# Patient Record
Sex: MALE | Race: WHITE | NOT HISPANIC OR LATINO | Employment: PART TIME | URBAN - METROPOLITAN AREA
[De-identification: names, ages, dates, MRNs, and addresses within clinical notes are randomized per-mention and may not be internally consistent; named-entity substitution may affect disease eponyms.]

---

## 2021-12-02 ENCOUNTER — APPOINTMENT (OUTPATIENT)
Dept: CARDIOLOGY | Facility: HOSPITAL | Age: 61
End: 2021-12-02
Attending: INTERNAL MEDICINE
Payer: COMMERCIAL

## 2021-12-02 ENCOUNTER — APPOINTMENT (OUTPATIENT)
Dept: CT IMAGING | Facility: HOSPITAL | Age: 61
End: 2021-12-02
Attending: EMERGENCY MEDICINE
Payer: COMMERCIAL

## 2021-12-02 ENCOUNTER — APPOINTMENT (OUTPATIENT)
Dept: RADIOLOGY | Facility: HOSPITAL | Age: 61
End: 2021-12-02
Attending: EMERGENCY MEDICINE
Payer: COMMERCIAL

## 2021-12-02 ENCOUNTER — HOSPITAL ENCOUNTER (INPATIENT)
Facility: HOSPITAL | Age: 61
LOS: 3 days | Discharge: HOME | End: 2021-12-05
Attending: EMERGENCY MEDICINE | Admitting: HOSPITALIST
Payer: COMMERCIAL

## 2021-12-02 ENCOUNTER — APPOINTMENT (OUTPATIENT)
Dept: MRI IMAGING | Facility: HOSPITAL | Age: 61
End: 2021-12-02
Attending: HOSPITALIST
Payer: COMMERCIAL

## 2021-12-02 DIAGNOSIS — I24.9 ACS (ACUTE CORONARY SYNDROME) (H): ICD-10-CM

## 2021-12-02 DIAGNOSIS — E78.5 HYPERLIPIDEMIA, UNSPECIFIED HYPERLIPIDEMIA TYPE: ICD-10-CM

## 2021-12-02 DIAGNOSIS — I10 HYPERTENSION, UNSPECIFIED TYPE: ICD-10-CM

## 2021-12-02 DIAGNOSIS — R00.1 BRADYCARDIA: ICD-10-CM

## 2021-12-02 DIAGNOSIS — I16.1 HYPERTENSIVE EMERGENCY: Primary | ICD-10-CM

## 2021-12-02 DIAGNOSIS — I21.3 ST ELEVATION MI (STEMI) (H): ICD-10-CM

## 2021-12-02 PROBLEM — Z98.890 STATUS POST CORONARY ANGIOGRAM: Status: ACTIVE | Noted: 2021-12-02

## 2021-12-02 LAB
ACT BLD: 297 SECONDS (ref 74–150)
ANION GAP SERPL CALCULATED.3IONS-SCNC: 14 MMOL/L (ref 5–18)
APTT PPP: 27 SECONDS (ref 22–38)
ATRIAL RATE - MUSE: 49 BPM
BASOPHILS # BLD AUTO: 0.1 10E3/UL (ref 0–0.2)
BASOPHILS NFR BLD AUTO: 1 %
BUN SERPL-MCNC: 18 MG/DL (ref 8–22)
CALCIUM SERPL-MCNC: 9.8 MG/DL (ref 8.5–10.5)
CATH EF ESTIMATED: 60 %
CHLORIDE BLD-SCNC: 108 MMOL/L (ref 98–107)
CHOLEST SERPL-MCNC: 187 MG/DL
CO2 SERPL-SCNC: 19 MMOL/L (ref 22–31)
CREAT SERPL-MCNC: 0.91 MG/DL (ref 0.7–1.3)
DIASTOLIC BLOOD PRESSURE - MUSE: NORMAL MMHG
EOSINOPHIL # BLD AUTO: 0.2 10E3/UL (ref 0–0.7)
EOSINOPHIL NFR BLD AUTO: 3 %
ERYTHROCYTE [DISTWIDTH] IN BLOOD BY AUTOMATED COUNT: 12.4 % (ref 10–15)
GFR SERPL CREATININE-BSD FRML MDRD: >90 ML/MIN/1.73M2
GLUCOSE BLD-MCNC: 150 MG/DL (ref 70–125)
HCT VFR BLD AUTO: 46 % (ref 40–53)
HDLC SERPL-MCNC: 51 MG/DL
HGB BLD-MCNC: 15.9 G/DL (ref 13.3–17.7)
HOLD SPECIMEN: NORMAL
IMM GRANULOCYTES # BLD: 0 10E3/UL
IMM GRANULOCYTES NFR BLD: 1 %
INR PPP: 1.05 (ref 0.9–1.15)
INTERPRETATION ECG - MUSE: NORMAL
LDLC SERPL CALC-MCNC: 109 MG/DL
LVEF ECHO: NORMAL
LYMPHOCYTES # BLD AUTO: 1.6 10E3/UL (ref 0.8–5.3)
LYMPHOCYTES NFR BLD AUTO: 25 %
MCH RBC QN AUTO: 31.9 PG (ref 26.5–33)
MCHC RBC AUTO-ENTMCNC: 34.6 G/DL (ref 31.5–36.5)
MCV RBC AUTO: 92 FL (ref 78–100)
MONOCYTES # BLD AUTO: 0.6 10E3/UL (ref 0–1.3)
MONOCYTES NFR BLD AUTO: 9 %
NEUTROPHILS # BLD AUTO: 3.9 10E3/UL (ref 1.6–8.3)
NEUTROPHILS NFR BLD AUTO: 61 %
NRBC # BLD AUTO: 0 10E3/UL
NRBC BLD AUTO-RTO: 0 /100
P AXIS - MUSE: 45 DEGREES
PLATELET # BLD AUTO: 268 10E3/UL (ref 150–450)
POTASSIUM BLD-SCNC: 3.6 MMOL/L (ref 3.5–5)
PR INTERVAL - MUSE: 200 MS
QRS DURATION - MUSE: 162 MS
QT - MUSE: 526 MS
QTC - MUSE: 475 MS
R AXIS - MUSE: -7 DEGREES
RBC # BLD AUTO: 4.99 10E6/UL (ref 4.4–5.9)
SARS-COV-2 RNA RESP QL NAA+PROBE: NEGATIVE
SODIUM SERPL-SCNC: 141 MMOL/L (ref 136–145)
SYSTOLIC BLOOD PRESSURE - MUSE: NORMAL MMHG
T AXIS - MUSE: 118 DEGREES
TRIGL SERPL-MCNC: 134 MG/DL
TROPONIN I SERPL-MCNC: 0.02 NG/ML (ref 0–0.29)
TROPONIN I SERPL-MCNC: 0.03 NG/ML (ref 0–0.29)
TROPONIN I SERPL-MCNC: 0.04 NG/ML (ref 0–0.29)
VENTRICULAR RATE- MUSE: 49 BPM
WBC # BLD AUTO: 6.4 10E3/UL (ref 4–11)

## 2021-12-02 PROCEDURE — 250N000011 HC RX IP 250 OP 636: Performed by: EMERGENCY MEDICINE

## 2021-12-02 PROCEDURE — 80048 BASIC METABOLIC PNL TOTAL CA: CPT | Performed by: EMERGENCY MEDICINE

## 2021-12-02 PROCEDURE — 99152 MOD SED SAME PHYS/QHP 5/>YRS: CPT | Performed by: INTERNAL MEDICINE

## 2021-12-02 PROCEDURE — B2111ZZ FLUOROSCOPY OF MULTIPLE CORONARY ARTERIES USING LOW OSMOLAR CONTRAST: ICD-10-PCS | Performed by: INTERNAL MEDICINE

## 2021-12-02 PROCEDURE — 85730 THROMBOPLASTIN TIME PARTIAL: CPT | Performed by: EMERGENCY MEDICINE

## 2021-12-02 PROCEDURE — 250N000013 HC RX MED GY IP 250 OP 250 PS 637: Performed by: HOSPITALIST

## 2021-12-02 PROCEDURE — 272N000001 HC OR GENERAL SUPPLY STERILE: Performed by: INTERNAL MEDICINE

## 2021-12-02 PROCEDURE — 255N000002 HC RX 255 OP 636: Performed by: INTERNAL MEDICINE

## 2021-12-02 PROCEDURE — 71045 X-RAY EXAM CHEST 1 VIEW: CPT

## 2021-12-02 PROCEDURE — C1894 INTRO/SHEATH, NON-LASER: HCPCS | Performed by: INTERNAL MEDICINE

## 2021-12-02 PROCEDURE — 250N000013 HC RX MED GY IP 250 OP 250 PS 637: Performed by: EMERGENCY MEDICINE

## 2021-12-02 PROCEDURE — 99223 1ST HOSP IP/OBS HIGH 75: CPT | Mod: AI | Performed by: HOSPITALIST

## 2021-12-02 PROCEDURE — 84484 ASSAY OF TROPONIN QUANT: CPT | Performed by: INTERNAL MEDICINE

## 2021-12-02 PROCEDURE — C9803 HOPD COVID-19 SPEC COLLECT: HCPCS

## 2021-12-02 PROCEDURE — 96374 THER/PROPH/DIAG INJ IV PUSH: CPT

## 2021-12-02 PROCEDURE — 250N000009 HC RX 250: Performed by: INTERNAL MEDICINE

## 2021-12-02 PROCEDURE — 85610 PROTHROMBIN TIME: CPT | Performed by: EMERGENCY MEDICINE

## 2021-12-02 PROCEDURE — 93458 L HRT ARTERY/VENTRICLE ANGIO: CPT | Mod: 26 | Performed by: INTERNAL MEDICINE

## 2021-12-02 PROCEDURE — 250N000011 HC RX IP 250 OP 636: Performed by: INTERNAL MEDICINE

## 2021-12-02 PROCEDURE — 99223 1ST HOSP IP/OBS HIGH 75: CPT | Mod: 25 | Performed by: INTERNAL MEDICINE

## 2021-12-02 PROCEDURE — 255N000002 HC RX 255 OP 636: Performed by: HOSPITALIST

## 2021-12-02 PROCEDURE — 85347 COAGULATION TIME ACTIVATED: CPT

## 2021-12-02 PROCEDURE — 93306 TTE W/DOPPLER COMPLETE: CPT | Mod: 26 | Performed by: INTERNAL MEDICINE

## 2021-12-02 PROCEDURE — 70553 MRI BRAIN STEM W/O & W/DYE: CPT

## 2021-12-02 PROCEDURE — 250N000013 HC RX MED GY IP 250 OP 250 PS 637: Performed by: INTERNAL MEDICINE

## 2021-12-02 PROCEDURE — 93005 ELECTROCARDIOGRAM TRACING: CPT

## 2021-12-02 PROCEDURE — 93005 ELECTROCARDIOGRAM TRACING: CPT | Performed by: EMERGENCY MEDICINE

## 2021-12-02 PROCEDURE — 85025 COMPLETE CBC W/AUTO DIFF WBC: CPT | Performed by: EMERGENCY MEDICINE

## 2021-12-02 PROCEDURE — 70450 CT HEAD/BRAIN W/O DYE: CPT

## 2021-12-02 PROCEDURE — 99285 EMERGENCY DEPT VISIT HI MDM: CPT | Mod: 25

## 2021-12-02 PROCEDURE — 80061 LIPID PANEL: CPT | Performed by: INTERNAL MEDICINE

## 2021-12-02 PROCEDURE — A9585 GADOBUTROL INJECTION: HCPCS | Performed by: HOSPITALIST

## 2021-12-02 PROCEDURE — 84484 ASSAY OF TROPONIN QUANT: CPT | Performed by: EMERGENCY MEDICINE

## 2021-12-02 PROCEDURE — 36415 COLL VENOUS BLD VENIPUNCTURE: CPT | Performed by: EMERGENCY MEDICINE

## 2021-12-02 PROCEDURE — 93458 L HRT ARTERY/VENTRICLE ANGIO: CPT | Performed by: INTERNAL MEDICINE

## 2021-12-02 PROCEDURE — C1769 GUIDE WIRE: HCPCS | Performed by: INTERNAL MEDICINE

## 2021-12-02 PROCEDURE — 93010 ELECTROCARDIOGRAM REPORT: CPT | Performed by: INTERNAL MEDICINE

## 2021-12-02 PROCEDURE — 210N000001 HC R&B IMCU HEART CARE

## 2021-12-02 PROCEDURE — B2151ZZ FLUOROSCOPY OF LEFT HEART USING LOW OSMOLAR CONTRAST: ICD-10-PCS | Performed by: INTERNAL MEDICINE

## 2021-12-02 PROCEDURE — C1887 CATHETER, GUIDING: HCPCS | Performed by: INTERNAL MEDICINE

## 2021-12-02 PROCEDURE — 87635 SARS-COV-2 COVID-19 AMP PRB: CPT | Performed by: EMERGENCY MEDICINE

## 2021-12-02 PROCEDURE — 36415 COLL VENOUS BLD VENIPUNCTURE: CPT | Performed by: INTERNAL MEDICINE

## 2021-12-02 PROCEDURE — C1725 CATH, TRANSLUMIN NON-LASER: HCPCS | Performed by: INTERNAL MEDICINE

## 2021-12-02 PROCEDURE — 4A023N7 MEASUREMENT OF CARDIAC SAMPLING AND PRESSURE, LEFT HEART, PERCUTANEOUS APPROACH: ICD-10-PCS | Performed by: INTERNAL MEDICINE

## 2021-12-02 RX ORDER — ATENOLOL 25 MG/1
25 TABLET ORAL DAILY
Status: DISCONTINUED | OUTPATIENT
Start: 2021-12-02 | End: 2021-12-02

## 2021-12-02 RX ORDER — ATROPINE SULFATE 0.1 MG/ML
0.5 INJECTION INTRAVENOUS
Status: ACTIVE | OUTPATIENT
Start: 2021-12-02 | End: 2021-12-02

## 2021-12-02 RX ORDER — HYDRALAZINE HYDROCHLORIDE 20 MG/ML
10 INJECTION INTRAMUSCULAR; INTRAVENOUS
Status: COMPLETED | OUTPATIENT
Start: 2021-12-02 | End: 2021-12-02

## 2021-12-02 RX ORDER — HYDROMORPHONE HCL IN WATER/PF 6 MG/30 ML
.2-.4 PATIENT CONTROLLED ANALGESIA SYRINGE INTRAVENOUS
Status: DISCONTINUED | OUTPATIENT
Start: 2021-12-02 | End: 2021-12-05 | Stop reason: HOSPADM

## 2021-12-02 RX ORDER — POLYETHYLENE GLYCOL 3350 17 G/17G
17 POWDER, FOR SOLUTION ORAL DAILY PRN
Status: DISCONTINUED | OUTPATIENT
Start: 2021-12-02 | End: 2021-12-05 | Stop reason: HOSPADM

## 2021-12-02 RX ORDER — ATORVASTATIN CALCIUM 40 MG/1
80 TABLET, FILM COATED ORAL DAILY
Status: DISCONTINUED | OUTPATIENT
Start: 2021-12-03 | End: 2021-12-05 | Stop reason: HOSPADM

## 2021-12-02 RX ORDER — IODIXANOL 320 MG/ML
INJECTION, SOLUTION INTRAVASCULAR
Status: DISCONTINUED | OUTPATIENT
Start: 2021-12-02 | End: 2021-12-02 | Stop reason: HOSPADM

## 2021-12-02 RX ORDER — FENTANYL CITRATE 50 UG/ML
INJECTION, SOLUTION INTRAMUSCULAR; INTRAVENOUS
Status: DISCONTINUED | OUTPATIENT
Start: 2021-12-02 | End: 2021-12-02 | Stop reason: HOSPADM

## 2021-12-02 RX ORDER — ATENOLOL 25 MG/1
50 TABLET ORAL DAILY
Status: DISCONTINUED | OUTPATIENT
Start: 2021-12-03 | End: 2021-12-04

## 2021-12-02 RX ORDER — ATORVASTATIN CALCIUM 40 MG/1
40 TABLET, FILM COATED ORAL DAILY
Status: ON HOLD | COMMUNITY
End: 2021-12-05

## 2021-12-02 RX ORDER — METHOCARBAMOL 500 MG/1
500 TABLET, FILM COATED ORAL 4 TIMES DAILY
Status: DISCONTINUED | OUTPATIENT
Start: 2021-12-02 | End: 2021-12-03

## 2021-12-02 RX ORDER — LANOLIN ALCOHOL/MO/W.PET/CERES
3 CREAM (GRAM) TOPICAL
Status: DISCONTINUED | OUTPATIENT
Start: 2021-12-02 | End: 2021-12-05 | Stop reason: HOSPADM

## 2021-12-02 RX ORDER — SERTRALINE HYDROCHLORIDE 100 MG/1
100 TABLET, FILM COATED ORAL DAILY
Status: ON HOLD | COMMUNITY
End: 2021-12-05

## 2021-12-02 RX ORDER — OXYCODONE HYDROCHLORIDE 5 MG/1
5 TABLET ORAL EVERY 4 HOURS PRN
Status: DISCONTINUED | OUTPATIENT
Start: 2021-12-02 | End: 2021-12-05 | Stop reason: HOSPADM

## 2021-12-02 RX ORDER — ACETAMINOPHEN 325 MG/1
650 TABLET ORAL EVERY 4 HOURS PRN
Status: DISCONTINUED | OUTPATIENT
Start: 2021-12-02 | End: 2021-12-05 | Stop reason: HOSPADM

## 2021-12-02 RX ORDER — OXYCODONE HYDROCHLORIDE 5 MG/1
10 TABLET ORAL EVERY 4 HOURS PRN
Status: DISCONTINUED | OUTPATIENT
Start: 2021-12-02 | End: 2021-12-05 | Stop reason: HOSPADM

## 2021-12-02 RX ORDER — CARBOXYMETHYLCELLULOSE SODIUM 5 MG/ML
1 SOLUTION/ DROPS OPHTHALMIC 3 TIMES DAILY PRN
Status: DISCONTINUED | OUTPATIENT
Start: 2021-12-02 | End: 2021-12-05 | Stop reason: HOSPADM

## 2021-12-02 RX ORDER — GADOBUTROL 604.72 MG/ML
10 INJECTION INTRAVENOUS ONCE
Status: COMPLETED | OUTPATIENT
Start: 2021-12-02 | End: 2021-12-02

## 2021-12-02 RX ORDER — ATORVASTATIN CALCIUM 40 MG/1
40 TABLET, FILM COATED ORAL DAILY
Status: DISCONTINUED | OUTPATIENT
Start: 2021-12-02 | End: 2021-12-02

## 2021-12-02 RX ORDER — NALOXONE HYDROCHLORIDE 0.4 MG/ML
0.2 INJECTION, SOLUTION INTRAMUSCULAR; INTRAVENOUS; SUBCUTANEOUS
Status: ACTIVE | OUTPATIENT
Start: 2021-12-02 | End: 2021-12-02

## 2021-12-02 RX ORDER — CALCIUM CARBONATE 500 MG/1
500 TABLET, CHEWABLE ORAL 3 TIMES DAILY PRN
Status: DISCONTINUED | OUTPATIENT
Start: 2021-12-02 | End: 2021-12-05 | Stop reason: HOSPADM

## 2021-12-02 RX ORDER — NALOXONE HYDROCHLORIDE 0.4 MG/ML
0.4 INJECTION, SOLUTION INTRAMUSCULAR; INTRAVENOUS; SUBCUTANEOUS
Status: ACTIVE | OUTPATIENT
Start: 2021-12-02 | End: 2021-12-02

## 2021-12-02 RX ORDER — NITROGLYCERIN 0.4 MG/1
TABLET SUBLINGUAL
Qty: 25 TABLET | Refills: 3 | Status: SHIPPED | OUTPATIENT
Start: 2021-12-02 | End: 2021-12-05

## 2021-12-02 RX ORDER — ATENOLOL 50 MG/1
50 TABLET ORAL DAILY
Status: ON HOLD | COMMUNITY
End: 2021-12-05

## 2021-12-02 RX ORDER — ONDANSETRON 2 MG/ML
4 INJECTION INTRAMUSCULAR; INTRAVENOUS EVERY 6 HOURS PRN
Status: DISCONTINUED | OUTPATIENT
Start: 2021-12-02 | End: 2021-12-05 | Stop reason: HOSPADM

## 2021-12-02 RX ORDER — ASPIRIN 81 MG/1
324 TABLET, CHEWABLE ORAL ONCE
Status: COMPLETED | OUTPATIENT
Start: 2021-12-02 | End: 2021-12-02

## 2021-12-02 RX ORDER — FLUMAZENIL 0.1 MG/ML
0.2 INJECTION, SOLUTION INTRAVENOUS
Status: ACTIVE | OUTPATIENT
Start: 2021-12-02 | End: 2021-12-02

## 2021-12-02 RX ORDER — BISACODYL 10 MG
10 SUPPOSITORY, RECTAL RECTAL DAILY PRN
Status: DISCONTINUED | OUTPATIENT
Start: 2021-12-02 | End: 2021-12-05 | Stop reason: HOSPADM

## 2021-12-02 RX ORDER — ASPIRIN 81 MG/1
81 TABLET ORAL DAILY
Status: DISCONTINUED | OUTPATIENT
Start: 2021-12-02 | End: 2021-12-05 | Stop reason: HOSPADM

## 2021-12-02 RX ORDER — ENALAPRILAT 1.25 MG/ML
1.25 INJECTION INTRAVENOUS EVERY 6 HOURS PRN
Status: DISCONTINUED | OUTPATIENT
Start: 2021-12-02 | End: 2021-12-05 | Stop reason: HOSPADM

## 2021-12-02 RX ORDER — FENTANYL CITRATE 50 UG/ML
25 INJECTION, SOLUTION INTRAMUSCULAR; INTRAVENOUS
Status: DISCONTINUED | OUTPATIENT
Start: 2021-12-02 | End: 2021-12-05 | Stop reason: HOSPADM

## 2021-12-02 RX ORDER — SODIUM CHLORIDE 9 MG/ML
75 INJECTION, SOLUTION INTRAVENOUS CONTINUOUS
Status: DISCONTINUED | OUTPATIENT
Start: 2021-12-02 | End: 2021-12-02

## 2021-12-02 RX ORDER — LISINOPRIL 5 MG/1
10 TABLET ORAL DAILY
Status: DISCONTINUED | OUTPATIENT
Start: 2021-12-02 | End: 2021-12-05 | Stop reason: HOSPADM

## 2021-12-02 RX ADMIN — GADOBUTROL 10 ML: 604.72 INJECTION INTRAVENOUS at 20:27

## 2021-12-02 RX ADMIN — PERFLUTREN 3 ML: 6.52 INJECTION, SUSPENSION INTRAVENOUS at 11:03

## 2021-12-02 RX ADMIN — TICAGRELOR 180 MG: 90 TABLET ORAL at 08:41

## 2021-12-02 RX ADMIN — METHOCARBAMOL 500 MG: 500 TABLET, FILM COATED ORAL at 18:22

## 2021-12-02 RX ADMIN — HEPARIN SODIUM 8000 UNITS: 1000 INJECTION INTRAVENOUS; SUBCUTANEOUS at 08:45

## 2021-12-02 RX ADMIN — ASPIRIN 81 MG 324 MG: 81 TABLET ORAL at 08:25

## 2021-12-02 RX ADMIN — HYDRALAZINE HYDROCHLORIDE 10 MG: 20 INJECTION INTRAMUSCULAR; INTRAVENOUS at 17:29

## 2021-12-02 RX ADMIN — LISINOPRIL 10 MG: 5 TABLET ORAL at 11:09

## 2021-12-02 RX ADMIN — METHOCARBAMOL 500 MG: 500 TABLET, FILM COATED ORAL at 23:46

## 2021-12-02 ASSESSMENT — ACTIVITIES OF DAILY LIVING (ADL)
ADLS_ACUITY_SCORE: 5
ADLS_ACUITY_SCORE: 3
ADLS_ACUITY_SCORE: 11
ADLS_ACUITY_SCORE: 3
CONCENTRATING,_REMEMBERING_OR_MAKING_DECISIONS_DIFFICULTY: NO
ADLS_ACUITY_SCORE: 11
NUMBER_OF_TIMES_PATIENT_HAS_FALLEN_WITHIN_LAST_SIX_MONTHS: 0
DOING_ERRANDS_INDEPENDENTLY_DIFFICULTY: NO
ADLS_ACUITY_SCORE: 5
WALKING_OR_CLIMBING_STAIRS_DIFFICULTY: NO
DIFFICULTY_COMMUNICATING: NO
DIFFICULTY_EATING/SWALLOWING: NO
ADLS_ACUITY_SCORE: 5
ADLS_ACUITY_SCORE: 3
ADLS_ACUITY_SCORE: 3
WEAR_GLASSES_OR_BLIND: NO
DRESSING/BATHING_DIFFICULTY: NO
ADLS_ACUITY_SCORE: 5
FALL_HISTORY_WITHIN_LAST_SIX_MONTHS: NO
ADLS_ACUITY_SCORE: 11
TOILETING_ISSUES: NO
HEARING_DIFFICULTY_OR_DEAF: NO
ADLS_ACUITY_SCORE: 11

## 2021-12-02 ASSESSMENT — MIFFLIN-ST. JEOR
SCORE: 1997.87
SCORE: 2005.58

## 2021-12-02 NOTE — LETTER
[facility] HEART CARE  [facility] [facility] [address]  Phone: [phone]  Fax: [phone]    December 5, 2021        Abraham Dong  [address]  [address]  [place] [id number]          To Whom It May Concern:    Abraham Dong was admitted to our facility from 12/2/2021 to 12/5/2021. Please excuse him from work.    He may return to work without restriction on 12/13/2021. If you have questions or concerns about this message please contact Mr. Dong directly for more information.    Sincerely,    Keri Rodas MD

## 2021-12-02 NOTE — Clinical Note
left ventricle Cine(s)  injected and visualized utilizing power injector system.Rate (mL/sec) 10 Total Volume (mL) 40

## 2021-12-02 NOTE — PLAN OF CARE
Problem: Hypertension Comorbidity  Goal: Blood Pressure in Desired Range  Outcome: No Change     Received from [facility] [place], alert/orient, TR band removed prior to transfer to floor, right radial site intact, no bleeding, no hematoma, continues to have vertigo, and wants head flat, and no lights on. BP running 150-160/70s

## 2021-12-02 NOTE — ED TRIAGE NOTES
Patient arrived by EMS due to having dizziness and lightheadedness, feeling like spinning, Patient in route had BP of 210/130. Patient reliving 2L via NC. Patient is not on blood thinners, no COVID exposure, no C/O C/P. Fall aprox 45 min in bathroom, did not hit head. Chest pressure.

## 2021-12-02 NOTE — CONSULTS
"  HEART CARE CONSULTATON NOTE        Assessment/Recommendations   Assessment/Plan:  Chest pressure - likely related to his severe hypertension. There is mild atherosclerosis so I will titrate up his statin medication and continue his aspirin. Continue home does atenolol 50 mg daily and start lisinopril 10mg daily.     Vertigo - I have ordered a CT head. If that is negative we may need to get a brain MRA head/neck to rule out posterior circulation infarct causing his sudden vertigo.     Hypertension - as above    Hyperlipidemia - as above    F/U with Dr. Ferrell in 4 weeks after discharge       History of Present Illness/Subjective    HPI: Abraham Dong is a 61 year old male who is obese, with hypertension, hyperlipemia, depression and a chronically abnormal EKG who presented to our ER via EMS this morning with chest pressure. He started experiencing dizziness with vertigo yesterday around noon and then noted some dyspnea and central chest pressure. His ex-wife called EMS this morning and he was found to be diaphoretic and very hypertensive with /130 mmHg. EMS EKG showed diffuse ST segment depressions and elevation in AVR. He was given aspirin and SL NTG in the EMS and the cath lab was activated from the field. In the ER his chest pain had resolved and his EKG was improving but his vertigo persisted with any head movement. He was brought to the cath lab and found to have mild coronary disease and a severely elevated LV EDP. EF was grossly normal with possible apical hypertrophy vs diverticulum.        Physical Examination  Review of Systems   VITALS: BP (!) 147/92   Pulse 61   Temp (!) 96.6  F (35.9  C) (Rectal)   Resp 10   Ht 1.854 m (6' 1\")   Wt 113.4 kg (250 lb)   SpO2 99%   BMI 32.98 kg/m    BMI: Body mass index is 32.98 kg/m .  Wt Readings from Last 3 Encounters:   12/02/21 113.4 kg (250 lb)     No intake or output data in the 24 hours ending 12/02/21 0847  General Appearance:   no distress, " normal body habitus   ENT/Mouth: membranes moist, no oral lesions or bleeding gums.      EYES:  no scleral icterus, normal conjunctivae   Neck: no carotid bruits or thyromegaly   Chest/Lungs:   lungs are clear to auscultation, no rales or wheezing, no sternal scar, equal chest wall expansion    Cardiovascular:   Regular. Normal first and second heart sounds with no murmurs, rubs, or gallops; the carotid, radial and posterior tibial pulses are intact, Jugular venous pressure flat, no edema bilaterally    Abdomen:  no organomegaly, masses, bruits, or tenderness; bowel sounds are present   Extremities: no cyanosis or clubbing   Skin: no xanthelasma, warm.    Neurologic: normal  bilateral, no tremors     Psychiatric: alert and oriented x3, calm     Review Of Systems  Vertigo, otherwise 10/14 systems are reviewed and are negative except as per the HPI          Lab Results    Chemistry/lipid CBC Cardiac Enzymes/BNP/TSH/INR   No results for input(s): CHOL, HDL, LDL, TRIG, CHOLHDLRATIO in the last 65561 hours.  No results for input(s): LDL in the last 90818 hours.  No results for input(s): NA, POTASSIUM, CHLORIDE, CO2, GLC, BUN, CR, GFRESTIMATED, TEGAN in the last 72849 hours.    Invalid input(s): GRFESTBLACK  No results for input(s): CR in the last 84616 hours.  No results for input(s): A1C in the last 78218 hours.       Recent Labs   Lab Test 12/02/21  0828   WBC 6.4   HGB 15.9   HCT 46.0   MCV 92        Recent Labs   Lab Test 12/02/21  0828   HGB 15.9    No results for input(s): TROPONINI in the last 15615 hours.  No results for input(s): BNP, NTBNPI, NTBNP in the last 05293 hours.  No results for input(s): TSH in the last 65962 hours.  No results for input(s): INR in the last 44812 hours.     Medical History  Surgical History Family History Social History   HTN  Hyperlipidemia  Depression  Obesity   Arthroscopy, vasectomy, hernia No heart history   Social History     Socioeconomic History     Marital status:       Spouse name: Not on file     Number of children: Not on file     Years of education: Not on file     Highest education level: Not on file   Occupational History     Not on file   Tobacco Use     Smoking status: Not on file     Smokeless tobacco: Not on file   Substance and Sexual Activity     Alcohol use: Not on file     Drug use: Not on file     Sexual activity: Not on file   Other Topics Concern     Not on file   Social History Narrative     Not on file     Social Determinants of Health     Financial Resource Strain: Not on file   Food Insecurity: Not on file   Transportation Needs: Not on file   Physical Activity: Not on file   Stress: Not on file   Social Connections: Not on file   Intimate Partner Violence: Not on file   Housing Stability: Not on file    Non smoker, 8 beers per week     Medications  Allergies   No current outpatient medications on file.    Atenolol 50, atorvastatin 40, zoloft 100  NKDA     Radha Ferrell MD

## 2021-12-02 NOTE — ED PROVIDER NOTES
"EMERGENCY DEPARTMENT ENCOUNTER            IMPRESSION:  Acute coronary syndrome-STEMI      MEDICAL DECISION MAKING:  Patient brought in by paramedics for acute coronary syndrome.  He developed chest heaviness shortness of breath and dizziness over the last 12 hours.  Prehospital EKG showed evidence of ischemia.  Level One was initiated.  He was given aspirin and nitroglycerin prehospital which lessened his chest heaviness.    On initial exam he appears mildly ill.  His vital signs are unremarkable.  Cardiopulmonary exam is normal.    He was placed on cardiac monitor    EKG shows some ST elevation in aVR and diffuse ST depressions with some T wave inversions    STEMI orders were initiated.    I spoke with his ex-wife who is at the bedside.    I spoke with on-call cardiology regarding the case.    His history physical and EKG are convincing for acute coronary syndrome.  Patient will be probably transported to the interventional Cath Lab      =================================================================  CHIEF COMPLAINT:  Chief Complaint   Patient presents with     Dizziness         HPI  Ruddy Dong is a 61 year old male with a history of depression, HTN, HLD, and obesity who presents to the ED by EMS for evaluation of dizziness.    Since yesterday at 12:00 PM (20 hours ago), patient reports chest \"heaviness,\" shortness of breath, nausea, emesis, lightheadedness, and dizziness. He describes the dizziness as \"room-spinning.\" He initially attributed his symptoms to the flu. Today, his ex-wife called EMS. Upon arrival, EMS found patient to be diaphoretic with blood pressure 210/130. Three EKGs taken with concern for STEMI. They gave patient 324 mg Asprin and 0.4 mg nitro. Repeat blood pressure reading of 183/10. Patient on 2L O2. No other known complaints at this time. Patient denies history of STEMI or vertigo.      IMary Ellen am serving as a scribe to document services personally performed by  " "David Forbes MD, based on my observation and the provider's statements to me. I, Dr. David Forbes MD attest that Mary Ellen Nelson is acting in a scribe capacity, has observed my performance of the services and has documented them in accordance with my direction.      REVIEW OF SYSTEMS   Constitutional: Denies fever, chills, unintentional weight loss or fatigue   Eyes: Denies visual changes or discharge    HENT: Denies sore throat, ear pain or neck pain  Respiratory: Denies cough Positive for shortness of breath   Cardiovascular: Denies palpitations or leg swelling Positive for chest \"heaviness\"  GI: Denies abdominal pain, or dark, bloody stools. Positive for nausea and emesis   : Denies hematuria, dysuria, or flank pain  Musculoskeletal: Denies any new back pain or new muscle/joint pains  Skin: Denies rash or wound  Neurologic: Denies current headache, new weakness, focal weakness, or sensory changes Positive for lightheadedness and dizziness   Lymphatic: Denies swollen glands    Psychiatric: Denies depression, suicidal ideation or homicidal ideation.    Remainder of systems reviewed, unless noted in HPI all others negative.      PAST MEDICAL HISTORY:  History reviewed. No pertinent past medical history except as otherwise stated.    PAST SURGICAL HISTORY:  History reviewed. No pertinent surgical history.      CURRENT MEDICATIONS:    No current outpatient medications on file.      ALLERGIES:  Not on File    FAMILY HISTORY:  No family history on file.    SOCIAL HISTORY:   Social History     Socioeconomic History     Marital status:      Spouse name: Not on file     Number of children: Not on file     Years of education: Not on file     Highest education level: Not on file   Occupational History     Not on file   Tobacco Use     Smoking status: Not on file     Smokeless tobacco: Not on file   Substance and Sexual Activity     Alcohol use: Not on file     Drug use: Not on file     Sexual activity: Not on file " "  Other Topics Concern     Not on file   Social History Narrative     Not on file     Social Determinants of Health     Financial Resource Strain: Not on file   Food Insecurity: Not on file   Transportation Needs: Not on file   Physical Activity: Not on file   Stress: Not on file   Social Connections: Not on file   Intimate Partner Violence: Not on file   Housing Stability: Not on file       PHYSICAL EXAM:    BP (!) 145/82   Pulse 66   Temp (!) 96.6  F (35.9  C) (Rectal)   Resp 22   Ht 1.854 m (6' 1\")   Wt 113.4 kg (250 lb)   SpO2 99%   BMI 32.98 kg/m      Constitutional: Awake, alert, in no acute distress, he appears ill  Head: Normocephalic, atraumatic.  ENT: Mucous membranes moist. Posterior oropharynx appears normal.  Eyes: Pupils midrange and reactive ,no conjunctival discharge  Neck: No lymphadenopathy, no stridor, supple, soft tissue swelling  Chest: No tenderness   Respiratory: Respirations even, unlabored. Lungs clear to ascultation bilaterally, in no acute respiratory distress.  Cardiovascular: Regular rate and rhythm.+2 radial pulses, equal bilaterally.  No murmurs.   GI: Abdomen soft, non-tender to palpation in all 4 quadrants. No guarding or rebound. Bowel sounds intact on all 4 quadrants.   Back: No CVA tenderness.    Musculoskeletal: Moves all 4 extremities equally, strength symmetrical on bilateral uppers and lowers.  No peripheral edema  Integument: Warm, dry. No rash. No bruising or petechiae.  Lymphatic: No cervical lymphadenopathy  Neurologic: Alert & oriented x 3. Normal speech. Grossly normal motor and sensory function. No focal deficits noted.  NIHSS = 0  Psychiatric: Normal mood and affect. Normal judgement.    ED COURSE:  8:17 AM I met with patient for initial interview and exam. PPE includes surgical mask.    8:24 AM Patient's ex-wife is on the way here to the ED.  8:29 AM I discussed case with   - cardiologist.    LAB:  All pertinent labs reviewed and interpreted.  Results for " orders placed or performed during the hospital encounter of 12/02/21   CBC with platelets and differential   Result Value Ref Range    WBC Count 6.4 4.0 - 11.0 10e3/uL    RBC Count 4.99 4.40 - 5.90 10e6/uL    Hemoglobin 15.9 13.3 - 17.7 g/dL    Hematocrit 46.0 40.0 - 53.0 %    MCV 92 78 - 100 fL    MCH 31.9 26.5 - 33.0 pg    MCHC 34.6 31.5 - 36.5 g/dL    RDW 12.4 10.0 - 15.0 %    Platelet Count 268 150 - 450 10e3/uL    % Neutrophils 61 %    % Lymphocytes 25 %    % Monocytes 9 %    % Eosinophils 3 %    % Basophils 1 %    % Immature Granulocytes 1 %    NRBCs per 100 WBC 0 <1 /100    Absolute Neutrophils 3.9 1.6 - 8.3 10e3/uL    Absolute Lymphocytes 1.6 0.8 - 5.3 10e3/uL    Absolute Monocytes 0.6 0.0 - 1.3 10e3/uL    Absolute Eosinophils 0.2 0.0 - 0.7 10e3/uL    Absolute Basophils 0.1 0.0 - 0.2 10e3/uL    Absolute Immature Granulocytes 0.0 <=0.4 10e3/uL    Absolute NRBCs 0.0 10e3/uL       RADIOLOGY:  Reviewed all pertinent imaging. Please see official radiology report.  XR Chest Port 1 View    (Results Pending)   Cardiac Catheterization    (Results Pending)        EKG:    There are diffuse ST depressions along with T wave inversions.  Elevation in aVR.  Comparison EKG without similar findings    I have independently reviewed and interpreted the EKG(s) documented above.    PROCEDURES:   Critical Care Time 30 minutes excluding procedures      MEDICATIONS GIVEN IN THE EMERGENCY:  Medications   ticagrelor (BRILINTA) tablet 180 mg (has no administration in time range)   heparin (porcine) injection 1000 units/mL (rounds in 500 unit increments) (has no administration in time range)   aspirin (ASA) chewable tablet 324 mg (324 mg Oral Given by Other 12/2/21 2326)           NEW PRESCRIPTIONS STARTED AT TODAY'S ER VISIT:  New Prescriptions    No medications on file          FINAL DIAGNOSIS:    ICD-10-CM    1. ST elevation MI (STEMI) (H)  I21.3 Cardiac Catheterization     Cardiac Catheterization   2. ACS (acute coronary syndrome)  (H)  I24.9 Cardiac Catheterization     Cardiac Catheterization            At the conclusion of the encounter I discussed the results of all of the tests and the disposition. The questions were answered. The patient or family acknowledged understanding and was agreeable with the care plan.     NAME: Ruddy Dong  AGE: 61 year old male  YOB: 1960  MRN: [id number]  EVALUATION DATE & TIME: 12/2/2021  8:19 AM    PCP: No primary care provider on file.    ED PROVIDER: David Forbes M.D.      Mary Ellen KERR, am serving as a scribe to document services personally performed by Dr. David Forbes based on my observation and the provider's statements to me. I, David Forbes MD attest that Mary Ellen Nelson is acting in a scribe capacity, has observed my performance of the services and has documented them in accordance with my direction.    David Forbes M.D.  Emergency Medicine  [facility] EMERGENCY DEPARTMENT  [facility] [facility] [address]  [phone]  Dept: [phone]  12/2/2021       David Forbes MD  12/02/21 0847

## 2021-12-02 NOTE — PROGRESS NOTES
Dizziness continues.  Continue bedrest.  Wrist stable.  Transfer with our Tele pack.  Belongings sent.  No questions.

## 2021-12-02 NOTE — PHARMACY-ADMISSION MEDICATION HISTORY
"Pharmacy Note - Admission Medication History    Pertinent Provider Information: Nitorglycerin and aspirin were added post procedure.      ______________________________________________________________________    Prior To Admission (PTA) med list completed and updated in EMR.       PTA Med List   Medication Sig Last Dose     acetaminophen-caffeine (EXCEDRIN TENSION HEADACHE) 500-65 MG TABS Take 2 tablets by mouth daily as needed for mild pain 12/2/2021 at am     [START ON 12/3/2021] aspirin (ASA) 81 MG EC tablet Take 1 tablet (81 mg) by mouth daily Start tomorrow morning.      atenolol (TENORMIN) 50 MG tablet Take 50 mg by mouth daily 12/1/2021 at am     atorvastatin (LIPITOR) 40 MG tablet Take 40 mg by mouth daily 12/1/2021 at am     nitroGLYcerin (NITROSTAT) 0.4 MG sublingual tablet One tablet under the tongue every 5 minutes if needed for chest pain. May repeat every 5 minutes for a maximum of 3 doses in 15 minutes\"      sertraline (ZOLOFT) 100 MG tablet Take 100 mg by mouth daily 12/1/2021 at am       Information source(s): Patient, Family member and Patient's pharmacy  Method of interview communication: in-person    Summary of Changes to PTA Med List  New: Atenolol, atorvastatin,sertraline, excedrin. Just added post procedure aspirin and nitroglycerin  Discontinued: none  Changed: none    Patient was asked about OTC/herbal products specifically.  PTA med list reflects this.    In the past week, patient estimated taking medication this percent of the time:  greater than 90%.    Allergies were reviewed, assessed, and updated with the patient.      Patient does not use any multi-dose medications prior to admission.    The information provided in this note is only as accurate as the sources available at the time of the update(s).    Thank you for the opportunity to participate in the care of this patient.    Ke Garcia, PharmD  12/2/2021 11:28 AM    "

## 2021-12-02 NOTE — H&P
[facility]    History and Physical - Hospitalist Service       Date of Admission:  2021  Abraham Dong,  1960, MRN [id number]  PCP: Physician No Ref-Primary    Assessment & Plan      Abraham Dong is a 61 year old male admitted on 2021. He has history of hypertension and hyperlipidemia and presents for evaluation of new onset dizziness and chest pressure.  He was sent directly to the Cath Lab from the ER.    #Chest pressure  Patient presented with dizziness and chest pressure, EKG showed sinus bradycardia with new anterior ST depressions and right bundle branch block.  Patient was sent directly to the Cath Lab.  Angiogram showed tortuous coronary arteries with only mild atherosclerosis.  Chest pressure has resolved.  Cardiology recommend continue high-dose statin indefinitely.  Continue home aspirin.  Cardiology recommend close management of blood pressure, continuing home atenolol and lisinopril was added.  Patient to follow-up with cardiology in 4 weeks    #Vertigo  #Posterior headache  Patient with severe dizziness since this morning, bilateral nystagmus L > R on exam.  Noncontrast CT head normal.  Notes stress with a new job involves a lot of heavy lifting and he is considering quitting.  No previous history of migraine.  Possible atypical migraine.  Ordered MRI with contrast to rule out stroke, vertebral artery dissection etc.  No hearing loss, did note some chills.  Labyrinthitis possible.  We will ask neurology for assistance with evaluation and management.  Start muscle relaxer, IV dilaudid    #Hyperlipidemia  As above with mild atherosclerosis noted on coronary angiogram.  .  Home atorvastatin 40 mg, cardiology increased to 80 mg.    #Hypertensive urgency  Noted by EMS to have blood pressure 210/130  Continue home atenolol, cardiology added lisinopril.  IV enalapril as needed for severely elevated blood pressure    #Some component of acute diastolic heart  "failure  Noted to have extremely elevated LV end-diastolic pressure during angiogram  Does not appear grossly volume overloaded at this time, but hypertensive urgency probably due to some degree of volume overload.  Would use enalapril for severe hypertension tonight.    #Depression, home Zoloft       Diet: Low Saturated Fat Na <2400 mg    DVT Prophylaxis: Moderate risk. ambulation    So Catheter: Not present  Central Lines: None  Code Status:   Full    Clinically Significant Risk Factors Present on Admission                   Disposition Plan   Expected discharge: 12/04/2021 recommended to prior living arrangement once symptoms improved.     The patient's care was discussed with the Patient.    Keri Rodas MD  [facility]  Text page via [facility] Paging/Directory      ______________________________________________________________________    Chief Complaint   Abraham Dong is a 61 year old male who presents for evaluation of dizziness and chest pressure    History of Present Illness   Patient reports starting yesterday afternoon he has felt \"off\" with fatigue and mild dizziness.  He went to bed early at 6 PM yesterday and slept all night.  He woke this morning feeling much more dizzy and could not even stand up.  He notes he can hardly keep his eyes open due to severe sensation of dizziness.  Does not notice any correlation with gaze direction.  He does notice symptoms are worse when his head is up.  He feels better when he is laying down.  He has a severe headache located in the back of his neck wrapping around to the top of his head and even into his abdomen.  He has been having some sweats.  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  He endorses a chest heaviness when he first came in which has resolved now.  He has " been nauseated but has not vomited.  He denies any constipation, diarrhea, urinary issues, rash, wound, swelling, change in hearing, cough, rhinorrhea    [facility] [place] was not notified of patient's admission. Different hospitalist noted their name assigned to this patient and inquired with [facility] and this is how we became aware of consult.    Review of Systems    The 10 point Review of Systems is negative other than noted in the HPI or here.    Past Medical History    I have reviewed this patient's medical history and updated it with pertinent information if needed.   Past Medical History:   Diagnosis Date     Benign essential hypertension      Hyperlipidemia      Obesity        Past Surgical History   I have reviewed this patient's surgical history and updated it with pertinent information if needed.  Past Surgical History:   Procedure Laterality Date     HERNIA REPAIR       KNEE SURGERY       TONSILLECTOMY       VASECTOMY         Social History   I have reviewed this patient's social history and updated it with pertinent information if needed.  Social History     Tobacco Use     Smoking status: Never Smoker     Smokeless tobacco: None   Substance Use Topics     Alcohol use: Yes     Alcohol/week: 6.0 standard drinks     Types: 6 Standard drinks or equivalent per week     Drug use: Never       Family History   I have reviewed this patient's family history and updated it with pertinent information if needed.  Family History   Problem Relation Age of Onset     Diabetes Mother      Hypertension Mother      Coronary Artery Disease No family hx of      Cerebrovascular Disease No family hx of        Prior to Admission Medications   Prior to Admission Medications   Prescriptions Last Dose Informant Patient Reported? Taking?   acetaminophen-caffeine (EXCEDRIN TENSION HEADACHE) 500-65 MG TABS 12/2/2021 at am  Yes Yes   Sig: Take 2 tablets by mouth daily as needed for mild pain   atenolol (TENORMIN) 50 MG tablet 12/1/2021 at am  Yes Yes    Sig: Take 50 mg by mouth daily   atorvastatin (LIPITOR) 40 MG tablet 12/1/2021 at am  Yes Yes   Sig: Take 40 mg by mouth daily   sertraline (ZOLOFT) 100 MG tablet 12/1/2021 at am  Yes Yes   Sig: Take 100 mg by mouth daily      Facility-Administered Medications: None     Allergies   No Known Allergies    Physical Exam   Vital Signs: Temp: 97.8  F (36.6  C) Temp src: Oral BP: (!) 171/94 Pulse: 64   Resp: 20 SpO2: 99 % O2 Device: None (Room air)    Weight: 251 lbs 11.2 oz    General: in no apparent distress, non-toxic and alert male lying in hospital bed oriented x3. Mildly restless  HEENT: Head normocephalic atraumatic, oral mucosa moist. Sclerae anicteric  CV: Regular rhythm, normal rate, no murmurs  Resp: No wheezes, no rales or rhonchi, no focal consolidations  GI: Belly soft, nondistended, nontender, bowel sounds present  Skin: No rashes or lesions  Extremities: No peripheral edema  Psych: Normal affect, mildly anxious mood  Neuro: Strength 5/5 in BUE and BLE, sensation intact BUE and BLE.  He spontaneously supinated the left ankle quite dramatically with straight leg raise but did not do it a subsequent time when this was pointed out to him, and he has a similar range of motion on the right.  He has bilateral nystagmus, leftward beating more dramatic than right.  Not able to provoke vertigo with exam.  He was noted to be closing his eyes very frequently throughout the exam citing vertigo anytime he has his eyes open for any length of time.    Data   Data reviewed today: I reviewed all medications, new labs and imaging results over the last 24 hours.  EKG personally reviewed shows: Sinus bradycardia, rate 49 bpm.  New right bundle branch block compared to previous.  Anterior ST depressions new compared to previous.  Lateral ST depressions unchanged from previous.    Recent Results (from the past 12 hour(s))   INR    Collection Time: 12/02/21  8:28 AM   Result Value Ref Range    INR 1.05 0.90 - 1.15   Partial  thromboplastin time    Collection Time: 12/02/21  8:28 AM   Result Value Ref Range    aPTT 27 22 - 38 Seconds   Basic metabolic panel    Collection Time: 12/02/21  8:28 AM   Result Value Ref Range    Sodium 141 136 - 145 mmol/L    Potassium 3.6 3.5 - 5.0 mmol/L    Chloride 108 (H) 98 - 107 mmol/L    Carbon Dioxide (CO2) 19 (L) 22 - 31 mmol/L    Anion Gap 14 5 - 18 mmol/L    Urea Nitrogen 18 8 - 22 mg/dL    Creatinine 0.91 0.70 - 1.30 mg/dL    Calcium 9.8 8.5 - 10.5 mg/dL    Glucose 150 (H) 70 - 125 mg/dL    GFR Estimate >90 >60 mL/min/1.73m2   Troponin I    Collection Time: 12/02/21  8:28 AM   Result Value Ref Range    Troponin I 0.02 0.00 - 0.29 ng/mL   CBC with platelets and differential    Collection Time: 12/02/21  8:28 AM   Result Value Ref Range    WBC Count 6.4 4.0 - 11.0 10e3/uL    RBC Count 4.99 4.40 - 5.90 10e6/uL    Hemoglobin 15.9 13.3 - 17.7 g/dL    Hematocrit 46.0 40.0 - 53.0 %    MCV 92 78 - 100 fL    MCH 31.9 26.5 - 33.0 pg    MCHC 34.6 31.5 - 36.5 g/dL    RDW 12.4 10.0 - 15.0 %    Platelet Count 268 150 - 450 10e3/uL    % Neutrophils 61 %    % Lymphocytes 25 %    % Monocytes 9 %    % Eosinophils 3 %    % Basophils 1 %    % Immature Granulocytes 1 %    NRBCs per 100 WBC 0 <1 /100    Absolute Neutrophils 3.9 1.6 - 8.3 10e3/uL    Absolute Lymphocytes 1.6 0.8 - 5.3 10e3/uL    Absolute Monocytes 0.6 0.0 - 1.3 10e3/uL    Absolute Eosinophils 0.2 0.0 - 0.7 10e3/uL    Absolute Basophils 0.1 0.0 - 0.2 10e3/uL    Absolute Immature Granulocytes 0.0 <=0.4 10e3/uL    Absolute NRBCs 0.0 10e3/uL   Extra Red Top Tube    Collection Time: 12/02/21  8:31 AM   Result Value Ref Range    Hold Specimen JIC    Extra Green Top (Lithium Heparin) Tube    Collection Time: 12/02/21  8:31 AM   Result Value Ref Range    Hold Specimen JIC    Extra Purple Top Tube    Collection Time: 12/02/21  8:31 AM   Result Value Ref Range    Hold Specimen JIC    Lipid panel    Collection Time: 12/02/21  8:31 AM   Result Value Ref Range     Cholesterol 187 <=199 mg/dL    Triglycerides 134 <=149 mg/dL    Direct Measure HDL 51 >=40 mg/dL    LDL Cholesterol Calculated 109 <=129 mg/dL   Asymptomatic COVID-19 Virus (Coronavirus) by PCR Nasopharyngeal    Collection Time: 12/02/21  8:46 AM    Specimen: Nasopharyngeal; Swab   Result Value Ref Range    SARS CoV2 PCR Negative Negative   Activated clotting time celite, POCT    Collection Time: 12/02/21  9:16 AM   Result Value Ref Range    Activated Clotting Time (Celite) POCT 297 (H) 74 - 150 seconds   Cardiac Catheterization    Collection Time: 12/02/21  9:23 AM   Result Value Ref Range    Cath EF Estimated 60 %   ECG 12-LEAD WITH MUSE (LHE)    Collection Time: 12/02/21 10:10 AM   Result Value Ref Range    Systolic Blood Pressure  mmHg    Diastolic Blood Pressure  mmHg    Ventricular Rate 49 BPM    Atrial Rate 49 BPM    NH Interval 200 ms    QRS Duration 162 ms     ms    QTc 475 ms    P Axis 45 degrees    R AXIS -7 degrees    T Axis 118 degrees    Interpretation ECG       Sinus bradycardia  Right bundle branch block  Left ventricular hypertrophy with QRS widening and repolarization abnormality  Abnormal ECG  When compared with ECG of 02-DEC-2021 08:25,  Vent. rate has decreased BY  24 BPM  T wave inversion now evident in Lateral leads  QT has shortened  Confirmed by GEETHA REDDING, PAVITHRA LOC:JN ([id number]) on 12/2/2021 2:54:46 PM     Echocardiogram Complete    Collection Time: 12/02/21 11:10 AM   Result Value Ref Range    LVEF  60-65%    Troponin I    Collection Time: 12/02/21  3:43 PM   Result Value Ref Range    Troponin I 0.03 0.00 - 0.29 ng/mL

## 2021-12-02 NOTE — PLAN OF CARE
"Patient was kept comfortable during post-procedure stay.VSS. Denies pain but complaining of dizziness everytime he opens his eyes- stating \"the room is spinning\". He refused to eat & verbalized feeling comfortable when asked if he needs to use the bathroom. Remains in his bed, turns to his side independently. Radial site remains intact. Appointments made & included in AVS. Dr. Ferrell was able to speak with patient's ex-wife during post-procedure. Will be transferring to P3 once room is ready. Report given to P3 RN. Handing off to Lorena RN while awaiting for bed availability.      "

## 2021-12-02 NOTE — DISCHARGE INSTRUCTIONS
Interventional Cardiology  Coronary Angiogram/Angioplasty/Stent/Atherectomy Discharge  Instructions -   Radial (wrist) Approach     The instructions below are to help you understand how to take care of yourself. There is also information about when to call the doctor or emergency services.    **Do not stop your aspirin or platelet inhibitor unless directed by your Cardiologist.  These medications help to prevent platelets in your blood from sticking together and forming a clot.   Examples of these medications are: Ticagrelor (Brilinta), Clopidogrel (Plavix), Prasugrel (Effient)    For 24 hours after procedure:    Do not subject hand/arm to any forceful movements for 24 hours, such as supporting weight when rising from a chair or bed.    Do not drive a car for 24 hours.    The dressing on the puncture site may be removed after 24 hours and left open to air. If minor oozing, you may apply a Band-Aid and remove after 12 hours.     You may shower on the day after your procedure. Do not take a tub bath or wash dishes (no soaking wrist) with the puncture site in water for 3 days after the procedure.    For 48 hours following the procedure:    Do not operate a lawnmower, motorcycle, chainsaw or all-terrain vehicle.    Do not lift anything heavier than 5-10 pounds with affected arm for 5 days.    Avoid excessive bending (flexion/extension) wrist movement.    Do not engage in vigorous exercise (i.e. tennis, golf) using the affected arm for 5 days after discharge.    You may return to work in 72 hours if no complications and no heavy lifting.    If bleeding should occur following discharge:    Sit down and apply firm pressure with your thumb against the puncture site and fingers against back of wrist for 10 minutes.    If the bleeding stops, continue to rest, keeping your wrist still for 2 hours. Notify your doctor as soon as possible.    If bleeding does not stop after 10 minutes or if there is a large amount of bleeding or  spurting, call 911 immediately. Do not drive yourself to the hospital.           Contact the Heart Clinic at [phone] if you develop:    Fever over 100.4, that lasts more than one day    Redness, heat, or pus at the puncture site    Change in color or temperature of the hand or arm    Expect mild tingling of hand and tenderness at the puncture site for up to 3 days. You may take Tylenol or a pain medicine recommended by your doctor.                       Your Procedural Physician was: Dr. Radha Ferrell  the phone number is: [phone]    [facility]:  [phone]  If you are calling after hours, please listen to the entire voicemail, a live  will answer at the end of the message

## 2021-12-02 NOTE — ED NOTES
Lung sounds clear, cold due to diaphoresis before arrival.  Bare hugger applied Temp was 96.6 rectally.  Pt alert and oriented.  No CP at the moment but has chest pressure and severe dizziness even with little movement

## 2021-12-03 PROBLEM — I21.3 ST ELEVATION MI (STEMI) (H): Status: ACTIVE | Noted: 2021-12-03

## 2021-12-03 PROBLEM — Q28.3: Status: ACTIVE | Noted: 2021-12-03

## 2021-12-03 PROBLEM — I10 HTN (HYPERTENSION): Status: ACTIVE | Noted: 2021-12-03

## 2021-12-03 PROBLEM — F32.A DEPRESSION: Status: ACTIVE | Noted: 2021-12-03

## 2021-12-03 PROBLEM — Q28.3 CEREBRAL CAVERNOMA: Status: ACTIVE | Noted: 2021-12-03

## 2021-12-03 PROBLEM — E78.5 HYPERLIPIDEMIA: Status: ACTIVE | Noted: 2019-05-15

## 2021-12-03 LAB
ANION GAP SERPL CALCULATED.3IONS-SCNC: 10 MMOL/L (ref 5–18)
BUN SERPL-MCNC: 12 MG/DL (ref 8–22)
CALCIUM SERPL-MCNC: 9.6 MG/DL (ref 8.5–10.5)
CHLORIDE BLD-SCNC: 108 MMOL/L (ref 98–107)
CO2 SERPL-SCNC: 20 MMOL/L (ref 22–31)
CREAT SERPL-MCNC: 0.71 MG/DL (ref 0.7–1.3)
ERYTHROCYTE [DISTWIDTH] IN BLOOD BY AUTOMATED COUNT: 12.5 % (ref 10–15)
GFR SERPL CREATININE-BSD FRML MDRD: >90 ML/MIN/1.73M2
GLUCOSE BLD-MCNC: 99 MG/DL (ref 70–125)
HCT VFR BLD AUTO: 45.9 % (ref 40–53)
HGB BLD-MCNC: 15.6 G/DL (ref 13.3–17.7)
MCH RBC QN AUTO: 31 PG (ref 26.5–33)
MCHC RBC AUTO-ENTMCNC: 34 G/DL (ref 31.5–36.5)
MCV RBC AUTO: 91 FL (ref 78–100)
PLATELET # BLD AUTO: 251 10E3/UL (ref 150–450)
POTASSIUM BLD-SCNC: 3.6 MMOL/L (ref 3.5–5)
RBC # BLD AUTO: 5.03 10E6/UL (ref 4.4–5.9)
SODIUM SERPL-SCNC: 138 MMOL/L (ref 136–145)
TROPONIN I SERPL-MCNC: 0.04 NG/ML (ref 0–0.29)
TROPONIN I SERPL-MCNC: 0.04 NG/ML (ref 0–0.29)
WBC # BLD AUTO: 9.2 10E3/UL (ref 4–11)

## 2021-12-03 PROCEDURE — 210N000001 HC R&B IMCU HEART CARE

## 2021-12-03 PROCEDURE — 84484 ASSAY OF TROPONIN QUANT: CPT | Performed by: INTERNAL MEDICINE

## 2021-12-03 PROCEDURE — 85048 AUTOMATED LEUKOCYTE COUNT: CPT | Performed by: INTERNAL MEDICINE

## 2021-12-03 PROCEDURE — 99233 SBSQ HOSP IP/OBS HIGH 50: CPT | Performed by: INTERNAL MEDICINE

## 2021-12-03 PROCEDURE — 96372 THER/PROPH/DIAG INJ SC/IM: CPT | Performed by: HOSPITALIST

## 2021-12-03 PROCEDURE — 250N000013 HC RX MED GY IP 250 OP 250 PS 637: Performed by: INTERNAL MEDICINE

## 2021-12-03 PROCEDURE — 250N000013 HC RX MED GY IP 250 OP 250 PS 637: Performed by: HOSPITALIST

## 2021-12-03 PROCEDURE — 99255 IP/OBS CONSLTJ NEW/EST HI 80: CPT | Performed by: PSYCHIATRY & NEUROLOGY

## 2021-12-03 PROCEDURE — 36415 COLL VENOUS BLD VENIPUNCTURE: CPT | Performed by: INTERNAL MEDICINE

## 2021-12-03 PROCEDURE — 99233 SBSQ HOSP IP/OBS HIGH 50: CPT | Performed by: HOSPITALIST

## 2021-12-03 PROCEDURE — 80048 BASIC METABOLIC PNL TOTAL CA: CPT | Performed by: INTERNAL MEDICINE

## 2021-12-03 PROCEDURE — 250N000011 HC RX IP 250 OP 636: Performed by: HOSPITALIST

## 2021-12-03 PROCEDURE — G0378 HOSPITAL OBSERVATION PER HR: HCPCS

## 2021-12-03 RX ORDER — ATROPINE SULFATE 0.1 MG/ML
0.4 INJECTION INTRAVENOUS
Status: DISCONTINUED | OUTPATIENT
Start: 2021-12-03 | End: 2021-12-05 | Stop reason: HOSPADM

## 2021-12-03 RX ORDER — MECLIZINE HCL 12.5 MG 12.5 MG/1
12.5 TABLET ORAL 3 TIMES DAILY PRN
Status: DISCONTINUED | OUTPATIENT
Start: 2021-12-03 | End: 2021-12-05 | Stop reason: HOSPADM

## 2021-12-03 RX ORDER — HEPARIN SODIUM 5000 [USP'U]/.5ML
5000 INJECTION, SOLUTION INTRAVENOUS; SUBCUTANEOUS EVERY 12 HOURS
Status: DISCONTINUED | OUTPATIENT
Start: 2021-12-03 | End: 2021-12-05 | Stop reason: HOSPADM

## 2021-12-03 RX ADMIN — ACETAMINOPHEN 650 MG: 325 TABLET ORAL at 20:21

## 2021-12-03 RX ADMIN — SERTRALINE HYDROCHLORIDE 100 MG: 50 TABLET ORAL at 09:37

## 2021-12-03 RX ADMIN — ONDANSETRON 4 MG: 2 INJECTION INTRAMUSCULAR; INTRAVENOUS at 06:58

## 2021-12-03 RX ADMIN — ATENOLOL 50 MG: 25 TABLET ORAL at 09:35

## 2021-12-03 RX ADMIN — ASPIRIN 81 MG: 81 TABLET, COATED ORAL at 09:36

## 2021-12-03 RX ADMIN — LISINOPRIL 10 MG: 5 TABLET ORAL at 09:37

## 2021-12-03 RX ADMIN — ATORVASTATIN CALCIUM 80 MG: 40 TABLET, FILM COATED ORAL at 09:37

## 2021-12-03 RX ADMIN — HEPARIN SODIUM 5000 UNITS: 5000 INJECTION, SOLUTION INTRAVENOUS; SUBCUTANEOUS at 21:06

## 2021-12-03 ASSESSMENT — ACTIVITIES OF DAILY LIVING (ADL)
ADLS_ACUITY_SCORE: 5

## 2021-12-03 NOTE — UTILIZATION REVIEW
"Admission Status; Secondary Review Conditional Determination         Under the authority of the Utilization Management Committee, the utilization review process indicated a secondary review on the above patient.  The review outcome is based on review of the medical records, discussions with staff, and applying clinical experience noted on the date of the review.          (x) Observation Status Appropriate - This patient does not meet hospital inpatient criteria and is placed in observation status. If this patient's primary payer is Medicare and was admitted as an inpatient, Condition Code 44 should be used and patient status changed to \"observation\".       RATIONALE FOR DETERMINATION     Mr. Pierson is a 60 yo male who presented to the ED with chest pressure and EKG changes; hewent to cardiac angiogram from the ED.  Mild CAD was noted and no intervention needed.  CP resolved but he complained of dizziness and was admitted for vertigo.   CT of the head without abnormalities.  Neuro consulted and recommends prn po meclizine and Epley's maneuvers by PT this am.  He had one emesis this am but IVF have been d/c'ed.  SBP improved and no IV meds needed overnight for elevated BP.  If symptoms worsen, emesis persists and there is a need to re-start IVF then INPATIENT status would be appropriate.     The severity of illness, intensity of service provided, expected LOS and risk for adverse outcome make the care appropriate for further observation; however, doesn't meet criteria for hospital inpatient admission. Dr Rodas notified of this conditional determination.        The information on this document is developed by the utilization review team in order for the business office to ensure compliance.  This only denotes the appropriateness of proper admission status and does not reflect the quality of care rendered.         The definitions of Inpatient Status and Observation Status used in making the determination above are those " provided in the CMS Coverage Manual, Chapter 1 and Chapter 6, section 70.4.        Sincerely,    Alexus Carrasco, DO  Utilization Review  Physician Advisor  [facility]

## 2021-12-03 NOTE — PLAN OF CARE
Problem: Hypertension Comorbidity  Goal: Blood Pressure in Desired Range  12/3/2021 1606 by Boo Hernandez RN  Outcome: Improving  12/3/2021 1606 by Boo Hernandez RN  Outcome: Improving  BP stable after receiving scheduled medication.  Problem: Chest Pain  Goal: Resolution of Chest Pain Symptoms  Outcome: Improving   Reported no chest pain during shift. Continues to have dizziness. PT will see patient 12/4.

## 2021-12-03 NOTE — PROGRESS NOTES
[facility]    Medicine Progress Note - Hospitalist Service       Date of Admission:  12/2/2021    Assessment & Plan           Abraham Dong is a 61 year old male admitted on 12/2/2021. He has history of hypertension and hyperlipidemia and presented for evaluation of new onset dizziness and chest pressure.    Had negative angiogram but dizziness continues to be an ongoing issue.       #Vertigo  #Posterior headache  Patient with severe dizziness since this morning, bilateral nystagmus L > R on exam.  Noncontrast CT head normal.  Notes stress with a new job involves a lot of heavy lifting and he is considering quitting.  No previous history of migraine.  Possible atypical migraine.  MRI negative for stroke, vertebral artery dissection etc.  No hearing loss, did note some chills.  Neurology saw and favor BPPV as etiology.  PT for vestibular exercises ordered.  Tried muscle relaxant without benefit, order discontinued.  Ordered for acupuncture consult if available today  Meclizine as needed  Unable to discharge now while he is still so symptomatic.     #Chest pressure  Patient presented with dizziness and chest pressure, EKG showed sinus bradycardia with new anterior ST depressions and right bundle branch block.  Patient was sent directly to the Cath Lab.  Angiogram showed tortuous coronary arteries with only mild atherosclerosis.  Chest pressure has resolved.  Cardiology recommend continue high-dose statin indefinitely.  Continue home aspirin.  Cardiology recommend close management of blood pressure, continuing home atenolol and lisinopril was added.  Patient to follow-up with cardiology in 4 weeks    #Asystole  Patient had 4.5 second pause on tele.  Cardiology recommend 30-day event monitor when he discharges  Continue tele here    #Hyperlipidemia  As above with mild atherosclerosis noted on coronary angiogram.  .  Home atorvastatin 40 mg, cardiology increased to 80  mg.     #Hypertensive urgency  Noted by EMS to have blood pressure 210/130  Continue home atenolol, cardiology added lisinopril.  IV enalapril as needed for severely elevated blood pressure  BP not consistently at goal here but is overall improved     #Some component of acute diastolic heart failure  Noted to have extremely elevated LV end-diastolic pressure during angiogram  Does not appear grossly volume overloaded at this time, but hypertensive urgency probably due to some degree of volume overload.  Would use enalapril for severe hypertension here.     #Depression, home Zoloft         Diet: Low Saturated Fat Na <2400 mg    DVT Prophylaxis: Moderate risk. SQH   So Catheter: Not present  Central Lines: None  Code Status: Full Code      Disposition Plan   Disposition: Home tomorrow  Discharge barriers: symptom control, PT vestibular eval  Medically ready to discharge today: No  Estimated discharge date: 12/04/2021     The patient's care was discussed with the Patient.    Keri Rodas MD  Hospitalist Service  [facility]  Text page via [facility] Paging/Directory      Clinically Significant Risk Factors Present on Admission               ____________        Physical Exam   Vital Signs: Temp: 98  F (36.7  C) Temp src: Oral BP: (!) 152/93 Pulse: (!) 49   Resp: 18 SpO2: 98 % O2 Device: None (Room air)    Weight: 251 lbs 11.2 oz  General: in no apparent distress, non-toxic and alert male lying in hospital bed oriented x3. Mildly restless  HEENT: Head normocephalic atraumatic, oral mucosa moist. Sclerae anicteric  Skin: No rashes or lesions  Extremities: No peripheral edema  Psych: Normal affect, mildly anxious mood  Neuro: Closing eyes frequently and shaking head from dizziness.    Data   Recent Results (from the past 24 hour(s))   Troponin I    Collection Time: 12/02/21 10:37 PM   Result Value Ref Range    Troponin I 0.04 0.00 - 0.29 ng/mL   Troponin I    Collection Time: 12/03/21  4:32 AM    Result Value Ref Range    Troponin I 0.04 0.00 - 0.29 ng/mL   Basic metabolic panel    Collection Time: 12/03/21  4:32 AM   Result Value Ref Range    Sodium 138 136 - 145 mmol/L    Potassium 3.6 3.5 - 5.0 mmol/L    Chloride 108 (H) 98 - 107 mmol/L    Carbon Dioxide (CO2) 20 (L) 22 - 31 mmol/L    Anion Gap 10 5 - 18 mmol/L    Urea Nitrogen 12 8 - 22 mg/dL    Creatinine 0.71 0.70 - 1.30 mg/dL    Calcium 9.6 8.5 - 10.5 mg/dL    Glucose 99 70 - 125 mg/dL    GFR Estimate >90 >60 mL/min/1.73m2   CBC with platelets    Collection Time: 12/03/21  4:32 AM   Result Value Ref Range    WBC Count 9.2 4.0 - 11.0 10e3/uL    RBC Count 5.03 4.40 - 5.90 10e6/uL    Hemoglobin 15.6 13.3 - 17.7 g/dL    Hematocrit 45.9 40.0 - 53.0 %    MCV 91 78 - 100 fL    MCH 31.0 26.5 - 33.0 pg    MCHC 34.0 31.5 - 36.5 g/dL    RDW 12.5 10.0 - 15.0 %    Platelet Count 251 150 - 450 10e3/uL   Troponin I    Collection Time: 12/03/21 10:16 AM   Result Value Ref Range    Troponin I 0.04 0.00 - 0.29 ng/mL     ____________  Interval History   Data reviewed today: I reviewed all medications, new labs and imaging results over the last 24 hours. I personally reviewed no images or EKG's today.  Patient reports he continues to feel very dizzy with any movement of his head.  He was nauseated earlier and did vomit.  Continues to have a posterior headache, muscle relaxer has not helped.  Neurology saw and recommended PT for Epley maneuver which is ordered.  We could also see if acupuncture able to visit with the patient.  He does not feel muscle relaxer has been helpful and we will stop this.  Denies chest pressure or shortness of breath.  Still too symptomatic to discharge.      I did try to call and update patient's family members Suze and Mariela at his request, however received no answer and will try back tomorrow

## 2021-12-03 NOTE — PROGRESS NOTES
CARDIOLOGY PROGRESS NOTE      Assessment/Plan:  1.  ACS (acute coronary syndrome) (H)-normal troponins, no significant cardiac symptoms nor coronary artery disease, this is most likely systemic hypertension.  2.  Coronary artery disease-minimal nonobstructive with normal left main, mid LAD 20% sequential 30% lesion, ramus with a 20% lesion, circumflex normal right coronary artery normal.  Preventive therapy.  3.  Hypertensive emergency-blood pressure under good control currently.  Could be etiology of some of his lightheadedness.  4.  Kruyyaun-7-2/2-second pause, noted on telemetry, possibly asymptomatic.  Do not think this is related to his lightheadedness, will send him home on a 30-day event monitor to rule out major arrhythmia associated lightheadedness.  5.  Vertigo-Per neurology Antivert.  6.  Right frontal cavernoma-incidental finding, no further evaluation.  7.  Pure hypercholesterolemia-cholesterol 187 with an LDL of 109 which is acceptable.  Now on atorvastatin, recheck in several months.    Plan:  1.  Cardiology has nothing further inpatient add, we will sign off, available as needed.  2.  We will place order for event monitor.    Discharge Plannin.  On discharge, would arrange for repeat fasting lipids in about 3 months.  2.  Would arrange for 21-day ACT monitor, will place order.  3.  Can follow with Dr. Radha Ferrell if needed depending on monitor.     LOS: 1 day     Subjective:  Interval History:    61-year-old white gentleman being seen on second day of hospitalization here at [facility].  He is still complaining of vertigo with the room spinning when he sits up.  No chest discomfort, palpitations, shortness of breath, PND, orthopnea or peripheral edema.    Medications    aspirin  81 mg Oral Daily     atenolol  50 mg Oral Daily     atorvastatin  80 mg Oral Daily     lisinopril  10 mg Oral Daily     sertraline  100 mg Oral Daily     Objective:   Vital signs in last 24 hours:  Temp:  [97.5  F  "(36.4  C)-98.5  F (36.9  C)] 98.5  F (36.9  C)  Pulse:  [46-91] 56  Resp:  [12-22] 20  BP: (131-171)/(66-99) 132/66  SpO2:  [94 %-100 %] 94 %    Physical Exam:  /66 (BP Location: Right arm)   Pulse 56   Temp 98.5  F (36.9  C) (Oral)   Resp 20   Ht 1.854 m (6' 1\")   Wt 114.2 kg (251 lb 11.2 oz)   SpO2 94%   BMI 33.21 kg/m      General Appearance:    Alert, cooperative, no distress, appears stated age   Head:    Normocephalic, without obvious abnormality, atraumatic   Throat:   Lips, mucosa, and tongue normal; teeth and gums normal   Neck:   Supple, symmetrical, trachea midline, no adenopathy;        thyroid:  No enlargement/tenderness/nodules; no carotid    bruit or JVD   Back:     Symmetric, no curvature, ROM normal, no CVA tenderness   Lungs:     Clear to auscultation bilaterally, respirations unlabored   Chest wall:    No tenderness or deformity   Heart:    Regular rate and rhythm, S1 and S2 normal, no murmur, rub   or gallop   Abdomen:     Soft, non-tender, bowel sounds active all four quadrants,     no masses, no organomegaly   Extremities:   Normal, atraumatic, no cyanosis or edema   Pulses:   2+ and symmetric all extremities   Skin:   Skin color, texture, turgor normal, no rashes or lesions     Cardiographics:      ECG: Personally reviewed by myself shows sinus bradycardia, leftward axis, right bundle branch block pattern.    Echocardiogram:   The left ventricle is normal in size. There is moderate eccentric left ventricular hypertrophy.  The visual ejection fraction is 60-65%. Normal left ventricular wall motion  Aortic root moderately enlarged  The rhythm was sinus bradycardia.          Lab Results:   Recent Labs   Lab 12/03/21  0432   WBC 9.2   HGB 15.6   HCT 45.9        Recent Labs   Lab 12/03/21  0432      CO2 20*   BUN 12   .       Lab Results   Component Value Date    TROPONINI 0.04 12/03/2021           "

## 2021-12-03 NOTE — PLAN OF CARE
Alert and oriented. Still complaining of vertigo. Denies any pain. On telemetry. MRI done last night. Neurology consult today. Right wrist post angio has a slight bruise but no hematoma. Bandaid clean, dry, intact. Vital signs stable. Slept in between cares. Will continue to monitor.    Mary Vogt, JULIANN    Problem: Adult Inpatient Plan of Care  Goal: Absence of Hospital-Acquired Illness or Injury  Outcome: No Change  Intervention: Identify and Manage Fall Risk  Recent Flowsheet Documentation  Taken 12/3/2021 0030 by Mary Vogt, RN  Safety Promotion/Fall Prevention:   activity supervised   bed alarm on    Problem: Chest Pain  Goal: Resolution of Chest Pain Symptoms  Outcome: Improving     Goal: Optimal Comfort and Wellbeing  Outcome: No Change     Problem: Hypertension Comorbidity  Goal: Blood Pressure in Desired Range  Outcome: No Change

## 2021-12-04 ENCOUNTER — APPOINTMENT (OUTPATIENT)
Dept: PHYSICAL THERAPY | Facility: HOSPITAL | Age: 61
End: 2021-12-04
Attending: PSYCHIATRY & NEUROLOGY
Payer: COMMERCIAL

## 2021-12-04 PROCEDURE — 250N000013 HC RX MED GY IP 250 OP 250 PS 637: Performed by: INTERNAL MEDICINE

## 2021-12-04 PROCEDURE — 99207 PR CDG-MDM COMPONENT: MEETS MODERATE - DOWN CODED: CPT | Performed by: HOSPITALIST

## 2021-12-04 PROCEDURE — G0378 HOSPITAL OBSERVATION PER HR: HCPCS

## 2021-12-04 PROCEDURE — 250N000013 HC RX MED GY IP 250 OP 250 PS 637: Performed by: HOSPITALIST

## 2021-12-04 PROCEDURE — 97530 THERAPEUTIC ACTIVITIES: CPT | Mod: GP

## 2021-12-04 PROCEDURE — 250N000011 HC RX IP 250 OP 636: Performed by: HOSPITALIST

## 2021-12-04 PROCEDURE — 258N000003 HC RX IP 258 OP 636: Performed by: HOSPITALIST

## 2021-12-04 PROCEDURE — 96372 THER/PROPH/DIAG INJ SC/IM: CPT | Performed by: HOSPITALIST

## 2021-12-04 PROCEDURE — 99232 SBSQ HOSP IP/OBS MODERATE 35: CPT | Performed by: HOSPITALIST

## 2021-12-04 PROCEDURE — 97161 PT EVAL LOW COMPLEX 20 MIN: CPT | Mod: GP

## 2021-12-04 PROCEDURE — 210N000001 HC R&B IMCU HEART CARE

## 2021-12-04 PROCEDURE — 99233 SBSQ HOSP IP/OBS HIGH 50: CPT | Performed by: PSYCHIATRY & NEUROLOGY

## 2021-12-04 RX ADMIN — ATORVASTATIN CALCIUM 80 MG: 40 TABLET, FILM COATED ORAL at 08:48

## 2021-12-04 RX ADMIN — ACETAMINOPHEN 650 MG: 325 TABLET ORAL at 11:03

## 2021-12-04 RX ADMIN — HEPARIN SODIUM 5000 UNITS: 5000 INJECTION, SOLUTION INTRAVENOUS; SUBCUTANEOUS at 08:53

## 2021-12-04 RX ADMIN — ACETAMINOPHEN 650 MG: 325 TABLET ORAL at 16:19

## 2021-12-04 RX ADMIN — ASPIRIN 81 MG: 81 TABLET, COATED ORAL at 08:49

## 2021-12-04 RX ADMIN — SERTRALINE HYDROCHLORIDE 100 MG: 50 TABLET ORAL at 08:49

## 2021-12-04 RX ADMIN — LISINOPRIL 10 MG: 5 TABLET ORAL at 08:48

## 2021-12-04 RX ADMIN — SODIUM CHLORIDE 500 ML: 9 INJECTION, SOLUTION INTRAVENOUS at 11:22

## 2021-12-04 RX ADMIN — HEPARIN SODIUM 5000 UNITS: 5000 INJECTION, SOLUTION INTRAVENOUS; SUBCUTANEOUS at 20:24

## 2021-12-04 ASSESSMENT — ACTIVITIES OF DAILY LIVING (ADL)
ADLS_ACUITY_SCORE: 7

## 2021-12-04 ASSESSMENT — MIFFLIN-ST. JEOR: SCORE: 1982.9

## 2021-12-04 NOTE — PLAN OF CARE
C/o dizziness, especially with movement.  Reports improved since yesterday.  Sinus bradycardia.  HR 44-55.  Reports chest pressure resolved.    Problem: Dysrhythmia  Goal: Normalized Cardiac Rhythm  Outcome: No Change     Problem: Syncope  Goal: Absence of Syncopal Symptoms  Outcome: Improving

## 2021-12-04 NOTE — PROGRESS NOTES
12/04/21 0845   Quick Adds   Quick Adds Vestibular Eval   Type of Visit Initial PT Evaluation   Living Environment   People in home alone   Current Living Arrangements apartment   Self-Care   Usual Activity Tolerance good   Current Activity Tolerance fair   Equipment Currently Used at Home none   Activity/Exercise/Self-Care Comment just started new job at [employer]   Disability/Function   Hearing Difficulty or Deaf no   General Information   Onset of Illness/Injury or Date of Surgery 12/02/21   Referring Physician Sumeet Merida MD   Patient/Family Therapy Goals Statement (PT) no dizziness   Pertinent History of Current Problem (include personal factors and/or comorbidities that impact the POC) history of hypertension and hyperlipidemia and presents for evaluation of new onset dizziness and chest pressure.  He was sent directly to the Cath Lab from the ER   Pain Assessment   Patient Currently in Pain No   Range of Motion (ROM)   ROM Quick Adds ROM WNL   Strength   Manual Muscle Testing Quick Adds Strength WNL   Bed Mobility   Bed Mobility supine-sit;sit-supine   Supine-Sit Boundary (Bed Mobility) supervision;independent   Sit-Supine Boundary (Bed Mobility) supervision;independent   Impairments Contributing to Impaired Bed Mobility   (dizziness)   Comment (Bed Mobility) reporting minimal dizziness with transfers. feeling uneasy   Transfers   Transfers sit-stand transfer   Sit-Stand Transfer   Sit-Stand Boundary (Transfers) supervision;independent   Assistive Device (Sit-Stand Transfers) other (see comments)  (none)   Sit/Stand Transfer Comments reports feeling dizzy with standing, this is how he has been feeling per report with transfers. No LOB   Gait/Stairs (Locomotion)   Comment (Gait/Stairs) deferred d/t dizziness   Balance   Balance Comments minimally unsteady with standing   Muscle Tone   Muscle Tone Comments cervical neck muscles: SCM, UT, scalenes all bilaterally do not produce dizziness with  palpation, not overtly tight.    Cervicogenic Screen   Neck ROM end range ROM cervical rotation left with minimal dizziness, none to R. Normal flexion/extension   Vertebral Artery Test Normal   Vertebral Artery Test Comments mVAT with head turn to right, no dizziness. mVAT with head turned to left very minimal dizziness, does not worsen.    Oculomotor Exam   Smooth Pursuit Abnormal   Smooth Pursuit Comment inc dizziness with following finger to end range of L and up. No other signs/symptoms found with smooth pursuit   Saccades Normal   Rapid Head Thrust Normal   Infrared Goggle Exam or Frenzel Lense Exam   Sudha-Hallpike (Right) Negative   [place]-Hallpike (Right) Comments does not complain of any dizziness   Sudha-Hallpike (Left) Negative   [place]-Hallpike (Left) Comments does not complain of any dizziness   [facility] Supine Roll Test (Right) Other   [facility] Supine Roll Test (Right) Comments minimal dizziness initially with quick movement - fatigues very quickly   [facility] Supine Roll Test (Left) Other   [facility] Supine Roll Test (Left) Comments  minimal dizziness initially with quick movement - fatigues very quickly   Clinical Impression   Criteria for Skilled Therapeutic Intervention yes, treatment indicated   PT Diagnosis (PT) impaired functional mobility   Influenced by the following impairments gait, bed mob, transfers, stairs   Functional limitations due to impairments dizziness, decreased endurance   Clinical Presentation Stable/Uncomplicated   Clinical Presentation Rationale pt presents as medically diagnosed   Clinical Decision Making (Complexity) low complexity   Therapy Frequency (PT) 2x/day  (as able)   Predicted Duration of Therapy Intervention (days/wks) 7 days   Planned Therapy Interventions (PT) balance training;bed mobility training;gait training;home exercise program;stair training;transfer training   Anticipated Equipment Needs at Discharge (PT) other (see comments)  (none)   Risk & Benefits of therapy have been explained  evaluation/treatment results reviewed;patient   PT Discharge Planning    PT Discharge Recommendation (DC Rec) home with assist;home with outpatient physical therapy   PT Brief overview of current status  No current symptoms of vestibular disorder, negative [place]-hallpike bilaterally (no dizziness, no nystagmus). Does have BP drop with orthostatics, see vitals.   Therapy Certification   Start of care date 12/04/21   Certification date from 12/04/21   Certification date to 12/11/21   Medical Diagnosis dizziness   Total Evaluation Time   Total Evaluation Time (Minutes) 10     Lisa Juarez, PT, DPT  12/4/2021    [phone]

## 2021-12-04 NOTE — UTILIZATION REVIEW
Admission Status; Secondary Review Determination   Under the authority of the Utilization Management Committee, the utilization review process indicated a secondary review on Abraham Dong. The review outcome is based on review of the medical records, discussions with staff, and applying clinical experience noted on the date of the review.   (x) Inpatient Status Appropriate - This patient's medical care is consistent with medical management for inpatient care and reasonable inpatient medical practice.     RATIONALE FOR DETERMINATION   61 yr old male presented with chest pain and EKG changes.  Emergent angiogram from ED given EKG changes.  Mild CAD but no intervention needed.  CP resolved however dizziness and admitted with concern for vertigo.  Since admission ongoing dizziness, emesis, bradycardia and holding atenolol.  Has had a sinus pause even prior to this and unclear if related to dizziness.  Neurology has seen and possible BPPV however ongoing combined with bradycardia and pause raise concern for higher medical complexity and now has crossed 2 MN with ongoing dizziness and unable to discharge.    At the time of admission with the information available to the attending physician more than 2 nights Hospital complex care was anticipated, based on patient risk of adverse outcome if treated as outpatient and complex care required. Inpatient admission is appropriate based on the Medicare guidelines.   The information on this document is developed by the utilization review team in order for the business office to ensure compliance. This only denotes the appropriateness of proper admission status and does not reflect the quality of care rendered.   The definitions of Inpatient Status and Observation Status used in making the determination above are those provided in the CMS Coverage Manual, Chapter 1 and Chapter 6, section 70.4.   Sincerely,   Ariana Welch MD  Utilization Review  Physician  Advisor  [facility]

## 2021-12-04 NOTE — PLAN OF CARE
Problem: Hypertension Comorbidity  Goal: Blood Pressure in Desired Range  Outcome: Improving  BP under control.  Problem: Pain Acute  Goal: Acceptable Pain Control and Functional Ability  Outcome: Improving  Reported headache and pain under control with prn tylenol. Sinus hannah with BBB. HR was in the 50s.

## 2021-12-04 NOTE — PROGRESS NOTES
[facility]      OUTPATIENT PHYSICAL THERAPY EVALUATION  PLAN OF TREATMENT FOR OUTPATIENT REHABILITATION  (COMPLETE FOR INITIAL CLAIMS ONLY)  Patient's Last Name, First Name, M.I.  YOB: 1960  Abraham Dong                        Provider's Name  [facility] Medical Record No.  [id number]                               Onset Date:  12/02/21   Start of Care Date:  12/04/21      Type:     _X_PT   ___OT   ___SLP Medical Diagnosis:  dizziness                        PT Diagnosis:  impaired functional mobility   Visits from SOC:  1   _________________________________________________________________________________  Plan of Treatment/Functional Goals    Planned Interventions: balance training,bed mobility training,gait training,home exercise program,stair training,transfer training     Goals: See Physical Therapy Goals on Care Plan in [employer] electronic health record.    Therapy Frequency: 2x/day (as able)  Predicted Duration of Therapy Intervention: 7 days  _________________________________________________________________________________    I CERTIFY THE NEED FOR THESE SERVICES FURNISHED UNDER        THIS PLAN OF TREATMENT AND WHILE UNDER MY CARE     (Physician co-signature of this document indicates review and certification of the therapy plan).                Certification date from: 12/04/21, Certification date to: 12/11/21    Referring Physician: Sumeet Merida MD            Initial Assessment        See Physical Therapy evaluation dated 12/04/21 in Epic electronic health record.

## 2021-12-04 NOTE — H&P (VIEW-ONLY)
[facility]    Medicine Progress Note - Hospitalist Service       Date of Admission:  12/2/2021    Assessment & Plan           Abraham Dong is a 61 year old male admitted on 12/2/2021. He has history of hypertension and hyperlipidemia and presented for evaluation of new onset dizziness and chest pressure.  Had negative angiogram but dizziness continues to be an ongoing issue.       #Vertigo  #Posterior headache  Patient presented with severe dizziness and nystagmus.  Negative CT head and MRA brain.  Seen by neurology yesterday, diagnosis of BPPV was favored.  However seen today by vestibular therapist and nystagmus seems to have mostly resolved in the interim and now has a negative Sudha-Hallpike.  However he is still dizzy, cannot stand for longer than about a minute before he needs to sit down due to dizziness and lightheadedness.  Was noted to be orthostatic.  Suspicion that he had episode of BPPV that is now resolving.  Presentation complicated by concurrent orthostatic hypotension contributing to dizziness as well.  He has been noted to have bradycardia and 4.5-second asystole.  Concerned about autonomic dysfunction.  For initial step, we will give him a small fluid bolus, discontinue his atenolol (held since yesterday afternoon) and decrease his dose of Zoloft.  Hopefully he will be feeling better tomorrow, if not we will ask neurology to reevaluate him.  Concerned about something like Shy-Drager or amyloidosis etc.  He has had a fairly persistent posterior headache we are uncertain what this is from.  Has not had benefit from muscle relaxer.  Could consider LP with opening pressure if continues not to improve.     #Chest pressure  Patient presented with dizziness and chest pressure, EKG showed sinus bradycardia with new anterior ST depressions and right bundle branch block.  Patient was sent directly to the Cath Lab.  Angiogram showed tortuous coronary arteries with only mild  atherosclerosis.  Chest pressure has resolved.  Cardiology recommend continue high-dose statin indefinitely.  Continue home aspirin.  Cardiology recommend close management of blood pressure, continuing home atenolol and lisinopril was added.  Patient to follow-up with cardiology in 4 weeks    #Asystole  Patient had 4.5 second pause on tele. Has been bradycardic. As above stopping atenolol  Cardiology recommend 30-day event monitor when he discharges  Continue tele here    #Hyperlipidemia  As above with mild atherosclerosis noted on coronary angiogram.  .  Home atorvastatin 40 mg, cardiology increased to 80 mg.     #Hypertensive urgency  Noted by EMS to have blood pressure 210/130  Continue home atenolol, cardiology added lisinopril.  IV enalapril as needed for severely elevated blood pressure  BP not consistently at goal here but is overall improved     #Some component of acute diastolic heart failure  Noted to have extremely elevated LV end-diastolic pressure during angiogram  Does not appear grossly volume overloaded at this time, but hypertensive urgency probably due to some degree of volume overload.  Would use enalapril for severe hypertension here.     #Depression, home Zoloft         Diet: Low Saturated Fat Na <2400 mg    DVT Prophylaxis: Moderate risk. SQH   So Catheter: Not present  Central Lines: None  Code Status: Full Code      Disposition Plan   Disposition: Home tomorrow  Discharge barriers: symptom control  Medically ready to discharge today: No  Estimated discharge date: 12/05/2021     The patient's care was discussed with the Patient.    Keri Rodas MD  Hospitalist Service  [employer]  Text page via [employer] Paging/Directory      Clinically Significant Risk Factors Present on Admission               ____________        Physical Exam   Vital Signs: Temp: 97.9  F (36.6  C) Temp src: Oral BP: 116/72 Pulse: 51   Resp: 18 SpO2: 96 % O2 Device: None (Room air)    Weight:  246 lbs 11.2 oz  General: in no apparent distress, non-toxic and alert male lying in hospital bed oriented x3. Mildly restless  HEENT: Head normocephalic atraumatic, oral mucosa moist. Sclerae anicteric  Skin: No rashes or lesions  Extremities: No peripheral edema  Psych: Normal affect, mildly anxious mood  Neuro: Closing eyes frequently and shaking head from dizziness.    Data   No results found for this or any previous visit (from the past 24 hour(s)).  ____________  Interval History   Data reviewed today: I reviewed all medications, new labs and imaging results over the last 24 hours. I personally reviewed no images or EKG's today.  Patient is feeling a little bit better today. Still with headache. PT noted negative Sudha Hallpike. Nystagmus has mostly resolved. He was still lightheaded with standing and had to sit down fairly quickly. He might have had some BPPV but episode seems resolved now, however he remains dizzy and unable to walk much. Was noted orthostatic. This combined with his bradycardia/pauses, worry about autonomic dysfunction. Possibly medication induced- atenolol has already been on hold, most recent dose was yesterday AM. Maybe this is why he is improving. We will discontinue atenolol. He is also on Zoloft which can cause autonomic dysfunction. We will decrease Zoloft dose; he already got his dose for today though. Will give a small IVF bolus.    Anticipate with above changes he will hopefully be feeling better tomorrow. If not, will ask Neuro to reevaluate. Would be difficulty to diagnose something like Shy Drager or amyloidosis etc without their input.    I updated his mom

## 2021-12-04 NOTE — PROGRESS NOTES
[facility]    Medicine Progress Note - Hospitalist Service       Date of Admission:  12/2/2021    Assessment & Plan           Abraham Dong is a 61 year old male admitted on 12/2/2021. He has history of hypertension and hyperlipidemia and presented for evaluation of new onset dizziness and chest pressure.  Had negative angiogram but dizziness continues to be an ongoing issue.       #Vertigo  #Posterior headache  Patient presented with severe dizziness and nystagmus.  Negative CT head and MRA brain.  Seen by neurology yesterday, diagnosis of BPPV was favored.  However seen today by vestibular therapist and nystagmus seems to have mostly resolved in the interim and now has a negative Sudha-Hallpike.  However he is still dizzy, cannot stand for longer than about a minute before he needs to sit down due to dizziness and lightheadedness.  Was noted to be orthostatic.  Suspicion that he had episode of BPPV that is now resolving.  Presentation complicated by concurrent orthostatic hypotension contributing to dizziness as well.  He has been noted to have bradycardia and 4.5-second asystole.  Concerned about autonomic dysfunction.  For initial step, we will give him a small fluid bolus, discontinue his atenolol (held since yesterday afternoon) and decrease his dose of Zoloft.  Hopefully he will be feeling better tomorrow, if not we will ask neurology to reevaluate him.  Concerned about something like Shy-Drager or amyloidosis etc.  He has had a fairly persistent posterior headache we are uncertain what this is from.  Has not had benefit from muscle relaxer.  Could consider LP with opening pressure if continues not to improve.     #Chest pressure  Patient presented with dizziness and chest pressure, EKG showed sinus bradycardia with new anterior ST depressions and right bundle branch block.  Patient was sent directly to the Cath Lab.  Angiogram showed tortuous coronary arteries with only mild  atherosclerosis.  Chest pressure has resolved.  Cardiology recommend continue high-dose statin indefinitely.  Continue home aspirin.  Cardiology recommend close management of blood pressure, continuing home atenolol and lisinopril was added.  Patient to follow-up with cardiology in 4 weeks    #Asystole  Patient had 4.5 second pause on tele. Has been bradycardic. As above stopping atenolol  Cardiology recommend 30-day event monitor when he discharges  Continue tele here    #Hyperlipidemia  As above with mild atherosclerosis noted on coronary angiogram.  .  Home atorvastatin 40 mg, cardiology increased to 80 mg.     #Hypertensive urgency  Noted by EMS to have blood pressure 210/130  Continue home atenolol, cardiology added lisinopril.  IV enalapril as needed for severely elevated blood pressure  BP not consistently at goal here but is overall improved     #Some component of acute diastolic heart failure  Noted to have extremely elevated LV end-diastolic pressure during angiogram  Does not appear grossly volume overloaded at this time, but hypertensive urgency probably due to some degree of volume overload.  Would use enalapril for severe hypertension here.     #Depression, home Zoloft         Diet: Low Saturated Fat Na <2400 mg    DVT Prophylaxis: Moderate risk. SQH   So Catheter: Not present  Central Lines: None  Code Status: Full Code      Disposition Plan   Disposition: Home tomorrow  Discharge barriers: symptom control  Medically ready to discharge today: No  Estimated discharge date: 12/05/2021     The patient's care was discussed with the Patient.    Keri Rodas MD  Hospitalist Service  [facility]  Text page via [employer]/Directory      Clinically Significant Risk Factors Present on Admission               ____________        Physical Exam   Vital Signs: Temp: 97.9  F (36.6  C) Temp src: Oral BP: 116/72 Pulse: 51   Resp: 18 SpO2: 96 % O2 Device: None (Room air)    Weight:  246 lbs 11.2 oz  General: in no apparent distress, non-toxic and alert male lying in hospital bed oriented x3. Mildly restless  HEENT: Head normocephalic atraumatic, oral mucosa moist. Sclerae anicteric  Skin: No rashes or lesions  Extremities: No peripheral edema  Psych: Normal affect, mildly anxious mood  Neuro: Closing eyes frequently and shaking head from dizziness.    Data   No results found for this or any previous visit (from the past 24 hour(s)).  ____________  Interval History   Data reviewed today: I reviewed all medications, new labs and imaging results over the last 24 hours. I personally reviewed no images or EKG's today.  Patient is feeling a little bit better today. Still with headache. PT noted negative Sudha Hallpike. Nystagmus has mostly resolved. He was still lightheaded with standing and had to sit down fairly quickly. He might have had some BPPV but episode seems resolved now, however he remains dizzy and unable to walk much. Was noted orthostatic. This combined with his bradycardia/pauses, worry about autonomic dysfunction. Possibly medication induced- atenolol has already been on hold, most recent dose was yesterday AM. Maybe this is why he is improving. We will discontinue atenolol. He is also on Zoloft which can cause autonomic dysfunction. We will decrease Zoloft dose; he already got his dose for today though. Will give a small IVF bolus.    Anticipate with above changes he will hopefully be feeling better tomorrow. If not, will ask Neuro to reevaluate. Would be difficulty to diagnose something like Shy Drager or amyloidosis etc without their input.    I updated his mom

## 2021-12-04 NOTE — PROGRESS NOTES
NEUROLOGY PROGRESS NOTE     ASSESSMENT & PLAN   Visit diagnosis: Vertigo    Vertigo.    MRI brain negative for structural lesions  MRA negative for any large vessel occlusion/stenosis  Given the positional nature of his vertigo suspect BPPV  Patient has done 1 session of physical therapy with no improvement.  Recommend multiple sessions  Meclizine as needed for dizziness    Neurology Discharge Planning : Per primary team.    Patient Active Problem List    Diagnosis Date Noted     Depression 12/03/2021     Priority: Medium     HTN (hypertension) 12/03/2021     Priority: Medium     Formatting of this note might be different from the original.  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     Priority: Medium     Right frontal developmental venous  12/03/2021     Priority: Medium     ST elevation MI (STEMI) (H) 12/03/2021     Priority: Medium     Status post coronary angiogram 12/02/2021     Priority: Medium     ACS (acute coronary syndrome) (H)      Priority: Medium     Hypertensive emergency      Priority: Medium     Vertigo      Priority: Medium     Hyperlipidemia 05/15/2019     Priority: Medium     Plantar fasciitis 08/28/2009     Priority: Medium     Medical History  Past Medical History:   Diagnosis Date     Benign essential hypertension      Hyperlipidemia      Obesity         SUBJECTIVE     Seen in follow-up for Dr. Merida.  Patient presented with dizziness and chest pressure.  EKG showed ST depression with right bundle branch block.  Patient's angiogram shows Starcher CAD with mild atherosclerosis.  He has an aspirin and statin.  Neurology is been consulted to look for persistent dizziness.  Patient reports that every time he looks towards the right side he will feel dizzy.  This is intermittent.  Reports a room spinning sensation.  MRI/CT scan did not show any significant abnormality.    Patient has done 1 session of physical therapy this morning.  Did have vestibular  "rehab.  Has not made any improvement.  Discussed that he needs to give it couple of sessions before he could make significant improvement.     OBJECTIVE     Vital signs in last 24 hours  Temp:  [97.8  F (36.6  C)-98.3  F (36.8  C)] 97.9  F (36.6  C)  Pulse:  [49-63] 51  Resp:  [18-20] 18  BP: (116-152)/(70-93) 116/72  SpO2:  [96 %-98 %] 96 %      Review of Systems   Comprehensive review of systems was done and was negative within the HPI.  Pertinent positives noted in the HPI.    PHYSICAL EXAMINATION  VITALS: /72 (BP Location: Right arm)   Pulse 51   Temp 97.9  F (36.6  C) (Oral)   Resp 18   Ht 1.854 m (6' 1\")   Wt 111.9 kg (246 lb 11.2 oz)   SpO2 96%   BMI 32.55 kg/m    GENERAL -Health appearing, No apparent distress  EYES- No scleral icterus, no eyelid droop, Pupils - see Neuro section  HEENT - Normocephalic, atraumatic, Hearing grossly intact; Oral mucosa moist and pink in color. External Ears and nose intact.   Neck - supple with no obvious lymphadenopathy or thyromegaly  PULM - Good spontaneous respiratory effort; Normal palpation of chest.   CV- Pedal pulses present with no peripheral edema/ No significant varicosities.  MSK- Gait - see Neuro section; Strength and tone- see Neuro section; Range of motion grossly intact.  PSYCH -cooperative.    Neurological  Mental status - Patient is awake and oriented to self, place and time. Attention span is normal. Language is fluent and follows commands appropriately.   Cranial nerves - CN II-XII intact. Pupils are reactive and symmetric; EOMI, VFIT, NLF symmetric  Motor - There is no pronator drift. Motor exam shows 5/5 strength in all extremities.  Tone - Tone is symmetric bilaterally in upper and lower extremities.  Reflexes - Reflexes are symmetric.  Sensation - Sensory exam is grossly intact to light touch, pain.  Coordination - Finger to nose and heel to shin is without dysmetria.  Gait and station --needs assistance to ambulate.  Formal gait testing " cannot be done due to safety concerns from ongoing medical issues.       DIAGNOSTIC STUDIES     Pertinent Radiology   HEAD MRI:-Images reviewed.  1.  No finding for acute infarct.     2.  Subcentimeter presumed cavernoma in the parasagittal right frontal lobe without adjacent FLAIR hyperintense edema to suggest recent hemorrhage. Very subtle small amount of adjacent linear enhancement in this area likely reflects an adjacent small   developmental venous anomaly. Additional small developmental venous anomaly in the right insula.     3.  Background age-related changes as above.     HEAD MRA:   1.  No significant intracranial stenosis. No aneurysm and no high flow vascular malformation.     NECK MRA:  1.  No high-grade stenosis of the neck vessels by NASCET criteria.     2.  Mild vertebral artery ostial stenosis bilaterally.    CT head  IMPRESSION:    1.  No evidence of acute intracranial hemorrhage or mass effect.  2.  Mild nonspecific white matter changes.  3.  Mild brain parenchymal volume loss.    Occupational Therapy notes reviewed./Physical therapy notes reviewed.     HOSPITAL MEDICATIONS       aspirin  81 mg Oral Daily     atorvastatin  80 mg Oral Daily     heparin ANTICOAGULANT  5,000 Units Subcutaneous Q12H     lisinopril  10 mg Oral Daily     [START ON 12/5/2021] sertraline  50 mg Oral Daily        Total time spent for face to face visit, reviewing labs/imaging studies, counseling and coordination of care was: Over 35 min More than 50% of this time was spent on counseling and coordination of care.    Patient new to me.  Counseling patient.  Coordination of care with the primary team chart.    Yosi Prince MD  Neurologist  [facility]  Tel:- [phone]

## 2021-12-04 NOTE — PLAN OF CARE
PRIMARY DIAGNOSIS: CHEST PAIN  OUTPATIENT/OBSERVATION GOALS TO BE MET BEFORE DISCHARGE:  1. Ruled out acute coronary syndrome (negative or stable Troponin):  Yes  2. Pain Status: Pain free.  3. Appropriate provocative testing performed: Yes  - Stress Test Procedure: Regular  - Interpretation of cardiac rhythm per telemetry tech: sinus hannah    4. Cleared by Consultants (if applicable):Yes  5. Return to near baseline physical activity: No  Discharge Planner Nurse   Safe discharge environment identified: No  Barriers to discharge: Yes       Entered by: Dionna Correia 12/03/2021 6:51 PM    Pt still has complaints of dizziness.  Waiting for PT to see patient and they're scheduled to see pt in the am.  He is assist of 1 to use restroom due to dizziness.  Will continue to [facility].    Please review provider order for any additional goals.   Nurse to notify provider when observation goals have been met and patient is ready for discharge.

## 2021-12-04 NOTE — PLAN OF CARE
Problem: Adult Inpatient Plan of Care  Goal: Optimal Comfort and Wellbeing  Outcome: Improving     Angiogram completed 2 days ago. R radial insertion site WDL.  Some vertigo still last night.   Complained of no pain.  PT planned today to help assess his vertigo issues.

## 2021-12-04 NOTE — PLAN OF CARE
PRIMARY DIAGNOSIS: VERTIGO    OUTPATIENT/OBSERVATION GOALS TO BE MET BEFORE DISCHARGE  1. Orthostatic performed: No    2. Completion of appropriate imaging: Yes    3. Tolerating PO medications: Yes    4. Return to near baseline physical activity: No    5. Cleared for discharge by consultants (if involved): No    Discharge Planner Nurse   Safe discharge environment identified: Yes  Barriers to discharge: No       Entered by: Brooks Biggs 12/04/2021 12:17 AM     Please review provider order for any additional goals.   Nurse to notify provider when observation goals have been met and patient is ready for discharge.

## 2021-12-04 NOTE — PROVIDER NOTIFICATION
FYI - HR low 50s, has dropped as low as 44.  No new symptoms except for headache.  No changes in dizziness (ongoing symptom).    Text page sent to on-call hospitalist.

## 2021-12-04 NOTE — SIGNIFICANT EVENT
Significant Event Note    Called about bradycardia    Description of event:  Seems that patient has had intermittent bradycardia througout the day with no worsening dizziness or hemodynamic instability    Plan:  Hold atenolol in AM and trend hemodynamics  PRN atropine available if patient develops symptomatic bradycardia or hypotension     Discussed with: bedside nurse    Yannick Oh DO

## 2021-12-05 ENCOUNTER — APPOINTMENT (OUTPATIENT)
Dept: PHYSICAL THERAPY | Facility: HOSPITAL | Age: 61
End: 2021-12-05
Payer: COMMERCIAL

## 2021-12-05 VITALS
HEART RATE: 80 BPM | HEIGHT: 73 IN | OXYGEN SATURATION: 99 % | WEIGHT: 247.6 LBS | DIASTOLIC BLOOD PRESSURE: 93 MMHG | BODY MASS INDEX: 32.82 KG/M2 | SYSTOLIC BLOOD PRESSURE: 152 MMHG | RESPIRATION RATE: 20 BRPM | TEMPERATURE: 98.3 F

## 2021-12-05 LAB
ANION GAP SERPL CALCULATED.3IONS-SCNC: 11 MMOL/L (ref 5–18)
BUN SERPL-MCNC: 13 MG/DL (ref 8–22)
CALCIUM SERPL-MCNC: 9.3 MG/DL (ref 8.5–10.5)
CHLORIDE BLD-SCNC: 106 MMOL/L (ref 98–107)
CO2 SERPL-SCNC: 19 MMOL/L (ref 22–31)
CREAT SERPL-MCNC: 0.75 MG/DL (ref 0.7–1.3)
ERYTHROCYTE [DISTWIDTH] IN BLOOD BY AUTOMATED COUNT: 12.4 % (ref 10–15)
GFR SERPL CREATININE-BSD FRML MDRD: >90 ML/MIN/1.73M2
GLUCOSE BLD-MCNC: 96 MG/DL (ref 70–125)
HCT VFR BLD AUTO: 46.5 % (ref 40–53)
HGB BLD-MCNC: 15.7 G/DL (ref 13.3–17.7)
MCH RBC QN AUTO: 31 PG (ref 26.5–33)
MCHC RBC AUTO-ENTMCNC: 33.8 G/DL (ref 31.5–36.5)
MCV RBC AUTO: 92 FL (ref 78–100)
PLATELET # BLD AUTO: 248 10E3/UL (ref 150–450)
POTASSIUM BLD-SCNC: 3.9 MMOL/L (ref 3.5–5)
RBC # BLD AUTO: 5.07 10E6/UL (ref 4.4–5.9)
SODIUM SERPL-SCNC: 136 MMOL/L (ref 136–145)
WBC # BLD AUTO: 7.7 10E3/UL (ref 4–11)

## 2021-12-05 PROCEDURE — 85027 COMPLETE CBC AUTOMATED: CPT | Performed by: HOSPITALIST

## 2021-12-05 PROCEDURE — 80048 BASIC METABOLIC PNL TOTAL CA: CPT | Performed by: HOSPITALIST

## 2021-12-05 PROCEDURE — 97110 THERAPEUTIC EXERCISES: CPT | Mod: GP

## 2021-12-05 PROCEDURE — 99239 HOSP IP/OBS DSCHRG MGMT >30: CPT | Performed by: HOSPITALIST

## 2021-12-05 PROCEDURE — 36415 COLL VENOUS BLD VENIPUNCTURE: CPT | Performed by: HOSPITALIST

## 2021-12-05 PROCEDURE — 250N000013 HC RX MED GY IP 250 OP 250 PS 637: Performed by: INTERNAL MEDICINE

## 2021-12-05 PROCEDURE — 97116 GAIT TRAINING THERAPY: CPT | Mod: GP

## 2021-12-05 PROCEDURE — 99232 SBSQ HOSP IP/OBS MODERATE 35: CPT | Performed by: PSYCHIATRY & NEUROLOGY

## 2021-12-05 PROCEDURE — 250N000011 HC RX IP 250 OP 636: Performed by: HOSPITALIST

## 2021-12-05 PROCEDURE — 250N000013 HC RX MED GY IP 250 OP 250 PS 637: Performed by: HOSPITALIST

## 2021-12-05 RX ORDER — SERTRALINE HYDROCHLORIDE 100 MG/1
100 TABLET, FILM COATED ORAL DAILY
COMMUNITY
Start: 2021-12-05

## 2021-12-05 RX ORDER — LISINOPRIL 20 MG/1
20 TABLET ORAL DAILY
Qty: 30 TABLET | Refills: 0 | Status: SHIPPED | OUTPATIENT
Start: 2021-12-06 | End: 2023-06-27

## 2021-12-05 RX ORDER — ATORVASTATIN CALCIUM 80 MG/1
80 TABLET, FILM COATED ORAL DAILY
Qty: 30 TABLET | Refills: 0 | Status: SHIPPED | OUTPATIENT
Start: 2021-12-05

## 2021-12-05 RX ORDER — NITROGLYCERIN 0.4 MG/1
TABLET SUBLINGUAL
Qty: 25 TABLET | Refills: 3 | Status: SHIPPED | OUTPATIENT
Start: 2021-12-05

## 2021-12-05 RX ADMIN — ASPIRIN 81 MG: 81 TABLET, COATED ORAL at 09:29

## 2021-12-05 RX ADMIN — ATORVASTATIN CALCIUM 80 MG: 40 TABLET, FILM COATED ORAL at 09:28

## 2021-12-05 RX ADMIN — SERTRALINE HYDROCHLORIDE 50 MG: 50 TABLET ORAL at 09:28

## 2021-12-05 RX ADMIN — LISINOPRIL 10 MG: 5 TABLET ORAL at 09:28

## 2021-12-05 RX ADMIN — HEPARIN SODIUM 5000 UNITS: 5000 INJECTION, SOLUTION INTRAVENOUS; SUBCUTANEOUS at 09:33

## 2021-12-05 ASSESSMENT — ACTIVITIES OF DAILY LIVING (ADL)
ADLS_ACUITY_SCORE: 7

## 2021-12-05 ASSESSMENT — MIFFLIN-ST. JEOR: SCORE: 1986.99

## 2021-12-05 NOTE — PLAN OF CARE
Problem: Syncope  Goal: Absence of Syncopal Symptoms  Outcome: Improving     Pt continues with headache, dizziness continues, states slightly better than this morning. Pt continues in SR with BBB with inverted T-Wave.

## 2021-12-05 NOTE — DISCHARGE SUMMARY
[facility] MEDICINE  DISCHARGE SUMMARY     Primary Care Physician: Physician No Ref-Primary  Admission Date: 12/2/2021   Discharge Provider: Keri Rodas Discharge Date: 12/5/2021   Diet:   Active Diet and Nourishment Order   Procedures     Low Saturated Fat Na <2400 mg     Diet       Code Status: Full Code   Activity: DCACTIVITY: Activity as tolerated        Condition at Discharge: Stable     REASON FOR PRESENTATION(See Admission Note for Details)   Abraham Dong is a 61 year old male admitted on 12/2/2021. He has history of hypertension and hyperlipidemia and presented for evaluation of new onset dizziness and chest pressure. Had negative angiogram and echocardiogram. Had ongoing vertigo and headache.     PRINCIPAL & ACTIVE DISCHARGE DIAGNOSES     Active Problems:    ACS (acute coronary syndrome) (H)    Hypertensive emergency    Vertigo    Status post coronary angiogram    Right frontal cavernoma    Right frontal developmental venous     ST elevation MI (STEMI) (H)      PENDING LABS     Unresulted Labs Ordered in the Past 30 Days of this Admission     No orders found from 11/2/2021 to 12/3/2021.          PROCEDURES ( this hospitalization only)      Procedure(s):  Coronary Angiogram  Left Heart Cath  Left Ventriculogram    RECOMMENDATIONS TO OUTPATIENT PROVIDER FOR F/U VISIT     Follow-up Appointments     Follow-up and recommended labs and tests       Follow up with primary care provider, Physician No Ref-Primary, within 7   days for hospital follow- up.  The following labs/tests are recommended:   basic metabolic profile in 1 week. Lipid profile in 3 months.    Follow up with cardiology in 3 weeks.             DISPOSITION     Home    SUMMARY OF HOSPITAL COURSE:    #Vertigo, likely secondary to BPPV  #Posterior headache  #Some concern for autonomic dysfunction  Patient presented with severe dizziness and nystagmus.  Negative CT head and MRA brain; central cause for vertigo  ruled out. Seen by neurology yesterday, diagnosis of BPPV was favored.  However, vestibular therapist also saw the patient and his vertigo resolved in the interim and now has a negative Austin-Hallpike. Suspicion that he had episode of BPPV that is now resolving.  Presentation complicated by concurrent orthostatic hypotension contributing to dizziness as well.  He has been noted to have bradycardia and 4.5-second asystole. Concerned about autonomic dysfunction. He was treated initially with gentle IV fluids. We discontinued his atenolol and decreased his dose of Zoloft. At discharge, patient was relatively asymptomatic and back to baseline, denying any ha or ongoing dizziness or lightheadedness. Neuro exam intact. He did have left beating nystagmus at discharge, but again no symptoms. Given his myriad of signs and sx, concern for autonomic dysfunction, potential for early degenerative disease like Parkinson's or Shy-Drager etc.  Currently most likely diagnosis would seem to be BPPV complicated by perhaps status migrainosus, and he seems to have improved with beta-blocker discontinuation.  However, if he returns with similar symptoms would recommend LP with opening pressures at that time, along with repeat MRI. Patient could also benefit from meclizine if he continues to have episodes of vertigo.      #Asystole/Bradycardia   Patient had single asymptomatic 4.5 second asystole on tele shortly after admission. He was also bradycardic in the 40's, and had several 3-second pauses. Normal cardiac workup. Home atenolol discontinued, and he seems to be having less frequent pauses now since this change.  Optimistic that with atenolol discontinued this would not be an ongoing issue for him.  Has been asymptomatic throughout.  As above, some degree of concern that this could be due to autonomic dysfunction.  -Cardiology saw and do recommend 21-day event monitor with follow up.  He should see cardiology if the event monitor is  abnormal.     #Chest pressure/Concern for ACS  Patient presented with dizziness and chest pressure with severely elevated blood pressures, EKG showed sinus bradycardia with new anterior ST depressions and right bundle branch block.  Patient was sent directly to the Cath Lab.  Angiogram showed tortuous coronary arteries with only mild atherosclerosis.  Chest pressure has resolved and may have been secondary to severe hypertension.  -Cardiology recommend continue high-dose statin indefinitely.  Started on aspirin.  -Cardiology recommend close management of blood pressure     #Hyperlipidemia  As above with mild atherosclerosis noted on coronary angiogram.  .   -Home Atorvastatin increased to 80 mg per cardiology.  -Recommend repeat lipid panel in 3 mo and primary care follow up      #Hypertensive urgency, resolved  Noted by EMS to have blood pressure 210/130, treated with a one time dose of IV hydralazin. Pressure came down nicely. Home atenolol discontinued d/t bradycardia. Lisinopril was added for BP control   -Start Lisinopril 20 mg qday with bmp in 1 week to assess K     #Some component of acute diastolic heart failure  Noted to have extremely elevated LV end-diastolic pressure during angiogram.  Suspect secondary to hypertensive urgency as above.  Do not think this is an ongoing issue for him.     #Depression  -Home Zoloft, reduced from 100 mg to 50 mg due to orthostasis while hospitalized.  Would not plan to resume 100 mg dose at this time, and could consider tapering off of Zoloft completely if symptoms persist.  -Follow-up with pcp regarding dosing     Discharge Medications with Med changes:     Discharge Medication List as of 12/5/2021  2:05 PM      START taking these medications    Details   lisinopril (ZESTRIL) 20 MG tablet Take 1 tablet (20 mg) by mouth daily, Disp-30 tablet, R-0, E-Prescribe         CONTINUE these medications which have CHANGED    Details   aspirin (ASA) 81 MG EC tablet Take 1  tablet (81 mg) by mouth daily, Disp-90 tablet, R-3, E-Prescribe      atorvastatin (LIPITOR) 80 MG tablet Take 1 tablet (80 mg) by mouth daily, Disp-30 tablet, R-0, E-Prescribe      nitroGLYcerin (NITROSTAT) 0.4 MG sublingual tablet One tablet under the tongue every 5 minutes if needed for chest pain. May repeat every 5 minutes for a maximum of 3 doses in 15 minutes, Disp-25 tablet, R-3, E-Prescribe      sertraline (ZOLOFT) 100 MG tablet Take 0.5 tablets (50 mg) by mouth daily, Historical         CONTINUE these medications which have NOT CHANGED    Details   acetaminophen-caffeine (EXCEDRIN TENSION HEADACHE) 500-65 MG TABS Take 2 tablets by mouth daily as needed for mild pain, Historical         STOP taking these medications       atenolol (TENORMIN) 50 MG tablet Comments:   Reason for Stopping:                 Consults     CARDIOLOGY IP CONSULT  PHARMACY IP CONSULT  PHARMACY IP CONSULT  HOSPITALIST IP CONSULT  NEUROLOGY IP CONSULT  PHYSICAL THERAPY ADULT IP CONSULT  ACUPUNCTURE IP CONSULT HE     SIGNIFICANT IMAGING FINDINGS     Results for orders placed or performed during the hospital encounter of 12/02/21   XR Chest Port 1 View    Impression    IMPRESSION: Elevated right hemidiaphragm. No focal consolidation or pleural effusion. The cardiac silhouette and pulmonary vasculature are normal.   CT Head w/o Contrast    Impression    IMPRESSION:    1.  No evidence of acute intracranial hemorrhage or mass effect.  2.  Mild nonspecific white matter changes.  3.  Mild brain parenchymal volume loss.   MR Brain COW Carotid wwo Contrast    Impression    IMPRESSION:  HEAD MRI:   1.  No finding for acute infarct.    2.  Subcentimeter presumed cavernoma in the parasagittal right frontal lobe without adjacent FLAIR hyperintense edema to suggest recent hemorrhage. Very subtle small amount of adjacent linear enhancement in this area likely reflects an adjacent small   developmental venous anomaly. Additional small developmental  venous anomaly in the right insula.    3.  Background age-related changes as above.    HEAD MRA:   1.  No significant intracranial stenosis. No aneurysm and no high flow vascular malformation.    NECK MRA:  1.  No high-grade stenosis of the neck vessels by NASCET criteria.    2.  Mild vertebral artery ostial stenosis bilaterally.   Echocardiogram Complete   Result Value Ref Range    LVEF  60-65%    Cardiac Catheterization   Result Value Ref Range    Cath EF Estimated 60 %       Discharge Orders        Reason for your hospital stay    Dizziness, chest tightness     Follow-up and recommended labs and tests     Follow up with primary care provider, Physician No Ref-Primary, within 7 days for hospital follow- up.  The following labs/tests are recommended: basic metabolic profile in 1 week. Lipid profile in 3 months.    Follow up with cardiology in 3 weeks.     Activity    Your activity upon discharge: activity as tolerated. Recommend not driving until cleared by your primary doctor.     When to contact your care team    Call your primary doctor if you have any of the following: worsening dizziness, headache, visual changes, fever, chills, shortness of breath, chest pain, feeling faint, or any other symptoms that are new or concerning to you.     Cardiac Event Monitor Adult Pediatric     Diet    Follow this diet upon discharge: Resume your regular diet       Examination   Physical Exam   Temp:  [98  F (36.7  C)-98.4  F (36.9  C)] 98.3  F (36.8  C)  Pulse:  [53-90] 80  Resp:  [16-20] 20  BP: (137-173)/(88-98) 152/93  SpO2:  [95 %-99 %] 99 %  Wt Readings from Last 1 Encounters:   12/05/21 112.3 kg (247 lb 9.6 oz)       General: in no apparent distress, non-toxic, alert and well hydrated male   HEENT: Head normocephalic atraumatic, oral mucosa moist.   CV: Regular rhythm, normal rate,   Resp: No increased work of breathing.   Extremities: No peripheral edema  Psych: Normal affect, mood euthymic  Neuro: Strength 5/5 BUE and  BLE, sensation intact BUE and BLE, cranial nerves CNII-XII intact. Speech clear.    Please see EMR for more detailed significant labs, imaging, consultant notes etc.    Christine Krueger, PA-[address]    I, Keri Rodas MD, personally saw the patient today and spent greater than 30 minutes discharging this patient.  In supervising the student, I saw and evaluated the patient with the student and personally performed all aspects of the encounter documented above.  I have reviewed and verified that the documentation is correct and complete.  Keri Rodas MD  [facility]    CC:No Ref-Primary, Physician

## 2021-12-05 NOTE — PLAN OF CARE
Problem: Adult Inpatient Plan of Care  Goal: Plan of Care Review  12/5/2021 0749 by Fred Shanks, RN  Outcome: Improving  12/5/2021 0457 by Fred Shanks, RN  Outcome: Improving   Slept during the night. Denied discomfort. No dizziness or chest pain. Vital signs are stable. Sinus rhythm with bundle branch block on Tele. Continue to monitor.

## 2021-12-05 NOTE — PLAN OF CARE
Physical Therapy Discharge Summary    Reason for therapy discharge:    Discharged to home.    Progress towards therapy goal(s). See goals on Care Plan in [facility] electronic health record for goal details.  Goals partially met.  Barriers to achieving goals:   discharge from facility.    Therapy recommendation(s):    Continued therapy is recommended.  Rationale/Recommendations:  outpatient PT vestibular as needed.

## 2021-12-06 NOTE — PROGRESS NOTES
NEUROLOGY PROGRESS NOTE     ASSESSMENT & PLAN   Visit diagnosis: Vertigo    Vertigo.  Question of lightheadedness/unsteadiness versus vertigo.    MRI brain negative for structural lesions  MRA negative for any large vessel occlusion/stenosis  Given the positional nature of his vertigo suspect BPPV  Patient has done 1 session of physical therapy with no improvement.  Recommend multiple sessions  Meclizine as needed for dizziness    Neurology Discharge Planning : Per primary team.  Will sign off.  Possible discharge later today.    Patient Active Problem List    Diagnosis Date Noted     Depression 12/03/2021     Priority: Medium     HTN (hypertension) 12/03/2021     Priority: Medium     Formatting of this note might be different from the original.  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     Priority: Medium     Right frontal developmental venous  12/03/2021     Priority: Medium     ST elevation MI (STEMI) (H) 12/03/2021     Priority: Medium     Status post coronary angiogram 12/02/2021     Priority: Medium     ACS (acute coronary syndrome) (H)      Priority: Medium     Hypertensive emergency      Priority: Medium     Vertigo      Priority: Medium     Hyperlipidemia 05/15/2019     Priority: Medium     Plantar fasciitis 08/28/2009     Priority: Medium     Medical History  Past Medical History:   Diagnosis Date     Benign essential hypertension      Hyperlipidemia      Obesity         SUBJECTIVE     Seen in follow-up for Dr. Merida.  Patient presented with dizziness and chest pressure.  EKG showed ST depression with right bundle branch block.  Patient's angiogram shows Starcher CAD with mild atherosclerosis.  He has an aspirin and statin.  Neurology is been consulted to look for persistent dizziness.  Patient reports that every time he looks towards the right side he will feel dizzy.  This is intermittent.  Reports a room spinning sensation.  MRI/CT scan did not show any  "significant abnormality.    Patient has done 1 session of physical therapy this morning.  Did have vestibular rehab.  Has not made any improvement.  Discussed that he needs to give it couple of sessions before he could make significant improvement.    12/5  Patient reports his dizziness is slightly better today.  Describes it more as lightheadedness.  Did do physical therapy this morning which she feels is helping.  Denies any other new complaints.  No neurological issues.  Possible discharge later today.  Feels more unsteady and is doing better in bed.  Looking towards the left side still causes some symptoms.     OBJECTIVE     Vital signs in last 24 hours  Temp:  [98  F (36.7  C)-98.3  F (36.8  C)] 98.3  F (36.8  C)  Pulse:  [66-90] 80  Resp:  [16-20] 20  BP: (137-173)/(89-98) 152/93  SpO2:  [95 %-99 %] 99 %      Review of Systems   Comprehensive review of systems was done and was negative within the HPI.  Pertinent positives noted in the HPI.    PHYSICAL EXAMINATION  VITALS: BP (!) 152/93 (BP Location: Left arm)   Pulse 80   Temp 98.3  F (36.8  C) (Oral)   Resp 20   Ht 1.854 m (6' 1\")   Wt 112.3 kg (247 lb 9.6 oz)   SpO2 99%   BMI 32.67 kg/m    GENERAL -Health appearing, No apparent distress  EYES- No scleral icterus, no eyelid droop, Pupils - see Neuro section  HEENT - Normocephalic, atraumatic, Hearing grossly intact; Oral mucosa moist and pink in color. External Ears and nose intact.   Neck - supple with no obvious lymphadenopathy or thyromegaly  PULM - Good spontaneous respiratory effort; Normal palpation of chest.   CV- Pedal pulses present with no peripheral edema/ No significant varicosities.  MSK- Gait - see Neuro section; Strength and tone- see Neuro section; Range of motion grossly intact.  PSYCH -cooperative.    Neurological  Mental status - Patient is awake and oriented to self, place and time. Attention span is normal. Language is fluent and follows commands appropriately.   Cranial nerves - " CN II-XII intact. Pupils are reactive and symmetric; EOMI, VFIT, NLF symmetric  Motor - There is no pronator drift. Motor exam shows 5/5 strength in all extremities.  Tone - Tone is symmetric bilaterally in upper and lower extremities.  Coordination - Finger to nose and heel to shin is without dysmetria.  Gait and station --needs assistance to ambulate.  Formal gait testing cannot be done due to safety concerns from ongoing medical issues.       DIAGNOSTIC STUDIES     Pertinent Radiology   HEAD MRI:-Images reviewed.  1.  No finding for acute infarct.     2.  Subcentimeter presumed cavernoma in the parasagittal right frontal lobe without adjacent FLAIR hyperintense edema to suggest recent hemorrhage. Very subtle small amount of adjacent linear enhancement in this area likely reflects an adjacent small   developmental venous anomaly. Additional small developmental venous anomaly in the right insula.     3.  Background age-related changes as above.     HEAD MRA:   1.  No significant intracranial stenosis. No aneurysm and no high flow vascular malformation.     NECK MRA:  1.  No high-grade stenosis of the neck vessels by NASCET criteria.     2.  Mild vertebral artery ostial stenosis bilaterally.    CT head  IMPRESSION:    1.  No evidence of acute intracranial hemorrhage or mass effect.  2.  Mild nonspecific white matter changes.  3.  Mild brain parenchymal volume loss.    Occupational Therapy notes reviewed./Physical therapy notes reviewed.     HOSPITAL MEDICATIONS          Total time spent for face to face visit, reviewing labs/imaging studies, counseling and coordination of care was: Over 30 min More than 50% of this time was spent on counseling and coordination of care.    Counseling patient.  Coordination of care with the primary team chart.    Yosi Prince MD  Neurologist  [facility]  Tel:- [phone]

## 2021-12-09 ENCOUNTER — HOSPITAL ENCOUNTER (OUTPATIENT)
Dept: CARDIOLOGY | Facility: HOSPITAL | Age: 61
Discharge: HOME | End: 2021-12-09
Attending: HOSPITALIST | Admitting: HOSPITALIST
Payer: COMMERCIAL

## 2021-12-09 DIAGNOSIS — R00.1 BRADYCARDIA: ICD-10-CM

## 2021-12-09 PROCEDURE — 93270 REMOTE 30 DAY ECG REV/REPORT: CPT

## 2021-12-10 ENCOUNTER — TELEPHONE (OUTPATIENT)
Dept: CARDIOLOGY | Facility: CLINIC | Age: 61
End: 2021-12-10
Payer: COMMERCIAL

## 2021-12-10 DIAGNOSIS — R00.1 BRADYCARDIA: ICD-10-CM

## 2021-12-10 DIAGNOSIS — I47.10 SVT (SUPRAVENTRICULAR TACHYCARDIA) (H): Primary | ICD-10-CM

## 2021-12-10 NOTE — TELEPHONE ENCOUNTER
----- Message from DMITRI Lynn sent at 12/10/2021 12:27 PM CST -----  Regarding: RE: MD Notify  My apologies. We have been having issues with them saving. It is in there now. Thanks!  ----- Message -----  From: Litzy Rosado, RN  Sent: 12/10/2021  11:19 AM CST  To: DMITRI Lynn  Subject: RE: MD Notify                                    Thanks,  Let me know when it is on [employer]. I do not see it as of yet.  Thanks!  Mal   ----- Message -----  From: Bisi Varner EP  Sent: 12/10/2021  11:15 AM CST  To: Litzy Rosado, RN  Subject: MD Notify                                        MD Notify saved to [facility]. On 12/10/2021 at 08:40:54 a patient triggered event during light activity with lightheadedness and dizziness recorded New Onset Atrial Fibrillation/Flutter with PVC(s), .

## 2021-12-10 NOTE — TELEPHONE ENCOUNTER
Chart reviewed from hospital- no longer on Atenolol:    #Asystole/Bradycardia   Patient had single asymptomatic 4.5 second asystole on tele shortly after admission. He was also bradycardic in the 40's, and had several 3-second pauses. Normal cardiac workup. Home atenolol discontinued, and he seems to be having less frequent pauses now since this change.  Optimistic that with atenolol discontinued this would not be an ongoing issue for him.  Has been asymptomatic throughout.  As above, some degree of concern that this could be due to autonomic dysfunction.  -Cardiology saw and do recommend 21-day event monitor with follow up.  He should see cardiology if the event monitor is abnormal.        Left message for patient to address [employer] alert. Noted that he reported feeling dizzy, doing light activity. -[facility] [place]      ==Received a call back from Abraham. He states that around the time of the alert, he was getting ready quickly to go to his PMD appt for post-hospitalization follow up. He was rushing because he woke up late and his ride was there to pick him up. He felt a little dizzy, but was moving quickly. He confirmed that he takes a baby aspirin and he STOPPED his atenolol due to bradycardia in the hospital. He was informed of what strip read and we discussed that it will ultimately be reviewed by MD but preliminary discussed Afib and treatment. Reassured him that this is a non-life threatening arrhythmia that we treat with medicines and then we also discussed anticoagulation, should MD recommend this. Informed him to please continue with monitor as he is and will route to EMG as patient will be following with her and LBF is out of the office. -[facility] [place] [facility] [place]      Dr. Ferrell,  Dr. Chaparro is out of the office and pt looks like he will be following with you as primary cardiologist . Could you review above [employer] alert for Abraham? Any recommendations? His atenolol was stopped at discharge due to pauses and bradycardia. He is  only on baby aspirin.   Thanks,  Litzy

## 2021-12-14 NOTE — TELEPHONE ENCOUNTER
===View-only below this line===  ----- Message -----  From: Radha Ferrell MD  Sent: 12/14/2021  12:29 PM CST  To: Elroy Siegel RN    Hi, Abraham had pauses during his hospitalization and so his beta-blocker was stopped. Unfortunately, now he is having episodes of an SVT. These were likely suppressed by his beta-blocker. I think that in order to treat/prevent these faster SVT rhythms that we need to consider an ablation so that we can avoid the medications that would slow down his heart rate too much otherwise.     Can you help get Abraham an appointment with the next available EP doctor, preferably Dr. Howard, to discuss an ablation vs a trial of a different medication?    I left Kimi brief voicemail just now, but included very little information.    Thanks, EG

## 2021-12-15 NOTE — TELEPHONE ENCOUNTER
Called patient and discuss EMG review of rhythm strips and recommendations.  He verbalized understanding and is agreeable to meeting with EP. He is on his way out the door to work so he did not want to be transferred to scheduling to have this arranged. Order placed for EP consult and msg to schedulers. He was also given the main scheduling line. -[facility] [place]

## 2021-12-16 ENCOUNTER — TELEPHONE (OUTPATIENT)
Dept: CARDIOLOGY | Facility: CLINIC | Age: 61
End: 2021-12-16
Payer: COMMERCIAL

## 2021-12-16 NOTE — TELEPHONE ENCOUNTER
"PC with patient. Reports that he went to work yesterday, around 1400. While at work, around [id number], he felt unwell; mild chest pain,  Heart racing, shaking, sweating, lightheaded, and with flashing in front of his eyes. 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 He went and sat down/rested. He continued to not feel right, and noticed also shortness of breath. He went home from work, having his ex-wife pick him up and drive him home. Upon returning home, his symptoms persisted til almost 2100. Reports that is took quite a while for it \"to get normal\". He did take a NTG at home, X 1 which did mildly help. He was able to go to bed. Awoke today and feels fine, his usual self. Pt calling in as he feels that possibly there is more going on, as he felt awful yesterday which was upsetting. Today, he feels fine.     Discussion with patient will transfer to schedulers to arrange his OV with Dr. Howard. Secondly, will call [employer] to see if any of the heart tracings can be sent for review. No alerts have been notified to clinic. Pt reports that he did remove the heart monitor for a period of time last night to charge it. Today is having difficulties with the heart monitor. Will ask [facility] to call the patient to help guide/ possibly instruct to fix the heart monitor.     Discussion with patient that if this reoccurs to stop and rest. If persists then present to the ER for evaluation. Pt verbalized understanding. JULIANN ARRIAGA     PC to [facility] to see if any strips/monitoring can be provided. Strips at times of reported symptoms from yesterday, being faxed to writer directly. Await transmission. Requested that a patient care rep call the patient to help with the monitor. [facility] will reach out.   Dr. Howard appt arranged for 1/11/21. JULIANN ARRIAGA     "

## 2021-12-16 NOTE — TELEPHONE ENCOUNTER
----- Message from Christine Mayer sent at 12/16/2021  9:27 AM CST -----  Regarding: EMG  General phone call:    Caller: Abraham  Primary cardiologist: KARINE  Detailed reason for call: Pt is calling and states that last at work he became very sweaty and seeing flashing lights. Pt states he had someone come get him because he couldn't drive. Pt states he is feeling better now but not 100%    Best phone number: [phone]  Best time to contact: anytime  Ok to leave a detailedmessage? yes  Device? yes    Additional Info:

## 2021-12-16 NOTE — TELEPHONE ENCOUNTER
19 pages received from [employer]. Review of pages, most of the pages are from previous dates. Starting at page 13, is in relation to yesterday and time of reported symptoms. On page 14 concerning possible pause lasting 5-6 seconds. Pt no longer on BB therapy. Will have all 19 pages scanned in and routed to EMG for review.     Dr. Ferrell please review cc'd [employer] report, after page 13, when onset on concerning reported symptoms from yesterday. EP OV not til 1/11/21. Any further recommendations at this time? CORINA,RN

## 2021-12-22 ENCOUNTER — TELEPHONE (OUTPATIENT)
Dept: CARDIOLOGY | Facility: CLINIC | Age: 61
End: 2021-12-22
Payer: COMMERCIAL

## 2021-12-22 ENCOUNTER — DOCUMENTATION ONLY (OUTPATIENT)
Dept: CARDIOLOGY | Facility: CLINIC | Age: 61
End: 2021-12-22
Payer: COMMERCIAL

## 2021-12-22 DIAGNOSIS — I44.30 ATRIOVENTRICULAR BLOCK: Primary | ICD-10-CM

## 2021-12-22 NOTE — PROGRESS NOTES
Dr. Ferrell and I reviewed the observed paroxysmal atrial fibrillation and 5-6 second asystolic event while in atrial fibrillation noted on ambulatory rhythm monitoring.  In the setting of his previously noted 4-5 second asystolic events, RBBB QRS 160ms, reported symptoms, the findings are concerning for high degree atrioventricular block related to infra-Hisian conduction disease.  He is also observed to have LVH on TTE with associated ECG repolarization abnormality suggestive of a hypertrophic vs infiltrative cardiomyopathy.  He has not had documented NSVT.    I attempted to contact Mr. Dong to review symptoms during the above episode and discuss possible pacemaker implantation - there was no answer at the only available phone number, I left a voicemail requesting call-back (Dr. Ferrell had left him 2 messages previously).  Further myocardial imaging eg. cMRI would be reasonable to further evaluate the LVH and repolarization changes, though will likely be better performed post pacemaker implantation given the possible high degree AV block and accepting the possibility of device related artifact.    Aletha Howard MD  12/22/2021  3:46 PM      ADDENDUM  I talked to Mr. Dong by phone and reviewed with him his pause and possible high degree AV block, as well as atrial fibrillation.    Briefly, Mr. 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    Pauses - baseline RBBB and near syncope, concerning for high degree AV block.  Normal LV function, though LVH and repolarization abnormality suggest possible HCM vs infiltrative cardiomyopathy.     Pacemaker implantation is indicated.  We reviewed the rationale for pacemaker implantation, and reviewed the implant procedure, risks (including pneumothorax, lead dislodgement, perforation, tamponade, system infection), recovery expectations, and remote monitoring.  He elects to proceed with pacemaker implantation.    - dual chamber pacemaker implanation.  We will attempt to coordinate this for tomorrow. He will remain NPO after midnight.    - we reviewed symptoms which should prompt EMS/ER evaluation  - myocardial imaging eg. 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  We can re-visit this discussion further in ongoing follow-up.  - suggest neurology consultation to determine safety of therapeutic anticoagulation.  If felt to be high risk, can consider percutaneous left atrial appendage occlusion    Aletha Howard MD  12/22/2021  5:38 PM

## 2021-12-22 NOTE — TELEPHONE ENCOUNTER
----- Message from Christine Mayer sent at 12/22/2021  4:33 PM CST -----  Regarding: EMG/ADW  General phone call:    Caller: Abraham  Primary cardiologist: KARINE/ADW  Detailed reason for call: Pt is returning a few calls from EMG and ADW. Pt states he is available for the rest of the evening    Best phone number: [phone]  Best time to contact: anytime  Ok to leave a detailedmessage? yes  Device? no    Additional Info:

## 2021-12-23 ENCOUNTER — TRANSCRIBE ORDERS (OUTPATIENT)
Dept: CARDIOLOGY | Facility: CLINIC | Age: 61
End: 2021-12-23
Payer: COMMERCIAL

## 2021-12-23 ENCOUNTER — APPOINTMENT (OUTPATIENT)
Dept: RADIOLOGY | Facility: HOSPITAL | Age: 61
End: 2021-12-23
Attending: INTERNAL MEDICINE
Payer: COMMERCIAL

## 2021-12-23 ENCOUNTER — HOSPITAL ENCOUNTER (OUTPATIENT)
Facility: HOSPITAL | Age: 61
Discharge: HOME | End: 2021-12-23
Attending: INTERNAL MEDICINE | Admitting: INTERNAL MEDICINE
Payer: COMMERCIAL

## 2021-12-23 VITALS
SYSTOLIC BLOOD PRESSURE: 140 MMHG | BODY MASS INDEX: 32.56 KG/M2 | HEART RATE: 82 BPM | TEMPERATURE: 98.2 F | OXYGEN SATURATION: 92 % | DIASTOLIC BLOOD PRESSURE: 83 MMHG | WEIGHT: 245.7 LBS | RESPIRATION RATE: 18 BRPM | HEIGHT: 73 IN

## 2021-12-23 DIAGNOSIS — Z95.0 CARDIAC PACEMAKER IN SITU: Primary | ICD-10-CM

## 2021-12-23 DIAGNOSIS — I44.30 ATRIOVENTRICULAR BLOCK: ICD-10-CM

## 2021-12-23 DIAGNOSIS — I48.0 PAROXYSMAL ATRIAL FIBRILLATION (H): Primary | ICD-10-CM

## 2021-12-23 LAB
ABO/RH(D): NORMAL
ALBUMIN SERPL-MCNC: 4 G/DL (ref 3.5–5)
ALP SERPL-CCNC: 47 U/L (ref 45–120)
ALT SERPL W P-5'-P-CCNC: 35 U/L (ref 0–45)
ANION GAP SERPL CALCULATED.3IONS-SCNC: 10 MMOL/L (ref 5–18)
ANTIBODY SCREEN: NEGATIVE
AST SERPL W P-5'-P-CCNC: 28 U/L (ref 0–40)
BASOPHILS # BLD AUTO: 0.1 10E3/UL (ref 0–0.2)
BASOPHILS NFR BLD AUTO: 1 %
BILIRUB DIRECT SERPL-MCNC: 0.1 MG/DL
BILIRUB SERPL-MCNC: 0.4 MG/DL (ref 0–1)
BUN SERPL-MCNC: 27 MG/DL (ref 8–22)
CALCIUM SERPL-MCNC: 9.4 MG/DL (ref 8.5–10.5)
CHLORIDE BLD-SCNC: 106 MMOL/L (ref 98–107)
CO2 SERPL-SCNC: 22 MMOL/L (ref 22–31)
CREAT SERPL-MCNC: 0.84 MG/DL (ref 0.7–1.3)
EOSINOPHIL # BLD AUTO: 0.2 10E3/UL (ref 0–0.7)
EOSINOPHIL NFR BLD AUTO: 4 %
ERYTHROCYTE [DISTWIDTH] IN BLOOD BY AUTOMATED COUNT: 12.3 % (ref 10–15)
GFR SERPL CREATININE-BSD FRML MDRD: >90 ML/MIN/1.73M2
GLUCOSE BLD-MCNC: 100 MG/DL (ref 70–125)
HCT VFR BLD AUTO: 43.2 % (ref 40–53)
HGB BLD-MCNC: 14.5 G/DL (ref 13.3–17.7)
IMM GRANULOCYTES # BLD: 0 10E3/UL
IMM GRANULOCYTES NFR BLD: 0 %
LYMPHOCYTES # BLD AUTO: 1.4 10E3/UL (ref 0.8–5.3)
LYMPHOCYTES NFR BLD AUTO: 25 %
MCH RBC QN AUTO: 31.3 PG (ref 26.5–33)
MCHC RBC AUTO-ENTMCNC: 33.6 G/DL (ref 31.5–36.5)
MCV RBC AUTO: 93 FL (ref 78–100)
MONOCYTES # BLD AUTO: 0.6 10E3/UL (ref 0–1.3)
MONOCYTES NFR BLD AUTO: 11 %
NEUTROPHILS # BLD AUTO: 3.2 10E3/UL (ref 1.6–8.3)
NEUTROPHILS NFR BLD AUTO: 59 %
NRBC # BLD AUTO: 0 10E3/UL
NRBC BLD AUTO-RTO: 0 /100
PLATELET # BLD AUTO: 263 10E3/UL (ref 150–450)
POTASSIUM BLD-SCNC: 4.5 MMOL/L (ref 3.5–5)
PROT SERPL-MCNC: 7 G/DL (ref 6–8)
RBC # BLD AUTO: 4.64 10E6/UL (ref 4.4–5.9)
SARS-COV-2 RNA RESP QL NAA+PROBE: NEGATIVE
SODIUM SERPL-SCNC: 138 MMOL/L (ref 136–145)
SPECIMEN EXPIRATION DATE: NORMAL
TSH SERPL DL<=0.005 MIU/L-ACNC: 3.63 UIU/ML (ref 0.3–5)
WBC # BLD AUTO: 5.4 10E3/UL (ref 4–11)

## 2021-12-23 PROCEDURE — 82248 BILIRUBIN DIRECT: CPT | Performed by: NURSE PRACTITIONER

## 2021-12-23 PROCEDURE — 80048 BASIC METABOLIC PNL TOTAL CA: CPT | Performed by: NURSE PRACTITIONER

## 2021-12-23 PROCEDURE — 36415 COLL VENOUS BLD VENIPUNCTURE: CPT | Performed by: NURSE PRACTITIONER

## 2021-12-23 PROCEDURE — 250N000013 HC RX MED GY IP 250 OP 250 PS 637: Performed by: NURSE PRACTITIONER

## 2021-12-23 PROCEDURE — 272N000001 HC OR GENERAL SUPPLY STERILE: Performed by: INTERNAL MEDICINE

## 2021-12-23 PROCEDURE — 84443 ASSAY THYROID STIM HORMONE: CPT | Performed by: NURSE PRACTITIONER

## 2021-12-23 PROCEDURE — C1785 PMKR, DUAL, RATE-RESP: HCPCS | Performed by: INTERNAL MEDICINE

## 2021-12-23 PROCEDURE — 250N000009 HC RX 250: Performed by: INTERNAL MEDICINE

## 2021-12-23 PROCEDURE — 86900 BLOOD TYPING SEROLOGIC ABO: CPT | Performed by: NURSE PRACTITIONER

## 2021-12-23 PROCEDURE — 99152 MOD SED SAME PHYS/QHP 5/>YRS: CPT | Performed by: INTERNAL MEDICINE

## 2021-12-23 PROCEDURE — 87635 SARS-COV-2 COVID-19 AMP PRB: CPT | Performed by: NURSE PRACTITIONER

## 2021-12-23 PROCEDURE — 999N000065 XR CHEST 2 VW

## 2021-12-23 PROCEDURE — 250N000011 HC RX IP 250 OP 636: Performed by: INTERNAL MEDICINE

## 2021-12-23 PROCEDURE — 33208 INSRT HEART PM ATRIAL & VENT: CPT | Performed by: INTERNAL MEDICINE

## 2021-12-23 PROCEDURE — C1894 INTRO/SHEATH, NON-LASER: HCPCS | Performed by: INTERNAL MEDICINE

## 2021-12-23 PROCEDURE — 85025 COMPLETE CBC W/AUTO DIFF WBC: CPT | Performed by: NURSE PRACTITIONER

## 2021-12-23 PROCEDURE — C1898 LEAD, PMKR, OTHER THAN TRANS: HCPCS | Performed by: INTERNAL MEDICINE

## 2021-12-23 PROCEDURE — 99153 MOD SED SAME PHYS/QHP EA: CPT | Performed by: INTERNAL MEDICINE

## 2021-12-23 PROCEDURE — 33208 INSRT HEART PM ATRIAL & VENT: CPT | Mod: KX | Performed by: INTERNAL MEDICINE

## 2021-12-23 DEVICE — PACEMAKER AZURE MRI XT DR
Type: IMPLANTABLE DEVICE | Site: CHEST  WALL | Status: NON-FUNCTIONAL
Removed: 2024-04-24

## 2021-12-23 DEVICE — IMP LEAD PACING BIPOLAR CAPSUREFIX NOVUS 52CM 5076-52: Type: IMPLANTABLE DEVICE | Site: HEART | Status: FUNCTIONAL

## 2021-12-23 DEVICE — IMP LEAD PACING BIPOLAR CAPSUREFIX NOVUS 58CM 5076-58
Type: IMPLANTABLE DEVICE | Site: HEART | Status: NON-FUNCTIONAL
Removed: 2024-04-24

## 2021-12-23 RX ORDER — FENTANYL CITRATE 50 UG/ML
INJECTION, SOLUTION INTRAMUSCULAR; INTRAVENOUS
Status: DISCONTINUED | OUTPATIENT
Start: 2021-12-23 | End: 2021-12-23 | Stop reason: HOSPADM

## 2021-12-23 RX ORDER — ACETAMINOPHEN 325 MG/1
650 TABLET ORAL EVERY 4 HOURS PRN
Status: DISCONTINUED | OUTPATIENT
Start: 2021-12-23 | End: 2021-12-23 | Stop reason: HOSPADM

## 2021-12-23 RX ORDER — CEFAZOLIN SODIUM 2 G/100ML
INJECTION, SOLUTION INTRAVENOUS CONTINUOUS PRN
Status: COMPLETED | OUTPATIENT
Start: 2021-12-23 | End: 2021-12-23

## 2021-12-23 RX ORDER — DIAZEPAM 5 MG
10 TABLET ORAL ONCE
Status: COMPLETED | OUTPATIENT
Start: 2021-12-23 | End: 2021-12-23

## 2021-12-23 RX ORDER — AMIODARONE HYDROCHLORIDE 200 MG/1
200 TABLET ORAL DAILY
Qty: 30 TABLET | Refills: 3 | Status: SHIPPED | OUTPATIENT
Start: 2021-12-31 | End: 2022-01-24

## 2021-12-23 RX ORDER — LIDOCAINE 40 MG/G
CREAM TOPICAL
Status: DISCONTINUED | OUTPATIENT
Start: 2021-12-23 | End: 2021-12-23 | Stop reason: HOSPADM

## 2021-12-23 RX ORDER — AMIODARONE HYDROCHLORIDE 200 MG/1
200 TABLET ORAL 3 TIMES DAILY
Qty: 21 TABLET | Refills: 0 | Status: SHIPPED | OUTPATIENT
Start: 2021-12-23 | End: 2022-01-24

## 2021-12-23 RX ADMIN — DIAZEPAM 10 MG: 5 TABLET ORAL at 08:27

## 2021-12-23 ASSESSMENT — MIFFLIN-ST. JEOR: SCORE: 1978.37

## 2021-12-23 NOTE — INTERVAL H&P NOTE
I have reviewed the surgical (or preoperative) H&P that is linked to this encounter, and examined the patient. There are no significant changes     Please see comprehensive note from Dr. Howard yesterday.  Paroxysmal atrial fibrillation and pauses consistent with tachycardia-bradycardia syndrome.  We will proceed with permanent pacemaker today with consideration of amiodarone for control of paroxysmal atrial fibrillation.  I will discuss anticoagulation issues with Dr. Merida regarding right frontal cavernoma.    Kun Lopez MD

## 2021-12-23 NOTE — Clinical Note
Tested the atrial lead. See vendor printout/MD dictation. capsure 5076, 52 cm   [id number]  Exp 9-

## 2021-12-23 NOTE — LETTER
[facility] HE[facility]  [facility] [facility] [address]  Phone: [phone]  Fax: [phone]    December 23, 2021        Abraham Dong  [address]  [address]  [facility] [address]          To whom it may concern:    RE: Abraham LAN[address] Letitia    Patient may return to work on 12/27/2021 with the following: No lifting more than 10 lbs or reaching above the shoulder with the left arm for a total of 4 weeks.     Please contact me for questions or concerns.      Sincerely,        Jeaneth Hunt, CNP

## 2021-12-23 NOTE — PRE-PROCEDURE
Updated case time.   Mr. Dong is going to be moved to the first case of the day with Dr. Lopez. Abraham was called and is going to arrive to [facility] at 0630 tomorrow.     EDU johnson.

## 2021-12-23 NOTE — TELEPHONE ENCOUNTER
Dr Howard and CV lab staff contacted the pt directly after hours on 12/22, to add on pt for urgent PPM today 12/23.  12/23/2021 8:13 AM  Santa Valencia RN

## 2021-12-23 NOTE — PRE-PROCEDURE
GENERAL PRE-PROCEDURE:   Procedure:  PPM implant for indication of paroxysmal atrial fibrillation with sinus pauses of significant duration  Date/Time:  12/23/2021 7:33 AM    Written consent obtained?: Yes    Risks and benefits: Risks, benefits and alternatives were discussed    Consent given by:  Patient  Patient states understanding of procedure being performed: Yes    Patient's understanding of procedure matches consent: Yes    Procedure consent matches procedure scheduled: Yes    Expected level of sedation:  Moderate  Appropriately NPO:  Yes  ASA Class:  3 (PAF with pauses, non-obstructive CAD, HTN, Obesity)  Mallampati  :  Grade 2- soft palate, base of uvula, tonsillar pillars, and portion of posterior pharyngeal wall visible  Lungs:  Lungs clear with good breath sounds bilaterally  Heart:  Normal heart sounds and rate  History & Physical reviewed:  History and physical reviewed and no updates needed  Statement of review:  I have reviewed the lab findings, diagnostic data, medications, and the plan for sedation

## 2021-12-23 NOTE — PROGRESS NOTES
Pt admitted for Pacemaker Insertion for high grade AV block and near syncopal episodes. Suze here with pt. Pt anxious, yet flat, stressed about procedure. Labs noted, checklist complete. Ready for procedure.

## 2021-12-23 NOTE — Clinical Note
Tested the right ventricular lead. See vendor printout/MD dictation. CAPSURE 5076 58 cm   [id number]  Exp 8-

## 2021-12-23 NOTE — DISCHARGE INSTRUCTIONS
Electrophysiology  Discharge Instructions for Pacemakers    1.  For four weeks, with your pacemaker arm,  Do not:    Raise your arm above the height of your shoulder    Perform any vigorous arm movements    Swim, golf, wash windows, shovel snow or vacuum    Lift greater than 10-15 pounds    2. Check the implant site daily for the first week.  Contact the [facility] [phone] should you experience any of the following:    Swelling    Redness    Drainage    Fever greater than  100.5 lasting more than 24 hours    3. You may shower in 3 days.  If you have steri strips over your incision, leave them on.  They are glued on and will be removed at your follow up appointment.      4. It is not necessary to cover your incision with a dressing.  Do not put any lotions or creams over the incision site    5. To reduce the risk of infection, avoid dental procedures for the first 6 weeks.  Contact your cardiology clinic for an antibiotic should you need to see the dentist in the first 6 weeks post pacemaker implant    6. You may travel by any mode of transportation; just show your pacemaker identification card.  Follow the recommendation by [facility] staff if you are traveling by air    7.  For any future surgery or colonoscopy let your doctor know you have a pacemaker    8. Most household appliances including a microwave, telephone, cell phone will not interfere with your pacemaker function.  If you suspect interference, simply move away from the source.  Do not carry a cell phone in the shirt pocket directly over your pacemaker     9. Please refer to your pacemaker booklet from the  or their web site under the section on electromagnetic interference (KRYSTAL) for further guidelines on things that may interfere with your pacemaker  10. No driving for 3 days    11. If the pacemaker site is uncomfortable you may place an ice pack (with a small dish cloth between skin and ice pack) over the site;  alternate on 20 minutes - off 20 minutes      Your Procedural Physician was: Dr. Kun Lopez   To reach the Electrophysiology Registered Nurses working with Dr Lopez please call [phone]   To reach the Device Registered Nurses regarding questions about your device incision or device function please call [phone] Option #3      [facility]:  [phone]  If you are calling after hours, please listen to the entire voice mail, a live  will answer at the end of the message.

## 2021-12-23 NOTE — PLAN OF CARE
Patient was kept comfortable during post-procedure stay.VSS. C/O minimal pain from chest incision site. Pacemaker incision site remains dry & free from signs of bleeding. Appointments made & included in AVS. Dr. Lopez came in & able to speak with patient & ex-wife during recovery. He cleared patient after device interrogation & CXR. Post-op instructions given to patient & ex-spouse. Able to ask questions. Verbalized no concerns. Belongings returned. Patient sent with arm sling as ok'd by Gabriella SORENSEN. Advised to make sure he still moves arms/elbow just not lift arm above shoulder. Patient verbalized understanding. Discharged in stable condition.

## 2021-12-23 NOTE — PRE-PROCEDURE
Called Mr. Dong to make sure he did not have any questions on arrival for tomorrow at [facility] unit. He is aware he is to arrive at 8 am and he will remain NPO after midnight tonight.     He will hold his sertraline and atorvastatin in the morning (he shared he usually takes it in the morning), and he will also wait on his morning lisinopril dose. I shared we will evaluate the need for the lisinopril in the morning on arrival.    C[facility] team has been updated Mr. Dong is arriving in morning, and EDU Hunt is also expecting him for pre-operative assessment needs.

## 2021-12-28 ENCOUNTER — ANCILLARY PROCEDURE (OUTPATIENT)
Dept: CARDIOLOGY | Facility: CLINIC | Age: 61
End: 2021-12-28
Attending: INTERNAL MEDICINE
Payer: COMMERCIAL

## 2021-12-28 DIAGNOSIS — Z95.0 CARDIAC PACEMAKER IN SITU: ICD-10-CM

## 2021-12-28 PROCEDURE — 93280 PM DEVICE PROGR EVAL DUAL: CPT | Performed by: INTERNAL MEDICINE

## 2021-12-28 NOTE — PROGRESS NOTES
DEVICE CLINIC RN POST-OP NOTE    Reason for visit: In-clinic 1 week PO PPM and incision check     Device: Med[employer]onic Bita (D) PPM  Procedure date: 12/23/21  Implant Diagnosis: AV Block, PAF  Implanting Physician: Jessica Asher  Subjective: How can I go back to work if I can't lift more than 10 pounds?  Incision/device pocket: CDI, no redness, swelling, or drainage.   Other: Patient had previously removed steri strips.       Device Findings  Interrogation of device reveals normal sensing and capture thresholds  See the Paceart Report for a full summary of the device information.        Patient Education  Abraham Dong was accompanied today by self     Signs and symptoms of infection, poor wound healing, and device function were reviewed. Range of motion and weight restrictions for the left arm side are 4 weeks. He was issued a [employer]tronic remote monitor and instructed on its set-up and use for remote monitoring by the [employer] representative prior to hospital discharge. These instructions were reviewed with the patient at today s visit.       [facility]'s Partnership Agreement for Device Patients and Post-operative Checklist were presented and reviewed with patient at today's appointment.    JULIANN Sanchez / JULIANN Trujillo

## 2021-12-28 NOTE — DISCHARGE INSTRUCTIONS
Pacemaker Post-operative Checklist      The Device Registered Nurse (RN) reviewed the pacemaker function.      The Device RN did a wound assessment and wound care teaching.    Please call the Device Nurses with any signs of infection or questions regarding wound healing. Device Nurse Line: [phone], Option #3      The Device RN demonstrated and displayed the specific remote monitoring system for your pacemaker.      The Device RN reviewed the Partnership Agreement Form.    Patient Instructions    Do not lift your Left arm above the shoulder height, perform any vigorous arm movements such as swimming, golfing, washing windows, shoveling show, vacuuming or lifting greater than 10-15lbs with the affected arm for 4 weeks from the date of implant, until 1/20/2022..      To reduce the risk of infection, try to avoid any dental procedures for the first 6 weeks after your pacemaker implant, until 2/3/2022. If you have an emergent or urgent dental need during this time, contact the device clinic for a prescription for an antibiotic.      You will receive a device identification (ID) card in the mail from the device  within 6 weeks to replace the temporary ID card you were given in the hospital.      You may travel by any mode of transportation; just show your pacemaker ID card. You may be subject to a hand search or use of a handheld wand, but official should not keep the wand over the implant site for greater than 5-10 seconds.      For any surgery, let your doctor know you have a pacemaker.       Your pacemaker is MRI safe after 6 weeks until 2/3/2022.       Most household appliances, including a microwave, will not interfere with your pacemaker function. If you suspect interference, simply move away from the source. Cell phones do not cause a problem. Please refer to the device booklet from the  or their website under the section on electromagnetic interference (KRYSTAL) for further guidelines on  things that may interfere with your pacemaker.       Device Clinic Contact Information  Device Nurses: [phone], Option #3    Device Remote Specialists: [phone], Option #2. For questions about your Remote Transmission or Transmission Schedule  Device Schedulers: [phone], Option #1

## 2021-12-31 LAB
MDC_IDC_LEAD_IMPLANT_DT: NORMAL
MDC_IDC_LEAD_IMPLANT_DT: NORMAL
MDC_IDC_LEAD_LOCATION: NORMAL
MDC_IDC_LEAD_LOCATION: NORMAL
MDC_IDC_LEAD_LOCATION_DETAIL_1: NORMAL
MDC_IDC_LEAD_LOCATION_DETAIL_1: NORMAL
MDC_IDC_LEAD_MFG: NORMAL
MDC_IDC_LEAD_MFG: NORMAL
MDC_IDC_LEAD_MODEL: NORMAL
MDC_IDC_LEAD_MODEL: NORMAL
MDC_IDC_LEAD_POLARITY_TYPE: NORMAL
MDC_IDC_LEAD_POLARITY_TYPE: NORMAL
MDC_IDC_LEAD_SERIAL: NORMAL
MDC_IDC_LEAD_SERIAL: NORMAL
MDC_IDC_LEAD_SPECIAL_FUNCTION: NORMAL
MDC_IDC_LEAD_SPECIAL_FUNCTION: NORMAL
MDC_IDC_MSMT_BATTERY_DTM: NORMAL
MDC_IDC_MSMT_BATTERY_REMAINING_LONGEVITY: 181 MO
MDC_IDC_MSMT_BATTERY_RRT_TRIGGER: 2.62
MDC_IDC_MSMT_BATTERY_STATUS: NORMAL
MDC_IDC_MSMT_BATTERY_VOLTAGE: 3.23 V
MDC_IDC_MSMT_LEADCHNL_RA_IMPEDANCE_VALUE: 399 OHM
MDC_IDC_MSMT_LEADCHNL_RA_IMPEDANCE_VALUE: 513 OHM
MDC_IDC_MSMT_LEADCHNL_RA_PACING_THRESHOLD_AMPLITUDE: 0.5 V
MDC_IDC_MSMT_LEADCHNL_RA_PACING_THRESHOLD_AMPLITUDE: 0.5 V
MDC_IDC_MSMT_LEADCHNL_RA_PACING_THRESHOLD_PULSEWIDTH: 0.4 MS
MDC_IDC_MSMT_LEADCHNL_RA_PACING_THRESHOLD_PULSEWIDTH: 0.4 MS
MDC_IDC_MSMT_LEADCHNL_RA_SENSING_INTR_AMPL: 4.38 MV
MDC_IDC_MSMT_LEADCHNL_RA_SENSING_INTR_AMPL: 4.62 MV
MDC_IDC_MSMT_LEADCHNL_RV_IMPEDANCE_VALUE: 418 OHM
MDC_IDC_MSMT_LEADCHNL_RV_IMPEDANCE_VALUE: 684 OHM
MDC_IDC_MSMT_LEADCHNL_RV_PACING_THRESHOLD_AMPLITUDE: 0.5 V
MDC_IDC_MSMT_LEADCHNL_RV_PACING_THRESHOLD_AMPLITUDE: 0.75 V
MDC_IDC_MSMT_LEADCHNL_RV_PACING_THRESHOLD_PULSEWIDTH: 0.4 MS
MDC_IDC_MSMT_LEADCHNL_RV_PACING_THRESHOLD_PULSEWIDTH: 0.4 MS
MDC_IDC_MSMT_LEADCHNL_RV_SENSING_INTR_AMPL: 15.75 MV
MDC_IDC_MSMT_LEADCHNL_RV_SENSING_INTR_AMPL: 18.88 MV
MDC_IDC_PG_IMPLANT_DTM: NORMAL
MDC_IDC_PG_MFG: NORMAL
MDC_IDC_PG_MODEL: NORMAL
MDC_IDC_PG_SERIAL: NORMAL
MDC_IDC_PG_TYPE: NORMAL
MDC_IDC_SESS_CLINIC_NAME: NORMAL
MDC_IDC_SESS_DTM: NORMAL
MDC_IDC_SESS_TYPE: NORMAL
MDC_IDC_SET_BRADY_AT_MODE_SWITCH_RATE: 171 {BEATS}/MIN
MDC_IDC_SET_BRADY_HYSTRATE: NORMAL
MDC_IDC_SET_BRADY_LOWRATE: 60 {BEATS}/MIN
MDC_IDC_SET_BRADY_MAX_SENSOR_RATE: 130 {BEATS}/MIN
MDC_IDC_SET_BRADY_MAX_TRACKING_RATE: 130 {BEATS}/MIN
MDC_IDC_SET_BRADY_MODE: NORMAL
MDC_IDC_SET_BRADY_PAV_DELAY_LOW: 180 MS
MDC_IDC_SET_BRADY_SAV_DELAY_LOW: 150 MS
MDC_IDC_SET_LEADCHNL_RA_PACING_AMPLITUDE: NORMAL
MDC_IDC_SET_LEADCHNL_RA_PACING_ANODE_ELECTRODE_1: NORMAL
MDC_IDC_SET_LEADCHNL_RA_PACING_ANODE_LOCATION_1: NORMAL
MDC_IDC_SET_LEADCHNL_RA_PACING_CAPTURE_MODE: NORMAL
MDC_IDC_SET_LEADCHNL_RA_PACING_CATHODE_ELECTRODE_1: NORMAL
MDC_IDC_SET_LEADCHNL_RA_PACING_CATHODE_LOCATION_1: NORMAL
MDC_IDC_SET_LEADCHNL_RA_PACING_POLARITY: NORMAL
MDC_IDC_SET_LEADCHNL_RA_PACING_PULSEWIDTH: 0.4 MS
MDC_IDC_SET_LEADCHNL_RA_SENSING_ANODE_ELECTRODE_1: NORMAL
MDC_IDC_SET_LEADCHNL_RA_SENSING_ANODE_LOCATION_1: NORMAL
MDC_IDC_SET_LEADCHNL_RA_SENSING_CATHODE_ELECTRODE_1: NORMAL
MDC_IDC_SET_LEADCHNL_RA_SENSING_CATHODE_LOCATION_1: NORMAL
MDC_IDC_SET_LEADCHNL_RA_SENSING_POLARITY: NORMAL
MDC_IDC_SET_LEADCHNL_RA_SENSING_SENSITIVITY: 0.3 MV
MDC_IDC_SET_LEADCHNL_RV_PACING_AMPLITUDE: NORMAL
MDC_IDC_SET_LEADCHNL_RV_PACING_ANODE_ELECTRODE_1: NORMAL
MDC_IDC_SET_LEADCHNL_RV_PACING_ANODE_LOCATION_1: NORMAL
MDC_IDC_SET_LEADCHNL_RV_PACING_CAPTURE_MODE: NORMAL
MDC_IDC_SET_LEADCHNL_RV_PACING_CATHODE_ELECTRODE_1: NORMAL
MDC_IDC_SET_LEADCHNL_RV_PACING_CATHODE_LOCATION_1: NORMAL
MDC_IDC_SET_LEADCHNL_RV_PACING_POLARITY: NORMAL
MDC_IDC_SET_LEADCHNL_RV_PACING_PULSEWIDTH: 0.4 MS
MDC_IDC_SET_LEADCHNL_RV_SENSING_ANODE_ELECTRODE_1: NORMAL
MDC_IDC_SET_LEADCHNL_RV_SENSING_ANODE_LOCATION_1: NORMAL
MDC_IDC_SET_LEADCHNL_RV_SENSING_CATHODE_ELECTRODE_1: NORMAL
MDC_IDC_SET_LEADCHNL_RV_SENSING_CATHODE_LOCATION_1: NORMAL
MDC_IDC_SET_LEADCHNL_RV_SENSING_POLARITY: NORMAL
MDC_IDC_SET_LEADCHNL_RV_SENSING_SENSITIVITY: 0.9 MV
MDC_IDC_SET_ZONE_DETECTION_INTERVAL: 350 MS
MDC_IDC_SET_ZONE_DETECTION_INTERVAL: 400 MS
MDC_IDC_SET_ZONE_TYPE: NORMAL
MDC_IDC_STAT_AT_BURDEN_PERCENT: 0 %
MDC_IDC_STAT_AT_DTM_END: NORMAL
MDC_IDC_STAT_AT_DTM_START: NORMAL
MDC_IDC_STAT_AT_MODE_SW_COUNT: 0
MDC_IDC_STAT_BRADY_AP_VP_PERCENT: 0.78 %
MDC_IDC_STAT_BRADY_AP_VS_PERCENT: 11.5 %
MDC_IDC_STAT_BRADY_AS_VP_PERCENT: 3.84 %
MDC_IDC_STAT_BRADY_AS_VS_PERCENT: 83.88 %
MDC_IDC_STAT_BRADY_DTM_END: NORMAL
MDC_IDC_STAT_BRADY_DTM_START: NORMAL
MDC_IDC_STAT_BRADY_RA_PERCENT_PACED: 12.23 %
MDC_IDC_STAT_BRADY_RV_PERCENT_PACED: 4.62 %
MDC_IDC_STAT_EPISODE_RECENT_COUNT: 0
MDC_IDC_STAT_EPISODE_RECENT_COUNT_DTM_END: NORMAL
MDC_IDC_STAT_EPISODE_RECENT_COUNT_DTM_START: NORMAL
MDC_IDC_STAT_EPISODE_TOTAL_COUNT: 0
MDC_IDC_STAT_EPISODE_TOTAL_COUNT_DTM_END: NORMAL
MDC_IDC_STAT_EPISODE_TOTAL_COUNT_DTM_START: NORMAL
MDC_IDC_STAT_EPISODE_TYPE: NORMAL

## 2021-12-31 PROCEDURE — 93272 ECG/REVIEW INTERPRET ONLY: CPT | Performed by: INTERNAL MEDICINE

## 2022-01-20 ENCOUNTER — TELEPHONE (OUTPATIENT)
Dept: CARDIOLOGY | Facility: CLINIC | Age: 62
End: 2022-01-20

## 2022-01-20 ENCOUNTER — OFFICE VISIT (OUTPATIENT)
Dept: CARDIOLOGY | Facility: CLINIC | Age: 62
End: 2022-01-20
Payer: COMMERCIAL

## 2022-01-20 VITALS
SYSTOLIC BLOOD PRESSURE: 142 MMHG | DIASTOLIC BLOOD PRESSURE: 88 MMHG | WEIGHT: 257 LBS | OXYGEN SATURATION: 97 % | RESPIRATION RATE: 18 BRPM | HEART RATE: 92 BPM | BODY MASS INDEX: 33.91 KG/M2

## 2022-01-20 DIAGNOSIS — I25.10 MILD CAD: Primary | ICD-10-CM

## 2022-01-20 DIAGNOSIS — I48.0 PAROXYSMAL ATRIAL FIBRILLATION (H): ICD-10-CM

## 2022-01-20 LAB
CHOLEST SERPL-MCNC: 206 MG/DL
FASTING STATUS PATIENT QL REPORTED: YES
HDLC SERPL-MCNC: 58 MG/DL
LDLC SERPL CALC-MCNC: 110 MG/DL
TRIGL SERPL-MCNC: 192 MG/DL

## 2022-01-20 PROCEDURE — 36415 COLL VENOUS BLD VENIPUNCTURE: CPT | Performed by: INTERNAL MEDICINE

## 2022-01-20 PROCEDURE — 80061 LIPID PANEL: CPT | Performed by: INTERNAL MEDICINE

## 2022-01-20 PROCEDURE — 99214 OFFICE O/P EST MOD 30 MIN: CPT | Performed by: INTERNAL MEDICINE

## 2022-01-20 NOTE — PATIENT INSTRUCTIONS
It was a pleasure seeing you at [facility] today.        Here are my suggestions for your care:    1.  Stop the aspirin    2.  We will check your cholesterol today and will call with the results    3.  I will check with Dr. Lopez regarding the amiodarone and may switch it to a different medication      Let's meet again in 6 months.    You can always call my nurse Zhanna Siegel RN who is a nurse helping me in the care of my patients. She can be reached at [phone] if you have any questions.    For scheduling, please call my  Santa Osman at [phone].    Thank you again for trusting me with your care. Please feel free to call my office at any time if you have any question or if I can assist you in any way.    Radha Ferrell MD  [facility]

## 2022-01-20 NOTE — PROGRESS NOTES
HEART CARE ENCOUNTER CONSULTATON NOTE      [facility]  [phone]      Assessment/Recommendations   Assessment/Plan:  Sinus node dysfunction - s/p pacemaker    Atrial fibrillation - paroxysmal. On amiodarone and eliquis.  I am not sure why we are using amiodarone vs a betablocker. A beta-blocker would be better for his asymmetrical LV hypertrophy. I will reach out to Dr. Lopez regarding this. Abraham has had no bleeding and the eliquis is affordable for him.    Mild CAD - on high intensity statin, LDL check today. Okay to stop aspirin as he is on eliquis.    Asymmetrical septal hypertrophy - His LVEDP was very elevated during his angiogram as well. His BP is borderline today. I would like to have him on a beta-blocker, I am reaching out to Dr. Lopez as above. I will have him limit his lifting at work to 40 lbs.     Return to work - Okay with a 40 lb lifting restriction    F/U 6 months with me, he is seeing Dr. Lopez next week.        History of Present Illness/Subjective    HPI: Abraham Dong is a 61 year old male obese, with hypertension, hyperlipemia, depression and a chronically abnormal EKG who presented to our ER 12/2/21 with chest pressure, dyspnea, and vertigo.  He was very hypertensive with /130 mmHg. Coronary angiogram revealed mild coronary disease and a severely elevated LV EDP. EF was grossly normal with possible apical hypertrophy vs diverticulum. During his hospitalization he had sinus pauses, primarily at night. He was discharged with a monitor and had sinus node dysfunction and underwent a dual chamber pacemaker 12/23/21. He was also noted to have atrial fibrillation and was started on amiodarone and eliquis by Dr. Lopez at the time of his pacemaker placement.    Abraham returns for follow up and a return to work note. He is not exercising, continues a poor diet and is not interested in getting his COVID booster shot. Otherwise, he is doing well and notes that his  vertigo has resolved with OT maneuvers and that he has had no further chest pain. There is very limited dyspnea. 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 He denies any syncope.        Recent Echocardiogram Results: 12/21  The left ventricle is normal in size.  There is moderate eccentric left ventricular hypertrophy.  The visual ejection fraction is 60-65%.  Aortic root moderately enlarged  The rhythm was sinus bradycardia.  Normal left ventricular wall motion    Recent Coronary Angiogram Results:  Acute chest pressure in the setting of acute vertigo and hypertensive urgency.  Diffusely tortuous coronary arteries with mild atherosclerosis consistent with hypertensive coronary disease. Large, ectatic RCA.  LV EDP extremely elevated at 38 mmHg.  LV EF normal with possible apical hypertrophy vs diverticulum.         Physical Examination  Review of Systems   Vitals: BP (!) 142/88 (BP Location: Left arm, Patient Position: Sitting, Cuff Size: Adult Regular)   Pulse 92   Resp 18   Wt 116.6 kg (257 lb)   SpO2 97%   BMI 33.91 kg/m    BMI= Body mass index is 33.91 kg/m .  Wt Readings from Last 3 Encounters:   01/20/22 116.6 kg (257 lb)   12/23/21 111.4 kg (245 lb 11.2 oz)   12/05/21 112.3 kg (247 lb 9.6 oz)       General Appearance:   no distress, obese body habitus   ENT/Mouth: membranes moist, no oral lesions or bleeding gums.      EYES:  no scleral icterus, normal conjunctivae   Neck: no carotid bruits or thyromegaly   Chest/Lungs:   lungs are clear to auscultation, no rales or wheezing, no sternal scar, equal chest wall expansion    Cardiovascular:   Regular. Normal first and second heart sounds with no murmur. No rubs or gallops; the right carotid, radial and posterior tibial pulses are intact and the left carotid, radial and posterior tibial pulses are intact.  Jugular venous pressure is flat, no edema bilaterally    Abdomen:  no organomegaly, masses, bruits, or tenderness; bowel sounds are present    Extremities: no cyanosis or clubbing   Skin: no xanthelasma, warm.    Neurologic: normal  bilateral, no tremors     Psychiatric: alert and oriented x3, calm        Please refer above for cardiac ROS details.        Medical History  Surgical History Family History Social History   Past Medical History:   Diagnosis Date     Atrial fibrillation (H)      Benign essential hypertension      Coronary artery disease      Depression      Hyperlipidemia      Obesity      Past Surgical History:   Procedure Laterality Date     CORONARY ANGIOGRAPHY ADULT ORDER       CV CORONARY ANGIOGRAM N/A 12/2/2021    Procedure: Coronary Angiogram;  Surgeon: Radha Ferrell MD;  Location: [facility] CATH LAB CV     CV LEFT HEART CATH N/A 12/2/2021    Procedure: Left Heart Cath;  Surgeon: Radha Ferrell MD;  Location: [facility] LAB CV     CV LEFT VENTRICULOGRAM N/A 12/2/2021    Procedure: Left Ventriculogram;  Surgeon: Radha Ferrell MD;  Location: [facility] LAB CV     EP PACEMAKER N/A 12/23/2021    Procedure: EP Pacemaker;  Surgeon: Kun Lopez MD;  Location: [facility] LAB CV     HERNIA REPAIR       IMPLANT PACEMAKER       KNEE SURGERY       TONSILLECTOMY       VASECTOMY       Family History   Problem Relation Age of Onset     Diabetes Mother      Hypertension Mother      Coronary Artery Disease No family hx of      Cerebrovascular Disease No family hx of         Social History     Socioeconomic History     Marital status:      Spouse name: Not on file     Number of children: Not on file     Years of education: Not on file     Highest education level: Not on file   Occupational History     Not on file   Tobacco Use     Smoking status: Never Smoker     Smokeless tobacco: Never Used   Substance and Sexual Activity     Alcohol use: Yes     Alcohol/week: 6.0 standard drinks     Types: 6 Standard drinks or equivalent per week     Comment: 7 drinks per week     Drug use: Never     Sexual activity: Not on file    Other Topics Concern     Parent/sibling w/ CABG, MI or angioplasty before 65F 55M? Not Asked   Social History Narrative     Not on file     Social Determinants of Health     Financial Resource Strain: Not on file   Food Insecurity: Not on file   Transportation Needs: Not on file   Physical Activity: Not on file   Stress: Not on file   Social Connections: Not on file   Intimate Partner Violence: Not on file   Housing Stability: Not on file           Medications  Allergies   Current Outpatient Medications   Medication Sig Dispense Refill     acetaminophen-caffeine (EXCEDRIN TENSION HEADACHE) 500-65 MG TABS Take 2 tablets by mouth daily as needed for mild pain       amiodarone (PACERONE) 200 MG tablet Take 1 tablet (200 mg) by mouth daily 30 tablet 3     apixaban ANTICOAGULANT (ELIQUIS) 2.5 MG tablet Take 2 tablets (5 mg) by mouth 2 times daily 30 tablet 3     aspirin (ASA) 81 MG EC tablet Take 1 tablet (81 mg) by mouth daily 90 tablet 3     atorvastatin (LIPITOR) 80 MG tablet Take 1 tablet (80 mg) by mouth daily 30 tablet 0     lisinopril (ZESTRIL) 20 MG tablet Take 1 tablet (20 mg) by mouth daily 30 tablet 0     nitroGLYcerin (NITROSTAT) 0.4 MG sublingual tablet One tablet under the tongue every 5 minutes if needed for chest pain. May repeat every 5 minutes for a maximum of 3 doses in 15 minutes 25 tablet 3     sertraline (ZOLOFT) 100 MG tablet Take 0.5 tablets (50 mg) by mouth daily       amiodarone (PACERONE) 200 MG tablet Take 1 tablet (200 mg) by mouth 3 times daily for 7 days 21 tablet 0     No Known Allergies       Lab Results    Chemistry/lipid CBC Cardiac Enzymes/BNP/TSH/INR   Recent Labs   Lab Test 12/02/21  0831   CHOL 187   HDL 51      TRIG 134     Recent Labs   Lab Test 12/02/21  0831        Recent Labs   Lab Test 12/23/21  0702      POTASSIUM 4.5   CHLORIDE 106   CO2 22      BUN 27*   CR 0.84   GFRESTIMATED >90   TEGAN 9.4     Recent Labs   Lab Test 12/23/21  0702  12/05/21  0455 12/03/21  0432   CR 0.84 0.75 0.71     No results for input(s): A1C in the last 85005 hours.       Recent Labs   Lab Test 12/23/21  0702   WBC 5.4   HGB 14.5   HCT 43.2   MCV 93        Recent Labs   Lab Test 12/23/21  0702 12/05/21  0455 12/03/21  0432   HGB 14.5 15.7 15.6    Recent Labs   Lab Test 12/03/21  1016 12/03/21  0432 12/02/21  2237   TROPONINI 0.04 0.04 0.04     No results for input(s): BNP, NTBNPI, NTBNP in the last 56346 hours.  Recent Labs   Lab Test 12/23/21  0702   TSH 3.63     Recent Labs   Lab Test 12/02/21  0828   INR 1.05        Today's clinic visit entailed:  Review of external notes as documented elsewhere in note  15 minutes spent on the date of the encounter doing chart review   Provider  Link to MDM Help Grid     The level of medical decision making during this visit was of high complexity.        Radha Ferrell MD

## 2022-01-20 NOTE — LETTER
Date:January 29, 2022      Patient was self referred, no letter generated. Do not send.        [facility] Health Information

## 2022-01-20 NOTE — LETTER
January 20, 2022      Abraham Dong  [address]  [address]  [place] [address]        To Whom It May Concern:    Abraham Dong was seen in our clinic. He may return to work with a 40 lbs. lifting restriction.      Sincerely,        Zhanna Siegel, RN  CV Nurse Clinician on behalf of Dr. Radha Ferrell, Cardiologist   [facility]  [address]  [address]  [place] [address]  Office phone: [phone]   Fax: [phone]

## 2022-01-20 NOTE — LETTER
1/20/2022    Physician No Ref-Primary  No address on file    RE: Abraham Dong       Dear Colleague,     I had the [facility] of [place] [facility] [place] [facility].    HEART CARE ENCOUNTER CONSULTATON NOTE      [facility]  [phone]      Assessment/Recommendations   Assessment/Plan:  Sinus node dysfunction - s/p pacemaker    Atrial fibrillation - paroxysmal. On amiodarone and eliquis.  I am not sure why we are using amiodarone vs a betablocker. A beta-blocker would be better for his asymmetrical LV hypertrophy. I will reach out to Dr. Lopez regarding this. Abraham has had no bleeding and the eliquis is affordable for him.    Mild CAD - on high intensity statin, LDL check today. Okay to stop aspirin as he is on eliquis.    Asymmetrical septal hypertrophy - His LVEDP was very elevated during his angiogram as well. His BP is borderline today. I would like to have him on a beta-blocker, I am reaching out to Dr. Lopez as above. I will have him limit his lifting at work to 40 lbs.     Return to work - Okay with a 40 lb lifting restriction    F/U 6 months with me, he is seeing Dr. Lopez next week.        History of Present Illness/Subjective    HPI: Abraham Dong is a 61 year old male obese, with hypertension, hyperlipemia, depression and a chronically abnormal EKG who presented to our ER 12/2/21 with chest pressure, dyspnea, and vertigo.  He was very hypertensive with /130 mmHg. Coronary angiogram revealed mild coronary disease and a severely elevated LV EDP. EF was grossly normal with possible apical hypertrophy vs diverticulum. During his hospitalization he had sinus pauses, primarily at night. He was discharged with a monitor and had sinus node dysfunction and underwent a dual chamber pacemaker 12/23/21. He was also noted to have atrial fibrillation and was started on amiodarone and eliquis by Dr. Lopez at the time of his pacemaker placement.    Abraham returns for  follow up and a return to work note. He is not exercising, continues a poor diet and is not interested in getting his COVID booster shot. Otherwise, he is doing well and notes that his vertigo has resolved with OT maneuvers and that he has had no further chest pain. There is very limited dyspnea. 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 He denies any syncope.        Recent Echocardiogram Results: 12/21  The left ventricle is normal in size.  There is moderate eccentric left ventricular hypertrophy.  The visual ejection fraction is 60-65%.  Aortic root moderately enlarged  The rhythm was sinus bradycardia.  Normal left ventricular wall motion    Recent Coronary Angiogram Results:  Acute chest pressure in the setting of acute vertigo and hypertensive urgency.  Diffusely tortuous coronary arteries with mild atherosclerosis consistent with hypertensive coronary disease. Large, ectatic RCA.  LV EDP extremely elevated at 38 mmHg.  LV EF normal with possible apical hypertrophy vs diverticulum.         Physical Examination  Review of Systems   Vitals: BP (!) 142/88 (BP Location: Left arm, Patient Position: Sitting, Cuff Size: Adult Regular)   Pulse 92   Resp 18   Wt 116.6 kg (257 lb)   SpO2 97%   BMI 33.91 kg/m    BMI= Body mass index is 33.91 kg/m .  Wt Readings from Last 3 Encounters:   01/20/22 116.6 kg (257 lb)   12/23/21 111.4 kg (245 lb 11.2 oz)   12/05/21 112.3 kg (247 lb 9.6 oz)       General Appearance:   no distress, obese body habitus   ENT/Mouth: membranes moist, no oral lesions or bleeding gums.      EYES:  no scleral icterus, normal conjunctivae   Neck: no carotid bruits or thyromegaly   Chest/Lungs:   lungs are clear to auscultation, no rales or wheezing, no sternal scar, equal chest wall expansion    Cardiovascular:   Regular. Normal first and second heart sounds with no murmur. No rubs or gallops; the right carotid, radial and posterior tibial pulses are intact and the left carotid, radial  and posterior tibial pulses are intact.  Jugular venous pressure is flat, no edema bilaterally    Abdomen:  no organomegaly, masses, bruits, or tenderness; bowel sounds are present   Extremities: no cyanosis or clubbing   Skin: no xanthelasma, warm.    Neurologic: normal  bilateral, no tremors     Psychiatric: alert and oriented x3, calm        Please refer above for cardiac ROS details.        Medical History  Surgical History Family History Social History   Past Medical History:   Diagnosis Date     Atrial fibrillation (H)      Benign essential hypertension      Coronary artery disease      Depression      Hyperlipidemia      Obesity      Past Surgical History:   Procedure Laterality Date     CORONARY ANGIOGRAPHY ADULT ORDER       CV CORONARY ANGIOGRAM N/A 12/2/2021    Procedure: Coronary Angiogram;  Surgeon: Radha Ferrell MD;  Location: [facility] CATH [facility] CV     CV LEFT HEART CATH N/A 12/2/2021    Procedure: Left Heart Cath;  Surgeon: Radha Ferrell MD;  Location: [facility] LAB CV     CV LEFT VENTRICULOGRAM N/A 12/2/2021    Procedure: Left Ventriculogram;  Surgeon: Radha Ferrell MD;  Location: [facility] CV     EP PACEMAKER N/A 12/23/2021    Procedure: EP Pacemaker;  Surgeon: Kun Lopez MD;  Location: [facility] LAB CV     HERNIA REPAIR       IMPLANT PACEMAKER       KNEE SURGERY       TONSILLECTOMY       VASECTOMY       Family History   Problem Relation Age of Onset     Diabetes Mother      Hypertension Mother      Coronary Artery Disease No family hx of      Cerebrovascular Disease No family hx of         Social History     Socioeconomic History     Marital status:      Spouse name: Not on file     Number of children: Not on file     Years of education: Not on file     Highest education level: Not on file   Occupational History     Not on file   Tobacco Use     Smoking status: Never Smoker     Smokeless tobacco: Never Used   Substance and Sexual Activity     Alcohol use:  Yes     Alcohol/week: 6.0 standard drinks     Types: 6 Standard drinks or equivalent per week     Comment: 7 drinks per week     Drug use: Never     Sexual activity: Not on file   Other Topics Concern     Parent/sibling w/ CABG, MI or angioplasty before 65F 55M? Not Asked   Social History Narrative     Not on file     Social Determinants of Health     Financial Resource Strain: Not on file   Food Insecurity: Not on file   Transportation Needs: Not on file   Physical Activity: Not on file   Stress: Not on file   Social Connections: Not on file   Intimate Partner Violence: Not on file   Housing Stability: Not on file           Medications  Allergies   Current Outpatient Medications   Medication Sig Dispense Refill     acetaminophen-caffeine (EXCEDRIN TENSION HEADACHE) 500-65 MG TABS Take 2 tablets by mouth daily as needed for mild pain       amiodarone (PACERONE) 200 MG tablet Take 1 tablet (200 mg) by mouth daily 30 tablet 3     apixaban ANTICOAGULANT (ELIQUIS) 2.5 MG tablet Take 2 tablets (5 mg) by mouth 2 times daily 30 tablet 3     aspirin (ASA) 81 MG EC tablet Take 1 tablet (81 mg) by mouth daily 90 tablet 3     atorvastatin (LIPITOR) 80 MG tablet Take 1 tablet (80 mg) by mouth daily 30 tablet 0     lisinopril (ZESTRIL) 20 MG tablet Take 1 tablet (20 mg) by mouth daily 30 tablet 0     nitroGLYcerin (NITROSTAT) 0.4 MG sublingual tablet One tablet under the tongue every 5 minutes if needed for chest pain. May repeat every 5 minutes for a maximum of 3 doses in 15 minutes 25 tablet 3     sertraline (ZOLOFT) 100 MG tablet Take 0.5 tablets (50 mg) by mouth daily       amiodarone (PACERONE) 200 MG tablet Take 1 tablet (200 mg) by mouth 3 times daily for 7 days 21 tablet 0     No Known Allergies       Lab Results    Chemistry/lipid CBC Cardiac Enzymes/BNP/TSH/INR   Recent Labs   Lab Test 12/02/21  0831   CHOL 187   HDL 51      TRIG 134     Recent Labs   Lab Test 12/02/21  0831        Recent Labs   Lab Test  12/23/21  0702      POTASSIUM 4.5   CHLORIDE 106   CO2 22      BUN 27*   CR 0.84   GFRESTIMATED >90   TEGAN 9.4     Recent Labs   Lab Test 12/23/21  0702 12/05/21  0455 12/03/21  0432   CR 0.84 0.75 0.71     No results for input(s): A1C in the last 29936 hours.       Recent Labs   Lab Test 12/23/21  0702   WBC 5.4   HGB 14.5   HCT 43.2   MCV 93        Recent Labs   Lab Test 12/23/21  0702 12/05/21  0455 12/03/21  0432   HGB 14.5 15.7 15.6    Recent Labs   Lab Test 12/03/21  1016 12/03/21  0432 12/02/21  2237   TROPONINI 0.04 0.04 0.04     No results for input(s): BNP, NTBNPI, NTBNP in the last 83791 hours.  Recent Labs   Lab Test 12/23/21  0702   TSH 3.63     Recent Labs   Lab Test 12/02/21  0828   INR 1.05        Today's clinic visit entailed:  Review of external notes as documented elsewhere in note  15 minutes spent on the date of the encounter doing chart review   Provider  Link to MDM Help Grid     The level of medical decision making during this visit was of high complexity.        Radha Ferrell MD                                        Thank you for allowing me to participate in the care of your patient.      Sincerely,     Radha Ferrell MD     [facility]  cc:   Radha Ferrell MD  [address] [place] [address]

## 2022-01-20 NOTE — TELEPHONE ENCOUNTER
Staff message/IM with  at check-in at  desk whom will print and have for the patient to pick -up tomorrow. CORINA,Rn

## 2022-01-20 NOTE — TELEPHONE ENCOUNTER
----- Message from Radha Ferrell MD sent at 1/20/2022 11:50 AM CST -----  Hi, can you draft a return to work letter for Abraham. He can pick it up at the [facility] tomorrow.     He is okay to return to work with a 40 lb lifting restriction.     Thanks, EG

## 2022-01-21 ENCOUNTER — TELEPHONE (OUTPATIENT)
Dept: CARDIOLOGY | Facility: CLINIC | Age: 62
End: 2022-01-21
Payer: COMMERCIAL

## 2022-01-21 DIAGNOSIS — E78.5 HYPERLIPIDEMIA: Primary | ICD-10-CM

## 2022-01-21 RX ORDER — EZETIMIBE 10 MG/1
10 TABLET ORAL DAILY
Qty: 90 TABLET | Refills: 1 | Status: SHIPPED | OUTPATIENT
Start: 2022-01-21 | End: 2022-07-05

## 2022-01-21 NOTE — TELEPHONE ENCOUNTER
Spoke with pt and he is on Atorvastatin 80 mg hs and will add Zetia 10 mg at hs. Will send script to local pharm.

## 2022-01-24 ENCOUNTER — ANCILLARY PROCEDURE (OUTPATIENT)
Dept: CARDIOLOGY | Facility: CLINIC | Age: 62
End: 2022-01-24
Attending: INTERNAL MEDICINE
Payer: COMMERCIAL

## 2022-01-24 ENCOUNTER — OFFICE VISIT (OUTPATIENT)
Dept: CARDIOLOGY | Facility: CLINIC | Age: 62
End: 2022-01-24
Payer: COMMERCIAL

## 2022-01-24 VITALS
SYSTOLIC BLOOD PRESSURE: 138 MMHG | HEART RATE: 84 BPM | WEIGHT: 257 LBS | RESPIRATION RATE: 16 BRPM | DIASTOLIC BLOOD PRESSURE: 88 MMHG | HEIGHT: 73 IN | BODY MASS INDEX: 34.06 KG/M2

## 2022-01-24 DIAGNOSIS — I48.0 PAF (PAROXYSMAL ATRIAL FIBRILLATION) (H): Primary | ICD-10-CM

## 2022-01-24 DIAGNOSIS — I44.30 ATRIOVENTRICULAR BLOCK: ICD-10-CM

## 2022-01-24 DIAGNOSIS — I15.8 OTHER SECONDARY HYPERTENSION: ICD-10-CM

## 2022-01-24 DIAGNOSIS — I49.5 SICK SINUS SYNDROME (H): ICD-10-CM

## 2022-01-24 DIAGNOSIS — Z95.0 PACEMAKER: ICD-10-CM

## 2022-01-24 PROCEDURE — 99214 OFFICE O/P EST MOD 30 MIN: CPT | Mod: 24 | Performed by: INTERNAL MEDICINE

## 2022-01-24 RX ORDER — METOPROLOL SUCCINATE 50 MG/1
50 TABLET, EXTENDED RELEASE ORAL DAILY
Qty: 90 TABLET | Refills: 3 | Status: SHIPPED | OUTPATIENT
Start: 2022-01-24 | End: 2023-06-27

## 2022-01-24 ASSESSMENT — MIFFLIN-ST. JEOR: SCORE: 2029.62

## 2022-01-24 NOTE — PROGRESS NOTES
ELECTROPHYSIOLOGY NOTE    Today I had the opportunity to see  Abraham Dong for follow-up evaluation of paroxysmal atrial fibrillation and AV block.      Assessment/Recommendations   Clinic Problem List:  (I48.0) PAF (paroxysmal atrial fibrillation) (H)  (primary encounter diagnosis)  Comment: No further episodes  Plan: Follow-Up with Cardiology        Discontinue amiodarone, continue Eliquis for now    (I44.30) Atrioventricular block  Comment: Normal pacemaker function    (I15.8) Other secondary hypertension  Comment: Borderline control  Plan: metoprolol succinate ER (TOPROL-XL) 50 MG 24 hr        tablet added         36 minutes spent on this encounter date for chart review, history, physical exam,documentation and activities detailed below.    Plan:  Stop taking amiodarone  Start taking metoprolol succinate 50 mg daily  Call if troublesome palpitations or fainting.  Your pacemaker can be checked over the telephone.  Follow up appointment:   Dr. Radha Ferrell in 6 months.  Consideration could be given to discontinuing Eliquis if no further A. fib on pacemaker follow-up.       History of Present Illness     Abraham Dong is a 61 year old male with pauses with possible intermittent third-degree AV block. He was  admitted with hypertension and dizziness felt secondary to benign positional vertigo.  Cardiac catheterization showed minimal coronary artery disease but patient had poorly controlled hypertension and elevated EDP.  In the hospital he was noted to have sinus pauses.  Cardiac event monitor showed paroxysmal atrial fibrillation with ventricular response of approximately 110 bpm and a pause of several seconds in duration representing artifact or transient AV block.  Patient was highly symptomatic but no syncope.  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  He was started on amiodarone.  His symptoms of vertigo resolved.  He denies any further episodes of  "lightheadedness or tachypalpitations.  There have been no significant bleeding problems on Eliquis.  He denies exertional chest pain or dyspnea on exertion.    Personally reviewed.  Transtelephonic pacemaker check yesterday showed no further episodes of atrial fibrillation and no ventricular tachycardia.  He is 18% atrially paced and 37% ventricularly paced.  Thresholds are excellent.  Estimated battery longevity is greater than 8 years.       Physical Examination Review of Systems   /88 (BP Location: Right arm, Patient Position: Sitting, Cuff Size: Adult Large)   Pulse 84   Resp 16   Ht 1.854 m (6' 1\")   Wt 116.6 kg (257 lb)   BMI 33.91 kg/m    Body mass index is 33.91 kg/m .  Wt Readings from Last 3 Encounters:   01/24/22 116.6 kg (257 lb)   01/20/22 116.6 kg (257 lb)   12/23/21 111.4 kg (245 lb 11.2 oz)        Appearance:   no distress,    HEENT:   no scleral icterus, normal conjunctivae    Neck: no carotid bruits or thyromegaly   Chest/Lungs:   lungs are clear to auscultation, no rales or wheezing, pacemaker well-healed in the left subclavian pocket   Cardiovascular:   Jugular venous pressure 5 cm, Apical pulse is regular. Normal S1,S2 with no murmurs or gallops,   Abdomen:  no  Hepatosplenomegaly., nontender,  bowel sounds are present   Extremities: no cyanosis or clubbing, No edema   Skin: no xanthelasma, warm.    Neurologic: No gross focal neurologic deficits   Mood/Affect: Alert, cooperative    Enc Vitals  BP: 138/88  Pulse: 84  Resp: 16  Weight: 116.6 kg (257 lb)  Height: 185.4 cm (6' 1\")                                         Medical History  Surgical History Family History Social History   Past Medical History:   Diagnosis Date     Atrial fibrillation (H)      Benign essential hypertension      Coronary artery disease      Depression      Hyperlipidemia      Obesity     Past Surgical History:   Procedure Laterality Date     CORONARY ANGIOGRAPHY ADULT ORDER       CV CORONARY ANGIOGRAM N/A " 12/2/2021    Procedure: Coronary Angiogram;  Surgeon: Radha Ferrell MD;  Location: [facility] CATH LAB CV     CV LEFT HEART CATH N/A 12/2/2021    Procedure: Left Heart Cath;  Surgeon: Radha Ferrell MD;  Location: [facility] CATH LAB CV     CV LEFT VENTRICULOGRAM N/A 12/2/2021    Procedure: Left Ventriculogram;  Surgeon: Radha Ferrell MD;  Location: [facility] CATH LAB CV     EP PACEMAKER N/A 12/23/2021    Procedure: EP Pacemaker;  Surgeon: Kun Lopez MD;  Location: [facility] CATH LAB CV     HERNIA REPAIR       IMPLANT PACEMAKER       KNEE SURGERY       TONSILLECTOMY       VASECTOMY      Family History   Problem Relation Age of Onset     Diabetes Mother      Hypertension Mother      Coronary Artery Disease No family hx of      Cerebrovascular Disease No family hx of     Social History     Socioeconomic History     Marital status:      Spouse name: Not on file     Number of children: Not on file     Years of education: Not on file     Highest education level: Not on file   Occupational History     Not on file   Tobacco Use     Smoking status: Never Smoker     Smokeless tobacco: Never Used   Substance and Sexual Activity     Alcohol use: Yes     Alcohol/week: 6.0 standard drinks     Types: 6 Standard drinks or equivalent per week     Comment: 7 drinks per week     Drug use: Never     Sexual activity: Not on file   Other Topics Concern     Parent/sibling w/ CABG, MI or angioplasty before 65F 55M? Not Asked   Social History Narrative     Not on file     Social Determinants of Health     Financial Resource Strain: Not on file   Food Insecurity: Not on file   Transportation Needs: Not on file   Physical Activity: Not on file   Stress: Not on file   Social Connections: Not on file   Intimate Partner Violence: Not on file   Housing Stability: Not on file          Medications  Allergies   Current Outpatient Medications   Medication Sig Dispense Refill     acetaminophen-caffeine (EXCEDRIN TENSION HEADACHE)  500-65 MG TABS Take 2 tablets by mouth daily as needed for mild pain       amiodarone (PACERONE) 200 MG tablet Take 1 tablet (200 mg) by mouth daily 30 tablet 3     apixaban ANTICOAGULANT (ELIQUIS) 5 MG tablet Take 1 tablet (5 mg) by mouth 2 times daily 180 tablet 3     atorvastatin (LIPITOR) 80 MG tablet Take 1 tablet (80 mg) by mouth daily 30 tablet 0     ezetimibe (ZETIA) 10 MG tablet Take 1 tablet (10 mg) by mouth daily 90 tablet 1     lisinopril (ZESTRIL) 20 MG tablet Take 1 tablet (20 mg) by mouth daily 30 tablet 0     nitroGLYcerin (NITROSTAT) 0.4 MG sublingual tablet One tablet under the tongue every 5 minutes if needed for chest pain. May repeat every 5 minutes for a maximum of 3 doses in 15 minutes 25 tablet 3     sertraline (ZOLOFT) 100 MG tablet Take 100 mg by mouth daily        amiodarone (PACERONE) 200 MG tablet Take 1 tablet (200 mg) by mouth 3 times daily for 7 days 21 tablet 0      No Known Allergies

## 2022-01-24 NOTE — LETTER
Date:January 28, 2022      Patient was self referred, no letter generated. Do not send.        [facility] Health Information

## 2022-01-24 NOTE — LETTER
1/24/2022    RE: Abraham Dong       Dear Colleague,     I had the pleasure of seeing Abraham Dong in the [facility].         ELECTROPHYSIOLOGY NOTE    Today I had the opportunity to see  Abraham Dong for follow-up evaluation of paroxysmal atrial fibrillation and AV block.    Assessment/Recommendations   Clinic Problem List:  (I48.0) PAF (paroxysmal atrial fibrillation) (H)  (primary encounter diagnosis)  Comment: No further episodes  Plan: Follow-Up with Cardiology        Discontinue amiodarone, continue Eliquis for now    (I44.30) Atrioventricular block  Comment: Normal pacemaker function    (I15.8) Other secondary hypertension  Comment: Borderline control  Plan: metoprolol succinate ER (TOPROL-XL) 50 MG 24 hr        tablet added         36 minutes spent on this encounter date for chart review, history, physical exam,documentation and activities detailed below.    Plan:  Stop taking amiodarone  Start taking metoprolol succinate 50 mg daily  Call if troublesome palpitations or fainting.  Your pacemaker can be checked over the telephone.  Follow up appointment:   Dr. Radha Ferrell in 6 months.  Consideration could be given to discontinuing Eliquis if no further A. fib on pacemaker follow-up.       History of Present Illness     Abraham Dong is a 61 year old male with pauses with possible intermittent third-degree AV block. He was  admitted with hypertension and dizziness felt secondary to benign positional vertigo.  Cardiac catheterization showed minimal coronary artery disease but patient had poorly controlled hypertension and elevated EDP.  In the hospital he was noted to have sinus pauses.  Cardiac event monitor showed paroxysmal atrial fibrillation with ventricular response of approximately 110 bpm and a pause of several seconds in duration representing artifact or transient AV block.  Patient was highly symptomatic but no syncope.  Dual-chamber pacemaker was implanted 12/23/2021 for  "tachycardia-bradycardia syndrome.  He was started on amiodarone.  His symptoms of vertigo resolved.  He denies any further episodes of lightheadedness or tachypalpitations.  There have been no significant bleeding problems on Eliquis.  He denies exertional chest pain or dyspnea on exertion.    Personally reviewed.  Transtelephonic pacemaker check yesterday showed no further episodes of atrial fibrillation and no ventricular tachycardia.  He is 18% atrially paced and 37% ventricularly paced.  Thresholds are excellent.  Estimated battery longevity is greater than 8 years.       Physical Examination Review of Systems   /88 (BP Location: Right arm, Patient Position: Sitting, Cuff Size: Adult Large)   Pulse 84   Resp 16   Ht 1.854 m (6' 1\")   Wt 116.6 kg (257 lb)   BMI 33.91 kg/m    Body mass index is 33.91 kg/m .  Wt Readings from Last 3 Encounters:   01/24/22 116.6 kg (257 lb)   01/20/22 116.6 kg (257 lb)   12/23/21 111.4 kg (245 lb 11.2 oz)        Appearance:   no distress,    HEENT:   no scleral icterus, normal conjunctivae    Neck: no carotid bruits or thyromegaly   Chest/Lungs:   lungs are clear to auscultation, no rales or wheezing, pacemaker well-healed in the left subclavian pocket   Cardiovascular:   Jugular venous pressure 5 cm, Apical pulse is regular. Normal S1,S2 with no murmurs or gallops,   Abdomen:  no  Hepatosplenomegaly., nontender,  bowel sounds are present   Extremities: no cyanosis or clubbing, No edema   Skin: no xanthelasma, warm.    Neurologic: No gross focal neurologic deficits   Mood/Affect: Alert, cooperative    Enc Vitals  BP: 138/88  Pulse: 84  Resp: 16  Weight: 116.6 kg (257 lb)  Height: 185.4 cm (6' 1\")                                         Medical History  Surgical History Family History Social History   Past Medical History:   Diagnosis Date     Atrial fibrillation (H)      Benign essential hypertension      Coronary artery disease      Depression      Hyperlipidemia      " Obesity     Past Surgical History:   Procedure Laterality Date     CORONARY ANGIOGRAPHY ADULT ORDER       CV CORONARY ANGIOGRAM N/A 12/2/2021    Procedure: Coronary Angiogram;  Surgeon: Radha Ferrell MD;  Location: [facility] CATH LAB CV     CV LEFT HEART CATH N/A 12/2/2021    Procedure: Left Heart Cath;  Surgeon: Radha Ferrell MD;  Location: [facility] LAB CV     CV LEFT VENTRICULOGRAM N/A 12/2/2021    Procedure: Left Ventriculogram;  Surgeon: Radha Ferrell MD;  Location: [facility] CATH LAB CV     EP PACEMAKER N/A 12/23/2021    Procedure: EP Pacemaker;  Surgeon: Kun Lopez MD;  Location: [facility] LAB CV     HERNIA REPAIR       IMPLANT PACEMAKER       KNEE SURGERY       TONSILLECTOMY       VASECTOMY      Family History   Problem Relation Age of Onset     Diabetes Mother      Hypertension Mother      Coronary Artery Disease No family hx of      Cerebrovascular Disease No family hx of     Social History     Socioeconomic History     Marital status:      Spouse name: Not on file     Number of children: Not on file     Years of education: Not on file     Highest education level: Not on file   Occupational History     Not on file   Tobacco Use     Smoking status: Never Smoker     Smokeless tobacco: Never Used   Substance and Sexual Activity     Alcohol use: Yes     Alcohol/week: 6.0 standard drinks     Types: 6 Standard drinks or equivalent per week     Comment: 7 drinks per week     Drug use: Never     Sexual activity: Not on file   Other Topics Concern     Parent/sibling w/ CABG, MI or angioplasty before 65F 55M? Not Asked   Social History Narrative     Not on file     Social Determinants of Health     Financial Resource Strain: Not on file   Food Insecurity: Not on file   Transportation Needs: Not on file   Physical Activity: Not on file   Stress: Not on file   Social Connections: Not on file   Intimate Partner Violence: Not on file   Housing Stability: Not on file          Medications   Allergies   Current Outpatient Medications   Medication Sig Dispense Refill     acetaminophen-caffeine (EXCEDRIN TENSION HEADACHE) 500-65 MG TABS Take 2 tablets by mouth daily as needed for mild pain       amiodarone (PACERONE) 200 MG tablet Take 1 tablet (200 mg) by mouth daily 30 tablet 3     apixaban ANTICOAGULANT (ELIQUIS) 5 MG tablet Take 1 tablet (5 mg) by mouth 2 times daily 180 tablet 3     atorvastatin (LIPITOR) 80 MG tablet Take 1 tablet (80 mg) by mouth daily 30 tablet 0     ezetimibe (ZETIA) 10 MG tablet Take 1 tablet (10 mg) by mouth daily 90 tablet 1     lisinopril (ZESTRIL) 20 MG tablet Take 1 tablet (20 mg) by mouth daily 30 tablet 0     nitroGLYcerin (NITROSTAT) 0.4 MG sublingual tablet One tablet under the tongue every 5 minutes if needed for chest pain. May repeat every 5 minutes for a maximum of 3 doses in 15 minutes 25 tablet 3     sertraline (ZOLOFT) 100 MG tablet Take 100 mg by mouth daily        amiodarone (PACERONE) 200 MG tablet Take 1 tablet (200 mg) by mouth 3 times daily for 7 days 21 tablet 0      No Known Allergies

## 2022-01-24 NOTE — PATIENT INSTRUCTIONS
Abraham Dong    It was a pleasure to see you today for a clinic visit.    Stop taking amiodarone  Start taking metoprolol succinate 50 mg daily  Call if troublesome palpitations or fainting.  Your pacemaker can be checked over the telephone.  Follow up appointment:   Dr. Radha Ferrell in 6 months.  Consideration could be given to discontinuing Eliquis if no further A. fib on pacemaker follow-up.  Contact EP Nurse Clinicians at [phone] with questions or concerns.    Kun Lopez MD

## 2022-01-25 LAB
MDC_IDC_LEAD_IMPLANT_DT: NORMAL
MDC_IDC_LEAD_IMPLANT_DT: NORMAL
MDC_IDC_LEAD_LOCATION: NORMAL
MDC_IDC_LEAD_LOCATION: NORMAL
MDC_IDC_LEAD_LOCATION_DETAIL_1: NORMAL
MDC_IDC_LEAD_LOCATION_DETAIL_1: NORMAL
MDC_IDC_LEAD_MFG: NORMAL
MDC_IDC_LEAD_MFG: NORMAL
MDC_IDC_LEAD_MODEL: NORMAL
MDC_IDC_LEAD_MODEL: NORMAL
MDC_IDC_LEAD_POLARITY_TYPE: NORMAL
MDC_IDC_LEAD_POLARITY_TYPE: NORMAL
MDC_IDC_LEAD_SERIAL: NORMAL
MDC_IDC_LEAD_SERIAL: NORMAL
MDC_IDC_LEAD_SPECIAL_FUNCTION: NORMAL
MDC_IDC_LEAD_SPECIAL_FUNCTION: NORMAL
MDC_IDC_MSMT_BATTERY_DTM: NORMAL
MDC_IDC_MSMT_BATTERY_REMAINING_LONGEVITY: 172 MO
MDC_IDC_MSMT_BATTERY_RRT_TRIGGER: 2.62
MDC_IDC_MSMT_BATTERY_STATUS: NORMAL
MDC_IDC_MSMT_BATTERY_VOLTAGE: 3.23 V
MDC_IDC_MSMT_LEADCHNL_RA_IMPEDANCE_VALUE: 380 OHM
MDC_IDC_MSMT_LEADCHNL_RA_IMPEDANCE_VALUE: 456 OHM
MDC_IDC_MSMT_LEADCHNL_RA_PACING_THRESHOLD_AMPLITUDE: 0.62 V
MDC_IDC_MSMT_LEADCHNL_RA_PACING_THRESHOLD_PULSEWIDTH: 0.4 MS
MDC_IDC_MSMT_LEADCHNL_RA_SENSING_INTR_AMPL: 6.5 MV
MDC_IDC_MSMT_LEADCHNL_RA_SENSING_INTR_AMPL: 6.5 MV
MDC_IDC_MSMT_LEADCHNL_RV_IMPEDANCE_VALUE: 380 OHM
MDC_IDC_MSMT_LEADCHNL_RV_IMPEDANCE_VALUE: 532 OHM
MDC_IDC_MSMT_LEADCHNL_RV_PACING_THRESHOLD_AMPLITUDE: 0.62 V
MDC_IDC_MSMT_LEADCHNL_RV_PACING_THRESHOLD_PULSEWIDTH: 0.4 MS
MDC_IDC_MSMT_LEADCHNL_RV_SENSING_INTR_AMPL: 16.62 MV
MDC_IDC_MSMT_LEADCHNL_RV_SENSING_INTR_AMPL: 16.62 MV
MDC_IDC_PG_IMPLANT_DTM: NORMAL
MDC_IDC_PG_MFG: NORMAL
MDC_IDC_PG_MODEL: NORMAL
MDC_IDC_PG_SERIAL: NORMAL
MDC_IDC_PG_TYPE: NORMAL
MDC_IDC_SESS_CLINIC_NAME: NORMAL
MDC_IDC_SESS_DTM: NORMAL
MDC_IDC_SESS_TYPE: NORMAL
MDC_IDC_SET_BRADY_AT_MODE_SWITCH_RATE: 171 {BEATS}/MIN
MDC_IDC_SET_BRADY_HYSTRATE: NORMAL
MDC_IDC_SET_BRADY_LOWRATE: 60 {BEATS}/MIN
MDC_IDC_SET_BRADY_MAX_SENSOR_RATE: 130 {BEATS}/MIN
MDC_IDC_SET_BRADY_MAX_TRACKING_RATE: 130 {BEATS}/MIN
MDC_IDC_SET_BRADY_MODE: NORMAL
MDC_IDC_SET_BRADY_PAV_DELAY_LOW: 180 MS
MDC_IDC_SET_BRADY_SAV_DELAY_LOW: 150 MS
MDC_IDC_SET_LEADCHNL_RA_PACING_AMPLITUDE: NORMAL
MDC_IDC_SET_LEADCHNL_RA_PACING_ANODE_ELECTRODE_1: NORMAL
MDC_IDC_SET_LEADCHNL_RA_PACING_ANODE_LOCATION_1: NORMAL
MDC_IDC_SET_LEADCHNL_RA_PACING_CAPTURE_MODE: NORMAL
MDC_IDC_SET_LEADCHNL_RA_PACING_CATHODE_ELECTRODE_1: NORMAL
MDC_IDC_SET_LEADCHNL_RA_PACING_CATHODE_LOCATION_1: NORMAL
MDC_IDC_SET_LEADCHNL_RA_PACING_POLARITY: NORMAL
MDC_IDC_SET_LEADCHNL_RA_PACING_PULSEWIDTH: 0.4 MS
MDC_IDC_SET_LEADCHNL_RA_SENSING_ANODE_ELECTRODE_1: NORMAL
MDC_IDC_SET_LEADCHNL_RA_SENSING_ANODE_LOCATION_1: NORMAL
MDC_IDC_SET_LEADCHNL_RA_SENSING_CATHODE_ELECTRODE_1: NORMAL
MDC_IDC_SET_LEADCHNL_RA_SENSING_CATHODE_LOCATION_1: NORMAL
MDC_IDC_SET_LEADCHNL_RA_SENSING_POLARITY: NORMAL
MDC_IDC_SET_LEADCHNL_RA_SENSING_SENSITIVITY: 0.3 MV
MDC_IDC_SET_LEADCHNL_RV_PACING_AMPLITUDE: NORMAL
MDC_IDC_SET_LEADCHNL_RV_PACING_ANODE_ELECTRODE_1: NORMAL
MDC_IDC_SET_LEADCHNL_RV_PACING_ANODE_LOCATION_1: NORMAL
MDC_IDC_SET_LEADCHNL_RV_PACING_CAPTURE_MODE: NORMAL
MDC_IDC_SET_LEADCHNL_RV_PACING_CATHODE_ELECTRODE_1: NORMAL
MDC_IDC_SET_LEADCHNL_RV_PACING_CATHODE_LOCATION_1: NORMAL
MDC_IDC_SET_LEADCHNL_RV_PACING_POLARITY: NORMAL
MDC_IDC_SET_LEADCHNL_RV_PACING_PULSEWIDTH: 0.4 MS
MDC_IDC_SET_LEADCHNL_RV_SENSING_ANODE_ELECTRODE_1: NORMAL
MDC_IDC_SET_LEADCHNL_RV_SENSING_ANODE_LOCATION_1: NORMAL
MDC_IDC_SET_LEADCHNL_RV_SENSING_CATHODE_ELECTRODE_1: NORMAL
MDC_IDC_SET_LEADCHNL_RV_SENSING_CATHODE_LOCATION_1: NORMAL
MDC_IDC_SET_LEADCHNL_RV_SENSING_POLARITY: NORMAL
MDC_IDC_SET_LEADCHNL_RV_SENSING_SENSITIVITY: 0.9 MV
MDC_IDC_SET_ZONE_DETECTION_INTERVAL: 350 MS
MDC_IDC_SET_ZONE_DETECTION_INTERVAL: 400 MS
MDC_IDC_SET_ZONE_TYPE: NORMAL
MDC_IDC_STAT_AT_BURDEN_PERCENT: 0 %
MDC_IDC_STAT_AT_DTM_END: NORMAL
MDC_IDC_STAT_AT_DTM_START: NORMAL
MDC_IDC_STAT_AT_MODE_SW_COUNT: 0
MDC_IDC_STAT_BRADY_AP_VP_PERCENT: 5.99 %
MDC_IDC_STAT_BRADY_AP_VS_PERCENT: 12.3 %
MDC_IDC_STAT_BRADY_AS_VP_PERCENT: 31.31 %
MDC_IDC_STAT_BRADY_AS_VS_PERCENT: 50.4 %
MDC_IDC_STAT_BRADY_DTM_END: NORMAL
MDC_IDC_STAT_BRADY_DTM_START: NORMAL
MDC_IDC_STAT_BRADY_RA_PERCENT_PACED: 18.3 %
MDC_IDC_STAT_BRADY_RV_PERCENT_PACED: 37.3 %
MDC_IDC_STAT_EPISODE_RECENT_COUNT: 0
MDC_IDC_STAT_EPISODE_RECENT_COUNT_DTM_END: NORMAL
MDC_IDC_STAT_EPISODE_RECENT_COUNT_DTM_START: NORMAL
MDC_IDC_STAT_EPISODE_TOTAL_COUNT: 0
MDC_IDC_STAT_EPISODE_TOTAL_COUNT_DTM_END: NORMAL
MDC_IDC_STAT_EPISODE_TOTAL_COUNT_DTM_START: NORMAL
MDC_IDC_STAT_EPISODE_TYPE: NORMAL

## 2022-01-26 ENCOUNTER — TELEPHONE (OUTPATIENT)
Dept: CARDIOLOGY | Facility: CLINIC | Age: 62
End: 2022-01-26
Payer: COMMERCIAL

## 2022-01-26 NOTE — TELEPHONE ENCOUNTER
[facility] Call Center    Phone Message    May a detailed message be left on voicemail: yes     Reason for Call: Other: Patient called and spoke with writer, he states he needs a nurse to give him a call to discuss his medications.Please call patient.     Action Taken: Message routed to:  [facility] ([facility] Cardio     Travel Screening: Not Applicable

## 2022-01-26 NOTE — TELEPHONE ENCOUNTER
Phone call from patient wanting to discuss his medications.  He states he brought home Metoprolol today as instructed by Dr. Lopez after his visit on 1-24-22.  He was instructed to stop amiodarone and he states he is not taking amiodarone.  He wonders if he should be taking Metoprolol.  Explained the rationale for starting Metoprolol regardless of his amiodarone status.  He states understading.    Pt also wonders about taking both atorvastatin and zetia.  Will review with Dr. Norris nurse and call with further information.

## 2022-01-26 NOTE — TELEPHONE ENCOUNTER
Informed by EP nurse that patient has questions regarding taking both Atorvastatin and Zetia. PC to patient, no answer. Left detailed message on identified private VM, to continue on both medications as prescribed to lower cholesterol levels. Callback if questions or further discussion to [phone]. Responded to EP nurse, that question addressed. CORINA,Rn

## 2022-02-07 ENCOUNTER — TELEPHONE (OUTPATIENT)
Dept: CARDIOLOGY | Facility: CLINIC | Age: 62
End: 2022-02-07
Payer: COMMERCIAL

## 2022-02-07 NOTE — TELEPHONE ENCOUNTER
[facility] Call Center    Phone Message    May a detailed message be left on voicemail: yes     Reason for Call: Other: Abraham called to report that his return to work letter states that he can return with a 40 lbs weight restriction. He is wondering if the weight restriction is indefinite or how long the restriction will last. Please reach out to Abraham to discuss further. Thank you!     Action Taken: Message routed to:  Other: [facility] Cardio    Travel Screening: Not Applicable

## 2022-02-07 NOTE — LETTER
2022      Abraham Dong  [address]  [address]  [place] [address]        To Whom It May Concern:    Abraham Dong was seen in our clinic. He may return to work with the followin lb weight lifting restriction indefinitely.     Sincerely,      Zhanna Siegel, RN  CV Nurse Clinician on behalf of Dr. Radha Ferrell, Cardiologist  [facility]  [address]  [place] [address]  Office phone: [phone]   Fax: [phone]

## 2022-02-08 NOTE — TELEPHONE ENCOUNTER
PC with patient and would like to have a letter drafted and sent to his home. Appreciative of the call. Letter drafted, and sent in mail. CMM,RN

## 2022-02-08 NOTE — TELEPHONE ENCOUNTER
===View-only below this line===  ----- Message -----  From: Radha Ferrell MD  Sent: 2/8/2022   5:15 PM CST  To: Elroy Siegel RN    Forever.     EG

## 2022-02-08 NOTE — TELEPHONE ENCOUNTER
PC to patient and review. Pt was placed on  A 40 lb lifting restriction at last OV with EMG. Pt is working with his employer on this, wonders if this is indefinite? Review of documentation, and patient due for follow-up in 6 months with EMG. Will ask provider. If it is at least til next oV in approximately 6 months, pt requests letter be drafted to stipulate that and mailed to his home.    Dr. Ferrell, lifting restriction in effect til next OV in 6 months, and then see if still applicable? Or indefinite? Please advise and will make letter to accurately reflect. Thank you CMM,Rn

## 2022-04-04 ENCOUNTER — ANCILLARY PROCEDURE (OUTPATIENT)
Dept: CARDIOLOGY | Facility: CLINIC | Age: 62
End: 2022-04-04
Attending: INTERNAL MEDICINE
Payer: COMMERCIAL

## 2022-04-04 DIAGNOSIS — I44.30 ATRIOVENTRICULAR BLOCK: ICD-10-CM

## 2022-04-04 DIAGNOSIS — Z95.0 CARDIAC PACEMAKER IN SITU: ICD-10-CM

## 2022-04-04 DIAGNOSIS — I49.5 SICK SINUS SYNDROME (H): Primary | ICD-10-CM

## 2022-04-04 LAB
MDC_IDC_LEAD_IMPLANT_DT: NORMAL
MDC_IDC_LEAD_IMPLANT_DT: NORMAL
MDC_IDC_LEAD_LOCATION: NORMAL
MDC_IDC_LEAD_LOCATION: NORMAL
MDC_IDC_LEAD_LOCATION_DETAIL_1: NORMAL
MDC_IDC_LEAD_LOCATION_DETAIL_1: NORMAL
MDC_IDC_LEAD_MFG: NORMAL
MDC_IDC_LEAD_MFG: NORMAL
MDC_IDC_LEAD_MODEL: NORMAL
MDC_IDC_LEAD_MODEL: NORMAL
MDC_IDC_LEAD_POLARITY_TYPE: NORMAL
MDC_IDC_LEAD_POLARITY_TYPE: NORMAL
MDC_IDC_LEAD_SERIAL: NORMAL
MDC_IDC_LEAD_SERIAL: NORMAL
MDC_IDC_LEAD_SPECIAL_FUNCTION: NORMAL
MDC_IDC_LEAD_SPECIAL_FUNCTION: NORMAL
MDC_IDC_MSMT_BATTERY_DTM: NORMAL
MDC_IDC_MSMT_BATTERY_REMAINING_LONGEVITY: 166 MO
MDC_IDC_MSMT_BATTERY_RRT_TRIGGER: 2.62
MDC_IDC_MSMT_BATTERY_STATUS: NORMAL
MDC_IDC_MSMT_BATTERY_VOLTAGE: 3.21 V
MDC_IDC_MSMT_LEADCHNL_RA_IMPEDANCE_VALUE: 361 OHM
MDC_IDC_MSMT_LEADCHNL_RA_IMPEDANCE_VALUE: 456 OHM
MDC_IDC_MSMT_LEADCHNL_RA_PACING_THRESHOLD_AMPLITUDE: 0.62 V
MDC_IDC_MSMT_LEADCHNL_RA_PACING_THRESHOLD_AMPLITUDE: 0.75 V
MDC_IDC_MSMT_LEADCHNL_RA_PACING_THRESHOLD_PULSEWIDTH: 0.4 MS
MDC_IDC_MSMT_LEADCHNL_RA_PACING_THRESHOLD_PULSEWIDTH: 0.4 MS
MDC_IDC_MSMT_LEADCHNL_RA_SENSING_INTR_AMPL: 6.6 MV
MDC_IDC_MSMT_LEADCHNL_RA_SENSING_INTR_AMPL: 6.62 MV
MDC_IDC_MSMT_LEADCHNL_RV_IMPEDANCE_VALUE: 380 OHM
MDC_IDC_MSMT_LEADCHNL_RV_IMPEDANCE_VALUE: 665 OHM
MDC_IDC_MSMT_LEADCHNL_RV_PACING_THRESHOLD_AMPLITUDE: 0.75 V
MDC_IDC_MSMT_LEADCHNL_RV_PACING_THRESHOLD_AMPLITUDE: 0.75 V
MDC_IDC_MSMT_LEADCHNL_RV_PACING_THRESHOLD_PULSEWIDTH: 0.4 MS
MDC_IDC_MSMT_LEADCHNL_RV_PACING_THRESHOLD_PULSEWIDTH: 0.4 MS
MDC_IDC_MSMT_LEADCHNL_RV_SENSING_INTR_AMPL: 14 MV
MDC_IDC_MSMT_LEADCHNL_RV_SENSING_INTR_AMPL: 16.1 MV
MDC_IDC_PG_IMPLANT_DTM: NORMAL
MDC_IDC_PG_MFG: NORMAL
MDC_IDC_PG_MODEL: NORMAL
MDC_IDC_PG_SERIAL: NORMAL
MDC_IDC_PG_TYPE: NORMAL
MDC_IDC_SESS_CLINIC_NAME: NORMAL
MDC_IDC_SESS_DTM: NORMAL
MDC_IDC_SESS_TYPE: NORMAL
MDC_IDC_SET_BRADY_AT_MODE_SWITCH_RATE: 171 {BEATS}/MIN
MDC_IDC_SET_BRADY_HYSTRATE: NORMAL
MDC_IDC_SET_BRADY_LOWRATE: 60 {BEATS}/MIN
MDC_IDC_SET_BRADY_MAX_SENSOR_RATE: 130 {BEATS}/MIN
MDC_IDC_SET_BRADY_MAX_TRACKING_RATE: 130 {BEATS}/MIN
MDC_IDC_SET_BRADY_MODE: NORMAL
MDC_IDC_SET_BRADY_PAV_DELAY_LOW: 180 MS
MDC_IDC_SET_BRADY_SAV_DELAY_LOW: 150 MS
MDC_IDC_SET_LEADCHNL_RA_PACING_AMPLITUDE: NORMAL
MDC_IDC_SET_LEADCHNL_RA_PACING_ANODE_ELECTRODE_1: NORMAL
MDC_IDC_SET_LEADCHNL_RA_PACING_ANODE_LOCATION_1: NORMAL
MDC_IDC_SET_LEADCHNL_RA_PACING_CAPTURE_MODE: NORMAL
MDC_IDC_SET_LEADCHNL_RA_PACING_CATHODE_ELECTRODE_1: NORMAL
MDC_IDC_SET_LEADCHNL_RA_PACING_CATHODE_LOCATION_1: NORMAL
MDC_IDC_SET_LEADCHNL_RA_PACING_POLARITY: NORMAL
MDC_IDC_SET_LEADCHNL_RA_PACING_PULSEWIDTH: 0.4 MS
MDC_IDC_SET_LEADCHNL_RA_SENSING_ANODE_ELECTRODE_1: NORMAL
MDC_IDC_SET_LEADCHNL_RA_SENSING_ANODE_LOCATION_1: NORMAL
MDC_IDC_SET_LEADCHNL_RA_SENSING_CATHODE_ELECTRODE_1: NORMAL
MDC_IDC_SET_LEADCHNL_RA_SENSING_CATHODE_LOCATION_1: NORMAL
MDC_IDC_SET_LEADCHNL_RA_SENSING_POLARITY: NORMAL
MDC_IDC_SET_LEADCHNL_RA_SENSING_SENSITIVITY: 0.3 MV
MDC_IDC_SET_LEADCHNL_RV_PACING_AMPLITUDE: NORMAL
MDC_IDC_SET_LEADCHNL_RV_PACING_ANODE_ELECTRODE_1: NORMAL
MDC_IDC_SET_LEADCHNL_RV_PACING_ANODE_LOCATION_1: NORMAL
MDC_IDC_SET_LEADCHNL_RV_PACING_CAPTURE_MODE: NORMAL
MDC_IDC_SET_LEADCHNL_RV_PACING_CATHODE_ELECTRODE_1: NORMAL
MDC_IDC_SET_LEADCHNL_RV_PACING_CATHODE_LOCATION_1: NORMAL
MDC_IDC_SET_LEADCHNL_RV_PACING_POLARITY: NORMAL
MDC_IDC_SET_LEADCHNL_RV_PACING_PULSEWIDTH: 0.4 MS
MDC_IDC_SET_LEADCHNL_RV_SENSING_ANODE_ELECTRODE_1: NORMAL
MDC_IDC_SET_LEADCHNL_RV_SENSING_ANODE_LOCATION_1: NORMAL
MDC_IDC_SET_LEADCHNL_RV_SENSING_CATHODE_ELECTRODE_1: NORMAL
MDC_IDC_SET_LEADCHNL_RV_SENSING_CATHODE_LOCATION_1: NORMAL
MDC_IDC_SET_LEADCHNL_RV_SENSING_POLARITY: NORMAL
MDC_IDC_SET_LEADCHNL_RV_SENSING_SENSITIVITY: 0.9 MV
MDC_IDC_SET_ZONE_DETECTION_INTERVAL: 200 MS
MDC_IDC_SET_ZONE_DETECTION_INTERVAL: 350 MS
MDC_IDC_SET_ZONE_DETECTION_INTERVAL: 400 MS
MDC_IDC_SET_ZONE_TYPE: NORMAL
MDC_IDC_STAT_AT_BURDEN_PERCENT: 0 %
MDC_IDC_STAT_AT_DTM_END: NORMAL
MDC_IDC_STAT_AT_DTM_START: NORMAL
MDC_IDC_STAT_AT_MODE_SW_COUNT: 0
MDC_IDC_STAT_BRADY_AP_VP_PERCENT: 8.14 %
MDC_IDC_STAT_BRADY_AP_VS_PERCENT: 50.34 %
MDC_IDC_STAT_BRADY_AS_VP_PERCENT: 10.67 %
MDC_IDC_STAT_BRADY_AS_VS_PERCENT: 30.85 %
MDC_IDC_STAT_BRADY_DTM_END: NORMAL
MDC_IDC_STAT_BRADY_DTM_START: NORMAL
MDC_IDC_STAT_BRADY_RA_PERCENT_PACED: 58.5 %
MDC_IDC_STAT_BRADY_RV_PERCENT_PACED: 18.8 %
MDC_IDC_STAT_EPISODE_RECENT_COUNT: 0
MDC_IDC_STAT_EPISODE_RECENT_COUNT_DTM_END: NORMAL
MDC_IDC_STAT_EPISODE_RECENT_COUNT_DTM_START: NORMAL
MDC_IDC_STAT_EPISODE_TOTAL_COUNT: 0
MDC_IDC_STAT_EPISODE_TOTAL_COUNT_DTM_END: NORMAL
MDC_IDC_STAT_EPISODE_TOTAL_COUNT_DTM_START: NORMAL
MDC_IDC_STAT_EPISODE_TYPE: NORMAL

## 2022-04-04 PROCEDURE — 93280 PM DEVICE PROGR EVAL DUAL: CPT | Performed by: INTERNAL MEDICINE

## 2022-07-02 DIAGNOSIS — E78.5 HYPERLIPIDEMIA: ICD-10-CM

## 2022-07-05 RX ORDER — EZETIMIBE 10 MG/1
TABLET ORAL
Qty: 90 TABLET | Refills: 0 | Status: SHIPPED | OUTPATIENT
Start: 2022-07-05 | End: 2022-09-19

## 2022-07-11 ENCOUNTER — TELEPHONE (OUTPATIENT)
Dept: CARDIOLOGY | Facility: CLINIC | Age: 62
End: 2022-07-11

## 2022-07-11 ENCOUNTER — ANCILLARY PROCEDURE (OUTPATIENT)
Dept: CARDIOLOGY | Facility: CLINIC | Age: 62
End: 2022-07-11
Attending: INTERNAL MEDICINE
Payer: COMMERCIAL

## 2022-07-11 DIAGNOSIS — I49.5 SICK SINUS SYNDROME (H): ICD-10-CM

## 2022-07-11 DIAGNOSIS — I15.8 OTHER SECONDARY HYPERTENSION: Primary | ICD-10-CM

## 2022-07-11 DIAGNOSIS — Z95.0 PACEMAKER: ICD-10-CM

## 2022-07-11 DIAGNOSIS — I48.0 PAF (PAROXYSMAL ATRIAL FIBRILLATION) (H): ICD-10-CM

## 2022-07-11 LAB
MDC_IDC_LEAD_IMPLANT_DT: NORMAL
MDC_IDC_LEAD_IMPLANT_DT: NORMAL
MDC_IDC_LEAD_LOCATION: NORMAL
MDC_IDC_LEAD_LOCATION: NORMAL
MDC_IDC_LEAD_LOCATION_DETAIL_1: NORMAL
MDC_IDC_LEAD_LOCATION_DETAIL_1: NORMAL
MDC_IDC_LEAD_MFG: NORMAL
MDC_IDC_LEAD_MFG: NORMAL
MDC_IDC_LEAD_MODEL: NORMAL
MDC_IDC_LEAD_MODEL: NORMAL
MDC_IDC_LEAD_POLARITY_TYPE: NORMAL
MDC_IDC_LEAD_POLARITY_TYPE: NORMAL
MDC_IDC_LEAD_SERIAL: NORMAL
MDC_IDC_LEAD_SERIAL: NORMAL
MDC_IDC_LEAD_SPECIAL_FUNCTION: NORMAL
MDC_IDC_LEAD_SPECIAL_FUNCTION: NORMAL
MDC_IDC_MSMT_BATTERY_DTM: NORMAL
MDC_IDC_MSMT_BATTERY_REMAINING_LONGEVITY: 163 MO
MDC_IDC_MSMT_BATTERY_RRT_TRIGGER: 2.62
MDC_IDC_MSMT_BATTERY_STATUS: NORMAL
MDC_IDC_MSMT_BATTERY_VOLTAGE: 3.19 V
MDC_IDC_MSMT_LEADCHNL_RA_IMPEDANCE_VALUE: 323 OHM
MDC_IDC_MSMT_LEADCHNL_RA_IMPEDANCE_VALUE: 494 OHM
MDC_IDC_MSMT_LEADCHNL_RA_PACING_THRESHOLD_AMPLITUDE: 0.62 V
MDC_IDC_MSMT_LEADCHNL_RA_PACING_THRESHOLD_PULSEWIDTH: 0.4 MS
MDC_IDC_MSMT_LEADCHNL_RA_SENSING_INTR_AMPL: 6.12 MV
MDC_IDC_MSMT_LEADCHNL_RA_SENSING_INTR_AMPL: 6.12 MV
MDC_IDC_MSMT_LEADCHNL_RV_IMPEDANCE_VALUE: 342 OHM
MDC_IDC_MSMT_LEADCHNL_RV_IMPEDANCE_VALUE: 608 OHM
MDC_IDC_MSMT_LEADCHNL_RV_PACING_THRESHOLD_AMPLITUDE: 0.88 V
MDC_IDC_MSMT_LEADCHNL_RV_PACING_THRESHOLD_PULSEWIDTH: 0.4 MS
MDC_IDC_MSMT_LEADCHNL_RV_SENSING_INTR_AMPL: 13.75 MV
MDC_IDC_MSMT_LEADCHNL_RV_SENSING_INTR_AMPL: 13.75 MV
MDC_IDC_PG_IMPLANT_DTM: NORMAL
MDC_IDC_PG_MFG: NORMAL
MDC_IDC_PG_MODEL: NORMAL
MDC_IDC_PG_SERIAL: NORMAL
MDC_IDC_PG_TYPE: NORMAL
MDC_IDC_SESS_CLINIC_NAME: NORMAL
MDC_IDC_SESS_DTM: NORMAL
MDC_IDC_SESS_TYPE: NORMAL
MDC_IDC_SET_BRADY_AT_MODE_SWITCH_RATE: 171 {BEATS}/MIN
MDC_IDC_SET_BRADY_HYSTRATE: NORMAL
MDC_IDC_SET_BRADY_LOWRATE: 60 {BEATS}/MIN
MDC_IDC_SET_BRADY_MAX_SENSOR_RATE: 130 {BEATS}/MIN
MDC_IDC_SET_BRADY_MAX_TRACKING_RATE: 130 {BEATS}/MIN
MDC_IDC_SET_BRADY_MODE: NORMAL
MDC_IDC_SET_BRADY_PAV_DELAY_LOW: 180 MS
MDC_IDC_SET_BRADY_SAV_DELAY_LOW: 150 MS
MDC_IDC_SET_LEADCHNL_RA_PACING_AMPLITUDE: 1.5 V
MDC_IDC_SET_LEADCHNL_RA_PACING_ANODE_ELECTRODE_1: NORMAL
MDC_IDC_SET_LEADCHNL_RA_PACING_ANODE_LOCATION_1: NORMAL
MDC_IDC_SET_LEADCHNL_RA_PACING_CAPTURE_MODE: NORMAL
MDC_IDC_SET_LEADCHNL_RA_PACING_CATHODE_ELECTRODE_1: NORMAL
MDC_IDC_SET_LEADCHNL_RA_PACING_CATHODE_LOCATION_1: NORMAL
MDC_IDC_SET_LEADCHNL_RA_PACING_POLARITY: NORMAL
MDC_IDC_SET_LEADCHNL_RA_PACING_PULSEWIDTH: 0.4 MS
MDC_IDC_SET_LEADCHNL_RA_SENSING_ANODE_ELECTRODE_1: NORMAL
MDC_IDC_SET_LEADCHNL_RA_SENSING_ANODE_LOCATION_1: NORMAL
MDC_IDC_SET_LEADCHNL_RA_SENSING_CATHODE_ELECTRODE_1: NORMAL
MDC_IDC_SET_LEADCHNL_RA_SENSING_CATHODE_LOCATION_1: NORMAL
MDC_IDC_SET_LEADCHNL_RA_SENSING_POLARITY: NORMAL
MDC_IDC_SET_LEADCHNL_RA_SENSING_SENSITIVITY: 0.3 MV
MDC_IDC_SET_LEADCHNL_RV_PACING_AMPLITUDE: 1.5 V
MDC_IDC_SET_LEADCHNL_RV_PACING_ANODE_ELECTRODE_1: NORMAL
MDC_IDC_SET_LEADCHNL_RV_PACING_ANODE_LOCATION_1: NORMAL
MDC_IDC_SET_LEADCHNL_RV_PACING_CAPTURE_MODE: NORMAL
MDC_IDC_SET_LEADCHNL_RV_PACING_CATHODE_ELECTRODE_1: NORMAL
MDC_IDC_SET_LEADCHNL_RV_PACING_CATHODE_LOCATION_1: NORMAL
MDC_IDC_SET_LEADCHNL_RV_PACING_POLARITY: NORMAL
MDC_IDC_SET_LEADCHNL_RV_PACING_PULSEWIDTH: 0.4 MS
MDC_IDC_SET_LEADCHNL_RV_SENSING_ANODE_ELECTRODE_1: NORMAL
MDC_IDC_SET_LEADCHNL_RV_SENSING_ANODE_LOCATION_1: NORMAL
MDC_IDC_SET_LEADCHNL_RV_SENSING_CATHODE_ELECTRODE_1: NORMAL
MDC_IDC_SET_LEADCHNL_RV_SENSING_CATHODE_LOCATION_1: NORMAL
MDC_IDC_SET_LEADCHNL_RV_SENSING_POLARITY: NORMAL
MDC_IDC_SET_LEADCHNL_RV_SENSING_SENSITIVITY: 0.9 MV
MDC_IDC_SET_ZONE_DETECTION_INTERVAL: 200 MS
MDC_IDC_SET_ZONE_DETECTION_INTERVAL: 350 MS
MDC_IDC_SET_ZONE_DETECTION_INTERVAL: 400 MS
MDC_IDC_SET_ZONE_TYPE: NORMAL
MDC_IDC_STAT_AT_BURDEN_PERCENT: 0 %
MDC_IDC_STAT_AT_DTM_END: NORMAL
MDC_IDC_STAT_AT_DTM_START: NORMAL
MDC_IDC_STAT_BRADY_AP_VP_PERCENT: 3.38 %
MDC_IDC_STAT_BRADY_AP_VS_PERCENT: 70.7 %
MDC_IDC_STAT_BRADY_AS_VP_PERCENT: 2.67 %
MDC_IDC_STAT_BRADY_AS_VS_PERCENT: 23.24 %
MDC_IDC_STAT_BRADY_DTM_END: NORMAL
MDC_IDC_STAT_BRADY_DTM_START: NORMAL
MDC_IDC_STAT_BRADY_RA_PERCENT_PACED: 74.12 %
MDC_IDC_STAT_BRADY_RV_PERCENT_PACED: 6.05 %
MDC_IDC_STAT_EPISODE_RECENT_COUNT: 0
MDC_IDC_STAT_EPISODE_RECENT_COUNT_DTM_END: NORMAL
MDC_IDC_STAT_EPISODE_RECENT_COUNT_DTM_START: NORMAL
MDC_IDC_STAT_EPISODE_TOTAL_COUNT: 0
MDC_IDC_STAT_EPISODE_TOTAL_COUNT_DTM_END: NORMAL
MDC_IDC_STAT_EPISODE_TOTAL_COUNT_DTM_START: NORMAL
MDC_IDC_STAT_EPISODE_TYPE: NORMAL

## 2022-07-11 PROCEDURE — 93294 REM INTERROG EVL PM/LDLS PM: CPT | Performed by: INTERNAL MEDICINE

## 2022-07-11 PROCEDURE — 93296 REM INTERROG EVL PM/IDS: CPT | Performed by: INTERNAL MEDICINE

## 2022-07-11 NOTE — TELEPHONE ENCOUNTER
===View-only below this line===  ----- Message -----  From: Ana Lilia Mclaughlin  Sent: 7/11/2022   1:07 PM CDT  To: Elroy Siegel RN  Subject: device remote results                            Type: add on pacemaker remote transmission done for symptoms over the weekend.   Device: Medtronic Ravenel.   Pacing % /Programmed: AP 74%,  6% at AAIR>DDDR 60.   Lead(s): stable.  Battery longevity: 13yrs, 7mo.   Presenting: AS- 76 bpm.  Atrial arrhythmia: None detected.  Anticoagulant: Eliquis.  Ventricular arrhythmia: None detected.  Device/Lead alerts: has Bita alert device on advisory.   Comments: Normal pacemaker function.   Plan: Routed to CV RN, Zhanna Siegel. Next remote scheduled for 10/19/22. JINNY Mclaughlin, Device Specialist

## 2022-07-11 NOTE — TELEPHONE ENCOUNTER
[facility] Call Center    Phone Message    May a detailed message be left on voicemail: yes     Reason for Call: Other: Patient called today report over the weekend he was feeling lightheaded and stumbled against the wall. Please call patient back to discuss further, thank you.    Action Taken: Message routed to:  Other: Cardiology    Travel Screening: Not Applicable

## 2022-07-11 NOTE — TELEPHONE ENCOUNTER
PC with patient and informed of normal PPM monitor check. No arrhythmia noted. Reassurance. Encouraged to follow previous planf. Will send to EMG, and return call once reviewed. Informed pt, provider out of clinic. Will return as soon as available. If episode happens again, to present to ER. Pt verbalized understanding.     Dr. Ferrell, please review PPM normal remote check. Please review pt event on Saturday. BB related and continue to monitor? Or other advisement? CORINA,Rn

## 2022-07-11 NOTE — TELEPHONE ENCOUNTER
"PC with patient and discussion. Pt reports that he was sitting on the floor with his dog, got up, was in a narrow hallway in his home. States that he felt \"wavy\" and by description a lightheaded sensation. He blacked out for a few seconds, his posterior shoulder hit the wall, and he slid down to his butt. He came to quickly. This has not re-occurred. Pt reports he has felt fine since, without re-occurrence. This happened on Saturday, 7/9 around [id number]. Pt denies headache, epistaxis, blood in urine or stool.     Right shoulder is bruised, after hitting the corner of the wall. He then slid down to and blacked out for a couple of seconds, and slid down to his butt. He thinks he may have blacked out for a few seconds. He came to, got up off the floor from his seated position and felt fine. He has felt fine since, except his posterior shoulder is sore if touched or lays on that side. He denies hitting his head, or any other injuries. Pt removed his shirt and visualized his posterior shoulder in the mirror. He has the same (2) small areas about the size of (2) silver dollars. Deep purple, non-raised. Pt is able to use his arm, and no pain with use. Patient has been up independently and gone on walks with his dog without issues. Denies any dizziness or lightheadedness at rest.     Pt is due for a remote device check overnight 7/12/22. Will ask Device team if can send in a manual remote now for review of any concerns or events from the PPM.   Will send to a provider in absence of EMG, if any concerning findings. If no concerning findings will await til EMG return tomorrow to review. Discussion with patient that his could be his BB medication/ Discussion of slow purposeful movements and gauge if any onset of symptoms. Encouraged good hydration, and proper intake. Instructed patient to monitor and check shoulder sire daily, and monitor for improvement. Reasons to present to urgent care or PCP office discussed. If pt has " another episode where he is syncopal,  to present to ER  for evaluation. Pt verbalized understanding of all discussion.     Next steps will check to see if a remote can be done for review of PPM. Once obtained will send to provider if needed. CMM,Rn

## 2022-07-12 NOTE — TELEPHONE ENCOUNTER
===View-only below this line===  ----- Message -----  From: Radha Ferrell MD  Sent: 7/12/2022  12:51 PM CDT  To: Elroy Siegel RN    What have his blood pressures been running?    How much water is he drinking per day?    Eg      PC with patient. Pt doesn't take his BP or HR at home. Pt reports that he is drinking a total of 20 ounces of water per day, and more milk then anything, more than 20 ounces per day. Will update provider. Encouraged patient to drink at least 64 ounces of water per day.     Dr. Ferrell please review. Any recs? Thank you CMM,RN

## 2022-07-12 NOTE — TELEPHONE ENCOUNTER
===View-only below this line===  ----- Message -----  From: Radha Ferrell MD  Sent: 7/12/2022   2:32 PM CDT  To: Elroy Siegel RN    Please provide script for BP cuff and have him check it daily at home for a week and then let us know the results.  He needs to drink at least 64 oz of water per day. Drink half around breakfast and the other half around lunch.    Thanks, EG

## 2022-07-12 NOTE — TELEPHONE ENCOUNTER
PC to patient and LVM of detailed response. Order for BP cuff placed, and faxed to DME through [employer]. Await return call from the patient to discuss. CORINA,Rn

## 2022-07-13 ENCOUNTER — TELEPHONE (OUTPATIENT)
Dept: CARDIOLOGY | Facility: CLINIC | Age: 62
End: 2022-07-13

## 2022-07-13 NOTE — TELEPHONE ENCOUNTER
Other: Blood pressure monitor RX needs to be signed by Dr Ferrell. A signature from a nurse will no do per [facility]     Will re-enter order, and pend for physician to sign. Dr. Ferrell please sign DME order. Thank you CORINA,Rn

## 2022-07-14 NOTE — TELEPHONE ENCOUNTER
New signed order obtained. Faxed to [facility]. PC with patient and review of recommendations. Pt has direct office number to call with questions and/or to report BP findings. No further questions at this time.  CMM,Rn

## 2022-07-20 ENCOUNTER — OFFICE VISIT (OUTPATIENT)
Dept: CARDIOLOGY | Facility: CLINIC | Age: 62
End: 2022-07-20
Attending: INTERNAL MEDICINE
Payer: COMMERCIAL

## 2022-07-20 VITALS
WEIGHT: 259.2 LBS | RESPIRATION RATE: 12 BRPM | DIASTOLIC BLOOD PRESSURE: 80 MMHG | HEART RATE: 70 BPM | SYSTOLIC BLOOD PRESSURE: 110 MMHG | BODY MASS INDEX: 34.2 KG/M2

## 2022-07-20 DIAGNOSIS — I48.0 PAROXYSMAL ATRIAL FIBRILLATION (H): ICD-10-CM

## 2022-07-20 DIAGNOSIS — I25.10 MILD CAD: ICD-10-CM

## 2022-07-20 LAB
CHOLEST SERPL-MCNC: 146 MG/DL
FASTING STATUS PATIENT QL REPORTED: YES
HDLC SERPL-MCNC: 44 MG/DL
LDLC SERPL CALC-MCNC: 71 MG/DL
TRIGL SERPL-MCNC: 157 MG/DL

## 2022-07-20 PROCEDURE — 36415 COLL VENOUS BLD VENIPUNCTURE: CPT | Performed by: INTERNAL MEDICINE

## 2022-07-20 PROCEDURE — 99214 OFFICE O/P EST MOD 30 MIN: CPT | Performed by: INTERNAL MEDICINE

## 2022-07-20 PROCEDURE — 80061 LIPID PANEL: CPT | Performed by: INTERNAL MEDICINE

## 2022-07-20 NOTE — LETTER
7/20/2022    Clifford Gamboa MD  [address]  [facility] [place] [address]    RE: Abraham Dong       Dear Colleague,     I had the pleasure of seeing Abraham Dong in the [facility].    HEART CARE ENCOUNTER CONSULTATON NOTE      [facility]  [phone]      Assessment/Recommendations   Assessment/Plan:  SSS s/p dual chamber pacer - device checks look good. 74% atrial paced, < 10% v paced.    Paroxysmal atrial fibrillation - on eliquis and metoprolol. No symptoms    Mild CAD - on statin and zetia, lipid check today    Asymmetric septal hypertrophy - Moderate on echo. He is pre-load dependent and is on a 40 lb weight lifting restriction. Near syncope but no syncope, most likely related to dehydration. No ventricular arrhythmias on device checks.    Obesity - diet and exercise discussed.    F/U 12 months       History of Present Illness/Subjective    HPI: Abraham Dong is a 62 year old male obese, with mild CAD, septal hypertrophy, paroxysmal atrial fibrillation, hypertension, hyperlipemia, depression and a chronically abnormal EKG. Abraham initially presented to our ER 12/2/21 with chest pressure, dyspnea, and vertigo.  He was very hypertensive with /130 mmHg. Coronary angiogram revealed mild coronary disease and a severely elevated LV EDP. EF was grossly normal with possible apical hypertrophy vs diverticulum. During his hospitalization he had sinus pauses, primarily at night. He was discharged with a monitor and had sinus node dysfunction and underwent a dual chamber pacemaker 12/23/21. He was also noted to have atrial fibrillation and was started on amiodarone and eliquis by Dr. Lopez at the time of his pacemaker placement. Amiodarone was switched to metoprolol given the septal hypertrophy spring 2022.     Abraham returns for follow up. He is feeling well. Abraham is no longer working due to a 40 lb weight lifting restriction due to his septal hypertrophy. He had a near syncopal  episode after getting up quickly and going to the bathroom. He has been drinking more water since without recurrence. There is no chest pain, dyspnea.  He is not exercising and is eating a poor diet. Life has been stressful due to financial difficulties and he is awaiting disability approval.         Physical Examination  Review of Systems   Vitals: /80 (BP Location: Left arm, Patient Position: Sitting, Cuff Size: Adult Large)   Pulse 70   Resp 12   Wt 117.6 kg (259 lb 3.2 oz)   BMI 34.20 kg/m    BMI= Body mass index is 34.2 kg/m .  Wt Readings from Last 3 Encounters:   07/20/22 117.6 kg (259 lb 3.2 oz)   01/24/22 116.6 kg (257 lb)   01/20/22 116.6 kg (257 lb)       General Appearance:   no distress, obese body habitus   ENT/Mouth: membranes moist, no oral lesions or bleeding gums.      EYES:  no scleral icterus, normal conjunctivae   Neck: no carotid bruits or thyromegaly   Chest/Lungs:   lungs are clear to auscultation, no rales or wheezing, no sternal scar, equal chest wall expansion    Cardiovascular:   Regular. Normal first and second heart sounds with early systolic murmur. No rubs or gallops; the right carotid, radial and posterior tibial pulses are intact and the left carotid, radial and posterior tibial pulses are intact.  Jugular venous pressure is flat, no edema bilaterally    Abdomen:  no organomegaly, masses, bruits, or tenderness; bowel sounds are present   Extremities: no cyanosis or clubbing   Skin: no xanthelasma, warm.    Neurologic: normal  bilateral, no tremors     Psychiatric: alert and oriented x3, calm        Please refer above for cardiac ROS details.        Medical History  Surgical History Family History Social History   Past Medical History:   Diagnosis Date     Atrial fibrillation (H)      Benign essential hypertension      Coronary artery disease      Depression      Hyperlipidemia      Obesity      Past Surgical History:   Procedure Laterality Date     CORONARY ANGIOGRAPHY  ADULT ORDER       CV CORONARY ANGIOGRAM N/A 12/2/2021    Procedure: Coronary Angiogram;  Surgeon: Radha Ferrell MD;  Location: [facility] CATH LAB CV     CV LEFT HEART CATH N/A 12/2/2021    Procedure: Left Heart Cath;  Surgeon: Radha Ferrell MD;  Location: [facility] CATH LAB CV     CV LEFT VENTRICULOGRAM N/A 12/2/2021    Procedure: Left Ventriculogram;  Surgeon: Radha Ferrell MD;  Location: [facility] CATH LAB CV     EP PACEMAKER N/A 12/23/2021    Procedure: EP Pacemaker;  Surgeon: Kun Lopez MD;  Location: [facility] CATH LAB CV     HERNIA REPAIR       IMPLANT PACEMAKER       KNEE SURGERY       TONSILLECTOMY       VASECTOMY       Family History   Problem Relation Age of Onset     Diabetes Mother      Hypertension Mother      Coronary Artery Disease No family hx of      Cerebrovascular Disease No family hx of         Social History     Socioeconomic History     Marital status:      Spouse name: Not on file     Number of children: Not on file     Years of education: Not on file     Highest education level: Not on file   Occupational History     Not on file   Tobacco Use     Smoking status: Never Smoker     Smokeless tobacco: Never Used   Substance and Sexual Activity     Alcohol use: Yes     Alcohol/week: 6.0 standard drinks     Types: 6 Standard drinks or equivalent per week     Comment: 7 drinks per week     Drug use: Never     Sexual activity: Not on file   Other Topics Concern     Parent/sibling w/ CABG, MI or angioplasty before 65F 55M? Not Asked   Social History Narrative     Not on file     Social Determinants of Health     Financial Resource Strain: Not on file   Food Insecurity: Not on file   Transportation Needs: Not on file   Physical Activity: Not on file   Stress: Not on file   Social Connections: Not on file   Intimate Partner Violence: Not on file   Housing Stability: Not on file           Medications  Allergies   Current Outpatient Medications   Medication Sig Dispense Refill      acetaminophen-caffeine (EXCEDRIN TENSION HEADACHE) 500-65 MG TABS Take 2 tablets by mouth daily as needed for mild pain       apixaban ANTICOAGULANT (ELIQUIS) 5 MG tablet Take 1 tablet (5 mg) by mouth 2 times daily 180 tablet 3     atorvastatin (LIPITOR) 80 MG tablet Take 1 tablet (80 mg) by mouth daily 30 tablet 0     ezetimibe (ZETIA) 10 MG tablet Take 1 tablet (10 mg) by mouth once daily 90 tablet 0     lisinopril (ZESTRIL) 20 MG tablet Take 1 tablet (20 mg) by mouth daily 30 tablet 0     metoprolol succinate ER (TOPROL-XL) 50 MG 24 hr tablet Take 1 tablet (50 mg) by mouth daily 90 tablet 3     nitroGLYcerin (NITROSTAT) 0.4 MG sublingual tablet One tablet under the tongue every 5 minutes if needed for chest pain. May repeat every 5 minutes for a maximum of 3 doses in 15 minutes 25 tablet 3     sertraline (ZOLOFT) 100 MG tablet Take 100 mg by mouth daily        Blood Pressure Monitor KIT Please provide (1) arm bp cuff, and (1) digital BP monitor (Patient not taking: Reported on 7/20/2022) 1 kit 0     No Known Allergies       Lab Results    Chemistry/lipid CBC Cardiac Enzymes/BNP/TSH/INR   Recent Labs   Lab Test 01/20/22  1141   CHOL 206*   HDL 58      TRIG 192*     Recent Labs   Lab Test 01/20/22  1141 12/02/21  0831    109     Recent Labs   Lab Test 12/23/21  0702      POTASSIUM 4.5   CHLORIDE 106   CO2 22      BUN 27*   CR 0.84   GFRESTIMATED >90   TEGAN 9.4     Recent Labs   Lab Test 12/23/21  0702 12/05/21  0455 12/03/21  0432   CR 0.84 0.75 0.71     No results for input(s): A1C in the last 07184 hours.       Recent Labs   Lab Test 12/23/21  0702   WBC 5.4   HGB 14.5   HCT 43.2   MCV 93        Recent Labs   Lab Test 12/23/21  0702 12/05/21  0455 12/03/21  0432   HGB 14.5 15.7 15.6    Recent Labs   Lab Test 12/03/21  1016 12/03/21  0432 12/02/21  2237   TROPONINI 0.04 0.04 0.04     No results for input(s): BNP, NTBNPI, NTBNP in the last 62493 hours.  Recent Labs   Lab Test  12/23/21  0702   TSH 3.63     Recent Labs   Lab Test 12/02/21  0828   INR 1.05        Today's clinic visit entailed:  [facility] information from epic reviewed  40 minutes spent on the date of the encounter doing chart review, review of outside records, review of test results, interpretation of tests, patient visit and documentation   Provider  Link to [facility] Help Grid     The level of medical decision making during this visit was of moderate complexity.        Radha Ferrell MD            Thank you for allowing me to participate in the care of your patient.      Sincerely,     Radha Ferrell MD     [facility]  cc:   Radha Ferrell MD  [address] [place] [id number]

## 2022-07-20 NOTE — PATIENT INSTRUCTIONS
It was a pleasure seeing you at [facility] today.        Here are my suggestions for your care:      1. Limit your sugar intake to 30 grams per day (read labels).    2. Exercise 30 minutes daily    3. Check cholesterol daily    4. Drink at least 64 oz water daily      Let's meet again in 12 months.    You can always call my nurse Zhanna Siegel RN who is a nurse helping me in the care of my patients. She can be reached at [phone] if you have any questions.    For scheduling, please call my  Santa Osman at [phone].    Thank you again for trusting me with your care. Please feel free to call my office at any time if you have any question or if I can assist you in any way.    Radha Ferrell MD  [facility]

## 2022-07-20 NOTE — LETTER
August 3, 2022      Abraham Dong  [address]  [address]  [facility] [address]    Dear ,    We are writing to inform you of your test results.Your test results fall within the expected range(s) LDL much improved with the addition of zetia. Please continue to incorporate a heart healthy diet and exercise regimen as well as with the current treatment plan.    Resulted Orders   Lipid panel reflex to direct LDL Non-fasting   Result Value Ref Range    Cholesterol 146 <=199 mg/dL    Triglycerides 157 (H) <=149 mg/dL    Direct Measure HDL 44 >=40 mg/dL      Comment:      HDL Cholesterol Reference Range:     0-2 years:   No reference ranges established for patients under 2 years old  at [employer] for lipid analytes.    2-8 years:  Greater than 45 mg/dL     18 years and older:   Female: Greater than or equal to 50 mg/dL   Male:   Greater than or equal to 40 mg/dL    LDL Cholesterol Calculated 71 <=129 mg/dL    Patient Fasting > 8hrs? Yes        If you have any questions or concerns, please call the clinic at the number listed above.     Sincerely,      Radha Ferrell MD

## 2022-07-20 NOTE — PROGRESS NOTES
HEART CARE ENCOUNTER CONSULTATON NOTE      [facility]  [phone]      Assessment/Recommendations   Assessment/Plan:  SSS s/p dual chamber pacer - device checks look good. 74% atrial paced, < 10% v paced.    Paroxysmal atrial fibrillation - on eliquis and metoprolol. No symptoms    Mild CAD - on statin and zetia, lipid check today    Asymmetric septal hypertrophy - Moderate on echo. He is pre-load dependent and is on a 40 lb weight lifting restriction. Near syncope but no syncope, most likely related to dehydration. No ventricular arrhythmias on device checks.    Obesity - diet and exercise discussed.    F/U 12 months       History of Present Illness/Subjective    HPI: Abraham Dong is a 62 year old male obese, with mild CAD, septal hypertrophy, paroxysmal atrial fibrillation, hypertension, hyperlipemia, depression and a chronically abnormal EKG. Abraham initially presented to our ER 12/2/21 with chest pressure, dyspnea, and vertigo.  He was very hypertensive with /130 mmHg. Coronary angiogram revealed mild coronary disease and a severely elevated LV EDP. EF was grossly normal with possible apical hypertrophy vs diverticulum. During his hospitalization he had sinus pauses, primarily at night. He was discharged with a monitor and had sinus node dysfunction and underwent a dual chamber pacemaker 12/23/21. He was also noted to have atrial fibrillation and was started on amiodarone and eliquis by Dr. Lopez at the time of his pacemaker placement. Amiodarone was switched to metoprolol given the septal hypertrophy spring 2022.     Abraham returns for follow up. He is feeling well. Abraham is no longer working due to a 40 lb weight lifting restriction due to his septal hypertrophy. He had a near syncopal episode after getting up quickly and going to the bathroom. He has been drinking more water since without recurrence. There is no chest pain, dyspnea.  He is not exercising and is eating a poor diet.  Life has been stressful due to financial difficulties and he is awaiting disability approval.         Physical Examination  Review of Systems   Vitals: /80 (BP Location: Left arm, Patient Position: Sitting, Cuff Size: Adult Large)   Pulse 70   Resp 12   Wt 117.6 kg (259 lb 3.2 oz)   BMI 34.20 kg/m    BMI= Body mass index is 34.2 kg/m .  Wt Readings from Last 3 Encounters:   07/20/22 117.6 kg (259 lb 3.2 oz)   01/24/22 116.6 kg (257 lb)   01/20/22 116.6 kg (257 lb)       General Appearance:   no distress, obese body habitus   ENT/Mouth: membranes moist, no oral lesions or bleeding gums.      EYES:  no scleral icterus, normal conjunctivae   Neck: no carotid bruits or thyromegaly   Chest/Lungs:   lungs are clear to auscultation, no rales or wheezing, no sternal scar, equal chest wall expansion    Cardiovascular:   Regular. Normal first and second heart sounds with early systolic murmur. No rubs or gallops; the right carotid, radial and posterior tibial pulses are intact and the left carotid, radial and posterior tibial pulses are intact.  Jugular venous pressure is flat, no edema bilaterally    Abdomen:  no organomegaly, masses, bruits, or tenderness; bowel sounds are present   Extremities: no cyanosis or clubbing   Skin: no xanthelasma, warm.    Neurologic: normal  bilateral, no tremors     Psychiatric: alert and oriented x3, calm        Please refer above for cardiac ROS details.        Medical History  Surgical History Family History Social History   Past Medical History:   Diagnosis Date     Atrial fibrillation (H)      Benign essential hypertension      Coronary artery disease      Depression      Hyperlipidemia      Obesity      Past Surgical History:   Procedure Laterality Date     CORONARY ANGIOGRAPHY ADULT ORDER       CV CORONARY ANGIOGRAM N/A 12/2/2021    Procedure: Coronary Angiogram;  Surgeon: Radha Ferrell MD;  Location: [facility] CATH LAB CV     CV LEFT HEART CATH N/A 12/2/2021     Procedure: Left Heart Cath;  Surgeon: Radha Ferrell MD;  Location: [facility] CATH LAB CV     CV LEFT VENTRICULOGRAM N/A 12/2/2021    Procedure: Left Ventriculogram;  Surgeon: Radha Ferrell MD;  Location: [facility] CATH LAB CV     EP PACEMAKER N/A 12/23/2021    Procedure: EP Pacemaker;  Surgeon: Kun Lopez MD;  Location: [facility] CATH LAB CV     HERNIA REPAIR       IMPLANT PACEMAKER       KNEE SURGERY       TONSILLECTOMY       VASECTOMY       Family History   Problem Relation Age of Onset     Diabetes Mother      Hypertension Mother      Coronary Artery Disease No family hx of      Cerebrovascular Disease No family hx of         Social History     Socioeconomic History     Marital status:      Spouse name: Not on file     Number of children: Not on file     Years of education: Not on file     Highest education level: Not on file   Occupational History     Not on file   Tobacco Use     Smoking status: Never Smoker     Smokeless tobacco: Never Used   Substance and Sexual Activity     Alcohol use: Yes     Alcohol/week: 6.0 standard drinks     Types: 6 Standard drinks or equivalent per week     Comment: 7 drinks per week     Drug use: Never     Sexual activity: Not on file   Other Topics Concern     Parent/sibling w/ CABG, MI or angioplasty before 65F 55M? Not Asked   Social History Narrative     Not on file     Social Determinants of Health     Financial Resource Strain: Not on file   Food Insecurity: Not on file   Transportation Needs: Not on file   Physical Activity: Not on file   Stress: Not on file   Social Connections: Not on file   Intimate Partner Violence: Not on file   Housing Stability: Not on file           Medications  Allergies   Current Outpatient Medications   Medication Sig Dispense Refill     acetaminophen-caffeine (EXCEDRIN TENSION HEADACHE) 500-65 MG TABS Take 2 tablets by mouth daily as needed for mild pain       apixaban ANTICOAGULANT (ELIQUIS) 5 MG tablet Take 1 tablet (5 mg) by  mouth 2 times daily 180 tablet 3     atorvastatin (LIPITOR) 80 MG tablet Take 1 tablet (80 mg) by mouth daily 30 tablet 0     ezetimibe (ZETIA) 10 MG tablet Take 1 tablet (10 mg) by mouth once daily 90 tablet 0     lisinopril (ZESTRIL) 20 MG tablet Take 1 tablet (20 mg) by mouth daily 30 tablet 0     metoprolol succinate ER (TOPROL-XL) 50 MG 24 hr tablet Take 1 tablet (50 mg) by mouth daily 90 tablet 3     nitroGLYcerin (NITROSTAT) 0.4 MG sublingual tablet One tablet under the tongue every 5 minutes if needed for chest pain. May repeat every 5 minutes for a maximum of 3 doses in 15 minutes 25 tablet 3     sertraline (ZOLOFT) 100 MG tablet Take 100 mg by mouth daily        Blood Pressure Monitor KIT Please provide (1) arm bp cuff, and (1) digital BP monitor (Patient not taking: Reported on 7/20/2022) 1 kit 0     No Known Allergies       Lab Results    Chemistry/lipid CBC Cardiac Enzymes/BNP/TSH/INR   Recent Labs   Lab Test 01/20/22  1141   CHOL 206*   HDL 58      TRIG 192*     Recent Labs   Lab Test 01/20/22  1141 12/02/21  0831    109     Recent Labs   Lab Test 12/23/21  0702      POTASSIUM 4.5   CHLORIDE 106   CO2 22      BUN 27*   CR 0.84   GFRESTIMATED >90   TEGAN 9.4     Recent Labs   Lab Test 12/23/21  0702 12/05/21  0455 12/03/21  0432   CR 0.84 0.75 0.71     No results for input(s): A1C in the last 05899 hours.       Recent Labs   Lab Test 12/23/21  0702   WBC 5.4   HGB 14.5   HCT 43.2   MCV 93        Recent Labs   Lab Test 12/23/21  0702 12/05/21  0455 12/03/21  0432   HGB 14.5 15.7 15.6    Recent Labs   Lab Test 12/03/21  1016 12/03/21  0432 12/02/21  2237   TROPONINI 0.04 0.04 0.04     No results for input(s): BNP, NTBNPI, NTBNP in the last 22534 hours.  Recent Labs   Lab Test 12/23/21  0702   TSH 3.63     Recent Labs   Lab Test 12/02/21  0828   INR 1.05        Today's clinic visit entailed:  Review of prior external note(s) from - [facility] information from epic  reviewed  40 minutes spent on the date of the encounter doing chart review, review of outside records, review of test results, interpretation of tests, patient visit and documentation   Provider  Link to [facility] Help Grid     The level of medical decision making during this visit was of moderate complexity.        Radha Ferrell MD

## 2022-08-03 DIAGNOSIS — I48.0 PAROXYSMAL ATRIAL FIBRILLATION (H): Primary | ICD-10-CM

## 2022-10-19 ENCOUNTER — ANCILLARY PROCEDURE (OUTPATIENT)
Dept: CARDIOLOGY | Facility: CLINIC | Age: 62
End: 2022-10-19
Attending: INTERNAL MEDICINE
Payer: COMMERCIAL

## 2022-10-19 ENCOUNTER — TELEPHONE (OUTPATIENT)
Dept: CARDIOLOGY | Facility: CLINIC | Age: 62
End: 2022-10-19

## 2022-10-19 DIAGNOSIS — I15.8 OTHER SECONDARY HYPERTENSION: ICD-10-CM

## 2022-10-19 DIAGNOSIS — I47.29 NSVT (NONSUSTAINED VENTRICULAR TACHYCARDIA) (H): Primary | ICD-10-CM

## 2022-10-19 DIAGNOSIS — Z95.0 CARDIAC PACEMAKER IN SITU: ICD-10-CM

## 2022-10-19 DIAGNOSIS — I49.5 SICK SINUS SYNDROME (H): ICD-10-CM

## 2022-10-19 NOTE — TELEPHONE ENCOUNTER
"10/19/22: Reviewed remote results with patient. Questioned on symptoms during 18 beat NSVT 162bpm episode on 8/25/22 @ 20:10. Patient believes this was date/time of incident when he was \"going to the bathroom, stood up, passed out for 5 seconds, and fell backwards into the wall.\" Patient states he has had these episodes before where he will \"pass out for 5 seconds\" when standing up too fast. He said he had another episode at the beginning of October where he stood up fast after playing with dog on floor and then fell into window, no rhythm or episodes logged at this time. Routing to Dr. Ferrell for awareness of first NSVT episode.  "

## 2022-10-19 NOTE — TELEPHONE ENCOUNTER
Encounter Type: Routine 3 Month Remote Device Check  Device: [employer] [place] (D) pacemaker  Pacing % /Programmed: AP 81.9%,  36.2%, AAIR-DDDR 60-130bpm  Lead(s): Stable  Heart Rate: Stable, HR's per histogram range 60-130bpm and primarily 60-100bpm  Battery longevity: Estimating 13 years remaining  Presenting: APVS 60bpm  Atrial arrhythmia: None  Anticoagulant: Elqiuis  Ventricular arrhythmia: 1 VHR logged, EGM from 8/25/22 shows 18 beats NSVT 162bpm, EF 60-65% per echo 12/2/21, left message to question on symptoms, routed to Dr. Ferrell for awareness of first NSVT episode.  Device/Lead alerts: NA  Comments: Normal device function  Plan: Remote device check scheduled for 1/26/23.  Eula Warner RN

## 2022-10-20 LAB
MDC_IDC_EPISODE_DTM: NORMAL
MDC_IDC_EPISODE_DURATION: 1 S
MDC_IDC_EPISODE_ID: 1
MDC_IDC_EPISODE_TYPE: NORMAL
MDC_IDC_LEAD_IMPLANT_DT: NORMAL
MDC_IDC_LEAD_IMPLANT_DT: NORMAL
MDC_IDC_LEAD_LOCATION: NORMAL
MDC_IDC_LEAD_LOCATION: NORMAL
MDC_IDC_LEAD_LOCATION_DETAIL_1: NORMAL
MDC_IDC_LEAD_LOCATION_DETAIL_1: NORMAL
MDC_IDC_LEAD_MFG: NORMAL
MDC_IDC_LEAD_MFG: NORMAL
MDC_IDC_LEAD_MODEL: NORMAL
MDC_IDC_LEAD_MODEL: NORMAL
MDC_IDC_LEAD_POLARITY_TYPE: NORMAL
MDC_IDC_LEAD_POLARITY_TYPE: NORMAL
MDC_IDC_LEAD_SERIAL: NORMAL
MDC_IDC_LEAD_SERIAL: NORMAL
MDC_IDC_LEAD_SPECIAL_FUNCTION: NORMAL
MDC_IDC_LEAD_SPECIAL_FUNCTION: NORMAL
MDC_IDC_MSMT_BATTERY_DTM: NORMAL
MDC_IDC_MSMT_BATTERY_REMAINING_LONGEVITY: 155 MO
MDC_IDC_MSMT_BATTERY_RRT_TRIGGER: 2.62
MDC_IDC_MSMT_BATTERY_STATUS: NORMAL
MDC_IDC_MSMT_BATTERY_VOLTAGE: 3.14 V
MDC_IDC_MSMT_LEADCHNL_RA_IMPEDANCE_VALUE: 342 OHM
MDC_IDC_MSMT_LEADCHNL_RA_IMPEDANCE_VALUE: 475 OHM
MDC_IDC_MSMT_LEADCHNL_RA_PACING_THRESHOLD_AMPLITUDE: 0.62 V
MDC_IDC_MSMT_LEADCHNL_RA_PACING_THRESHOLD_PULSEWIDTH: 0.4 MS
MDC_IDC_MSMT_LEADCHNL_RA_SENSING_INTR_AMPL: 4.62 MV
MDC_IDC_MSMT_LEADCHNL_RA_SENSING_INTR_AMPL: 4.62 MV
MDC_IDC_MSMT_LEADCHNL_RV_IMPEDANCE_VALUE: 342 OHM
MDC_IDC_MSMT_LEADCHNL_RV_IMPEDANCE_VALUE: 437 OHM
MDC_IDC_MSMT_LEADCHNL_RV_PACING_THRESHOLD_AMPLITUDE: 0.5 V
MDC_IDC_MSMT_LEADCHNL_RV_PACING_THRESHOLD_PULSEWIDTH: 0.4 MS
MDC_IDC_MSMT_LEADCHNL_RV_SENSING_INTR_AMPL: 14.62 MV
MDC_IDC_MSMT_LEADCHNL_RV_SENSING_INTR_AMPL: 14.62 MV
MDC_IDC_PG_IMPLANT_DTM: NORMAL
MDC_IDC_PG_MFG: NORMAL
MDC_IDC_PG_MODEL: NORMAL
MDC_IDC_PG_SERIAL: NORMAL
MDC_IDC_PG_TYPE: NORMAL
MDC_IDC_SESS_CLINIC_NAME: NORMAL
MDC_IDC_SESS_DTM: NORMAL
MDC_IDC_SESS_TYPE: NORMAL
MDC_IDC_SET_BRADY_AT_MODE_SWITCH_RATE: 171 {BEATS}/MIN
MDC_IDC_SET_BRADY_HYSTRATE: NORMAL
MDC_IDC_SET_BRADY_LOWRATE: 60 {BEATS}/MIN
MDC_IDC_SET_BRADY_MAX_SENSOR_RATE: 130 {BEATS}/MIN
MDC_IDC_SET_BRADY_MAX_TRACKING_RATE: 130 {BEATS}/MIN
MDC_IDC_SET_BRADY_MODE: NORMAL
MDC_IDC_SET_BRADY_PAV_DELAY_LOW: 180 MS
MDC_IDC_SET_BRADY_SAV_DELAY_LOW: 150 MS
MDC_IDC_SET_LEADCHNL_RA_PACING_AMPLITUDE: 1.5 V
MDC_IDC_SET_LEADCHNL_RA_PACING_ANODE_ELECTRODE_1: NORMAL
MDC_IDC_SET_LEADCHNL_RA_PACING_ANODE_LOCATION_1: NORMAL
MDC_IDC_SET_LEADCHNL_RA_PACING_CAPTURE_MODE: NORMAL
MDC_IDC_SET_LEADCHNL_RA_PACING_CATHODE_ELECTRODE_1: NORMAL
MDC_IDC_SET_LEADCHNL_RA_PACING_CATHODE_LOCATION_1: NORMAL
MDC_IDC_SET_LEADCHNL_RA_PACING_POLARITY: NORMAL
MDC_IDC_SET_LEADCHNL_RA_PACING_PULSEWIDTH: 0.4 MS
MDC_IDC_SET_LEADCHNL_RA_SENSING_ANODE_ELECTRODE_1: NORMAL
MDC_IDC_SET_LEADCHNL_RA_SENSING_ANODE_LOCATION_1: NORMAL
MDC_IDC_SET_LEADCHNL_RA_SENSING_CATHODE_ELECTRODE_1: NORMAL
MDC_IDC_SET_LEADCHNL_RA_SENSING_CATHODE_LOCATION_1: NORMAL
MDC_IDC_SET_LEADCHNL_RA_SENSING_POLARITY: NORMAL
MDC_IDC_SET_LEADCHNL_RA_SENSING_SENSITIVITY: 0.3 MV
MDC_IDC_SET_LEADCHNL_RV_PACING_AMPLITUDE: 1.5 V
MDC_IDC_SET_LEADCHNL_RV_PACING_ANODE_ELECTRODE_1: NORMAL
MDC_IDC_SET_LEADCHNL_RV_PACING_ANODE_LOCATION_1: NORMAL
MDC_IDC_SET_LEADCHNL_RV_PACING_CAPTURE_MODE: NORMAL
MDC_IDC_SET_LEADCHNL_RV_PACING_CATHODE_ELECTRODE_1: NORMAL
MDC_IDC_SET_LEADCHNL_RV_PACING_CATHODE_LOCATION_1: NORMAL
MDC_IDC_SET_LEADCHNL_RV_PACING_POLARITY: NORMAL
MDC_IDC_SET_LEADCHNL_RV_PACING_PULSEWIDTH: 0.4 MS
MDC_IDC_SET_LEADCHNL_RV_SENSING_ANODE_ELECTRODE_1: NORMAL
MDC_IDC_SET_LEADCHNL_RV_SENSING_ANODE_LOCATION_1: NORMAL
MDC_IDC_SET_LEADCHNL_RV_SENSING_CATHODE_ELECTRODE_1: NORMAL
MDC_IDC_SET_LEADCHNL_RV_SENSING_CATHODE_LOCATION_1: NORMAL
MDC_IDC_SET_LEADCHNL_RV_SENSING_POLARITY: NORMAL
MDC_IDC_SET_LEADCHNL_RV_SENSING_SENSITIVITY: 0.9 MV
MDC_IDC_SET_ZONE_DETECTION_INTERVAL: 200 MS
MDC_IDC_SET_ZONE_DETECTION_INTERVAL: 350 MS
MDC_IDC_SET_ZONE_DETECTION_INTERVAL: 400 MS
MDC_IDC_SET_ZONE_TYPE: NORMAL
MDC_IDC_STAT_AT_BURDEN_PERCENT: 0 %
MDC_IDC_STAT_AT_DTM_END: NORMAL
MDC_IDC_STAT_AT_DTM_START: NORMAL
MDC_IDC_STAT_BRADY_AP_VP_PERCENT: 23.88 %
MDC_IDC_STAT_BRADY_AP_VS_PERCENT: 58.13 %
MDC_IDC_STAT_BRADY_AS_VP_PERCENT: 12.35 %
MDC_IDC_STAT_BRADY_AS_VS_PERCENT: 5.64 %
MDC_IDC_STAT_BRADY_DTM_END: NORMAL
MDC_IDC_STAT_BRADY_DTM_START: NORMAL
MDC_IDC_STAT_BRADY_RA_PERCENT_PACED: 81.92 %
MDC_IDC_STAT_BRADY_RV_PERCENT_PACED: 36.23 %
MDC_IDC_STAT_EPISODE_RECENT_COUNT: 0
MDC_IDC_STAT_EPISODE_RECENT_COUNT: 1
MDC_IDC_STAT_EPISODE_RECENT_COUNT_DTM_END: NORMAL
MDC_IDC_STAT_EPISODE_RECENT_COUNT_DTM_START: NORMAL
MDC_IDC_STAT_EPISODE_TOTAL_COUNT: 0
MDC_IDC_STAT_EPISODE_TOTAL_COUNT: 1
MDC_IDC_STAT_EPISODE_TOTAL_COUNT_DTM_END: NORMAL
MDC_IDC_STAT_EPISODE_TOTAL_COUNT_DTM_START: NORMAL
MDC_IDC_STAT_EPISODE_TYPE: NORMAL

## 2022-10-20 PROCEDURE — 93296 REM INTERROG EVL PM/IDS: CPT | Performed by: INTERNAL MEDICINE

## 2022-10-20 PROCEDURE — 93294 REM INTERROG EVL PM/LDLS PM: CPT | Performed by: INTERNAL MEDICINE

## 2022-10-25 RX ORDER — METOPROLOL SUCCINATE 100 MG/1
100 TABLET, EXTENDED RELEASE ORAL DAILY
Qty: 90 TABLET | Refills: 1 | Status: SHIPPED | OUTPATIENT
Start: 2022-10-25 | End: 2023-05-17

## 2022-10-25 RX ORDER — LISINOPRIL 10 MG/1
10 TABLET ORAL DAILY
Qty: 90 TABLET | Refills: 1 | Status: SHIPPED | OUTPATIENT
Start: 2022-10-25 | End: 2023-03-24

## 2022-10-25 NOTE — PROGRESS NOTES
===View-only below this line===  ----- Message -----  From: Radha Ferrell MD  Sent: 10/24/2022   7:10 PM CDT  To: Elroy Siegel RN, Eula Warner RN    Please have him double his toprol XL to 100mg daily and decrease the lisinopril to 10mg daily.     Thanks, EG      PC to patient, no answer and LVM to return call. CORINA,Rn

## 2022-10-25 NOTE — PROGRESS NOTES
Return call from patient. Rationale for call discussed. Medication adjustment recommendation reviewed. Pt wrote down to follow. New prescriptions sent in for both, and no further questions at this time. New Rx send to verified pharmacy location. CMM,Rn

## 2023-01-26 ENCOUNTER — ANCILLARY PROCEDURE (OUTPATIENT)
Dept: CARDIOLOGY | Facility: CLINIC | Age: 63
End: 2023-01-26
Attending: INTERNAL MEDICINE
Payer: COMMERCIAL

## 2023-01-26 DIAGNOSIS — Z95.0 CARDIAC PACEMAKER IN SITU: ICD-10-CM

## 2023-01-26 DIAGNOSIS — I49.5 SICK SINUS SYNDROME (H): ICD-10-CM

## 2023-01-26 LAB
MDC_IDC_LEAD_IMPLANT_DT: NORMAL
MDC_IDC_LEAD_IMPLANT_DT: NORMAL
MDC_IDC_LEAD_LOCATION: NORMAL
MDC_IDC_LEAD_LOCATION: NORMAL
MDC_IDC_LEAD_LOCATION_DETAIL_1: NORMAL
MDC_IDC_LEAD_LOCATION_DETAIL_1: NORMAL
MDC_IDC_LEAD_MFG: NORMAL
MDC_IDC_LEAD_MFG: NORMAL
MDC_IDC_LEAD_MODEL: NORMAL
MDC_IDC_LEAD_MODEL: NORMAL
MDC_IDC_LEAD_POLARITY_TYPE: NORMAL
MDC_IDC_LEAD_POLARITY_TYPE: NORMAL
MDC_IDC_LEAD_SERIAL: NORMAL
MDC_IDC_LEAD_SERIAL: NORMAL
MDC_IDC_LEAD_SPECIAL_FUNCTION: NORMAL
MDC_IDC_LEAD_SPECIAL_FUNCTION: NORMAL
MDC_IDC_MSMT_BATTERY_DTM: NORMAL
MDC_IDC_MSMT_BATTERY_REMAINING_LONGEVITY: 152 MO
MDC_IDC_MSMT_BATTERY_RRT_TRIGGER: 2.62
MDC_IDC_MSMT_BATTERY_STATUS: NORMAL
MDC_IDC_MSMT_BATTERY_VOLTAGE: 3.09 V
MDC_IDC_MSMT_LEADCHNL_RA_IMPEDANCE_VALUE: 342 OHM
MDC_IDC_MSMT_LEADCHNL_RA_IMPEDANCE_VALUE: 494 OHM
MDC_IDC_MSMT_LEADCHNL_RA_PACING_THRESHOLD_AMPLITUDE: 0.62 V
MDC_IDC_MSMT_LEADCHNL_RA_PACING_THRESHOLD_PULSEWIDTH: 0.4 MS
MDC_IDC_MSMT_LEADCHNL_RA_SENSING_INTR_AMPL: 4.38 MV
MDC_IDC_MSMT_LEADCHNL_RA_SENSING_INTR_AMPL: 4.38 MV
MDC_IDC_MSMT_LEADCHNL_RV_IMPEDANCE_VALUE: 361 OHM
MDC_IDC_MSMT_LEADCHNL_RV_IMPEDANCE_VALUE: 437 OHM
MDC_IDC_MSMT_LEADCHNL_RV_PACING_THRESHOLD_AMPLITUDE: 0.62 V
MDC_IDC_MSMT_LEADCHNL_RV_PACING_THRESHOLD_PULSEWIDTH: 0.4 MS
MDC_IDC_MSMT_LEADCHNL_RV_SENSING_INTR_AMPL: 16.25 MV
MDC_IDC_MSMT_LEADCHNL_RV_SENSING_INTR_AMPL: 16.25 MV
MDC_IDC_PG_IMPLANT_DTM: NORMAL
MDC_IDC_PG_MFG: NORMAL
MDC_IDC_PG_MODEL: NORMAL
MDC_IDC_PG_SERIAL: NORMAL
MDC_IDC_PG_TYPE: NORMAL
MDC_IDC_SESS_CLINIC_NAME: NORMAL
MDC_IDC_SESS_DTM: NORMAL
MDC_IDC_SESS_TYPE: NORMAL
MDC_IDC_SET_BRADY_AT_MODE_SWITCH_RATE: 171 {BEATS}/MIN
MDC_IDC_SET_BRADY_HYSTRATE: NORMAL
MDC_IDC_SET_BRADY_LOWRATE: 60 {BEATS}/MIN
MDC_IDC_SET_BRADY_MAX_SENSOR_RATE: 130 {BEATS}/MIN
MDC_IDC_SET_BRADY_MAX_TRACKING_RATE: 130 {BEATS}/MIN
MDC_IDC_SET_BRADY_MODE: NORMAL
MDC_IDC_SET_BRADY_PAV_DELAY_LOW: 180 MS
MDC_IDC_SET_BRADY_SAV_DELAY_LOW: 150 MS
MDC_IDC_SET_LEADCHNL_RA_PACING_AMPLITUDE: 1.5 V
MDC_IDC_SET_LEADCHNL_RA_PACING_ANODE_ELECTRODE_1: NORMAL
MDC_IDC_SET_LEADCHNL_RA_PACING_ANODE_LOCATION_1: NORMAL
MDC_IDC_SET_LEADCHNL_RA_PACING_CAPTURE_MODE: NORMAL
MDC_IDC_SET_LEADCHNL_RA_PACING_CATHODE_ELECTRODE_1: NORMAL
MDC_IDC_SET_LEADCHNL_RA_PACING_CATHODE_LOCATION_1: NORMAL
MDC_IDC_SET_LEADCHNL_RA_PACING_POLARITY: NORMAL
MDC_IDC_SET_LEADCHNL_RA_PACING_PULSEWIDTH: 0.4 MS
MDC_IDC_SET_LEADCHNL_RA_SENSING_ANODE_ELECTRODE_1: NORMAL
MDC_IDC_SET_LEADCHNL_RA_SENSING_ANODE_LOCATION_1: NORMAL
MDC_IDC_SET_LEADCHNL_RA_SENSING_CATHODE_ELECTRODE_1: NORMAL
MDC_IDC_SET_LEADCHNL_RA_SENSING_CATHODE_LOCATION_1: NORMAL
MDC_IDC_SET_LEADCHNL_RA_SENSING_POLARITY: NORMAL
MDC_IDC_SET_LEADCHNL_RA_SENSING_SENSITIVITY: 0.3 MV
MDC_IDC_SET_LEADCHNL_RV_PACING_AMPLITUDE: 1.5 V
MDC_IDC_SET_LEADCHNL_RV_PACING_ANODE_ELECTRODE_1: NORMAL
MDC_IDC_SET_LEADCHNL_RV_PACING_ANODE_LOCATION_1: NORMAL
MDC_IDC_SET_LEADCHNL_RV_PACING_CAPTURE_MODE: NORMAL
MDC_IDC_SET_LEADCHNL_RV_PACING_CATHODE_ELECTRODE_1: NORMAL
MDC_IDC_SET_LEADCHNL_RV_PACING_CATHODE_LOCATION_1: NORMAL
MDC_IDC_SET_LEADCHNL_RV_PACING_POLARITY: NORMAL
MDC_IDC_SET_LEADCHNL_RV_PACING_PULSEWIDTH: 0.4 MS
MDC_IDC_SET_LEADCHNL_RV_SENSING_ANODE_ELECTRODE_1: NORMAL
MDC_IDC_SET_LEADCHNL_RV_SENSING_ANODE_LOCATION_1: NORMAL
MDC_IDC_SET_LEADCHNL_RV_SENSING_CATHODE_ELECTRODE_1: NORMAL
MDC_IDC_SET_LEADCHNL_RV_SENSING_CATHODE_LOCATION_1: NORMAL
MDC_IDC_SET_LEADCHNL_RV_SENSING_POLARITY: NORMAL
MDC_IDC_SET_LEADCHNL_RV_SENSING_SENSITIVITY: 0.9 MV
MDC_IDC_SET_ZONE_DETECTION_INTERVAL: 200 MS
MDC_IDC_SET_ZONE_DETECTION_INTERVAL: 350 MS
MDC_IDC_SET_ZONE_DETECTION_INTERVAL: 400 MS
MDC_IDC_SET_ZONE_TYPE: NORMAL
MDC_IDC_STAT_AT_BURDEN_PERCENT: 0 %
MDC_IDC_STAT_AT_DTM_END: NORMAL
MDC_IDC_STAT_AT_DTM_START: NORMAL
MDC_IDC_STAT_BRADY_AP_VP_PERCENT: 17.71 %
MDC_IDC_STAT_BRADY_AP_VS_PERCENT: 72.49 %
MDC_IDC_STAT_BRADY_AS_VP_PERCENT: 4.94 %
MDC_IDC_STAT_BRADY_AS_VS_PERCENT: 4.86 %
MDC_IDC_STAT_BRADY_DTM_END: NORMAL
MDC_IDC_STAT_BRADY_DTM_START: NORMAL
MDC_IDC_STAT_BRADY_RA_PERCENT_PACED: 90.12 %
MDC_IDC_STAT_BRADY_RV_PERCENT_PACED: 22.65 %
MDC_IDC_STAT_EPISODE_RECENT_COUNT: 0
MDC_IDC_STAT_EPISODE_RECENT_COUNT_DTM_END: NORMAL
MDC_IDC_STAT_EPISODE_RECENT_COUNT_DTM_START: NORMAL
MDC_IDC_STAT_EPISODE_TOTAL_COUNT: 0
MDC_IDC_STAT_EPISODE_TOTAL_COUNT: 1
MDC_IDC_STAT_EPISODE_TOTAL_COUNT_DTM_END: NORMAL
MDC_IDC_STAT_EPISODE_TOTAL_COUNT_DTM_START: NORMAL
MDC_IDC_STAT_EPISODE_TYPE: NORMAL

## 2023-01-26 PROCEDURE — 93294 REM INTERROG EVL PM/LDLS PM: CPT | Performed by: INTERNAL MEDICINE

## 2023-01-26 PROCEDURE — 93296 REM INTERROG EVL PM/IDS: CPT | Performed by: INTERNAL MEDICINE

## 2023-03-21 DIAGNOSIS — I48.0 PAROXYSMAL ATRIAL FIBRILLATION (H): ICD-10-CM

## 2023-03-21 RX ORDER — APIXABAN 5 MG/1
TABLET, FILM COATED ORAL
Qty: 180 TABLET | Refills: 0 | Status: SHIPPED | OUTPATIENT
Start: 2023-03-21 | End: 2024-04-17

## 2023-03-24 DIAGNOSIS — I15.8 OTHER SECONDARY HYPERTENSION: ICD-10-CM

## 2023-03-24 RX ORDER — LISINOPRIL 10 MG/1
10 TABLET ORAL DAILY
Qty: 90 TABLET | Refills: 1 | Status: SHIPPED | OUTPATIENT
Start: 2023-03-24 | End: 2023-06-27

## 2023-05-16 DIAGNOSIS — I47.29 NSVT (NONSUSTAINED VENTRICULAR TACHYCARDIA) (H): ICD-10-CM

## 2023-05-16 DIAGNOSIS — I15.8 OTHER SECONDARY HYPERTENSION: ICD-10-CM

## 2023-05-17 RX ORDER — METOPROLOL SUCCINATE 100 MG/1
100 TABLET, EXTENDED RELEASE ORAL DAILY
Qty: 90 TABLET | Refills: 3 | Status: SHIPPED | OUTPATIENT
Start: 2023-05-17 | End: 2023-06-27

## 2023-06-27 ENCOUNTER — OFFICE VISIT (OUTPATIENT)
Dept: CARDIOLOGY | Facility: CLINIC | Age: 63
End: 2023-06-27
Attending: INTERNAL MEDICINE
Payer: COMMERCIAL

## 2023-06-27 VITALS
DIASTOLIC BLOOD PRESSURE: 82 MMHG | SYSTOLIC BLOOD PRESSURE: 110 MMHG | WEIGHT: 258 LBS | HEART RATE: 61 BPM | RESPIRATION RATE: 12 BRPM | BODY MASS INDEX: 34.04 KG/M2

## 2023-06-27 DIAGNOSIS — I51.7 ASYMMETRIC SEPTAL HYPERTROPHY: Primary | ICD-10-CM

## 2023-06-27 DIAGNOSIS — I49.5 SICK SINUS SYNDROME (H): ICD-10-CM

## 2023-06-27 DIAGNOSIS — I25.10 MILD CAD: ICD-10-CM

## 2023-06-27 DIAGNOSIS — I48.0 PAROXYSMAL ATRIAL FIBRILLATION (H): ICD-10-CM

## 2023-06-27 DIAGNOSIS — Z95.0 CARDIAC PACEMAKER IN SITU: ICD-10-CM

## 2023-06-27 DIAGNOSIS — I15.8 OTHER SECONDARY HYPERTENSION: ICD-10-CM

## 2023-06-27 DIAGNOSIS — I47.29 NSVT (NONSUSTAINED VENTRICULAR TACHYCARDIA) (H): ICD-10-CM

## 2023-06-27 LAB
CHOLEST SERPL-MCNC: 138 MG/DL
HDLC SERPL-MCNC: 45 MG/DL
LDLC SERPL CALC-MCNC: 63 MG/DL
MDC_IDC_LEAD_IMPLANT_DT: NORMAL
MDC_IDC_LEAD_IMPLANT_DT: NORMAL
MDC_IDC_LEAD_LOCATION: NORMAL
MDC_IDC_LEAD_LOCATION: NORMAL
MDC_IDC_LEAD_LOCATION_DETAIL_1: NORMAL
MDC_IDC_LEAD_LOCATION_DETAIL_1: NORMAL
MDC_IDC_LEAD_MFG: NORMAL
MDC_IDC_LEAD_MFG: NORMAL
MDC_IDC_LEAD_MODEL: NORMAL
MDC_IDC_LEAD_MODEL: NORMAL
MDC_IDC_LEAD_POLARITY_TYPE: NORMAL
MDC_IDC_LEAD_POLARITY_TYPE: NORMAL
MDC_IDC_LEAD_SERIAL: NORMAL
MDC_IDC_LEAD_SERIAL: NORMAL
MDC_IDC_LEAD_SPECIAL_FUNCTION: NORMAL
MDC_IDC_LEAD_SPECIAL_FUNCTION: NORMAL
MDC_IDC_MSMT_BATTERY_DTM: NORMAL
MDC_IDC_MSMT_BATTERY_REMAINING_LONGEVITY: 147 MO
MDC_IDC_MSMT_BATTERY_RRT_TRIGGER: 2.62
MDC_IDC_MSMT_BATTERY_STATUS: NORMAL
MDC_IDC_MSMT_BATTERY_VOLTAGE: 3.05 V
MDC_IDC_MSMT_LEADCHNL_RA_IMPEDANCE_VALUE: 342 OHM
MDC_IDC_MSMT_LEADCHNL_RA_IMPEDANCE_VALUE: 513 OHM
MDC_IDC_MSMT_LEADCHNL_RA_PACING_THRESHOLD_AMPLITUDE: 0.62 V
MDC_IDC_MSMT_LEADCHNL_RA_PACING_THRESHOLD_AMPLITUDE: 0.75 V
MDC_IDC_MSMT_LEADCHNL_RA_PACING_THRESHOLD_PULSEWIDTH: 0.4 MS
MDC_IDC_MSMT_LEADCHNL_RA_PACING_THRESHOLD_PULSEWIDTH: 0.4 MS
MDC_IDC_MSMT_LEADCHNL_RA_SENSING_INTR_AMPL: 3.75 MV
MDC_IDC_MSMT_LEADCHNL_RA_SENSING_INTR_AMPL: 4.25 MV
MDC_IDC_MSMT_LEADCHNL_RA_SENSING_INTR_AMPL: 4.3 MV
MDC_IDC_MSMT_LEADCHNL_RV_IMPEDANCE_VALUE: 380 OHM
MDC_IDC_MSMT_LEADCHNL_RV_IMPEDANCE_VALUE: 456 OHM
MDC_IDC_MSMT_LEADCHNL_RV_PACING_THRESHOLD_AMPLITUDE: 0.5 V
MDC_IDC_MSMT_LEADCHNL_RV_PACING_THRESHOLD_AMPLITUDE: 0.62 V
MDC_IDC_MSMT_LEADCHNL_RV_PACING_THRESHOLD_PULSEWIDTH: 0.4 MS
MDC_IDC_MSMT_LEADCHNL_RV_PACING_THRESHOLD_PULSEWIDTH: 0.4 MS
MDC_IDC_MSMT_LEADCHNL_RV_SENSING_INTR_AMPL: 16.38 MV
MDC_IDC_MSMT_LEADCHNL_RV_SENSING_INTR_AMPL: 16.5 MV
MDC_IDC_MSMT_LEADCHNL_RV_SENSING_INTR_AMPL: 16.5 MV
MDC_IDC_PG_IMPLANT_DTM: NORMAL
MDC_IDC_PG_MFG: NORMAL
MDC_IDC_PG_MODEL: NORMAL
MDC_IDC_PG_SERIAL: NORMAL
MDC_IDC_PG_TYPE: NORMAL
MDC_IDC_SESS_CLINIC_NAME: NORMAL
MDC_IDC_SESS_DTM: NORMAL
MDC_IDC_SESS_TYPE: NORMAL
MDC_IDC_SET_BRADY_AT_MODE_SWITCH_RATE: 171 {BEATS}/MIN
MDC_IDC_SET_BRADY_HYSTRATE: NORMAL
MDC_IDC_SET_BRADY_LOWRATE: 60 {BEATS}/MIN
MDC_IDC_SET_BRADY_MAX_SENSOR_RATE: 130 {BEATS}/MIN
MDC_IDC_SET_BRADY_MAX_TRACKING_RATE: 130 {BEATS}/MIN
MDC_IDC_SET_BRADY_MODE: NORMAL
MDC_IDC_SET_BRADY_PAV_DELAY_LOW: 180 MS
MDC_IDC_SET_BRADY_SAV_DELAY_LOW: 150 MS
MDC_IDC_SET_LEADCHNL_RA_PACING_AMPLITUDE: NORMAL
MDC_IDC_SET_LEADCHNL_RA_PACING_ANODE_ELECTRODE_1: NORMAL
MDC_IDC_SET_LEADCHNL_RA_PACING_ANODE_LOCATION_1: NORMAL
MDC_IDC_SET_LEADCHNL_RA_PACING_CAPTURE_MODE: NORMAL
MDC_IDC_SET_LEADCHNL_RA_PACING_CATHODE_ELECTRODE_1: NORMAL
MDC_IDC_SET_LEADCHNL_RA_PACING_CATHODE_LOCATION_1: NORMAL
MDC_IDC_SET_LEADCHNL_RA_PACING_POLARITY: NORMAL
MDC_IDC_SET_LEADCHNL_RA_PACING_PULSEWIDTH: 0.4 MS
MDC_IDC_SET_LEADCHNL_RA_SENSING_ANODE_ELECTRODE_1: NORMAL
MDC_IDC_SET_LEADCHNL_RA_SENSING_ANODE_LOCATION_1: NORMAL
MDC_IDC_SET_LEADCHNL_RA_SENSING_CATHODE_ELECTRODE_1: NORMAL
MDC_IDC_SET_LEADCHNL_RA_SENSING_CATHODE_LOCATION_1: NORMAL
MDC_IDC_SET_LEADCHNL_RA_SENSING_POLARITY: NORMAL
MDC_IDC_SET_LEADCHNL_RA_SENSING_SENSITIVITY: 0.3 MV
MDC_IDC_SET_LEADCHNL_RV_PACING_AMPLITUDE: NORMAL
MDC_IDC_SET_LEADCHNL_RV_PACING_ANODE_ELECTRODE_1: NORMAL
MDC_IDC_SET_LEADCHNL_RV_PACING_ANODE_LOCATION_1: NORMAL
MDC_IDC_SET_LEADCHNL_RV_PACING_CAPTURE_MODE: NORMAL
MDC_IDC_SET_LEADCHNL_RV_PACING_CATHODE_ELECTRODE_1: NORMAL
MDC_IDC_SET_LEADCHNL_RV_PACING_CATHODE_LOCATION_1: NORMAL
MDC_IDC_SET_LEADCHNL_RV_PACING_POLARITY: NORMAL
MDC_IDC_SET_LEADCHNL_RV_PACING_PULSEWIDTH: 0.4 MS
MDC_IDC_SET_LEADCHNL_RV_SENSING_ANODE_ELECTRODE_1: NORMAL
MDC_IDC_SET_LEADCHNL_RV_SENSING_ANODE_LOCATION_1: NORMAL
MDC_IDC_SET_LEADCHNL_RV_SENSING_CATHODE_ELECTRODE_1: NORMAL
MDC_IDC_SET_LEADCHNL_RV_SENSING_CATHODE_LOCATION_1: NORMAL
MDC_IDC_SET_LEADCHNL_RV_SENSING_POLARITY: NORMAL
MDC_IDC_SET_LEADCHNL_RV_SENSING_SENSITIVITY: 0.9 MV
MDC_IDC_SET_ZONE_DETECTION_INTERVAL: 200 MS
MDC_IDC_SET_ZONE_DETECTION_INTERVAL: 350 MS
MDC_IDC_SET_ZONE_DETECTION_INTERVAL: 400 MS
MDC_IDC_SET_ZONE_TYPE: NORMAL
MDC_IDC_STAT_AT_BURDEN_PERCENT: 0 %
MDC_IDC_STAT_AT_DTM_END: NORMAL
MDC_IDC_STAT_AT_DTM_START: NORMAL
MDC_IDC_STAT_AT_MODE_SW_COUNT: 0
MDC_IDC_STAT_BRADY_AP_VP_PERCENT: 14.86 %
MDC_IDC_STAT_BRADY_AP_VS_PERCENT: 68.71 %
MDC_IDC_STAT_BRADY_AS_VP_PERCENT: 6.39 %
MDC_IDC_STAT_BRADY_AS_VS_PERCENT: 10.04 %
MDC_IDC_STAT_BRADY_DTM_END: NORMAL
MDC_IDC_STAT_BRADY_DTM_START: NORMAL
MDC_IDC_STAT_BRADY_RA_PERCENT_PACED: 83.54 %
MDC_IDC_STAT_BRADY_RV_PERCENT_PACED: 21.25 %
MDC_IDC_STAT_EPISODE_RECENT_COUNT: 0
MDC_IDC_STAT_EPISODE_RECENT_COUNT: 0
MDC_IDC_STAT_EPISODE_RECENT_COUNT: 1
MDC_IDC_STAT_EPISODE_RECENT_COUNT_DTM_END: NORMAL
MDC_IDC_STAT_EPISODE_RECENT_COUNT_DTM_START: NORMAL
MDC_IDC_STAT_EPISODE_TOTAL_COUNT: 0
MDC_IDC_STAT_EPISODE_TOTAL_COUNT: 0
MDC_IDC_STAT_EPISODE_TOTAL_COUNT: 1
MDC_IDC_STAT_EPISODE_TOTAL_COUNT_DTM_END: NORMAL
MDC_IDC_STAT_EPISODE_TOTAL_COUNT_DTM_START: NORMAL
MDC_IDC_STAT_EPISODE_TYPE: NORMAL
NONHDLC SERPL-MCNC: 93 MG/DL
TRIGL SERPL-MCNC: 148 MG/DL

## 2023-06-27 PROCEDURE — 93280 PM DEVICE PROGR EVAL DUAL: CPT | Performed by: INTERNAL MEDICINE

## 2023-06-27 PROCEDURE — 36415 COLL VENOUS BLD VENIPUNCTURE: CPT | Performed by: INTERNAL MEDICINE

## 2023-06-27 PROCEDURE — 80061 LIPID PANEL: CPT | Performed by: INTERNAL MEDICINE

## 2023-06-27 PROCEDURE — 99214 OFFICE O/P EST MOD 30 MIN: CPT | Performed by: INTERNAL MEDICINE

## 2023-06-27 RX ORDER — METOPROLOL SUCCINATE 200 MG/1
200 TABLET, EXTENDED RELEASE ORAL DAILY
Qty: 90 TABLET | Refills: 3 | Status: SHIPPED | OUTPATIENT
Start: 2023-06-27 | End: 2024-02-12

## 2023-06-27 NOTE — LETTER
6/27/2023    Clifford Gamboa MD  [address]  [facility] [address]    RE: Abraham Dong       Dear Colleague,     [facility] the [facility].    HEART CARE ENCOUNTER CONSULTATON NOTE      [facility]  [phone]      Assessment/Recommendations   Assessment/Plan:  NSVT - seen on device from August 2022 and has not recurred with titrated metoprolol. Will repeat echo and titrate the metoprolol xl to 200mg daily and stop the lisinopril.     Orthostasis - see above med changes, hydration encouraged    SSS s/p dual chamber pacer - normal function    Paroxysmal atrial fibrillation - on eliquis and metoprolol    Mild CAD - statin indefinitely. LDL check today    Asymmetric septal hypertrophy - as above, repeat echo.  NSVT on device last August, will discuss with EP the possibility of a device upgrade to an ICD.     F/U 12 months       History of Present Illness/Subjective    HPI: Abraham Dong is a 62 year old male obese, with mild CAD, septal hypertrophy, paroxysmal atrial fibrillation, hypertension, hyperlipemia, depression, asymmetric septal hypertrophy, and a chronically abnormal EKG. Abraham initially presented to our ER 12/2/21 with chest pressure, dyspnea, and vertigo.  He was very hypertensive with /130 mmHg. Coronary angiogram revealed mild coronary disease and a severely elevated LV EDP. EF was grossly normal with possible apical hypertrophy vs diverticulum. During his hospitalization he had sinus pauses, primarily at night. He was discharged with a monitor and had sinus node dysfunction and underwent a dual chamber pacemaker 12/23/21. He was also noted to have atrial fibrillation and was started on amiodarone and eliquis by Dr. Lopez at the time of his pacemaker placement. Amiodarone was switched to metoprolol given the septal hypertrophy spring 2022. Abraham is pre-load dependent and is on a 40 lb weight lifting restriction. He has had  near syncope but no syncope, most likely related to dehydration. August 2022 he had a syncopal event after getting up to use the restroom. His device at that time registered 18 beats NSVT. His metoprolol xl was titrated to 100mg daily and his lisinopril was cut back to 10mg daily and he has had no further syncope or NSVT.     Abraham returns for annual follow up today. He walks daily without trouble and denies any chest pain, dyspnea, fatigue, pnd/orthopnea, syncope, palpitations. He does get orthstatic but has had no syncope.      Device check today:  Device: Medtronic [place] (D) pacemaker  Pacing %/Programmed: AP 83.5%,  21.2%, AAIR-DDDR 60-130bpm  Lead(s): Stable  Battery longevity: Estimating 12.2 years remaining  Presenting rhythm: APVS 72bpm  Underlying rhythm: SB 50bpm w/1st degree AVB 230ms  Heart rates: Stable, HR's per histogram range 60-130bpm and primarily 60-100bpm  Atrial arrhythmias: None  Anticoagulant: Eliquis  Ventricular arrhythmias: 1 VHR episode logged 8/25/22, EGM shows 18 beats NSVT 162bpm, patient denies symptoms, EF 60-65% per echo 12/2/21.       Physical Examination  Review of Systems   Vitals: /82 (BP Location: Left arm, Patient Position: Sitting, Cuff Size: Adult Large)   Pulse 61   Resp 12   Wt 117 kg (258 lb)   BMI 34.04 kg/m    BMI= Body mass index is 34.04 kg/m .  Wt Readings from Last 3 Encounters:   06/27/23 117 kg (258 lb)   07/20/22 117.6 kg (259 lb 3.2 oz)   01/24/22 116.6 kg (257 lb)       General Appearance:   no distress, obese body habitus   ENT/Mouth: membranes moist, no oral lesions or bleeding gums.      EYES:  no scleral icterus, normal conjunctivae   Neck: no carotid bruits or thyromegaly   Chest/Lungs:   lungs are clear to auscultation, no rales or wheezing, no sternal scar, equal chest wall expansion    Cardiovascular:   Regular. Normal first and second heart sounds with systolic murmur. No rubs or gallops; the right carotid, radial and posterior tibial pulses  are intact and the left carotid, radial and posterior tibial pulses are intact.  Jugular venous pressure is flat, no edema bilaterally    Abdomen:  no organomegaly, masses, bruits, or tenderness; bowel sounds are present   Extremities: no cyanosis or clubbing   Skin: no xanthelasma, warm.    Neurologic: normal  bilateral, no tremors     Psychiatric: alert and oriented x3, calm        Please refer above for cardiac ROS details.        Medical History  Surgical History Family History Social History   Past Medical History:   Diagnosis Date    Atrial fibrillation (H)     Benign essential hypertension     Coronary artery disease     Depression     Hyperlipidemia     Obesity      Past Surgical History:   Procedure Laterality Date    CORONARY ANGIOGRAPHY ADULT ORDER      CV CORONARY ANGIOGRAM N/A 12/2/2021    Procedure: Coronary Angiogram;  Surgeon: Radha Ferrell MD;  Location: [facility] CATH LAB CV    CV LEFT HEART CATH N/A 12/2/2021    Procedure: Left Heart Cath;  Surgeon: Radha Ferrell MD;  Location: [facility] LAB CV    CV LEFT VENTRICULOGRAM N/A 12/2/2021    Procedure: Left Ventriculogram;  Surgeon: Radha Ferrell MD;  Location: [facility] CATH LAB CV    EP PACEMAKER N/A 12/23/2021    Procedure: EP Pacemaker;  Surgeon: Kun Lopez MD;  Location: [facility] CATH LAB CV    HERNIA REPAIR      IMPLANT PACEMAKER      KNEE SURGERY      TONSILLECTOMY      VASECTOMY       Family History   Problem Relation Age of Onset    Diabetes Mother     Hypertension Mother     Coronary Artery Disease No family hx of     Cerebrovascular Disease No family hx of         Social History     Socioeconomic History    Marital status:      Spouse name: Not on file    Number of children: Not on file    Years of education: Not on file    Highest education level: Not on file   Occupational History    Not on file   Tobacco Use    Smoking status: Never    Smokeless tobacco: Never   Substance and Sexual Activity    Alcohol use:  Yes     Alcohol/week: 6.0 standard drinks of alcohol     Types: 6 Standard drinks or equivalent per week     Comment: 7 drinks per week    Drug use: Never    Sexual activity: Not on file   Other Topics Concern    Parent/sibling w/ CABG, MI or angioplasty before 65F 55M? Not Asked   Social History Narrative    Not on file     Social Determinants of Health     Financial Resource Strain: Not on file   Food Insecurity: Not on file   Transportation Needs: Not on file   Physical Activity: Not on file   Stress: Not on file   Social Connections: Not on file   Intimate Partner Violence: Not on file   Housing Stability: Not on file           Medications  Allergies   Current Outpatient Medications   Medication Sig Dispense Refill    acetaminophen-caffeine (EXCEDRIN TENSION HEADACHE) 500-65 MG TABS Take 2 tablets by mouth daily as needed for mild pain      atorvastatin (LIPITOR) 80 MG tablet Take 1 tablet (80 mg) by mouth daily 30 tablet 0    ELIQUIS ANTICOAGULANT 5 MG tablet Take 1 tablet (5 mg) by mouth 2 times daily 180 tablet 0    ezetimibe (ZETIA) 10 MG tablet Take 1 tablet (10 mg) by mouth once daily 90 tablet 3    lisinopril (ZESTRIL) 10 MG tablet Take 1 tablet (10 mg) by mouth daily 90 tablet 1    metoprolol succinate ER (TOPROL XL) 100 MG 24 hr tablet Take 1 tablet (100 mg) by mouth daily 90 tablet 3    nitroGLYcerin (NITROSTAT) 0.4 MG sublingual tablet One tablet under the tongue every 5 minutes if needed for chest pain. May repeat every 5 minutes for a maximum of 3 doses in 15 minutes 25 tablet 3    sertraline (ZOLOFT) 100 MG tablet Take 100 mg by mouth daily       Blood Pressure Monitor KIT Please provide (1) arm bp cuff, and (1) digital BP monitor (Patient not taking: Reported on 7/20/2022) 1 kit 0    lisinopril (ZESTRIL) 20 MG tablet Take 1 tablet (20 mg) by mouth daily (Patient not taking: Reported on 6/27/2023) 30 tablet 0    metoprolol succinate ER (TOPROL-XL) 50 MG 24 hr tablet Take 1 tablet (50 mg) by mouth  daily (Patient not taking: Reported on 6/27/2023) 90 tablet 3     No Known Allergies       Lab Results    Chemistry/lipid CBC Cardiac Enzymes/BNP/TSH/INR   Recent Labs   Lab Test 07/20/22  1142   CHOL 146   HDL 44   LDL 71   TRIG 157*     Recent Labs   Lab Test 07/20/22  1142 01/20/22  1141 12/02/21  0831   LDL 71 110 109     Recent Labs   Lab Test 12/23/21  0702      POTASSIUM 4.5   CHLORIDE 106   CO2 22      BUN 27*   CR 0.84   GFRESTIMATED >90   TEGAN 9.4     Recent Labs   Lab Test 12/23/21  0702 12/05/21  0455 12/03/21  0432   CR 0.84 0.75 0.71     No results for input(s): A1C in the last 58295 hours.       Recent Labs   Lab Test 12/23/21  0702   WBC 5.4   HGB 14.5   HCT 43.2   MCV 93        Recent Labs   Lab Test 12/23/21  0702 12/05/21  0455 12/03/21  0432   HGB 14.5 15.7 15.6    Recent Labs   Lab Test 12/03/21  1016 12/03/21  0432 12/02/21  2237   TROPONINI 0.04 0.04 0.04     No results for input(s): BNP, NTBNPI, NTBNP in the last 68370 hours.  Recent Labs   Lab Test 12/23/21  0702   TSH 3.63     Recent Labs   Lab Test 12/02/21  0828   INR 1.05        Today's clinic visit entailed:  Review of prior external note(s) from - CareEverywhere information from epic reviewed  No LOS data to display   Time spent by me doing chart review, history and exam, documentation and further activities per the note  Provider  Link to [facility] Help Grid     The level of medical decision making during this visit was of high complexity.        Radha Ferrell MD              Thank you for allowing me to participate in the care of your patient.      Sincerely,     Radha Ferrell MD     [facility]  cc:   Aletha Howard MD  [address]  [place] [address]

## 2023-06-27 NOTE — PATIENT INSTRUCTIONS
It was a pleasure seeing you at [facility] today.        Here are my suggestions for your care:    1. Double up the metoprolol to 200 mg daily    2. Stop the lisinopril    3. Schedule an echocardiogram to look at your heart thickness    4. Cholesterol check today    5. See Dr. Levin in 1 year      You can always call my nurse Zhanna Siegel RN who is a nurse helping me in the care of my patients. She can be reached at [phone] if you have any questions.    For scheduling, please call my  Santa Osman at [phone].    Thank you again for trusting me with your care. Please feel free to call my office at any time if you have any question or if I can assist you in any way.    Radha Ferrell MD  [facility]

## 2023-06-27 NOTE — PROGRESS NOTES
HEART CARE ENCOUNTER CONSULTATON NOTE      [facility]  [phone]      Assessment/Recommendations   Assessment/Plan:  NSVT - seen on device from August 2022 and has not recurred with titrated metoprolol. Will repeat echo and titrate the metoprolol xl to 200mg daily and stop the lisinopril.     Orthostasis - see above med changes, hydration encouraged    SSS s/p dual chamber pacer - normal function    Paroxysmal atrial fibrillation - on eliquis and metoprolol    Mild CAD - statin indefinitely. LDL check today    Asymmetric septal hypertrophy - as above, repeat echo.  NSVT on device last August, will discuss with EP the possibility of a device upgrade to an ICD.     F/U 12 months       History of Present Illness/Subjective    HPI: Abraham Dong is a 62 year old male obese, with mild CAD, septal hypertrophy, paroxysmal atrial fibrillation, hypertension, hyperlipemia, depression, asymmetric septal hypertrophy, and a chronically abnormal EKG. Abraham initially presented to our ER 12/2/21 with chest pressure, dyspnea, and vertigo.  He was very hypertensive with /130 mmHg. Coronary angiogram revealed mild coronary disease and a severely elevated LV EDP. EF was grossly normal with possible apical hypertrophy vs diverticulum. During his hospitalization he had sinus pauses, primarily at night. He was discharged with a monitor and had sinus node dysfunction and underwent a dual chamber pacemaker 12/23/21. He was also noted to have atrial fibrillation and was started on amiodarone and eliquis by Dr. Lopez at the time of his pacemaker placement. Amiodarone was switched to metoprolol given the septal hypertrophy spring 2022. Abraham is pre-load dependent and is on a 40 lb weight lifting restriction. He has had near syncope but no syncope, most likely related to dehydration. August 2022 he had a syncopal event after getting up to use the restroom. His device at that time registered 18 beats NSVT. His  metoprolol xl was titrated to 100mg daily and his lisinopril was cut back to 10mg daily and he has had no further syncope or NSVT.     Abraham returns for annual follow up today. He walks daily without trouble and denies any chest pain, dyspnea, fatigue, pnd/orthopnea, syncope, palpitations. He does get orthstatic but has had no syncope.      Device check today:  Device: Medtronic [place] (D) pacemaker  Pacing %/Programmed: AP 83.5%,  21.2%, AAIR-DDDR 60-130bpm  Lead(s): Stable  Battery longevity: Estimating 12.2 years remaining  Presenting rhythm: APVS 72bpm  Underlying rhythm: SB 50bpm w/1st degree AVB 230ms  Heart rates: Stable, HR's per histogram range 60-130bpm and primarily 60-100bpm  Atrial arrhythmias: None  Anticoagulant: Eliquis  Ventricular arrhythmias: 1 VHR episode logged 8/25/22, EGM shows 18 beats NSVT 162bpm, patient denies symptoms, EF 60-65% per echo 12/2/21.       Physical Examination  Review of Systems   Vitals: /82 (BP Location: Left arm, Patient Position: Sitting, Cuff Size: Adult Large)   Pulse 61   Resp 12   Wt 117 kg (258 lb)   BMI 34.04 kg/m    BMI= Body mass index is 34.04 kg/m .  Wt Readings from Last 3 Encounters:   06/27/23 117 kg (258 lb)   07/20/22 117.6 kg (259 lb 3.2 oz)   01/24/22 116.6 kg (257 lb)       General Appearance:   no distress, obese body habitus   ENT/Mouth: membranes moist, no oral lesions or bleeding gums.      EYES:  no scleral icterus, normal conjunctivae   Neck: no carotid bruits or thyromegaly   Chest/Lungs:   lungs are clear to auscultation, no rales or wheezing, no sternal scar, equal chest wall expansion    Cardiovascular:   Regular. Normal first and second heart sounds with systolic murmur. No rubs or gallops; the right carotid, radial and posterior tibial pulses are intact and the left carotid, radial and posterior tibial pulses are intact.  Jugular venous pressure is flat, no edema bilaterally    Abdomen:  no organomegaly, masses, bruits, or  tenderness; bowel sounds are present   Extremities: no cyanosis or clubbing   Skin: no xanthelasma, warm.    Neurologic: normal  bilateral, no tremors     Psychiatric: alert and oriented x3, calm        Please refer above for cardiac ROS details.        Medical History  Surgical History Family History Social History   Past Medical History:   Diagnosis Date     Atrial fibrillation (H)      Benign essential hypertension      Coronary artery disease      Depression      Hyperlipidemia      Obesity      Past Surgical History:   Procedure Laterality Date     CORONARY ANGIOGRAPHY ADULT ORDER       CV CORONARY ANGIOGRAM N/A 12/2/2021    Procedure: Coronary Angiogram;  Surgeon: Radha Ferrell MD;  Location: [facility] CATH LAB CV     CV LEFT HEART CATH N/A 12/2/2021    Procedure: Left Heart Cath;  Surgeon: Radha Ferrell MD;  Location: [facility] LAB CV     CV LEFT VENTRICULOGRAM N/A 12/2/2021    Procedure: Left Ventriculogram;  Surgeon: Radha Ferrell MD;  Location: [facility] LAB CV     EP PACEMAKER N/A 12/23/2021    Procedure: EP Pacemaker;  Surgeon: Kun Lopez MD;  Location: [facility] LAB CV     HERNIA REPAIR       IMPLANT PACEMAKER       KNEE SURGERY       TONSILLECTOMY       VASECTOMY       Family History   Problem Relation Age of Onset     Diabetes Mother      Hypertension Mother      Coronary Artery Disease No family hx of      Cerebrovascular Disease No family hx of         Social History     Socioeconomic History     Marital status:      Spouse name: Not on file     Number of children: Not on file     Years of education: Not on file     Highest education level: Not on file   Occupational History     Not on file   Tobacco Use     Smoking status: Never     Smokeless tobacco: Never   Substance and Sexual Activity     Alcohol use: Yes     Alcohol/week: 6.0 standard drinks of alcohol     Types: 6 Standard drinks or equivalent per week     Comment: 7 drinks per week     Drug use: Never      Sexual activity: Not on file   Other Topics Concern     Parent/sibling w/ CABG, MI or angioplasty before 65F 55M? Not Asked   Social History Narrative     Not on file     Social Determinants of Health     Financial Resource Strain: Not on file   Food Insecurity: Not on file   Transportation Needs: Not on file   Physical Activity: Not on file   Stress: Not on file   Social Connections: Not on file   Intimate Partner Violence: Not on file   Housing Stability: Not on file           Medications  Allergies   Current Outpatient Medications   Medication Sig Dispense Refill     acetaminophen-caffeine (EXCEDRIN TENSION HEADACHE) 500-65 MG TABS Take 2 tablets by mouth daily as needed for mild pain       atorvastatin (LIPITOR) 80 MG tablet Take 1 tablet (80 mg) by mouth daily 30 tablet 0     ELIQUIS ANTICOAGULANT 5 MG tablet Take 1 tablet (5 mg) by mouth 2 times daily 180 tablet 0     ezetimibe (ZETIA) 10 MG tablet Take 1 tablet (10 mg) by mouth once daily 90 tablet 3     lisinopril (ZESTRIL) 10 MG tablet Take 1 tablet (10 mg) by mouth daily 90 tablet 1     metoprolol succinate ER (TOPROL XL) 100 MG 24 hr tablet Take 1 tablet (100 mg) by mouth daily 90 tablet 3     nitroGLYcerin (NITROSTAT) 0.4 MG sublingual tablet One tablet under the tongue every 5 minutes if needed for chest pain. May repeat every 5 minutes for a maximum of 3 doses in 15 minutes 25 tablet 3     sertraline (ZOLOFT) 100 MG tablet Take 100 mg by mouth daily        Blood Pressure Monitor KIT Please provide (1) arm bp cuff, and (1) digital BP monitor (Patient not taking: Reported on 7/20/2022) 1 kit 0     lisinopril (ZESTRIL) 20 MG tablet Take 1 tablet (20 mg) by mouth daily (Patient not taking: Reported on 6/27/2023) 30 tablet 0     metoprolol succinate ER (TOPROL-XL) 50 MG 24 hr tablet Take 1 tablet (50 mg) by mouth daily (Patient not taking: Reported on 6/27/2023) 90 tablet 3     No Known Allergies       Lab Results    Chemistry/lipid CBC Cardiac  Enzymes/BNP/TSH/INR   Recent Labs   Lab Test 07/20/22  1142   CHOL 146   HDL 44   LDL 71   TRIG 157*     Recent Labs   Lab Test 07/20/22  1142 01/20/22  1141 12/02/21  0831   LDL 71 110 109     Recent Labs   Lab Test 12/23/21  0702      POTASSIUM 4.5   CHLORIDE 106   CO2 22      BUN 27*   CR 0.84   GFRESTIMATED >90   TEGAN 9.4     Recent Labs   Lab Test 12/23/21  0702 12/05/21  0455 12/03/21  0432   CR 0.84 0.75 0.71     No results for input(s): A1C in the last 69786 hours.       Recent Labs   Lab Test 12/23/21  0702   WBC 5.4   HGB 14.5   HCT 43.2   MCV 93        Recent Labs   Lab Test 12/23/21  0702 12/05/21  0455 12/03/21  0432   HGB 14.5 15.7 15.6    Recent Labs   Lab Test 12/03/21  1016 12/03/21  0432 12/02/21  2237   TROPONINI 0.04 0.04 0.04     No results for input(s): BNP, NTBNPI, NTBNP in the last 19935 hours.  Recent Labs   Lab Test 12/23/21  0702   TSH 3.63     Recent Labs   Lab Test 12/02/21  0828   INR 1.05        Today's clinic visit entailed:  Review of prior external note(s) from - CareEverywhere information from epic reviewed  No LOS data to display   Time spent by me doing chart review, history and exam, documentation and further activities per the note  Provider  Link to MDM Help Grid     The level of medical decision making during this visit was of high complexity.        Radha Ferrell MD

## 2023-07-19 ENCOUNTER — HOSPITAL ENCOUNTER (OUTPATIENT)
Dept: CARDIOLOGY | Facility: HOSPITAL | Age: 63
Discharge: HOME | End: 2023-07-19
Attending: INTERNAL MEDICINE | Admitting: INTERNAL MEDICINE
Payer: COMMERCIAL

## 2023-07-19 DIAGNOSIS — I48.0 PAROXYSMAL ATRIAL FIBRILLATION (H): ICD-10-CM

## 2023-07-19 DIAGNOSIS — I25.10 MILD CAD: ICD-10-CM

## 2023-07-19 DIAGNOSIS — I51.7 ASYMMETRIC SEPTAL HYPERTROPHY: ICD-10-CM

## 2023-07-19 PROCEDURE — 255N000002 HC RX 255 OP 636: Performed by: INTERNAL MEDICINE

## 2023-07-19 PROCEDURE — 999N000208 ECHOCARDIOGRAM COMPLETE

## 2023-07-19 PROCEDURE — 93306 TTE W/DOPPLER COMPLETE: CPT | Mod: 26 | Performed by: INTERNAL MEDICINE

## 2023-07-19 RX ADMIN — PERFLUTREN 3 ML: 6.52 INJECTION, SUSPENSION INTRAVENOUS at 13:40

## 2023-07-27 DIAGNOSIS — R93.1 ABNORMAL ECHOCARDIOGRAM: Primary | ICD-10-CM

## 2023-07-28 ENCOUNTER — DOCUMENTATION ONLY (OUTPATIENT)
Dept: CARDIOLOGY | Facility: CLINIC | Age: 63
End: 2023-07-28
Payer: COMMERCIAL

## 2023-07-28 NOTE — PROGRESS NOTES
7/28/23:    Is the implanted device safe for MRI Exam?  Yes  Is this device 3T compatible? Yes  Device Type: Pacemaker      Device Information: Medtronic, PPM [place] (D)      Cardiology Orders for Device Programming     -- Yes -- The patient has a MRI conditional pulse generator and leads from the same     -- Yes -- The pulse generator and leads have been implanted for at least 6 weeks. (Does not apply to [employer] devices)    -- Yes-- The device is implanted in the right or left pectoral region    -- Yes -- There are not any additional active cardiac devices, abandoned leads, lead extenders or adapters    -- Yes -- The device lead impedance measurements are within the normal range. (Manufacture recommendations: Medtronic Advisa and Revo 200-1,500 ohms; Medtronic ICD and CRT's 200-3000 ohms and defibrillation lead impedance   ohms)    -- Yes -- If the patient is pacemaker dependent the thresholds are less than or equal to 2.0V @ 0.4ms.    -- Yes -- RA and RV leads are programmed to bipolar pacing operation or pacing off. If no, CONTACT THE DEVICE REP FOR PROGRAMMING           Date of last In-clinic Device check: 6/27/2023  Results of last Device check:  1.   Right atrium impedance: 513  2.   Right ventricle impedance: 456  3.   Left ventricle impedance: n/a     4.   Right atrium threshold: 0.75V@0.4ms  5.   Right ventricle threshold: 0.5V@0.4ms  6.   Left ventricle threshold: n/a     Device programming during the scan guidelines   Pacing Mode (check one): DOO  Pacing Rate: 80  bpm or 10 bpm > intrinsic  Kerry Boles Device RN

## 2023-09-19 NOTE — PROGRESS NOTES
Addendum to previous MRI clearance form. Now that Medtronic has MRI leelee availability, it is OK to use device settings per Medtronic MRI leelee for this patient.     Joann Garcia RN   [facility]  [facility]

## 2023-09-27 ENCOUNTER — ANCILLARY PROCEDURE (OUTPATIENT)
Dept: CARDIOLOGY | Facility: CLINIC | Age: 63
End: 2023-09-27
Attending: INTERNAL MEDICINE
Payer: COMMERCIAL

## 2023-09-27 ENCOUNTER — HOSPITAL ENCOUNTER (OUTPATIENT)
Dept: MRI IMAGING | Facility: HOSPITAL | Age: 63
Discharge: HOME | End: 2023-09-27
Attending: INTERNAL MEDICINE
Payer: COMMERCIAL

## 2023-09-27 ENCOUNTER — HOSPITAL ENCOUNTER (OUTPATIENT)
Dept: RADIOLOGY | Facility: HOSPITAL | Age: 63
Discharge: HOME | End: 2023-09-27
Attending: INTERNAL MEDICINE
Payer: COMMERCIAL

## 2023-09-27 VITALS — RESPIRATION RATE: 18 BRPM | HEART RATE: 62 BPM | OXYGEN SATURATION: 98 %

## 2023-09-27 DIAGNOSIS — I49.5 SICK SINUS SYNDROME (H): ICD-10-CM

## 2023-09-27 DIAGNOSIS — R93.1 ABNORMAL ECHOCARDIOGRAM: ICD-10-CM

## 2023-09-27 DIAGNOSIS — Z95.0 CARDIAC PACEMAKER IN SITU: ICD-10-CM

## 2023-09-27 PROCEDURE — A9585 GADOBUTROL INJECTION: HCPCS | Mod: JZ | Performed by: INTERNAL MEDICINE

## 2023-09-27 PROCEDURE — G2066 INTER DEVC REMOTE 30D: HCPCS | Performed by: INTERNAL MEDICINE

## 2023-09-27 PROCEDURE — 93297 REM INTERROG DEV EVAL ICPMS: CPT | Performed by: INTERNAL MEDICINE

## 2023-09-27 PROCEDURE — 75561 CARDIAC MRI FOR MORPH W/DYE: CPT

## 2023-09-27 PROCEDURE — 71045 X-RAY EXAM CHEST 1 VIEW: CPT

## 2023-09-27 PROCEDURE — 75561 CARDIAC MRI FOR MORPH W/DYE: CPT | Mod: 26 | Performed by: INTERNAL MEDICINE

## 2023-09-27 PROCEDURE — 255N000002 HC RX 255 OP 636: Mod: JZ | Performed by: INTERNAL MEDICINE

## 2023-09-27 PROCEDURE — 999N000122 MR MYOCARDIUM  OVERREAD

## 2023-09-27 RX ORDER — GADOBUTROL 604.72 MG/ML
22 INJECTION INTRAVENOUS ONCE
Status: COMPLETED | OUTPATIENT
Start: 2023-09-27 | End: 2023-09-27

## 2023-09-27 RX ADMIN — GADOBUTROL 22 ML: 604.72 INJECTION INTRAVENOUS at 09:12

## 2023-09-28 LAB
MDC_IDC_EPISODE_DTM: NORMAL
MDC_IDC_EPISODE_DURATION: 1 S
MDC_IDC_EPISODE_ID: 2
MDC_IDC_EPISODE_TYPE: NORMAL
MDC_IDC_LEAD_IMPLANT_DT: NORMAL
MDC_IDC_LEAD_IMPLANT_DT: NORMAL
MDC_IDC_LEAD_LOCATION: NORMAL
MDC_IDC_LEAD_LOCATION: NORMAL
MDC_IDC_LEAD_LOCATION_DETAIL_1: NORMAL
MDC_IDC_LEAD_LOCATION_DETAIL_1: NORMAL
MDC_IDC_LEAD_MFG: NORMAL
MDC_IDC_LEAD_MFG: NORMAL
MDC_IDC_LEAD_MODEL: NORMAL
MDC_IDC_LEAD_MODEL: NORMAL
MDC_IDC_LEAD_POLARITY_TYPE: NORMAL
MDC_IDC_LEAD_POLARITY_TYPE: NORMAL
MDC_IDC_LEAD_SERIAL: NORMAL
MDC_IDC_LEAD_SERIAL: NORMAL
MDC_IDC_LEAD_SPECIAL_FUNCTION: NORMAL
MDC_IDC_LEAD_SPECIAL_FUNCTION: NORMAL
MDC_IDC_MSMT_BATTERY_DTM: NORMAL
MDC_IDC_MSMT_BATTERY_REMAINING_LONGEVITY: 143 MO
MDC_IDC_MSMT_BATTERY_RRT_TRIGGER: 2.62
MDC_IDC_MSMT_BATTERY_STATUS: NORMAL
MDC_IDC_MSMT_BATTERY_VOLTAGE: 3.04 V
MDC_IDC_MSMT_LEADCHNL_RA_IMPEDANCE_VALUE: 323 OHM
MDC_IDC_MSMT_LEADCHNL_RA_IMPEDANCE_VALUE: 494 OHM
MDC_IDC_MSMT_LEADCHNL_RA_PACING_THRESHOLD_AMPLITUDE: 0.62 V
MDC_IDC_MSMT_LEADCHNL_RA_PACING_THRESHOLD_PULSEWIDTH: 0.4 MS
MDC_IDC_MSMT_LEADCHNL_RA_SENSING_INTR_AMPL: 4.38 MV
MDC_IDC_MSMT_LEADCHNL_RA_SENSING_INTR_AMPL: 4.38 MV
MDC_IDC_MSMT_LEADCHNL_RV_IMPEDANCE_VALUE: 361 OHM
MDC_IDC_MSMT_LEADCHNL_RV_IMPEDANCE_VALUE: 418 OHM
MDC_IDC_MSMT_LEADCHNL_RV_PACING_THRESHOLD_AMPLITUDE: 0.62 V
MDC_IDC_MSMT_LEADCHNL_RV_PACING_THRESHOLD_PULSEWIDTH: 0.4 MS
MDC_IDC_MSMT_LEADCHNL_RV_SENSING_INTR_AMPL: 15.12 MV
MDC_IDC_MSMT_LEADCHNL_RV_SENSING_INTR_AMPL: 15.12 MV
MDC_IDC_PG_IMPLANT_DTM: NORMAL
MDC_IDC_PG_MFG: NORMAL
MDC_IDC_PG_MODEL: NORMAL
MDC_IDC_PG_SERIAL: NORMAL
MDC_IDC_PG_TYPE: NORMAL
MDC_IDC_SESS_CLINIC_NAME: NORMAL
MDC_IDC_SESS_DTM: NORMAL
MDC_IDC_SESS_TYPE: NORMAL
MDC_IDC_SET_BRADY_AT_MODE_SWITCH_RATE: 171 {BEATS}/MIN
MDC_IDC_SET_BRADY_HYSTRATE: NORMAL
MDC_IDC_SET_BRADY_LOWRATE: 60 {BEATS}/MIN
MDC_IDC_SET_BRADY_MAX_SENSOR_RATE: 130 {BEATS}/MIN
MDC_IDC_SET_BRADY_MAX_TRACKING_RATE: 130 {BEATS}/MIN
MDC_IDC_SET_BRADY_MODE: NORMAL
MDC_IDC_SET_BRADY_PAV_DELAY_LOW: 180 MS
MDC_IDC_SET_BRADY_SAV_DELAY_LOW: 150 MS
MDC_IDC_SET_LEADCHNL_RA_PACING_AMPLITUDE: 1.5 V
MDC_IDC_SET_LEADCHNL_RA_PACING_ANODE_ELECTRODE_1: NORMAL
MDC_IDC_SET_LEADCHNL_RA_PACING_ANODE_LOCATION_1: NORMAL
MDC_IDC_SET_LEADCHNL_RA_PACING_CAPTURE_MODE: NORMAL
MDC_IDC_SET_LEADCHNL_RA_PACING_CATHODE_ELECTRODE_1: NORMAL
MDC_IDC_SET_LEADCHNL_RA_PACING_CATHODE_LOCATION_1: NORMAL
MDC_IDC_SET_LEADCHNL_RA_PACING_POLARITY: NORMAL
MDC_IDC_SET_LEADCHNL_RA_PACING_PULSEWIDTH: 0.4 MS
MDC_IDC_SET_LEADCHNL_RA_SENSING_ANODE_ELECTRODE_1: NORMAL
MDC_IDC_SET_LEADCHNL_RA_SENSING_ANODE_LOCATION_1: NORMAL
MDC_IDC_SET_LEADCHNL_RA_SENSING_CATHODE_ELECTRODE_1: NORMAL
MDC_IDC_SET_LEADCHNL_RA_SENSING_CATHODE_LOCATION_1: NORMAL
MDC_IDC_SET_LEADCHNL_RA_SENSING_POLARITY: NORMAL
MDC_IDC_SET_LEADCHNL_RA_SENSING_SENSITIVITY: 0.3 MV
MDC_IDC_SET_LEADCHNL_RV_PACING_AMPLITUDE: 1.5 V
MDC_IDC_SET_LEADCHNL_RV_PACING_ANODE_ELECTRODE_1: NORMAL
MDC_IDC_SET_LEADCHNL_RV_PACING_ANODE_LOCATION_1: NORMAL
MDC_IDC_SET_LEADCHNL_RV_PACING_CAPTURE_MODE: NORMAL
MDC_IDC_SET_LEADCHNL_RV_PACING_CATHODE_ELECTRODE_1: NORMAL
MDC_IDC_SET_LEADCHNL_RV_PACING_CATHODE_LOCATION_1: NORMAL
MDC_IDC_SET_LEADCHNL_RV_PACING_POLARITY: NORMAL
MDC_IDC_SET_LEADCHNL_RV_PACING_PULSEWIDTH: 0.4 MS
MDC_IDC_SET_LEADCHNL_RV_SENSING_ANODE_ELECTRODE_1: NORMAL
MDC_IDC_SET_LEADCHNL_RV_SENSING_ANODE_LOCATION_1: NORMAL
MDC_IDC_SET_LEADCHNL_RV_SENSING_CATHODE_ELECTRODE_1: NORMAL
MDC_IDC_SET_LEADCHNL_RV_SENSING_CATHODE_LOCATION_1: NORMAL
MDC_IDC_SET_LEADCHNL_RV_SENSING_POLARITY: NORMAL
MDC_IDC_SET_LEADCHNL_RV_SENSING_SENSITIVITY: 0.9 MV
MDC_IDC_SET_ZONE_DETECTION_INTERVAL: 200 MS
MDC_IDC_SET_ZONE_DETECTION_INTERVAL: 350 MS
MDC_IDC_SET_ZONE_DETECTION_INTERVAL: 400 MS
MDC_IDC_SET_ZONE_TYPE: NORMAL
MDC_IDC_STAT_AT_BURDEN_PERCENT: 0 %
MDC_IDC_STAT_AT_DTM_END: NORMAL
MDC_IDC_STAT_AT_DTM_START: NORMAL
MDC_IDC_STAT_BRADY_AP_VP_PERCENT: 4.92 %
MDC_IDC_STAT_BRADY_AP_VS_PERCENT: 91.97 %
MDC_IDC_STAT_BRADY_AS_VP_PERCENT: 0.17 %
MDC_IDC_STAT_BRADY_AS_VS_PERCENT: 2.95 %
MDC_IDC_STAT_BRADY_DTM_END: NORMAL
MDC_IDC_STAT_BRADY_DTM_START: NORMAL
MDC_IDC_STAT_BRADY_RA_PERCENT_PACED: 96.94 %
MDC_IDC_STAT_BRADY_RV_PERCENT_PACED: 5.08 %
MDC_IDC_STAT_EPISODE_RECENT_COUNT: 0
MDC_IDC_STAT_EPISODE_RECENT_COUNT: 1
MDC_IDC_STAT_EPISODE_RECENT_COUNT_DTM_END: NORMAL
MDC_IDC_STAT_EPISODE_RECENT_COUNT_DTM_START: NORMAL
MDC_IDC_STAT_EPISODE_TOTAL_COUNT: 0
MDC_IDC_STAT_EPISODE_TOTAL_COUNT: 2
MDC_IDC_STAT_EPISODE_TOTAL_COUNT_DTM_END: NORMAL
MDC_IDC_STAT_EPISODE_TOTAL_COUNT_DTM_START: NORMAL
MDC_IDC_STAT_EPISODE_TYPE: NORMAL

## 2023-10-05 DIAGNOSIS — Z95.0 CARDIAC PACEMAKER IN SITU: ICD-10-CM

## 2023-10-05 DIAGNOSIS — I42.2 HYPERTROPHIC CARDIOMYOPATHY (H): Primary | ICD-10-CM

## 2023-10-05 DIAGNOSIS — I47.29 NSVT (NONSUSTAINED VENTRICULAR TACHYCARDIA) (H): ICD-10-CM

## 2024-01-05 ENCOUNTER — ANCILLARY PROCEDURE (OUTPATIENT)
Dept: CARDIOLOGY | Facility: CLINIC | Age: 64
End: 2024-01-05
Attending: INTERNAL MEDICINE
Payer: COMMERCIAL

## 2024-01-05 DIAGNOSIS — I49.5 SICK SINUS SYNDROME (H): ICD-10-CM

## 2024-01-05 DIAGNOSIS — Z95.0 CARDIAC PACEMAKER IN SITU: ICD-10-CM

## 2024-02-01 LAB
MDC_IDC_LEAD_CONNECTION_STATUS: NORMAL
MDC_IDC_LEAD_CONNECTION_STATUS: NORMAL
MDC_IDC_LEAD_IMPLANT_DT: NORMAL
MDC_IDC_LEAD_IMPLANT_DT: NORMAL
MDC_IDC_LEAD_LOCATION: NORMAL
MDC_IDC_LEAD_LOCATION: NORMAL
MDC_IDC_LEAD_LOCATION_DETAIL_1: NORMAL
MDC_IDC_LEAD_LOCATION_DETAIL_1: NORMAL
MDC_IDC_LEAD_MFG: NORMAL
MDC_IDC_LEAD_MFG: NORMAL
MDC_IDC_LEAD_MODEL: NORMAL
MDC_IDC_LEAD_MODEL: NORMAL
MDC_IDC_LEAD_POLARITY_TYPE: NORMAL
MDC_IDC_LEAD_POLARITY_TYPE: NORMAL
MDC_IDC_LEAD_SERIAL: NORMAL
MDC_IDC_LEAD_SERIAL: NORMAL
MDC_IDC_LEAD_SPECIAL_FUNCTION: NORMAL
MDC_IDC_LEAD_SPECIAL_FUNCTION: NORMAL
MDC_IDC_MSMT_BATTERY_DTM: NORMAL
MDC_IDC_MSMT_BATTERY_REMAINING_LONGEVITY: 138 MO
MDC_IDC_MSMT_BATTERY_RRT_TRIGGER: 2.62
MDC_IDC_MSMT_BATTERY_STATUS: NORMAL
MDC_IDC_MSMT_BATTERY_VOLTAGE: 3.03 V
MDC_IDC_MSMT_LEADCHNL_RA_IMPEDANCE_VALUE: 323 OHM
MDC_IDC_MSMT_LEADCHNL_RA_IMPEDANCE_VALUE: 418 OHM
MDC_IDC_MSMT_LEADCHNL_RA_PACING_THRESHOLD_AMPLITUDE: 0.5 V
MDC_IDC_MSMT_LEADCHNL_RA_PACING_THRESHOLD_PULSEWIDTH: 0.4 MS
MDC_IDC_MSMT_LEADCHNL_RA_SENSING_INTR_AMPL: 5 MV
MDC_IDC_MSMT_LEADCHNL_RA_SENSING_INTR_AMPL: 5 MV
MDC_IDC_MSMT_LEADCHNL_RV_IMPEDANCE_VALUE: 380 OHM
MDC_IDC_MSMT_LEADCHNL_RV_IMPEDANCE_VALUE: 456 OHM
MDC_IDC_MSMT_LEADCHNL_RV_PACING_THRESHOLD_AMPLITUDE: 0.62 V
MDC_IDC_MSMT_LEADCHNL_RV_PACING_THRESHOLD_PULSEWIDTH: 0.4 MS
MDC_IDC_MSMT_LEADCHNL_RV_SENSING_INTR_AMPL: 17.38 MV
MDC_IDC_MSMT_LEADCHNL_RV_SENSING_INTR_AMPL: 17.38 MV
MDC_IDC_PG_IMPLANT_DTM: NORMAL
MDC_IDC_PG_MFG: NORMAL
MDC_IDC_PG_MODEL: NORMAL
MDC_IDC_PG_SERIAL: NORMAL
MDC_IDC_PG_TYPE: NORMAL
MDC_IDC_SESS_CLINIC_NAME: NORMAL
MDC_IDC_SESS_DTM: NORMAL
MDC_IDC_SESS_TYPE: NORMAL
MDC_IDC_SET_BRADY_AT_MODE_SWITCH_RATE: 171 {BEATS}/MIN
MDC_IDC_SET_BRADY_HYSTRATE: NORMAL
MDC_IDC_SET_BRADY_LOWRATE: 60 {BEATS}/MIN
MDC_IDC_SET_BRADY_MAX_SENSOR_RATE: 130 {BEATS}/MIN
MDC_IDC_SET_BRADY_MAX_TRACKING_RATE: 130 {BEATS}/MIN
MDC_IDC_SET_BRADY_MODE: NORMAL
MDC_IDC_SET_BRADY_PAV_DELAY_LOW: 180 MS
MDC_IDC_SET_BRADY_SAV_DELAY_LOW: 150 MS
MDC_IDC_SET_LEADCHNL_RA_PACING_AMPLITUDE: 1.5 V
MDC_IDC_SET_LEADCHNL_RA_PACING_ANODE_ELECTRODE_1: NORMAL
MDC_IDC_SET_LEADCHNL_RA_PACING_ANODE_LOCATION_1: NORMAL
MDC_IDC_SET_LEADCHNL_RA_PACING_CAPTURE_MODE: NORMAL
MDC_IDC_SET_LEADCHNL_RA_PACING_CATHODE_ELECTRODE_1: NORMAL
MDC_IDC_SET_LEADCHNL_RA_PACING_CATHODE_LOCATION_1: NORMAL
MDC_IDC_SET_LEADCHNL_RA_PACING_POLARITY: NORMAL
MDC_IDC_SET_LEADCHNL_RA_PACING_PULSEWIDTH: 0.4 MS
MDC_IDC_SET_LEADCHNL_RA_SENSING_ANODE_ELECTRODE_1: NORMAL
MDC_IDC_SET_LEADCHNL_RA_SENSING_ANODE_LOCATION_1: NORMAL
MDC_IDC_SET_LEADCHNL_RA_SENSING_CATHODE_ELECTRODE_1: NORMAL
MDC_IDC_SET_LEADCHNL_RA_SENSING_CATHODE_LOCATION_1: NORMAL
MDC_IDC_SET_LEADCHNL_RA_SENSING_POLARITY: NORMAL
MDC_IDC_SET_LEADCHNL_RA_SENSING_SENSITIVITY: 0.3 MV
MDC_IDC_SET_LEADCHNL_RV_PACING_AMPLITUDE: 1.5 V
MDC_IDC_SET_LEADCHNL_RV_PACING_ANODE_ELECTRODE_1: NORMAL
MDC_IDC_SET_LEADCHNL_RV_PACING_ANODE_LOCATION_1: NORMAL
MDC_IDC_SET_LEADCHNL_RV_PACING_CAPTURE_MODE: NORMAL
MDC_IDC_SET_LEADCHNL_RV_PACING_CATHODE_ELECTRODE_1: NORMAL
MDC_IDC_SET_LEADCHNL_RV_PACING_CATHODE_LOCATION_1: NORMAL
MDC_IDC_SET_LEADCHNL_RV_PACING_POLARITY: NORMAL
MDC_IDC_SET_LEADCHNL_RV_PACING_PULSEWIDTH: 0.4 MS
MDC_IDC_SET_LEADCHNL_RV_SENSING_ANODE_ELECTRODE_1: NORMAL
MDC_IDC_SET_LEADCHNL_RV_SENSING_ANODE_LOCATION_1: NORMAL
MDC_IDC_SET_LEADCHNL_RV_SENSING_CATHODE_ELECTRODE_1: NORMAL
MDC_IDC_SET_LEADCHNL_RV_SENSING_CATHODE_LOCATION_1: NORMAL
MDC_IDC_SET_LEADCHNL_RV_SENSING_POLARITY: NORMAL
MDC_IDC_SET_LEADCHNL_RV_SENSING_SENSITIVITY: 0.9 MV
MDC_IDC_SET_ZONE_DETECTION_INTERVAL: 200 MS
MDC_IDC_SET_ZONE_DETECTION_INTERVAL: 350 MS
MDC_IDC_SET_ZONE_DETECTION_INTERVAL: 400 MS
MDC_IDC_SET_ZONE_STATUS: NORMAL
MDC_IDC_SET_ZONE_TYPE: NORMAL
MDC_IDC_SET_ZONE_VENDOR_TYPE: NORMAL
MDC_IDC_STAT_AT_BURDEN_PERCENT: 0 %
MDC_IDC_STAT_AT_DTM_END: NORMAL
MDC_IDC_STAT_AT_DTM_START: NORMAL
MDC_IDC_STAT_BRADY_AP_VP_PERCENT: 23.66 %
MDC_IDC_STAT_BRADY_AP_VS_PERCENT: 72.22 %
MDC_IDC_STAT_BRADY_AS_VP_PERCENT: 2.46 %
MDC_IDC_STAT_BRADY_AS_VS_PERCENT: 1.66 %
MDC_IDC_STAT_BRADY_DTM_END: NORMAL
MDC_IDC_STAT_BRADY_DTM_START: NORMAL
MDC_IDC_STAT_BRADY_RA_PERCENT_PACED: 95.88 %
MDC_IDC_STAT_BRADY_RV_PERCENT_PACED: 26.12 %
MDC_IDC_STAT_EPISODE_RECENT_COUNT: 0
MDC_IDC_STAT_EPISODE_RECENT_COUNT_DTM_END: NORMAL
MDC_IDC_STAT_EPISODE_RECENT_COUNT_DTM_START: NORMAL
MDC_IDC_STAT_EPISODE_TOTAL_COUNT: 0
MDC_IDC_STAT_EPISODE_TOTAL_COUNT: 2
MDC_IDC_STAT_EPISODE_TOTAL_COUNT_DTM_END: NORMAL
MDC_IDC_STAT_EPISODE_TOTAL_COUNT_DTM_START: NORMAL
MDC_IDC_STAT_EPISODE_TYPE: NORMAL
MDC_IDC_STAT_TACHYTHERAPY_RECENT_DTM_END: NORMAL
MDC_IDC_STAT_TACHYTHERAPY_RECENT_DTM_START: NORMAL
MDC_IDC_STAT_TACHYTHERAPY_TOTAL_DTM_END: NORMAL
MDC_IDC_STAT_TACHYTHERAPY_TOTAL_DTM_START: NORMAL

## 2024-02-01 PROCEDURE — 93294 REM INTERROG EVL PM/LDLS PM: CPT | Performed by: INTERNAL MEDICINE

## 2024-02-01 PROCEDURE — 93296 REM INTERROG EVL PM/IDS: CPT | Performed by: INTERNAL MEDICINE

## 2024-02-12 ENCOUNTER — OFFICE VISIT (OUTPATIENT)
Dept: CARDIOLOGY | Facility: CLINIC | Age: 64
End: 2024-02-12
Payer: COMMERCIAL

## 2024-02-12 VITALS
RESPIRATION RATE: 16 BRPM | SYSTOLIC BLOOD PRESSURE: 130 MMHG | DIASTOLIC BLOOD PRESSURE: 90 MMHG | WEIGHT: 263 LBS | HEART RATE: 62 BPM | BODY MASS INDEX: 34.7 KG/M2

## 2024-02-12 DIAGNOSIS — I47.29 NSVT (NONSUSTAINED VENTRICULAR TACHYCARDIA) (H): ICD-10-CM

## 2024-02-12 DIAGNOSIS — I42.2 HYPERTROPHIC CARDIOMYOPATHY (H): Primary | ICD-10-CM

## 2024-02-12 DIAGNOSIS — I48.0 PAF (PAROXYSMAL ATRIAL FIBRILLATION) (H): ICD-10-CM

## 2024-02-12 DIAGNOSIS — R06.09 DYSPNEA ON EXERTION: ICD-10-CM

## 2024-02-12 DIAGNOSIS — I49.5 SICK SINUS SYNDROME (H): ICD-10-CM

## 2024-02-12 DIAGNOSIS — I15.8 OTHER SECONDARY HYPERTENSION: ICD-10-CM

## 2024-02-12 DIAGNOSIS — I21.9 MYOCARDIAL INFARCTION WITH NONOBSTRUCTIVE CORONARY ARTERIES (H): ICD-10-CM

## 2024-02-12 PROCEDURE — 99215 OFFICE O/P EST HI 40 MIN: CPT | Performed by: GENERAL ACUTE CARE HOSPITAL

## 2024-02-12 RX ORDER — METOPROLOL SUCCINATE 200 MG/1
200 TABLET, EXTENDED RELEASE ORAL DAILY
Qty: 90 TABLET | Refills: 3 | Status: ON HOLD | OUTPATIENT
Start: 2024-02-12 | End: 2024-07-25

## 2024-02-12 NOTE — LETTER
2/12/2024    Clifford Gamboa MD  [address]  [facility] [address]    RE: Abraham Dong       Dear Colleague,     I had the pleasure of seeing Abraham Dong in the [facility].  HEART CARE ENCOUNTER NOTE          Assessment/Recommendations   Assessment:    Hypertrophic cardiomyopathy with no evidence of resting left ventricular outflow tract obstruction but left ventricular end diastolic pressure was markedly elevated at 38 mmHg on cardiac catheterization 12/2/2021 and he reports exertional dyspnea and lightheadedness which may be symptoms of obstruction. His maximal wall thickness of 32 mm and presence of myocardial fibrosis noted on cardiac MRI 9/27/2023 as well as nonsustained ventricular tachycardia are risk factors for sudden cardiac death.  Sick sinus syndrome status post Medtronic dual-chamber permanent pacemaker placement 12/23/2021. Normal device function noted recently.  Paroxysmal atrial fibrillation. This has been quiescent. [id number] score is at least 1 (hypertension).  Nonsustained ventricular tachycardia 8/25/2022 noted on pacemaker interrogation possibly associated with symptoms.  Nonobstructive coronary artery disease seen on coronary angiography 12/2/2021.  Positional lightheadedness which has been attributed to orthostatic hypotension. This could be a symptom of hypertropic cardiomyopathy.  Hyperlipidemia. Last LDL 63 mg/dL.  Resting essential hypertension.  Body mass index 34.7 kg/m .    Plan:  Exercise stress echocardiogram to evaluate for exercise-induced obstruction, hypotension and ventricular arrhythmias.  Continue metoprolol succinate 200 mg daily.  After his stress echocardiogram, we will likely refer him to electrophysiology to discuss upgrading his permanent pacemaker to an implantable cardioverter-defibrillator for the primary prevention of sudden cardiac death.  If his stress echocardiogram does indicate obstruction, as he is already on the maximum dose of  metoprolol we can consider referring him to a hypertrophic cardiomyopathy specialty center to discuss advanced medical therapies as well as septal myectomy.  Although his [id number] score is only 1, his hypertrophic cardiomyopathy likely increases his risk of thromboembolism so I would favor continuing apixaban 5 mg twice daily.  Continue atorvastatin 80 mg daily.  He probably does not need to take ezetimibe. We can discuss stopping this at follow-up.  Follow-up with me in 6 months.       A total time of 40 minutes was spent on the date of this encounter with greater than 50% the time spent face-to-face including counseling and coordination of care.    History of Present Illness   Mr. Abraham Dong is a 63 year old male with a significant past history of HCM, SSS s/p Medtronic dual-chamber PPM 12/23/2021, paroxysmal AF, nonobstructive CAD and HLD presenting to [facility] care. He previously followed in cardiology clinic with Radha Ferrell.    Despite having a pacemaker, he still has episodes of feeling lightheaded when standing. He also had an episode of 18-beats of NSVT noted 8/25/2022 on pacemaker interrogation and he recalls feeling lightheaded and possibly losing consciousness during this. He was started on metoprolol which has since been increased to the max dose of 200 mg a day. No further episodes of NSVT have been seen but he still feels the same.    He also notes getting out of breath with exertion, especially if going up the stairs or any incline. This has been present for the last few years. It does not seem to be getting worse but has not improved despite all his cardiac treatments. He had a cardiac MRI 9/27/2023 showing a maximum septal wall thickness of 32 mm plus myocardial fibrosis.     No exertional chest pain/pressure/tightness, shortness of breath at rest, lower extremity swelling, palpitations, paroxysmal nocturnal dyspnea (PND), or orthopnea.     Cardiac Problems and Cardiac Diagnostics      Most Recent Cardiac testing:  ECG dated 12/2/2021 (personaly reviewed and interpreted): SR, RBBB, LVH with repolarization abnormality    Event monitor 12/9/2021 (report reviewed):  Results:  Indication for study: Bradycardia  Time monitored: 10 days 16 hours.  Time analyzed: 7 days 11 hours.    The baseline rhythm transmission demonstrated normal sinus rhythm with heart rates in the 70s.  The OR interval is normal.  The QRS duration is at the upper limits of normal or demonstrating true IVCD throughout all recordings.  The patient had 24 manually activated rhythm recordings.  Symptoms were reported and included lightheadedness, dizziness, heart racing, shortness of breath, and chest pain.  In 14 of the 24 recordings the patient demonstrated some form of arrhythmia ranging from simple ectopy (PACs + PVCs) to atrial fibrillation on 2 different days.  10 of the 24 recordings however demonstrated sinus rhythm and/or sinus tachycardia with IVCD but no other pathologic rhythm disturbance.  The patient had 3 auto triggered recordings.  Symptoms were now reported.  The patient's rhythm demonstrated normal sinus rhythm with heart rates ranging between 58 and 84 bpm with one of the recordings showing isolated PVCs.     Impression:  Abnormal multi day patient activated monitor by virtue of the presence of atrial fibrillation.  The recordings from December 11 and December 13 appear to be consistent with atrial fibrillation with a ventricular response as high as 122 bpm.  The automated over read from December 10 and December 17 misdiagnosed atrial fibrillation when the rhythm is actually sinus rhythm with frequent runs of ectopic atrial tachycardia.  The patient's symptomatic recordings do not consistently correlate with a pathologic rhythm disturbance with roughly 40% demonstrating sinus rhythm alone.  Indication for study: Bradycardia.  The patient demonstrated some mild bradycardia and conduction system disease (IVCD) but  no profound bradycardia or pauses.  No significant ventricular arrhythmia.    Device interrogation 1/5/2024 (report reviewed):  Encounter Type: Routine remote device check  Device: Medtronic [place] (D) pacemaker  Pacing % /Programmed: AP 95.9%,  26.1%, AAIR-DDDR 60-130bpm  Lead(s): Stable  Battery longevity: Estimating 11.5 years remaining  Presenting: APVS 60bpm  Atrial high rates: Since 9/26/23, none detected  Anticoagulant: Eliquis  Ventricular High rates: None  Comments: Normal device function.     ECHO 7/19/2023 (report reviewed):   1. Normal left ventricular size and systolic performance with a visually estimated ejection fraction of 55%.  2. There is moderate concentric increase in left ventricular wall thickness is more moderate-severe septal thickening.  3. No significant valvular heart disease is identified on this study.  4. Normal right ventricular size with low normal right ventricular systolic performance.  5. There is mild left atrial enlargement.  6. There is moderate aortic root enlargement.  7. When compared to the prior real-time echocardiogram dated 2 December 2021, the findings are felt to be fairly similar on both examinations.    Cardiac MRI 9/27/2023 (report reviewed):  1.  Significant hypertrophic cardiomyopathy with maximal septal wall thickness 32 mm.  Asymmetric LVH due to hypertrophic cardiomyopathy.  The estimated left ventricular ejection fraction is 55-60%.  LVOT obstruction is indicated by septal motion of anterior mitral leaflet.  2.  Patch myocardial scars in septal and basal anterior walls. Diffuse fibrotic tissue in thickened myocardium.    3.  Normal right ventricular size and systolic function.  Pacemaker lead in right ventricle.  4.  Valves are not well visualized.  Mild mitral valve regurgitation.    Cardiac cath 12/2/2021 (report reviewed):  Diffusely tortuous coronary arteries with mild atherosclerosis consistent with hypertensive coronary disease. Large, ectatic RCA.  LV  EDP extremely elevated at 38 mmHg.  LV EF normal with possible apical hypertrophy vs diverticulum.     Medications  Allergies   Current Outpatient Medications   Medication Sig Dispense Refill    acetaminophen-caffeine (EXCEDRIN TENSION HEADACHE) 500-65 MG TABS Take 2 tablets by mouth daily as needed for mild pain      atorvastatin (LIPITOR) 80 MG tablet Take 1 tablet (80 mg) by mouth daily 30 tablet 0    ELIQUIS ANTICOAGULANT 5 MG tablet Take 1 tablet (5 mg) by mouth 2 times daily 180 tablet 0    ezetimibe (ZETIA) 10 MG tablet Take 1 tablet (10 mg) by mouth once daily 90 tablet 3    metoprolol succinate ER (TOPROL XL) 200 MG 24 hr tablet Take 1 tablet (200 mg) by mouth daily 90 tablet 3    nitroGLYcerin (NITROSTAT) 0.4 MG sublingual tablet One tablet under the tongue every 5 minutes if needed for chest pain. May repeat every 5 minutes for a maximum of 3 doses in 15 minutes 25 tablet 3    sertraline (ZOLOFT) 100 MG tablet Take 100 mg by mouth daily         No Known Allergies     Physical Examination Review of Systems   BP (!) 130/90 (BP Location: Right arm, Patient Position: Sitting, Cuff Size: Adult Regular)   Pulse 62   Resp 16   Wt 119.3 kg (263 lb)   BMI 34.70 kg/m    Body mass index is 34.7 kg/m .  Wt Readings from Last 3 Encounters:   02/12/24 119.3 kg (263 lb)   06/27/23 117 kg (258 lb)   07/20/22 117.6 kg (259 lb 3.2 oz)       General Appearance:   Pleasant  male, appears  stated age. no acute distress, normal body habitus   ENT/Mouth: membranes moist, no apparent gingival bleeding.      EYES:  no scleral icterus, normal conjunctivae   Neck: no carotid bruits. No anterior cervical lymphadenopaty   Respiratory:   lungs are clear to auscultation, no rales or wheezing,  equal chest wall expansion    Cardiovascular:   Regular rhythm, normal rate. Normal first and second heart sounds with 2/6 early-peaking systolic crescendo-decrescendo murmur heard loudest at the right upper sternal border. No rubs or  gallops; the carotid, radial and posterior tibial pulses are intact, Jugular venous pressure normal, no edema bilaterally    Abdomen/GI:  no organomegaly, masses, bruits, or tenderness; bowel sounds are present   Extremities: no cyanosis or clubbing   Skin: no xanthelasma, warm.    Heme/lymph/ Immunology No apparent bleeding noted.   Neurologic: Alert and oriented. normal gait, no tremors     Psychiatric: Pleasant, calm, appropriate affect.    A complete 10 system review of systems was performed and is negative except as mentioned in the HPI/subjective.         Past History   Past Medical History:   Past Medical History:   Diagnosis Date    Atrial fibrillation (H)     Benign essential hypertension     Coronary artery disease     Depression     Hyperlipidemia     Obesity        Past Surgical History:   Past Surgical History:   Procedure Laterality Date    CORONARY ANGIOGRAPHY ADULT ORDER      CV CORONARY ANGIOGRAM N/A 12/2/2021    Procedure: Coronary Angiogram;  Surgeon: Radha Ferrell MD;  Location: [facility] CV    CV LEFT HEART CATH N/A 12/2/2021    Procedure: Left Heart Cath;  Surgeon: Radha Ferrell MD;  Location: [facility] CV    CV LEFT VENTRICULOGRAM N/A 12/2/2021    Procedure: Left Ventriculogram;  Surgeon: Radha Ferrell MD;  Location: [facility] CV    EP PACEMAKER N/A 12/23/2021    Procedure: EP Pacemaker;  Surgeon: Kun Lopez MD;  Location: [facility] CV    HERNIA REPAIR      IMPLANT PACEMAKER      KNEE SURGERY      TONSILLECTOMY      VASECTOMY         Family History:   Family History   Problem Relation Age of Onset    Diabetes Mother     Hypertension Mother     Coronary Artery Disease No family hx of     Cerebrovascular Disease No family hx of         Social History:   Social History     Socioeconomic History    Marital status:      Spouse name: Not on file    Number of children: Not on file    Years of education: Not on file    Highest education level: Not  on file   Occupational History    Not on file   Tobacco Use    Smoking status: Never    Smokeless tobacco: Never   Substance and Sexual Activity    Alcohol use: Yes     Alcohol/week: 6.0 standard drinks of alcohol     Types: 6 Standard drinks or equivalent per week     Comment: 7 drinks per week    Drug use: Never    Sexual activity: Not on file   Other Topics Concern    Parent/sibling w/ CABG, MI or angioplasty before 65F 55M? Not Asked   Social History Narrative    Not on file     Social Determinants of Health     Financial Resource Strain: Not on file   Food Insecurity: Not on file   Transportation Needs: Not on file   Physical Activity: Not on file   Stress: Not on file   Social Connections: Not on file   Interpersonal Safety: Not on file   Housing Stability: Not on file              Lab Results    Chemistry/lipid CBC Cardiac Enzymes/BNP/TSH/INR   Lab Results   Component Value Date    CHOL 138 06/27/2023    HDL 45 06/27/2023    LDL 63 06/27/2023    TRIG 148 06/27/2023    CR 0.84 12/23/2021    BUN 27 (H) 12/23/2021    POTASSIUM 4.5 12/23/2021     12/23/2021    CO2 22 12/23/2021      Lab Results   Component Value Date    WBC 5.4 12/23/2021    HGB 14.5 12/23/2021    HCT 43.2 12/23/2021    MCV 93 12/23/2021     12/23/2021      Lab Results   Component Value Date    TROPONINI 0.04 12/03/2021    TSH 3.63 12/23/2021    INR 1.05 12/02/2021          Leandro Rojas MD Merged with [facility]  Non-Invasive Cardiologist  [facility]  Pager [phone]      Thank you for allowing me to participate in the care of your patient.      Sincerely,     Leandro Rojas MD     [facility]  cc:   No referring provider defined for this encounter.

## 2024-02-12 NOTE — PATIENT INSTRUCTIONS
We will schedule you for a stress test.  Stay on metoprolol 200 mg a day.  Based on your test results, we may refer you to get your pacemaker upgraded to a defibrillator.  We may also have you see [facility] to determine if you need heart surgery.  See me back in 6 months.

## 2024-02-12 NOTE — PROGRESS NOTES
HEART CARE ENCOUNTER NOTE          Assessment/Recommendations   Assessment:    Hypertrophic cardiomyopathy with no evidence of resting left ventricular outflow tract obstruction but left ventricular end diastolic pressure was markedly elevated at 38 mmHg on cardiac catheterization 12/2/2021 and he reports exertional dyspnea and lightheadedness which may be symptoms of obstruction. His maximal wall thickness of 32 mm and presence of myocardial fibrosis noted on cardiac MRI 9/27/2023 as well as nonsustained ventricular tachycardia are risk factors for sudden cardiac death.  Sick sinus syndrome status post Medtronic dual-chamber permanent pacemaker placement 12/23/2021. Normal device function noted recently.  Paroxysmal atrial fibrillation. This has been quiescent. [id number] score is at least 1 (hypertension).  Nonsustained ventricular tachycardia 8/25/2022 noted on pacemaker interrogation possibly associated with symptoms.  Nonobstructive coronary artery disease seen on coronary angiography 12/2/2021.  Positional lightheadedness which has been attributed to orthostatic hypotension. This could be a symptom of hypertropic cardiomyopathy.  Hyperlipidemia. Last LDL 63 mg/dL.  Resting essential hypertension.  Body mass index 34.7 kg/m .    Plan:  Exercise stress echocardiogram to evaluate for exercise-induced obstruction, hypotension and ventricular arrhythmias.  Continue metoprolol succinate 200 mg daily.  After his stress echocardiogram, we will likely refer him to electrophysiology to discuss upgrading his permanent pacemaker to an implantable cardioverter-defibrillator for the primary prevention of sudden cardiac death.  If his stress echocardiogram does indicate obstruction, as he is already on the maximum dose of metoprolol we can consider referring him to a hypertrophic cardiomyopathy specialty center to discuss advanced medical therapies as well as septal myectomy.  Although his [id number] score is only 1, his  hypertrophic cardiomyopathy likely increases his risk of thromboembolism so I would favor continuing apixaban 5 mg twice daily.  Continue atorvastatin 80 mg daily.  He probably does not need to take ezetimibe. We can discuss stopping this at follow-up.  Follow-up with me in 6 months.       A total time of 40 minutes was spent on the date of this encounter with greater than 50% the time spent face-to-face including counseling and coordination of care.    History of Present Illness   Mr. Abraham Dong is a 63 year old male with a significant past history of HCM, SSS s/p Medtronic dual-chamber PPM 12/23/2021, paroxysmal AF, nonobstructive CAD and HLD presenting to establish care. He previously followed in cardiology clinic with Radha Ferrell.    Despite having a pacemaker, he still has episodes of feeling lightheaded when standing. He also had an episode of 18-beats of NSVT noted 8/25/2022 on pacemaker interrogation and he recalls feeling lightheaded and possibly losing consciousness during this. He was started on metoprolol which has since been increased to the max dose of 200 mg a day. No further episodes of NSVT have been seen but he still feels the same.    He also notes getting out of breath with exertion, especially if going up the stairs or any incline. This has been present for the last few years. It does not seem to be getting worse but has not improved despite all his cardiac treatments. He had a cardiac MRI 9/27/2023 showing a maximum septal wall thickness of 32 mm plus myocardial fibrosis.     No exertional chest pain/pressure/tightness, shortness of breath at rest, lower extremity swelling, palpitations, paroxysmal nocturnal dyspnea (PND), or orthopnea.     Cardiac Problems and Cardiac Diagnostics     Most Recent Cardiac testing:  ECG dated 12/2/2021 (personaly reviewed and interpreted): SR, RBBB, LVH with repolarization abnormality    Event monitor 12/9/2021 (report reviewed):  Results:  Indication for  study: Bradycardia  Time monitored: 10 days 16 hours.  Time analyzed: 7 days 11 hours.    The baseline rhythm transmission demonstrated normal sinus rhythm with heart rates in the 70s.  The LA interval is normal.  The QRS duration is at the upper limits of normal or demonstrating true IVCD throughout all recordings.  The patient had 24 manually activated rhythm recordings.  Symptoms were reported and included lightheadedness, dizziness, heart racing, shortness of breath, and chest pain.  In 14 of the 24 recordings the patient demonstrated some form of arrhythmia ranging from simple ectopy (PACs + PVCs) to atrial fibrillation on 2 different days.  10 of the 24 recordings however demonstrated sinus rhythm and/or sinus tachycardia with IVCD but no other pathologic rhythm disturbance.  The patient had 3 auto triggered recordings.  Symptoms were now reported.  The patient's rhythm demonstrated normal sinus rhythm with heart rates ranging between 58 and 84 bpm with one of the recordings showing isolated PVCs.     Impression:  Abnormal multi day patient activated monitor by virtue of the presence of atrial fibrillation.  The recordings from December 11 and December 13 appear to be consistent with atrial fibrillation with a ventricular response as high as 122 bpm.  The automated over read from December 10 and December 17 misdiagnosed atrial fibrillation when the rhythm is actually sinus rhythm with frequent runs of ectopic atrial tachycardia.  The patient's symptomatic recordings do not consistently correlate with a pathologic rhythm disturbance with roughly 40% demonstrating sinus rhythm alone.  Indication for study: Bradycardia.  The patient demonstrated some mild bradycardia and conduction system disease (IVCD) but no profound bradycardia or pauses.  No significant ventricular arrhythmia.    Device interrogation 1/5/2024 (report reviewed):  Encounter Type: Routine remote device check  Device: [employer] (D)  pacemaker  Pacing % /Programmed: AP 95.9%,  26.1%, AAIR-DDDR 60-130bpm  Lead(s): Stable  Battery longevity: Estimating 11.5 years remaining  Presenting: APVS 60bpm  Atrial high rates: Since 9/26/23, none detected  Anticoagulant: Eliquis  Ventricular High rates: None  Comments: Normal device function.     ECHO 7/19/2023 (report reviewed):   1. Normal left ventricular size and systolic performance with a visually estimated ejection fraction of 55%.  2. There is moderate concentric increase in left ventricular wall thickness is more moderate-severe septal thickening.  3. No significant valvular heart disease is identified on this study.  4. Normal right ventricular size with low normal right ventricular systolic performance.  5. There is mild left atrial enlargement.  6. There is moderate aortic root enlargement.  7. When compared to the prior real-time echocardiogram dated 2 December 2021, the findings are felt to be fairly similar on both examinations.    Cardiac MRI 9/27/2023 (report reviewed):  1.  Significant hypertrophic cardiomyopathy with maximal septal wall thickness 32 mm.  Asymmetric LVH due to hypertrophic cardiomyopathy.  The estimated left ventricular ejection fraction is 55-60%.  LVOT obstruction is indicated by septal motion of anterior mitral leaflet.  2.  Patch myocardial scars in septal and basal anterior walls. Diffuse fibrotic tissue in thickened myocardium.    3.  Normal right ventricular size and systolic function.  Pacemaker lead in right ventricle.  4.  Valves are not well visualized.  Mild mitral valve regurgitation.    Cardiac cath 12/2/2021 (report reviewed):  Diffusely tortuous coronary arteries with mild atherosclerosis consistent with hypertensive coronary disease. Large, ectatic RCA.  LV EDP extremely elevated at 38 mmHg.  LV EF normal with possible apical hypertrophy vs diverticulum.     Medications  Allergies   Current Outpatient Medications   Medication Sig Dispense Refill     acetaminophen-caffeine (EXCEDRIN TENSION HEADACHE) 500-65 MG TABS Take 2 tablets by mouth daily as needed for mild pain      atorvastatin (LIPITOR) 80 MG tablet Take 1 tablet (80 mg) by mouth daily 30 tablet 0    ELIQUIS ANTICOAGULANT 5 MG tablet Take 1 tablet (5 mg) by mouth 2 times daily 180 tablet 0    ezetimibe (ZETIA) 10 MG tablet Take 1 tablet (10 mg) by mouth once daily 90 tablet 3    metoprolol succinate ER (TOPROL XL) 200 MG 24 hr tablet Take 1 tablet (200 mg) by mouth daily 90 tablet 3    nitroGLYcerin (NITROSTAT) 0.4 MG sublingual tablet One tablet under the tongue every 5 minutes if needed for chest pain. May repeat every 5 minutes for a maximum of 3 doses in 15 minutes 25 tablet 3    sertraline (ZOLOFT) 100 MG tablet Take 100 mg by mouth daily         No Known Allergies     Physical Examination Review of Systems   BP (!) 130/90 (BP Location: Right arm, Patient Position: Sitting, Cuff Size: Adult Regular)   Pulse 62   Resp 16   Wt 119.3 kg (263 lb)   BMI 34.70 kg/m    Body mass index is 34.7 kg/m .  Wt Readings from Last 3 Encounters:   02/12/24 119.3 kg (263 lb)   06/27/23 117 kg (258 lb)   07/20/22 117.6 kg (259 lb 3.2 oz)       General Appearance:   Pleasant  male, appears  stated age. no acute distress, normal body habitus   ENT/Mouth: membranes moist, no apparent gingival bleeding.      EYES:  no scleral icterus, normal conjunctivae   Neck: no carotid bruits. No anterior cervical lymphadenopaty   Respiratory:   lungs are clear to auscultation, no rales or wheezing,  equal chest wall expansion    Cardiovascular:   Regular rhythm, normal rate. Normal first and second heart sounds with 2/6 early-peaking systolic crescendo-decrescendo murmur heard loudest at the right upper sternal border. No rubs or gallops; the carotid, radial and posterior tibial pulses are intact, Jugular venous pressure normal, no edema bilaterally    Abdomen/GI:  no organomegaly, masses, bruits, or tenderness; bowel sounds are  present   Extremities: no cyanosis or clubbing   Skin: no xanthelasma, warm.    Heme/lymph/ Immunology No apparent bleeding noted.   Neurologic: Alert and oriented. normal gait, no tremors     Psychiatric: Pleasant, calm, appropriate affect.    A complete 10 system review of systems was performed and is negative except as mentioned in the HPI/subjective.         Past History   Past Medical History:   Past Medical History:   Diagnosis Date    Atrial fibrillation (H)     Benign essential hypertension     Coronary artery disease     Depression     Hyperlipidemia     Obesity        Past Surgical History:   Past Surgical History:   Procedure Laterality Date    CORONARY ANGIOGRAPHY ADULT ORDER      CV CORONARY ANGIOGRAM N/A 12/2/2021    Procedure: Coronary Angiogram;  Surgeon: Radha Ferrell MD;  Location: [facility] CATH [facility] CV    CV LEFT HEART CATH N/A 12/2/2021    Procedure: Left Heart Cath;  Surgeon: Radha Ferrell MD;  Location: [facility] CV    CV LEFT VENTRICULOGRAM N/A 12/2/2021    Procedure: Left Ventriculogram;  Surgeon: Radha Ferrell MD;  Location: [facility] CV    EP PACEMAKER N/A 12/23/2021    Procedure: EP Pacemaker;  Surgeon: Kun Lopez MD;  Location: [facility] LAB CV    HERNIA REPAIR      IMPLANT PACEMAKER      KNEE SURGERY      TONSILLECTOMY      VASECTOMY         Family History:   Family History   Problem Relation Age of Onset    Diabetes Mother     Hypertension Mother     Coronary Artery Disease No family hx of     Cerebrovascular Disease No family hx of         Social History:   Social History     Socioeconomic History    Marital status:      Spouse name: Not on file    Number of children: Not on file    Years of education: Not on file    Highest education level: Not on file   Occupational History    Not on file   Tobacco Use    Smoking status: Never    Smokeless tobacco: Never   Substance and Sexual Activity    Alcohol use: Yes     Alcohol/week: 6.0 standard drinks  of alcohol     Types: 6 Standard drinks or equivalent per week     Comment: 7 drinks per week    Drug use: Never    Sexual activity: Not on file   Other Topics Concern    Parent/sibling w/ CABG, MI or angioplasty before 65F 55M? Not Asked   Social History Narrative    Not on file     Social Determinants of Health     Financial Resource Strain: Not on file   Food Insecurity: Not on file   Transportation Needs: Not on file   Physical Activity: Not on file   Stress: Not on file   Social Connections: Not on file   Interpersonal Safety: Not on file   Housing Stability: Not on file              Lab Results    Chemistry/lipid CBC Cardiac Enzymes/BNP/TSH/INR   Lab Results   Component Value Date    CHOL 138 06/27/2023    HDL 45 06/27/2023    LDL 63 06/27/2023    TRIG 148 06/27/2023    CR 0.84 12/23/2021    BUN 27 (H) 12/23/2021    POTASSIUM 4.5 12/23/2021     12/23/2021    CO2 22 12/23/2021      Lab Results   Component Value Date    WBC 5.4 12/23/2021    HGB 14.5 12/23/2021    HCT 43.2 12/23/2021    MCV 93 12/23/2021     12/23/2021      Lab Results   Component Value Date    TROPONINI 0.04 12/03/2021    TSH 3.63 12/23/2021    INR 1.05 12/02/2021          Leandro Rojas MD [facility]  Non-Invasive Cardiologist  [facility]  Pager [phone]

## 2024-02-23 ENCOUNTER — HOSPITAL ENCOUNTER (OUTPATIENT)
Dept: CARDIOLOGY | Facility: HOSPITAL | Age: 64
Discharge: HOME | End: 2024-02-23
Attending: GENERAL ACUTE CARE HOSPITAL | Admitting: GENERAL ACUTE CARE HOSPITAL
Payer: COMMERCIAL

## 2024-02-23 DIAGNOSIS — R06.09 DYSPNEA ON EXERTION: ICD-10-CM

## 2024-02-23 DIAGNOSIS — I42.2 HYPERTROPHIC CARDIOMYOPATHY (H): ICD-10-CM

## 2024-02-23 PROCEDURE — 93350 STRESS TTE ONLY: CPT | Mod: 26 | Performed by: INTERNAL MEDICINE

## 2024-02-23 PROCEDURE — 93325 DOPPLER ECHO COLOR FLOW MAPG: CPT | Mod: 26 | Performed by: INTERNAL MEDICINE

## 2024-02-23 PROCEDURE — 93321 DOPPLER ECHO F-UP/LMTD STD: CPT | Mod: 26 | Performed by: INTERNAL MEDICINE

## 2024-02-23 PROCEDURE — 255N000002 HC RX 255 OP 636: Performed by: GENERAL ACUTE CARE HOSPITAL

## 2024-02-23 PROCEDURE — 93016 CV STRESS TEST SUPVJ ONLY: CPT | Performed by: INTERNAL MEDICINE

## 2024-02-23 PROCEDURE — 93352 ADMIN ECG CONTRAST AGENT: CPT | Performed by: INTERNAL MEDICINE

## 2024-02-23 PROCEDURE — 93018 CV STRESS TEST I&R ONLY: CPT | Performed by: INTERNAL MEDICINE

## 2024-02-23 PROCEDURE — 93325 DOPPLER ECHO COLOR FLOW MAPG: CPT | Mod: TC

## 2024-02-23 RX ADMIN — PERFLUTREN 6 ML: 6.52 INJECTION, SUSPENSION INTRAVENOUS at 08:54

## 2024-02-26 DIAGNOSIS — I44.30 ATRIOVENTRICULAR BLOCK: ICD-10-CM

## 2024-02-26 DIAGNOSIS — I42.2 HYPERTROPHIC CARDIOMYOPATHY (H): Primary | ICD-10-CM

## 2024-02-26 DIAGNOSIS — R06.09 DYSPNEA ON EXERTION: ICD-10-CM

## 2024-02-26 DIAGNOSIS — I49.5 SICK SINUS SYNDROME (H): ICD-10-CM

## 2024-02-26 DIAGNOSIS — Z95.0 CARDIAC PACEMAKER IN SITU: ICD-10-CM

## 2024-03-15 ENCOUNTER — TELEPHONE (OUTPATIENT)
Dept: CARDIOLOGY | Facility: CLINIC | Age: 64
End: 2024-03-15
Payer: COMMERCIAL

## 2024-03-20 NOTE — PROGRESS NOTES
HEART CARE ENCOUNTER CONSULTATON NOTE      [facility]  [phone]      Assessment/Recommendations   Assessment/Plan:    Abraham Dong is a very pleasant 63 year old male with a significant past history of HCM, SSS s/p Medtronic dual-chamber PPM 12/23/2021, paroxysmal AF, nonobstructive CAD and HLD presents today to the EP clinic.    HCM  - maximal septal thickness is 32 mm  - cardiac MR showed myocardial scars in septal and basal anterior walls. Diffuse fibrotic tissue in thickened myocardium  - 18 beat NSVT noted on pacemaker  - he meets indications for a defibrillator lead upgrade  - discussed options of addition of ICD lead to current dual chamber pacemaker, vs extraction of existing RV lead with addition of ICD lead. Risks benefits discussed in detail.He would like to proceed with lead extraction followed by e implantation.    2. Dual chamber pacemaker  - Device interrogation as below  - continue remote checks    3. Paroxysmal AF  - No recent episodes of AF  - continue current meds    Time spent: 60 minutes spent on the date of the encounter doing chart review, history and exam, documentation and further activities as noted above.    The longitudinal plan of care for the condition(s) below were addressed during this visit. Due to the added complexity in care, I will continue support in the subsequent management of this condition(s) and with the ongoing continuity of care of this condition(s).          History of Present Illness/Subjective    HPI: Abraham Dong is a very pleasant 63 year old male  with a significant past history of HCM, SSS s/p Medtronic dual-chamber PPM 12/23/2021, paroxysmal AF, nonobstructive CAD and HLD presents today to the EP clinic.    Abraham had an episode of 18-beats of NSVT noted 8/25/2022 on pacemaker interrogation and he recalls feeling lightheaded and possibly losing consciousness during this. He was started on metoprolol which has since been increased to the  max dose of 200 mg a day. No further episodes of NSVT have been seen but he still feels the same.     He had a cardiac MRI 9/27/2023 showing a maximum septal wall thickness of 32 mm plus myocardial fibrosis.         Event monitor 12/9/2021 (report reviewed):  Results:  Indication for study: Bradycardia  Time monitored: 10 days 16 hours.  Time analyzed: 7 days 11 hours.    The baseline rhythm transmission demonstrated normal sinus rhythm with heart rates in the 70s.  The UT interval is normal.  The QRS duration is at the upper limits of normal or demonstrating true IVCD throughout all recordings.  The patient had 24 manually activated rhythm recordings.  Symptoms were reported and included lightheadedness, dizziness, heart racing, shortness of breath, and chest pain.  In 14 of the 24 recordings the patient demonstrated some form of arrhythmia ranging from simple ectopy (PACs + PVCs) to atrial fibrillation on 2 different days.  10 of the 24 recordings however demonstrated sinus rhythm and/or sinus tachycardia with IVCD but no other pathologic rhythm disturbance.  The patient had 3 auto triggered recordings.  Symptoms were now reported.  The patient's rhythm demonstrated normal sinus rhythm with heart rates ranging between 58 and 84 bpm with one of the recordings showing isolated PVCs.     Impression:  Abnormal multi day patient activated monitor by virtue of the presence of atrial fibrillation.  The recordings from December 11 and December 13 appear to be consistent with atrial fibrillation with a ventricular response as high as 122 bpm.  The automated over read from December 10 and December 17 misdiagnosed atrial fibrillation when the rhythm is actually sinus rhythm with frequent runs of ectopic atrial tachycardia.  The patient's symptomatic recordings do not consistently correlate with a pathologic rhythm disturbance with roughly 40% demonstrating sinus rhythm alone.  Indication for study: Bradycardia.  The patient  demonstrated some mild bradycardia and conduction system disease (IVCD) but no profound bradycardia or pauses.  No significant ventricular arrhythmia.     Device interrogation 1/5/2024 (report reviewed):  Encounter Type: Routine remote device check  Device: Medtronic [place] (D) pacemaker  Pacing % /Programmed: AP 95.9%,  26.1%, AAIR-DDDR 60-130bpm  Lead(s): Stable  Battery longevity: Estimating 11.5 years remaining  Presenting: APVS 60bpm  Atrial high rates: Since 9/26/23, none detected  Anticoagulant: Eliquis  Ventricular High rates: None  Comments: Normal device function.      ECHO 7/19/2023 (report reviewed):   1. Normal left ventricular size and systolic performance with a visually estimated ejection fraction of 55%.  2. There is moderate concentric increase in left ventricular wall thickness is more moderate-severe septal thickening.  3. No significant valvular heart disease is identified on this study.  4. Normal right ventricular size with low normal right ventricular systolic performance.  5. There is mild left atrial enlargement.  6. There is moderate aortic root enlargement.  7. When compared to the prior real-time echocardiogram dated 2 December 2021, the findings are felt to be fairly similar on both examinations.     Cardiac MRI 9/27/2023 (report reviewed):  1.  Significant hypertrophic cardiomyopathy with maximal septal wall thickness 32 mm.  Asymmetric LVH due to hypertrophic cardiomyopathy.  The estimated left ventricular ejection fraction is 55-60%.  LVOT obstruction is indicated by septal motion of anterior mitral leaflet.  2.  Patch myocardial scars in septal and basal anterior walls. Diffuse fibrotic tissue in thickened myocardium.    3.  Normal right ventricular size and systolic function.  Pacemaker lead in right ventricle.  4.  Valves are not well visualized.  Mild mitral valve regurgitation.     Cardiac cath 12/2/2021 (report reviewed):  Diffusely tortuous coronary arteries with mild  atherosclerosis consistent with hypertensive coronary disease. Large, ectatic RCA.  LV EDP extremely elevated at 38 mmHg.  LV EF normal with possible apical hypertrophy vs diverticulum.      Labs below reviewed personally     Physical Examination  Review of Systems   Vitals: /84 (BP Location: Left arm, Patient Position: Sitting, Cuff Size: Adult Large)   Pulse 61   Resp 16   Wt 119.7 kg (264 lb)   BMI 34.83 kg/m    BMI= Body mass index is 34.83 kg/m .  Wt Readings from Last 3 Encounters:   03/21/24 119.7 kg (264 lb)   02/12/24 119.3 kg (263 lb)   06/27/23 117 kg (258 lb)       General Appearance:   no distress, normal body habitus   ENT/Mouth: membranes moist, no oral lesions or bleeding gums.      EYES:  no scleral icterus, normal conjunctivae   Neck: no carotid bruits or thyromegaly   Chest/Lungs:   lungs are clear to auscultation, no rales or wheezing, no sternal scar, equal chest wall expansion    Cardiovascular:   Regular. Normal first and second heart sounds with no murmurs, rubs, or gallops; the carotid, radial and posterior tibial pulses are intact, no edema bilaterally    Abdomen:  no organomegaly, masses, bruits, or tenderness; bowel sounds are present   Extremities: no cyanosis or clubbing   Skin: no xanthelasma, warm.    Neurologic: normal  bilateral, no tremors     Psychiatric: alert and oriented x3, calm        Please refer above for cardiac ROS details.        Medical History  Surgical History Family History Social History   Past Medical History:   Diagnosis Date    Atrial fibrillation (H)     Benign essential hypertension     Coronary artery disease     Depression     Hyperlipidemia     Obesity      Past Surgical History:   Procedure Laterality Date    CORONARY ANGIOGRAPHY ADULT ORDER      CV CORONARY ANGIOGRAM N/A 12/2/2021    Procedure: Coronary Angiogram;  Surgeon: Radha Ferrell MD;  Location: [facility] CATH LAB CV    CV LEFT HEART CATH N/A 12/2/2021    Procedure: Left Heart Cath;   Surgeon: Radha Ferrell MD;  Location: [facility] CATH LAB CV    CV LEFT VENTRICULOGRAM N/A 12/2/2021    Procedure: Left Ventriculogram;  Surgeon: Radha Ferrell MD;  Location: [facility] CATH LAB CV    EP PACEMAKER N/A 12/23/2021    Procedure: EP Pacemaker;  Surgeon: Kun Lopez MD;  Location: [facility] CV    HERNIA REPAIR      IMPLANT PACEMAKER      KNEE SURGERY      TONSILLECTOMY      VASECTOMY       Family History   Problem Relation Age of Onset    Diabetes Mother     Hypertension Mother     Coronary Artery Disease No family hx of     Cerebrovascular Disease No family hx of         Social History     Socioeconomic History    Marital status:      Spouse name: Not on file    Number of children: Not on file    Years of education: Not on file    Highest education level: Not on file   Occupational History    Not on file   Tobacco Use    Smoking status: Never    Smokeless tobacco: Never   Substance and Sexual Activity    Alcohol use: Yes     Alcohol/week: 6.0 standard drinks of alcohol     Types: 6 Standard drinks or equivalent per week     Comment: 7 drinks per week    Drug use: Never    Sexual activity: Not on file   Other Topics Concern    Parent/sibling w/ CABG, MI or angioplasty before 65F 55M? Not Asked   Social History Narrative    Not on file     Social Determinants of Health     Financial Resource Strain: Not on file   Food Insecurity: Not on file   Transportation Needs: Not on file   Physical Activity: Not on file   Stress: Not on file   Social Connections: Not on file   Interpersonal Safety: Not on file   Housing Stability: Not on file           Medications  Allergies   Current Outpatient Medications   Medication Sig Dispense Refill    acetaminophen-caffeine (EXCEDRIN TENSION HEADACHE) 500-65 MG TABS Take 2 tablets by mouth daily as needed for mild pain      atorvastatin (LIPITOR) 80 MG tablet Take 1 tablet (80 mg) by mouth daily 30 tablet 0    ELIQUIS ANTICOAGULANT 5 MG tablet Take 1  "tablet (5 mg) by mouth 2 times daily 180 tablet 0    ezetimibe (ZETIA) 10 MG tablet Take 1 tablet (10 mg) by mouth once daily 90 tablet 3    lisinopril (ZESTRIL) 20 MG tablet Take 20 mg by mouth daily      metoprolol succinate ER (TOPROL XL) 200 MG 24 hr tablet Take 1 tablet (200 mg) by mouth daily 90 tablet 3    nitroGLYcerin (NITROSTAT) 0.4 MG sublingual tablet One tablet under the tongue every 5 minutes if needed for chest pain. May repeat every 5 minutes for a maximum of 3 doses in 15 minutes 25 tablet 3    sertraline (ZOLOFT) 100 MG tablet Take 100 mg by mouth daily        No Known Allergies       Lab Results    Chemistry/lipid CBC Cardiac Enzymes/BNP/TSH/INR   Recent Labs   Lab Test 06/27/23  1628   CHOL 138   HDL 45   LDL 63   TRIG 148     Recent Labs   Lab Test 06/27/23  1628 07/20/22  1142 01/20/22  1141   LDL 63 71 110     Recent Labs   Lab Test 12/23/21  0702      POTASSIUM 4.5   CHLORIDE 106   CO2 22      BUN 27*   CR 0.84   GFRESTIMATED >90   TEGAN 9.4     Recent Labs   Lab Test 12/23/21  0702 12/05/21  0455 12/03/21  0432   CR 0.84 0.75 0.71     No results for input(s): \"A1C\" in the last 34731 hours.       Recent Labs   Lab Test 12/23/21  0702   WBC 5.4   HGB 14.5   HCT 43.2   MCV 93        Recent Labs   Lab Test 12/23/21  0702 12/05/21  0455 12/03/21  0432   HGB 14.5 15.7 15.6    Recent Labs   Lab Test 12/03/21  1016 12/03/21  0432 12/02/21  2237   TROPONINI 0.04 0.04 0.04     No results for input(s): \"BNP\", \"NTBNPI\", \"NTBNP\" in the last 71886 hours.  Recent Labs   Lab Test 12/23/21  0702   TSH 3.63     Recent Labs   Lab Test 12/02/21  0828   INR 1.05        El Hagen MD                                      "

## 2024-03-21 ENCOUNTER — OFFICE VISIT (OUTPATIENT)
Dept: CARDIOLOGY | Facility: CLINIC | Age: 64
End: 2024-03-21
Attending: INTERNAL MEDICINE
Payer: COMMERCIAL

## 2024-03-21 ENCOUNTER — DOCUMENTATION ONLY (OUTPATIENT)
Dept: CARDIOLOGY | Facility: CLINIC | Age: 64
End: 2024-03-21

## 2024-03-21 VITALS
HEART RATE: 61 BPM | DIASTOLIC BLOOD PRESSURE: 84 MMHG | RESPIRATION RATE: 16 BRPM | SYSTOLIC BLOOD PRESSURE: 132 MMHG | WEIGHT: 264 LBS | BODY MASS INDEX: 34.83 KG/M2

## 2024-03-21 DIAGNOSIS — Z95.0 CARDIAC PACEMAKER IN SITU: ICD-10-CM

## 2024-03-21 DIAGNOSIS — Z95.0 CARDIAC PACEMAKER IN SITU: Primary | ICD-10-CM

## 2024-03-21 DIAGNOSIS — I49.5 SICK SINUS SYNDROME (H): ICD-10-CM

## 2024-03-21 DIAGNOSIS — I44.30 ATRIOVENTRICULAR BLOCK: ICD-10-CM

## 2024-03-21 DIAGNOSIS — I42.2 HYPERTROPHIC CARDIOMYOPATHY (H): ICD-10-CM

## 2024-03-21 DIAGNOSIS — I47.29 NSVT (NONSUSTAINED VENTRICULAR TACHYCARDIA) (H): ICD-10-CM

## 2024-03-21 PROCEDURE — G2211 COMPLEX E/M VISIT ADD ON: HCPCS | Performed by: INTERNAL MEDICINE

## 2024-03-21 PROCEDURE — 99205 OFFICE O/P NEW HI 60 MIN: CPT | Performed by: INTERNAL MEDICINE

## 2024-03-21 RX ORDER — LISINOPRIL 20 MG/1
20 TABLET ORAL DAILY
Status: ON HOLD | COMMUNITY
End: 2024-07-25

## 2024-03-21 NOTE — LETTER
3/21/2024    Clifford Gamboa MD  [address]  [facility] [address]    RE: Abraham Dong       Dear Colleague,     I had the pleasure of seeing Abraham Dong in the [facility].    HEART CARE ENCOUNTER CONSULTATON NOTE      [facility]  [phone]      Assessment/Recommendations   Assessment/Plan:    Abraham Dong is a very pleasant 63 year old male with a significant past history of HCM, SSS s/p Medtronic dual-chamber PPM 12/23/2021, paroxysmal AF, nonobstructive CAD and HLD presents today to the EP clinic.    HCM  - maximal septal thickness is 32 mm  - cardiac MR showed myocardial scars in septal and basal anterior walls. Diffuse fibrotic tissue in thickened myocardium  - 18 beat NSVT noted on pacemaker  - he meets indications for a defibrillator lead upgrade  - discussed options of addition of ICD lead to current dual chamber pacemaker, vs extraction of existing RV lead with addition of ICD lead. Risks benefits discussed in detail.He would like to proceed with lead extraction followed by e implantation.    2. Dual chamber pacemaker  - Device interrogation as below  - continue remote checks    3. Paroxysmal AF  - No recent episodes of AF  - continue current meds    Time spent: 60 minutes spent on the date of the encounter doing chart review, history and exam, documentation and further activities as noted above.    The longitudinal plan of care for the condition(s) below were addressed during this visit. Due to the added complexity in care, I will continue support in the subsequent management of this condition(s) and with the ongoing continuity of care of this condition(s).          History of Present Illness/Subjective    HPI: Abraham Dong is a very pleasant 63 year old male  with a significant past history of HCM, SSS s/p Medtronic dual-chamber PPM 12/23/2021, paroxysmal AF, nonobstructive CAD and HLD presents today to the EP clinic.    Abraham had an episode of 18-beats of  NSVT noted 8/25/2022 on pacemaker interrogation and he recalls feeling lightheaded and possibly losing consciousness during this. He was started on metoprolol which has since been increased to the max dose of 200 mg a day. No further episodes of NSVT have been seen but he still feels the same.     He had a cardiac MRI 9/27/2023 showing a maximum septal wall thickness of 32 mm plus myocardial fibrosis.         Event monitor 12/9/2021 (report reviewed):  Results:  Indication for study: Bradycardia  Time monitored: 10 days 16 hours.  Time analyzed: 7 days 11 hours.    The baseline rhythm transmission demonstrated normal sinus rhythm with heart rates in the 70s.  The ME interval is normal.  The QRS duration is at the upper limits of normal or demonstrating true IVCD throughout all recordings.  The patient had 24 manually activated rhythm recordings.  Symptoms were reported and included lightheadedness, dizziness, heart racing, shortness of breath, and chest pain.  In 14 of the 24 recordings the patient demonstrated some form of arrhythmia ranging from simple ectopy (PACs + PVCs) to atrial fibrillation on 2 different days.  10 of the 24 recordings however demonstrated sinus rhythm and/or sinus tachycardia with IVCD but no other pathologic rhythm disturbance.  The patient had 3 auto triggered recordings.  Symptoms were now reported.  The patient's rhythm demonstrated normal sinus rhythm with heart rates ranging between 58 and 84 bpm with one of the recordings showing isolated PVCs.     Impression:  Abnormal multi day patient activated monitor by virtue of the presence of atrial fibrillation.  The recordings from December 11 and December 13 appear to be consistent with atrial fibrillation with a ventricular response as high as 122 bpm.  The automated over read from December 10 and December 17 misdiagnosed atrial fibrillation when the rhythm is actually sinus rhythm with frequent runs of ectopic atrial tachycardia.  The  patient's symptomatic recordings do not consistently correlate with a pathologic rhythm disturbance with roughly 40% demonstrating sinus rhythm alone.  Indication for study: Bradycardia.  The patient demonstrated some mild bradycardia and conduction system disease (IVCD) but no profound bradycardia or pauses.  No significant ventricular arrhythmia.     Device interrogation 1/5/2024 (report reviewed):  Encounter Type: Routine remote device check  Device: Medtronic [place] (D) pacemaker  Pacing % /Programmed: AP 95.9%,  26.1%, AAIR-DDDR 60-130bpm  Lead(s): Stable  Battery longevity: Estimating 11.5 years remaining  Presenting: APVS 60bpm  Atrial high rates: Since 9/26/23, none detected  Anticoagulant: Eliquis  Ventricular High rates: None  Comments: Normal device function.      ECHO 7/19/2023 (report reviewed):   1. Normal left ventricular size and systolic performance with a visually estimated ejection fraction of 55%.  2. There is moderate concentric increase in left ventricular wall thickness is more moderate-severe septal thickening.  3. No significant valvular heart disease is identified on this study.  4. Normal right ventricular size with low normal right ventricular systolic performance.  5. There is mild left atrial enlargement.  6. There is moderate aortic root enlargement.  7. When compared to the prior real-time echocardiogram dated 2 December 2021, the findings are felt to be fairly similar on both examinations.     Cardiac MRI 9/27/2023 (report reviewed):  1.  Significant hypertrophic cardiomyopathy with maximal septal wall thickness 32 mm.  Asymmetric LVH due to hypertrophic cardiomyopathy.  The estimated left ventricular ejection fraction is 55-60%.  LVOT obstruction is indicated by septal motion of anterior mitral leaflet.  2.  Patch myocardial scars in septal and basal anterior walls. Diffuse fibrotic tissue in thickened myocardium.    3.  Normal right ventricular size and systolic function.   Pacemaker lead in right ventricle.  4.  Valves are not well visualized.  Mild mitral valve regurgitation.     Cardiac cath 12/2/2021 (report reviewed):  Diffusely tortuous coronary arteries with mild atherosclerosis consistent with hypertensive coronary disease. Large, ectatic RCA.  LV EDP extremely elevated at 38 mmHg.  LV EF normal with possible apical hypertrophy vs diverticulum.      Labs below reviewed personally     Physical Examination  Review of Systems   Vitals: /84 (BP Location: Left arm, Patient Position: Sitting, Cuff Size: Adult Large)   Pulse 61   Resp 16   Wt 119.7 kg (264 lb)   BMI 34.83 kg/m    BMI= Body mass index is 34.83 kg/m .  Wt Readings from Last 3 Encounters:   03/21/24 119.7 kg (264 lb)   02/12/24 119.3 kg (263 lb)   06/27/23 117 kg (258 lb)       General Appearance:   no distress, normal body habitus   ENT/Mouth: membranes moist, no oral lesions or bleeding gums.      EYES:  no scleral icterus, normal conjunctivae   Neck: no carotid bruits or thyromegaly   Chest/Lungs:   lungs are clear to auscultation, no rales or wheezing, no sternal scar, equal chest wall expansion    Cardiovascular:   Regular. Normal first and second heart sounds with no murmurs, rubs, or gallops; the carotid, radial and posterior tibial pulses are intact, no edema bilaterally    Abdomen:  no organomegaly, masses, bruits, or tenderness; bowel sounds are present   Extremities: no cyanosis or clubbing   Skin: no xanthelasma, warm.    Neurologic: normal  bilateral, no tremors     Psychiatric: alert and oriented x3, calm        Please refer above for cardiac ROS details.        Medical History  Surgical History Family History Social History   Past Medical History:   Diagnosis Date    Atrial fibrillation (H)     Benign essential hypertension     Coronary artery disease     Depression     Hyperlipidemia     Obesity      Past Surgical History:   Procedure Laterality Date    CORONARY ANGIOGRAPHY ADULT ORDER       CV CORONARY ANGIOGRAM N/A 12/2/2021    Procedure: Coronary Angiogram;  Surgeon: Radha Ferrell MD;  Location: [facility] CATH LAB CV    CV LEFT HEART CATH N/A 12/2/2021    Procedure: Left Heart Cath;  Surgeon: Radha Ferrell MD;  Location: [facility] CATH LAB CV    CV LEFT VENTRICULOGRAM N/A 12/2/2021    Procedure: Left Ventriculogram;  Surgeon: Radha Ferrell MD;  Location: [facility] CATH LAB CV    EP PACEMAKER N/A 12/23/2021    Procedure: EP Pacemaker;  Surgeon: Kun Lopez MD;  Location: [facility] CATH LAB CV    HERNIA REPAIR      IMPLANT PACEMAKER      KNEE SURGERY      TONSILLECTOMY      VASECTOMY       Family History   Problem Relation Age of Onset    Diabetes Mother     Hypertension Mother     Coronary Artery Disease No family hx of     Cerebrovascular Disease No family hx of         Social History     Socioeconomic History    Marital status:      Spouse name: Not on file    Number of children: Not on file    Years of education: Not on file    Highest education level: Not on file   Occupational History    Not on file   Tobacco Use    Smoking status: Never    Smokeless tobacco: Never   Substance and Sexual Activity    Alcohol use: Yes     Alcohol/week: 6.0 standard drinks of alcohol     Types: 6 Standard drinks or equivalent per week     Comment: 7 drinks per week    Drug use: Never    Sexual activity: Not on file   Other Topics Concern    Parent/sibling w/ CABG, MI or angioplasty before 65F 55M? Not Asked   Social History Narrative    Not on file     Social Determinants of Health     Financial Resource Strain: Not on file   Food Insecurity: Not on file   Transportation Needs: Not on file   Physical Activity: Not on file   Stress: Not on file   Social Connections: Not on file   Interpersonal Safety: Not on file   Housing Stability: Not on file           Medications  Allergies   Current Outpatient Medications   Medication Sig Dispense Refill    acetaminophen-caffeine (EXCEDRIN TENSION HEADACHE)  "500-65 MG TABS Take 2 tablets by mouth daily as needed for mild pain      atorvastatin (LIPITOR) 80 MG tablet Take 1 tablet (80 mg) by mouth daily 30 tablet 0    ELIQUIS ANTICOAGULANT 5 MG tablet Take 1 tablet (5 mg) by mouth 2 times daily 180 tablet 0    ezetimibe (ZETIA) 10 MG tablet Take 1 tablet (10 mg) by mouth once daily 90 tablet 3    lisinopril (ZESTRIL) 20 MG tablet Take 20 mg by mouth daily      metoprolol succinate ER (TOPROL XL) 200 MG 24 hr tablet Take 1 tablet (200 mg) by mouth daily 90 tablet 3    nitroGLYcerin (NITROSTAT) 0.4 MG sublingual tablet One tablet under the tongue every 5 minutes if needed for chest pain. May repeat every 5 minutes for a maximum of 3 doses in 15 minutes 25 tablet 3    sertraline (ZOLOFT) 100 MG tablet Take 100 mg by mouth daily        No Known Allergies       Lab Results    Chemistry/lipid CBC Cardiac Enzymes/BNP/TSH/INR   Recent Labs   Lab Test 06/27/23  1628   CHOL 138   HDL 45   LDL 63   TRIG 148     Recent Labs   Lab Test 06/27/23  1628 07/20/22  1142 01/20/22  1141   LDL 63 71 110     Recent Labs   Lab Test 12/23/21  0702      POTASSIUM 4.5   CHLORIDE 106   CO2 22      BUN 27*   CR 0.84   GFRESTIMATED >90   TEGAN 9.4     Recent Labs   Lab Test 12/23/21  0702 12/05/21  0455 12/03/21  0432   CR 0.84 0.75 0.71     No results for input(s): \"A1C\" in the last 12804 hours.       Recent Labs   Lab Test 12/23/21  0702   WBC 5.4   HGB 14.5   HCT 43.2   MCV 93        Recent Labs   Lab Test 12/23/21  0702 12/05/21  0455 12/03/21  0432   HGB 14.5 15.7 15.6    Recent Labs   Lab Test 12/03/21  1016 12/03/21  0432 12/02/21  2237   TROPONINI 0.04 0.04 0.04     No results for input(s): \"BNP\", \"NTBNPI\", \"NTBNP\" in the last 98615 hours.  Recent Labs   Lab Test 12/23/21  0702   TSH 3.63     Recent Labs   Lab Test 12/02/21  0828   INR 1.05        El Hgaen MD      Thank you for allowing me to participate in the care of your patient.      Sincerely,   El" MD Xiao   [facility]  cc:   Ayan Hart MD  [address] [facility] [address]  [place] [address]

## 2024-03-21 NOTE — PROGRESS NOTES
H&P  PMD: []  Date: Card OV: [x]  Date: 3-21-24 Orders I [x] P  [x]      AC: Eliquis- Hold 3 days prior Diuretics: None  DM Meds: None  GLP-1:None     Abraham Dong, 1960, 63 year old, [id number]  Data Unavailable, 0 lbs 0 oz, There is no height or weight on file to calculate BMI.    Home:[phone] (home) Cell:There is no such number on file (mobile).  Emergency Contact: JONH DONG [phone]  PCP: Clifford Gamboa, [phone]  El Hagen MD  [facility] Ep Support Pool - e; Ayan Hart MD  EP Laser Lead Extraction/Lead Removal Orders    Laser Lead Extraction:  Procedure Plan (Include both lead/device explant and reimplant plan): RV pacemaker lead extraction followed by implant of ICD lead  Scheduling Time Frame: Elective in the next 4-6 weeks  Diagnosis:  need for upgrade  Explanting side: Left  Implanting Side: Left  Ordering Level:  2  Current Device: Dual Pacemaker  Device Co Needed for Procedure:Medtronic  Procedural Co Needed for Procedure: Spectranetics/Gorman  Dependent: No  Consults Needed: Hospitalist  Ordering Provider: El Hagen MD 3/21/2024  MD Assist:  Yes, assisting MD- Dr Hart  Important information for staff: None  Anticoagulation Plan:If on AC hold 3 days prior to procedure, no bridging is required.    Sedation Plan:  Anesthesia:  General-Whole Case  Lines/Intubation needs:  Arterial Line    Surgical Plan:  Surgical Needs: Surgery in case (surgeon, OR, perfusion)- Typical for Level 2 and/or 3  Plan for VATs- Typical Level 3: No    Intra Procedural Plans:  Sheath Access:  Right Femoral: Venous size/length- 10Fr long, 7Fr long, Arterial size/length none  Left Femoral: Venous size/length- 7Fr long, Arterial size/length 5Fr short  Laser: 14Fr  Temp Pacer: Possible  Permanary: No  Bridge Balloon: Yes 12Fr  Alphavac: No  Tightrail: Possible  ICE: Yes 9 Fr  Angiovac: No    Always a possibility for some changes on the day of the procedure.  EP Scheduling  Needs:  Pre-Procedural Testing needed: None  Scheduling Restrictions: Will need to hold anticoagulation 3 days prior to procedure- please schedule accordingly  Scheduling Contact: Please contact pt to schedule, if you are unable to schedule date within the next 24 hours please contact pt to update on scheduling process  Date Ordered and Prepped: 3/21/2024 Vanessa Ham RN    [facility] EP Clinic Prep:   H&P:  Compled by cardiology on 3-21-24 if scheduled within 30 days, pt will need RN education and Labs apt within 3 days of procedure. If pts LLE scheduled outside of 30 days, pt to schedule H&P with EP JAVIER, RN Teach, and Labs within 3 days of LLE  Pre-Procedure Labs/T&S: For all levels of LLE schedule lab visit at [facility] lab within 3 days prior to procedure for T&S, BMP, CBC, HcG is appropriate, and INR if on warfarin.   Medical Records Pertinent for Procedure:   Echo 2-23-24 EF 60%;  implant 12-23-21 and None  Iodinated Contrast Dye Allergies (Does not include Shellfish, Egg, and/or Iodine Allergy):None  GLP-1 Protocol: If on Dulaglutide (Trulicity) (weekly)- Injection hold 7 days prior to procedure  , Exenatide extended release (Bydureon bcise) (weekly)- Injection hold 7 days prior to procedure, Exenatide (Byetta) (twice daily)- Oral Tablet hold day prior and morning of procedure and for Injection hold 7 days prior to procedure, Semaglutide (Ozempic) (weekly)- Injection and Oral hold 7 days prior to procedure, Liraglutide (Victoza, Saxenda) (daily)- Injection hold day prior and morning of procedure    No Known Allergies    Current Outpatient Medications:     acetaminophen-caffeine (EXCEDRIN TENSION HEADACHE) 500-65 MG TABS, Take 2 tablets by mouth daily as needed for mild pain, Disp: , Rfl:     atorvastatin (LIPITOR) 80 MG tablet, Take 1 tablet (80 mg) by mouth daily, Disp: 30 tablet, Rfl: 0    ELIQUIS ANTICOAGULANT 5 MG tablet, Take 1 tablet (5 mg) by mouth 2 times daily, Disp: 180 tablet, Rfl: 0    ezetimibe (ZETIA)  10 MG tablet, Take 1 tablet (10 mg) by mouth once daily, Disp: 90 tablet, Rfl: 3    lisinopril (ZESTRIL) 20 MG tablet, Take 20 mg by mouth daily, Disp: , Rfl:     metoprolol succinate ER (TOPROL XL) 200 MG 24 hr tablet, Take 1 tablet (200 mg) by mouth daily, Disp: 90 tablet, Rfl: 3    nitroGLYcerin (NITROSTAT) 0.4 MG sublingual tablet, One tablet under the tongue every 5 minutes if needed for chest pain. May repeat every 5 minutes for a maximum of 3 doses in 15 minutes, Disp: 25 tablet, Rfl: 3    sertraline (ZOLOFT) 100 MG tablet, Take 100 mg by mouth daily , Disp: , Rfl:     Documentation Date:3/21/2024 2:54 PM  Vanessa Ham RN

## 2024-03-21 NOTE — PATIENT INSTRUCTIONS
[facility]  Cardiac Electrophysiology  [address]  [place] [address]   Office: [phone]  Fax: [phone]       Thank you for seeing us in clinic today - it is a pleasure to be a part of your care team.  Below is a summary of our plan from today's visit.      We will call you to plan the lead extraction and reimplant     Please do not hesitate to be in touch with our office at [phone] with any questions that may arise.      Thank you for trusting us with your care,    El Hagen MD  Clinical Cardiac Electrophysiology  [facility]  [address]  [place] [address]   Office: [phone]  Fax: [phone]

## 2024-03-22 LAB
MDC_IDC_LEAD_CONNECTION_STATUS: NORMAL
MDC_IDC_LEAD_CONNECTION_STATUS: NORMAL
MDC_IDC_LEAD_IMPLANT_DT: NORMAL
MDC_IDC_LEAD_IMPLANT_DT: NORMAL
MDC_IDC_LEAD_LOCATION: NORMAL
MDC_IDC_LEAD_LOCATION: NORMAL
MDC_IDC_LEAD_LOCATION_DETAIL_1: NORMAL
MDC_IDC_LEAD_LOCATION_DETAIL_1: NORMAL
MDC_IDC_LEAD_MFG: NORMAL
MDC_IDC_LEAD_MFG: NORMAL
MDC_IDC_LEAD_MODEL: NORMAL
MDC_IDC_LEAD_MODEL: NORMAL
MDC_IDC_LEAD_POLARITY_TYPE: NORMAL
MDC_IDC_LEAD_POLARITY_TYPE: NORMAL
MDC_IDC_LEAD_SERIAL: NORMAL
MDC_IDC_LEAD_SERIAL: NORMAL
MDC_IDC_LEAD_SPECIAL_FUNCTION: NORMAL
MDC_IDC_LEAD_SPECIAL_FUNCTION: NORMAL
MDC_IDC_MSMT_BATTERY_DTM: NORMAL
MDC_IDC_MSMT_BATTERY_REMAINING_LONGEVITY: 136 MO
MDC_IDC_MSMT_BATTERY_RRT_TRIGGER: 2.62
MDC_IDC_MSMT_BATTERY_STATUS: NORMAL
MDC_IDC_MSMT_BATTERY_VOLTAGE: 3.03 V
MDC_IDC_MSMT_LEADCHNL_RA_IMPEDANCE_VALUE: 342 OHM
MDC_IDC_MSMT_LEADCHNL_RA_IMPEDANCE_VALUE: 437 OHM
MDC_IDC_MSMT_LEADCHNL_RA_PACING_THRESHOLD_AMPLITUDE: 0.5 V
MDC_IDC_MSMT_LEADCHNL_RA_PACING_THRESHOLD_AMPLITUDE: 0.5 V
MDC_IDC_MSMT_LEADCHNL_RA_PACING_THRESHOLD_PULSEWIDTH: 0.4 MS
MDC_IDC_MSMT_LEADCHNL_RA_PACING_THRESHOLD_PULSEWIDTH: 0.4 MS
MDC_IDC_MSMT_LEADCHNL_RA_SENSING_INTR_AMPL: 3.9 MV
MDC_IDC_MSMT_LEADCHNL_RA_SENSING_INTR_AMPL: 4.5 MV
MDC_IDC_MSMT_LEADCHNL_RV_IMPEDANCE_VALUE: 380 OHM
MDC_IDC_MSMT_LEADCHNL_RV_IMPEDANCE_VALUE: 456 OHM
MDC_IDC_MSMT_LEADCHNL_RV_PACING_THRESHOLD_AMPLITUDE: 0.5 V
MDC_IDC_MSMT_LEADCHNL_RV_PACING_THRESHOLD_AMPLITUDE: 0.62 V
MDC_IDC_MSMT_LEADCHNL_RV_PACING_THRESHOLD_PULSEWIDTH: 0.4 MS
MDC_IDC_MSMT_LEADCHNL_RV_PACING_THRESHOLD_PULSEWIDTH: 0.4 MS
MDC_IDC_MSMT_LEADCHNL_RV_SENSING_INTR_AMPL: 16.62 MV
MDC_IDC_MSMT_LEADCHNL_RV_SENSING_INTR_AMPL: 18.1 MV
MDC_IDC_MSMT_LEADCHNL_RV_SENSING_INTR_AMPL: 18.12 MV
MDC_IDC_PG_IMPLANT_DTM: NORMAL
MDC_IDC_PG_MFG: NORMAL
MDC_IDC_PG_MODEL: NORMAL
MDC_IDC_PG_SERIAL: NORMAL
MDC_IDC_PG_TYPE: NORMAL
MDC_IDC_SESS_CLINIC_NAME: NORMAL
MDC_IDC_SESS_DTM: NORMAL
MDC_IDC_SESS_TYPE: NORMAL
MDC_IDC_SET_BRADY_AT_MODE_SWITCH_RATE: 171 {BEATS}/MIN
MDC_IDC_SET_BRADY_HYSTRATE: NORMAL
MDC_IDC_SET_BRADY_LOWRATE: 60 {BEATS}/MIN
MDC_IDC_SET_BRADY_MAX_SENSOR_RATE: 130 {BEATS}/MIN
MDC_IDC_SET_BRADY_MAX_TRACKING_RATE: 130 {BEATS}/MIN
MDC_IDC_SET_BRADY_MODE: NORMAL
MDC_IDC_SET_BRADY_PAV_DELAY_LOW: 180 MS
MDC_IDC_SET_BRADY_SAV_DELAY_LOW: 150 MS
MDC_IDC_SET_LEADCHNL_RA_PACING_AMPLITUDE: NORMAL
MDC_IDC_SET_LEADCHNL_RA_PACING_ANODE_ELECTRODE_1: NORMAL
MDC_IDC_SET_LEADCHNL_RA_PACING_ANODE_LOCATION_1: NORMAL
MDC_IDC_SET_LEADCHNL_RA_PACING_CAPTURE_MODE: NORMAL
MDC_IDC_SET_LEADCHNL_RA_PACING_CATHODE_ELECTRODE_1: NORMAL
MDC_IDC_SET_LEADCHNL_RA_PACING_CATHODE_LOCATION_1: NORMAL
MDC_IDC_SET_LEADCHNL_RA_PACING_POLARITY: NORMAL
MDC_IDC_SET_LEADCHNL_RA_PACING_PULSEWIDTH: 0.4 MS
MDC_IDC_SET_LEADCHNL_RA_SENSING_ANODE_ELECTRODE_1: NORMAL
MDC_IDC_SET_LEADCHNL_RA_SENSING_ANODE_LOCATION_1: NORMAL
MDC_IDC_SET_LEADCHNL_RA_SENSING_CATHODE_ELECTRODE_1: NORMAL
MDC_IDC_SET_LEADCHNL_RA_SENSING_CATHODE_LOCATION_1: NORMAL
MDC_IDC_SET_LEADCHNL_RA_SENSING_POLARITY: NORMAL
MDC_IDC_SET_LEADCHNL_RA_SENSING_SENSITIVITY: 0.3 MV
MDC_IDC_SET_LEADCHNL_RV_PACING_AMPLITUDE: NORMAL
MDC_IDC_SET_LEADCHNL_RV_PACING_ANODE_ELECTRODE_1: NORMAL
MDC_IDC_SET_LEADCHNL_RV_PACING_ANODE_LOCATION_1: NORMAL
MDC_IDC_SET_LEADCHNL_RV_PACING_CAPTURE_MODE: NORMAL
MDC_IDC_SET_LEADCHNL_RV_PACING_CATHODE_ELECTRODE_1: NORMAL
MDC_IDC_SET_LEADCHNL_RV_PACING_CATHODE_LOCATION_1: NORMAL
MDC_IDC_SET_LEADCHNL_RV_PACING_POLARITY: NORMAL
MDC_IDC_SET_LEADCHNL_RV_PACING_PULSEWIDTH: 0.4 MS
MDC_IDC_SET_LEADCHNL_RV_SENSING_ANODE_ELECTRODE_1: NORMAL
MDC_IDC_SET_LEADCHNL_RV_SENSING_ANODE_LOCATION_1: NORMAL
MDC_IDC_SET_LEADCHNL_RV_SENSING_CATHODE_ELECTRODE_1: NORMAL
MDC_IDC_SET_LEADCHNL_RV_SENSING_CATHODE_LOCATION_1: NORMAL
MDC_IDC_SET_LEADCHNL_RV_SENSING_POLARITY: NORMAL
MDC_IDC_SET_LEADCHNL_RV_SENSING_SENSITIVITY: 0.9 MV
MDC_IDC_SET_ZONE_DETECTION_INTERVAL: 200 MS
MDC_IDC_SET_ZONE_DETECTION_INTERVAL: 350 MS
MDC_IDC_SET_ZONE_DETECTION_INTERVAL: 400 MS
MDC_IDC_SET_ZONE_STATUS: NORMAL
MDC_IDC_SET_ZONE_TYPE: NORMAL
MDC_IDC_SET_ZONE_VENDOR_TYPE: NORMAL
MDC_IDC_STAT_AT_BURDEN_PERCENT: 0 %
MDC_IDC_STAT_AT_DTM_END: NORMAL
MDC_IDC_STAT_AT_DTM_START: NORMAL
MDC_IDC_STAT_AT_MODE_SW_COUNT: 0
MDC_IDC_STAT_BRADY_AP_VP_PERCENT: 13.76 %
MDC_IDC_STAT_BRADY_AP_VS_PERCENT: 82.47 %
MDC_IDC_STAT_BRADY_AS_VP_PERCENT: 1.3 %
MDC_IDC_STAT_BRADY_AS_VS_PERCENT: 2.46 %
MDC_IDC_STAT_BRADY_DTM_END: NORMAL
MDC_IDC_STAT_BRADY_DTM_START: NORMAL
MDC_IDC_STAT_BRADY_RA_PERCENT_PACED: 96.31 %
MDC_IDC_STAT_BRADY_RV_PERCENT_PACED: 15.07 %
MDC_IDC_STAT_EPISODE_RECENT_COUNT: 0
MDC_IDC_STAT_EPISODE_RECENT_COUNT: 1
MDC_IDC_STAT_EPISODE_RECENT_COUNT_DTM_END: NORMAL
MDC_IDC_STAT_EPISODE_RECENT_COUNT_DTM_START: NORMAL
MDC_IDC_STAT_EPISODE_TOTAL_COUNT: 0
MDC_IDC_STAT_EPISODE_TOTAL_COUNT: 2
MDC_IDC_STAT_EPISODE_TOTAL_COUNT_DTM_END: NORMAL
MDC_IDC_STAT_EPISODE_TOTAL_COUNT_DTM_START: NORMAL
MDC_IDC_STAT_EPISODE_TYPE: NORMAL
MDC_IDC_STAT_TACHYTHERAPY_RECENT_DTM_END: NORMAL
MDC_IDC_STAT_TACHYTHERAPY_RECENT_DTM_START: NORMAL
MDC_IDC_STAT_TACHYTHERAPY_TOTAL_DTM_END: NORMAL
MDC_IDC_STAT_TACHYTHERAPY_TOTAL_DTM_START: NORMAL

## 2024-03-22 PROCEDURE — 93280 PM DEVICE PROGR EVAL DUAL: CPT | Performed by: INTERNAL MEDICINE

## 2024-03-25 ENCOUNTER — TELEPHONE (OUTPATIENT)
Dept: CARDIOLOGY | Facility: CLINIC | Age: 64
End: 2024-03-25
Payer: COMMERCIAL

## 2024-03-25 NOTE — PROGRESS NOTES
Pt scheduled and letter routed to EP RN Pool.    [facility]    Progress Note    Isabela Raymond Patient Status:  Inpatient    1954 MRN [id number]   Location [facility] 4W/SW/SE Attending Cecilia Hurst MD   Hosp Day # 0 PCP Jayson Gomez DO        Subjective:     Constit Hyperlipidemia  CONT HOME MEDS.              Results:     Lab Results   Component Value Date    WBC 13.0 (H) 11/11/2017    HGB 13.6 11/11/2017    HCT 39.2 (L) 11/11/2017     11/11/2017    CREATSERUM 0.92 11/11/2017    BUN 17 11/11/2017     11/1

## 2024-03-25 NOTE — TELEPHONE ENCOUNTER
Pre-Procedure Medication Education    Procedure: LLE with Dr Hagen on 4/24/24 with arrival time 5:30am    LM requesting return call.    Medication:   Instructions regarding anticoagulants: Eliquis- To hold 3 days prior to procedure  Instructions for medication, other than anticoagulants and antiarrhythmics listed above, given to pt: Take all medication AM of procedure with small sips of water     3/25/2024 4:06 PM  Isela Garcia RN

## 2024-03-26 NOTE — TELEPHONE ENCOUNTER
Patient returned call and left voicemail message with update blood pressure reading.      Last Blood Pressure: 130/90  Last Heart Rate: 62  Date: 2/12/24  Location: [facility] Cardiology    Today's Blood Pressure: 132/84  Today's Heart Rate: 61  Location: [facility]    Patient reported blood pressure updated in Epic. Blood pressure falls within [place] Community Measures guidelines.  Patient will follow up as previously advised. Thanks.     WADE Jones

## 2024-03-29 NOTE — TELEPHONE ENCOUNTER
2nd Attempt to contact pt, voicemail message was left with contact information and instructing pt to call back.  Take Eliquis through Saturday 4/20, then stop.  Teach is in clinic on 4/22.  3/29/2024 2:10 PM  Santa Valencia RN

## 2024-04-01 NOTE — TELEPHONE ENCOUNTER
Pt returned phone call and discussed hold date for Eliquis. Pt verbalized understanding.     Isela Garcia RN

## 2024-04-16 NOTE — PROGRESS NOTES
El Hagen MD Whitson, Michele, RN; P [facility] Ep Support Pool - [facility]; Lisa Diaz, JULIANN; Agustina Malcolm, ARRT; Linnea Salguero, RN; 1 other  Only scrub if needed.  Thanks          Previous Messages       ----- Message -----  From: Vamshi Bennett, RN  Sent: 4/16/2024   2:21 PM CDT  To: El Hagen MD; Viktoria Perry, RN; *  Subject: Lead extraction level scheduled 4/24.            Hi Dr. Hagen,    I wanted to confirm the surgeon and OR team's involvement in the upcoming lead extraction case.  You mentioned in your note that we wouldn't be doing VATS and the procedure order is for level 2 lead extraction OR standby.  Do you plan to have the surgeon and OR team scrubbed in from the beginning of the procedure or only scrub in if needed?    ThanksVamshi

## 2024-04-17 ENCOUNTER — TELEPHONE (OUTPATIENT)
Dept: CARDIOLOGY | Facility: CLINIC | Age: 64
End: 2024-04-17
Payer: COMMERCIAL

## 2024-04-17 ENCOUNTER — PREP FOR PROCEDURE (OUTPATIENT)
Dept: CARDIOLOGY | Facility: CLINIC | Age: 64
End: 2024-04-17
Payer: COMMERCIAL

## 2024-04-17 DIAGNOSIS — Z95.0 CARDIAC PACEMAKER IN SITU: Primary | ICD-10-CM

## 2024-04-17 DIAGNOSIS — I48.0 PAROXYSMAL ATRIAL FIBRILLATION (H): Primary | ICD-10-CM

## 2024-04-17 DIAGNOSIS — I48.0 PAROXYSMAL ATRIAL FIBRILLATION (H): ICD-10-CM

## 2024-04-17 RX ORDER — LIDOCAINE 40 MG/G
CREAM TOPICAL
Status: CANCELLED | OUTPATIENT
Start: 2024-04-17

## 2024-04-17 RX ORDER — SODIUM CHLORIDE 9 MG/ML
100 INJECTION, SOLUTION INTRAVENOUS CONTINUOUS
Status: CANCELLED | OUTPATIENT
Start: 2024-04-24

## 2024-04-17 NOTE — TELEPHONE ENCOUNTER
Pre-Procedure Education    Procedure: LLE with Dr Hagen on 4/24 with arrival time 5:30am    Orders: Orderset for procedure verified signed/held    COVID: COVID policy- if pt develops COVID like symptoms prior to procedure, he/she would need to complete an at home with a rapid antigen COVID test 1-2 days prior to your procedure date. If COVID + pt is aware the procedure will need to be rescheduled, and to contact CV scheduling as soon as possible    Pre-Op H&P: scheduled on 4/22 - Available in Epic    Education:   Contact: Reviewed via phone with pt  Pre-Procedure Instruction: NPO after midnight pre procedure, Defined NPO with pt, Remove all jewelry and leave all valuables at home, Shower prior to arrival, Sedation plan/orders, Transportation requirements and arrangements post procedure, Post-procedure follow up process, Post-procedure restrictions/expectations, and Pre-procedure letter sent- letter tab  Risks:  Lead Extraction (LLE) Risks  In addition to standard risks for ICD or PPM implant, there is:  1-10% Total risk for acute complications including Cardiac tamponade, Hemothorax, Pulmonary embolism (PE), Migrating lead tip, Infection, Thrombosis, Pneumothorax, Myocardial avulsion, Hypotension, Need for blood transfusion, or Need for emergent cardiothoracic surgery   < 2% risk of death  5-10% risk for post-procedure complications of Arm swelling, Pericardial effusion, Infection, Pocket-related complications, Arrythmia, Hypotension, Air embolism, Bleeding, Lead dislodgment  5% partial lead extraction, <1% unsuccessful lead extraction  Risks associated with general anesthesia will be addressed by the Anesthesiology Department  Internal Cardiac Defibrillator (ICD) Risks  1% Pneumothorax  1-2% Infection, Pocket erosion  1-2% Major bleeding, Hematoma (increased with anticoagulation therapy); 5-10% Minor bleeding  1-2% Acute subclavian vein thrombosis, 10% Chronic subclavian vein thrombosis  < 1% Cardiac  perforation, Cardiac tamponade, Arrythmias, Diaphragmatic stimulation  < 2% Lead dislodgment, < 1% Lead fracture or Generator malfunction  < 0.1% Death  < 2% Tricuspid valve dysfunction  If external defibrillation is needed, 25% risk for superficial skin irritation  < 5% Need for ICD system revision  5% Inappropriate shock  Risks associated with general anesthesia will be addressed by the Anesthesiology Department.  Risk for electromagnetic interference (KRYSTAL), psychosocial aspects of having an ICD, and limitations to operating a motor vehicle as directed by the electrophysiologist dependent on specific patient criteria.      Medication:   Instructions regarding anticoagulants: Pt not on AC-  Pt has listed that he should be taking Eliquis 5mg BID, but pt states he has not been on this or remembers when he stopped  Instructions regarding antiarrhythmic medication: N/A for this type of procedure; N/A  Instructions given to pt regarding diuretics medication: None  Instructions given to pt regarding DM/GLP-1 medication:   DM- None  GLP-1- None  Instructions for medication, other than anticoagulants and antiarrhythmics listed above, given to pt: Take all medication AM of procedure with small sips of water     Important patient information for staff: None    4/17/2024 8:39 AM  Santa Valencia RN

## 2024-04-17 NOTE — TELEPHONE ENCOUNTER
"Noted.  Case request updated.  RX for Eliquis was refilled.  PC back from pt, corresponding information/recommendations reviewed, verbalized understanding, has no further questions at this time, contact information was given for further concerns/questions, RX was sent to pt pharmacy, and medication list updated   4/17/2024 9:34 AM  Santa Valencia, RN    El Hagen MD  [facility] Ep Support Pool - Lhe; Vamshi Bennett, JULIANN11 minutes ago (9:21 AM)     MANSI  Hi team,      This gentleman will get an ICD lead and a left bundle lead for pacing post ablation after extraction of his current RV pacemaker lead.  I do not think I specified this in my previous note.  Thanks     El Hagen MD  [facility] Ep Support Pool - Lhe14 minutes ago (9:19 AM)     MANSI  Yes ideally he should be on Eliquis 5 mg twice daily.  At this point, we are probably better off starting it after the lead extraction.     You  El Hagen MD43 minutes ago (8:50 AM)     DIANE Hagen-  Pt is listed to be taking Eliquis 5mg BID, for AF  When I spoke to him to go over preporation for LLE, he indicated that he was not taking Eliquis 5mg BID  He does not recall when he stopped, and could not really recall being on it or remembering being on it  Looks like last refill was March 2023  Dr Rojas's note from February 2024 indicated:  \"Although his [id number]-YYMt score is only 1, his hypertrophic cardiomyopathy likely increases his risk of thromboembolism so I would favor continuing apixaban 5 mg twice daily.\"  You also saw pt in March and suggested to continue all medication  You were going to hold Eliquid 3 days prior to LLE on 4/24  No AF on his most recent device check  Do you want us to call in new RX for him to start after his procedure? Or do you want to address restarting after procedure?  Thanks  Tenisha     "

## 2024-04-21 LAB
ABO/RH(D): NORMAL
ANTIBODY SCREEN: NEGATIVE
SPECIMEN EXPIRATION DATE: NORMAL

## 2024-04-22 ENCOUNTER — APPOINTMENT (OUTPATIENT)
Dept: CARDIOLOGY | Facility: CLINIC | Age: 64
End: 2024-04-22
Payer: COMMERCIAL

## 2024-04-22 ENCOUNTER — OFFICE VISIT (OUTPATIENT)
Dept: CARDIOLOGY | Facility: CLINIC | Age: 64
End: 2024-04-22
Payer: COMMERCIAL

## 2024-04-22 ENCOUNTER — LAB (OUTPATIENT)
Dept: CARDIOLOGY | Facility: CLINIC | Age: 64
End: 2024-04-22
Payer: COMMERCIAL

## 2024-04-22 VITALS
WEIGHT: 263 LBS | RESPIRATION RATE: 16 BRPM | SYSTOLIC BLOOD PRESSURE: 122 MMHG | BODY MASS INDEX: 34.85 KG/M2 | DIASTOLIC BLOOD PRESSURE: 60 MMHG | HEIGHT: 73 IN | HEART RATE: 70 BPM

## 2024-04-22 DIAGNOSIS — I48.0 PAROXYSMAL ATRIAL FIBRILLATION (H): ICD-10-CM

## 2024-04-22 LAB
ANION GAP SERPL CALCULATED.3IONS-SCNC: 12 MMOL/L (ref 7–15)
ATRIAL RATE - MUSE: 70 BPM
BUN SERPL-MCNC: 24.4 MG/DL (ref 8–23)
CALCIUM SERPL-MCNC: 9.6 MG/DL (ref 8.8–10.2)
CHLORIDE SERPL-SCNC: 102 MMOL/L (ref 98–107)
CREAT SERPL-MCNC: 0.86 MG/DL (ref 0.67–1.17)
DEPRECATED HCO3 PLAS-SCNC: 21 MMOL/L (ref 22–29)
DIASTOLIC BLOOD PRESSURE - MUSE: NORMAL MMHG
EGFRCR SERPLBLD CKD-EPI 2021: >90 ML/MIN/1.73M2
ERYTHROCYTE [DISTWIDTH] IN BLOOD BY AUTOMATED COUNT: 12.7 % (ref 10–15)
GLUCOSE SERPL-MCNC: 94 MG/DL (ref 70–99)
HCT VFR BLD AUTO: 44.8 % (ref 40–53)
HGB BLD-MCNC: 15 G/DL (ref 13.3–17.7)
INTERPRETATION ECG - MUSE: NORMAL
MCH RBC QN AUTO: 30.4 PG (ref 26.5–33)
MCHC RBC AUTO-ENTMCNC: 33.5 G/DL (ref 31.5–36.5)
MCV RBC AUTO: 91 FL (ref 78–100)
P AXIS - MUSE: NORMAL DEGREES
PLATELET # BLD AUTO: 236 10E3/UL (ref 150–450)
POTASSIUM SERPL-SCNC: 4.7 MMOL/L (ref 3.4–5.3)
PR INTERVAL - MUSE: 172 MS
QRS DURATION - MUSE: 200 MS
QT - MUSE: 504 MS
QTC - MUSE: 544 MS
R AXIS - MUSE: -50 DEGREES
RBC # BLD AUTO: 4.94 10E6/UL (ref 4.4–5.9)
SODIUM SERPL-SCNC: 135 MMOL/L (ref 135–145)
SYSTOLIC BLOOD PRESSURE - MUSE: NORMAL MMHG
T AXIS - MUSE: 134 DEGREES
VENTRICULAR RATE- MUSE: 70 BPM
WBC # BLD AUTO: 8.1 10E3/UL (ref 4–11)

## 2024-04-22 PROCEDURE — 86901 BLOOD TYPING SEROLOGIC RH(D): CPT

## 2024-04-22 PROCEDURE — G2211 COMPLEX E/M VISIT ADD ON: HCPCS | Performed by: NURSE PRACTITIONER

## 2024-04-22 PROCEDURE — 85027 COMPLETE CBC AUTOMATED: CPT

## 2024-04-22 PROCEDURE — 80048 BASIC METABOLIC PNL TOTAL CA: CPT

## 2024-04-22 PROCEDURE — 93000 ELECTROCARDIOGRAM COMPLETE: CPT | Performed by: INTERNAL MEDICINE

## 2024-04-22 PROCEDURE — 86900 BLOOD TYPING SEROLOGIC ABO: CPT

## 2024-04-22 PROCEDURE — 99214 OFFICE O/P EST MOD 30 MIN: CPT | Performed by: NURSE PRACTITIONER

## 2024-04-22 PROCEDURE — 36415 COLL VENOUS BLD VENIPUNCTURE: CPT

## 2024-04-22 PROCEDURE — 86850 RBC ANTIBODY SCREEN: CPT

## 2024-04-22 NOTE — LETTER
4/22/2024    Clifford Gamboa MD  [address]  [facility] [address]    RE: Abraham Dong       Dear Colleague,     I had the pleasure of seeing Abraham Dong in the [facility].      [facility] HEART CARE   [address], [place] [address]   [email or web address]   OFFICE: [phone]        Primary Care: Clifford Gamboa MD     Assessment/Recommendations     Cardiac pacemaker in situ: We discussed lead extraction and ICD left bundle pacing lead insertion and reviewed the risks of such including but not limited to pocket infection, bleeding, pneumothorax, and possible need for emergent cardiothoracic surgery and/or blood transfusion, as well as expected recovery and follow up. Medical, past medical, surgical and social history reviewed and updated. Current medications and allergies reviewed. No personal or family history of adverse reactions to anesthesia or abnormal bleeding with surgery    Paroxysmal AF: No recent AHR by device interrogation.  [id number] of 1-2 for HTN and nonobstructive CAD, recommended to continue oral anticoagulation for history of HCM. Plan to resume Eliquis 5 mg twice a day for stroke prophylaxis post-procedure    Device clinic follow-up as previously outlined     History of Present Illness/Subjective    Abraham Dong is a 63 year old male with tachycardia-bradycardia syndrome status post Medtronic dual-chamber PPM 12/23/2021, hypertrophic cardiomyopathy, paroxysmal AF, nonobstructive CAD, dyslipidemia, who is seen today for history and physical. Following pacemaker implantation and uptitrated beta-blocker he noted ongoing lightheadedness and shortness of breath with exertion, possibly with loss of consciousness during an episode of NSVT in 2022.  He was diagnosed with hypertrophic cardiomyopathy by CMR 9/27/2023, with septal thickness up to 32 mm and diffuse fibrosis.  He is undergoing pacemaker upgrade to defibrillator, RV lead extraction  and ICD left bundle lead insertion.      He continues to endorse intermittent lightheadedness and shortness of breath, with occasional racing heart. His activity tolerance is generally poor, though can tolerate walking his dog daily.  He denies chest discomfort, palpitations, pedal edema, presyncope or syncope.     Data Review     EKG 4/22/2024 AV sequential pacing 70 bpm    Stress echocardiogram 2/23/2024  1. Normal stress echocardiogram without evidence of stress induced ischemia,  but with significantly reduced sensitivity due to not attaining target heart  rate and lower double product achieved (patient achieved 52% predicted maximum  heart rate for age & DP of 11,340).  2. Normal resting LV systolic performance with an ejection fraction of 60%.  There is improvement in left ventricular systolic performance with a peak  ejection fraction of 70%.  3. ECG portion of the study is nondiagnostic in the presence of ventricular  pacing.  4. No anginal chest pain reported with exercise.  5. Poor functional capacity for age.  6. Spectral Doppler interrogation through the LVOT/AoV does not demonstrate  any significant increase in flow velocities compared to rest evaluation  (baseline LVOT gradient 4 mmHg & post exercise peak 6 mmHg).    CMR 9/27/2023  1.  Significant hypertrophic cardiomyopathy with maximal septal wall thickness 32 mm.  Asymmetric LVH due  to hypertrophic cardiomyopathy.  The estimated left ventricular ejection fraction is 55-60%.  LVOT  obstruction is indicated by septal motion of anterior mitral leaflet.  2.  Patch myocardial scars in septal and basal anterior walls. Diffuse fibrotic tissue in thickened  myocardium.    3.  Normal right ventricular size and systolic function.  Pacemaker lead in right ventricle.  4.  Valves are not well visualized.  Mild mitral valve regurgitation.    DEVICE: MDT (D) PPM 12/23/2021 (TBS)  Annual interrogation 3/21/2024  AP 96%,  15%  Good battery life and stable lead  "measurements  Underlying SB 50s with AV delay  1 episode NSVT x 6 beats up to 162 bpm  ADL response 3 --> 4         Physical Examination Review of Systems   /60 (BP Location: Right arm, Patient Position: Sitting, Cuff Size: Adult Regular)   Pulse 70   Resp 16   Ht 1.854 m (6' 1\")   Wt 119.3 kg (263 lb)   BMI 34.70 kg/m      Body mass index is 34.7 kg/m .    Wt Readings from Last 3 Encounters:   04/22/24 119.3 kg (263 lb)   03/21/24 119.7 kg (264 lb)   02/12/24 119.3 kg (263 lb)       General   Appearance:   Alert and oriented, in no acute distress.    HEENT:  Normocephalic and atraumatic. Conjunctiva and sclera are clear. Moist oral mucosa.    Neck: No JVP, carotid bruit or obvious thyromegaly.   Lungs:   Respirations unlabored. Clear bilaterally with no rales, rhonchi, or wheezes.     Cardiovascular:   Rhythm is regular. S1 and S2 are normal. No significant murmur is present. Lower extremities demonstrate no significant edema. Posterior tibial pulses are intact bilaterally.   Extremities: No cyanosis or clubbing   Skin: Skin is warm, dry, and otherwise intact.   Neurologic: Gait not assessed. Mood and affect appropriate.                                                Medical History  Surgical History Family History Social History   Past Medical History:   Diagnosis Date    Atrial fibrillation (H)     Benign essential hypertension     Coronary artery disease     Depression     Hyperlipidemia     Obesity     Past Surgical History:   Procedure Laterality Date    CORONARY ANGIOGRAPHY ADULT ORDER      CV CORONARY ANGIOGRAM N/A 12/2/2021    Procedure: Coronary Angiogram;  Surgeon: Radha Ferrell MD;  Location: [facility] CV    CV LEFT HEART CATH N/A 12/2/2021    Procedure: Left Heart Cath;  Surgeon: Radha Ferrell MD;  Location: [facility] CV    CV LEFT VENTRICULOGRAM N/A 12/2/2021    Procedure: Left Ventriculogram;  Surgeon: Radha Ferrell MD;  Location: [facility] LAB CV    EP " "PACEMAKER N/A 12/23/2021    Procedure: EP Pacemaker;  Surgeon: Kun Lopez MD;  Location: [facility] CV    HERNIA REPAIR      IMPLANT PACEMAKER      KNEE SURGERY      TONSILLECTOMY      VASECTOMY      Family History   Problem Relation Age of Onset    Diabetes Mother     Hypertension Mother     Coronary Artery Disease No family hx of     Cerebrovascular Disease No family hx of     Social History     Tobacco Use    Smoking status: Never    Smokeless tobacco: Never   Substance Use Topics    Alcohol use: Yes     Alcohol/week: 6.0 standard drinks of alcohol     Types: 6 Standard drinks or equivalent per week     Comment: 7 drinks per week    Drug use: Never          Medications  Allergies   Current Outpatient Medications   Medication Sig Dispense Refill    acetaminophen-caffeine (EXCEDRIN TENSION HEADACHE) 500-65 MG TABS Take 2 tablets by mouth daily as needed for mild pain      apixaban ANTICOAGULANT (ELIQUIS ANTICOAGULANT) 5 MG tablet Take 1 tablet (5 mg) by mouth 2 times daily 180 tablet 3    atorvastatin (LIPITOR) 80 MG tablet Take 1 tablet (80 mg) by mouth daily 30 tablet 0    ezetimibe (ZETIA) 10 MG tablet Take 1 tablet (10 mg) by mouth once daily 90 tablet 3    lisinopril (ZESTRIL) 20 MG tablet Take 20 mg by mouth daily      metoprolol succinate ER (TOPROL XL) 200 MG 24 hr tablet Take 1 tablet (200 mg) by mouth daily 90 tablet 3    nitroGLYcerin (NITROSTAT) 0.4 MG sublingual tablet One tablet under the tongue every 5 minutes if needed for chest pain. May repeat every 5 minutes for a maximum of 3 doses in 15 minutes 25 tablet 3    sertraline (ZOLOFT) 100 MG tablet Take 100 mg by mouth daily       No Known Allergies      Lab Results    Chemistry/lipid CBC Cardiac Enzymes/BNP/TSH/INR   Lab Results   Component Value Date    BUN 27 (H) 12/23/2021     12/23/2021    CO2 22 12/23/2021     No results found for: \"CREATININE\"    Lab Results   Component Value Date    CHOL 138 06/27/2023    HDL 45 06/27/2023 "    LDL 63 06/27/2023      Lab Results   Component Value Date    WBC 5.4 12/23/2021    HGB 14.5 12/23/2021    HCT 43.2 12/23/2021    MCV 93 12/23/2021     12/23/2021    Lab Results   Component Value Date    TROPONINI 0.04 12/03/2021    TSH 3.63 12/23/2021    INR 1.05 12/02/2021        38 minutes spent reviewing prior records (including documentation, laboratory studies, cardiac testing/imaging), history and physical exam, planning, and subsequent documentation.     The longitudinal plan of care for the diagnosis(es)/condition(s) as documented were addressed during this visit. Due to the added complexity in care, I will continue to support Abraham in the subsequent management and with ongoing continuity of care.      This note has been dictated using voice recognition software. Any grammatical, typographical, or context distortions are unintentional and inherent to the software.    Ana Lilia Avila CNP  Clinical Cardiac Electrophysiology  [facility]  [address]  [place] [address]   Office: [phone]  Fax: [phone]    Thank you for allowing me to participate in the care of your patient.      Sincerely,     RAGINI FLORES CNP   [facility]  cc:   Clifford Gamboa MD  [address] [place] [address]

## 2024-04-22 NOTE — PROGRESS NOTES
[facility] HEART CARE [address], [place] [address]   [email or web address]   OFFICE: [phone]        Primary Care: Clifford Gamboa MD     Assessment/Recommendations     Cardiac pacemaker in situ: We discussed lead extraction and ICD left bundle pacing lead insertion and reviewed the risks of such including but not limited to pocket infection, bleeding, pneumothorax, and possible need for emergent cardiothoracic surgery and/or blood transfusion, as well as expected recovery and follow up. Medical, past medical, surgical and social history reviewed and updated. Current medications and allergies reviewed. No personal or family history of adverse reactions to anesthesia or abnormal bleeding with surgery    Paroxysmal AF: No recent AHR by device interrogation.  [id number] of 1-2 for HTN and nonobstructive CAD, recommended to continue oral anticoagulation for history of HCM. Plan to resume Eliquis 5 mg twice a day for stroke prophylaxis post-procedure    Device clinic follow-up as previously outlined     History of Present Illness/Subjective    Abraham Dong is a 63 year old male with tachycardia-bradycardia syndrome status post Medtronic dual-chamber PPM 12/23/2021, hypertrophic cardiomyopathy, paroxysmal AF, nonobstructive CAD, dyslipidemia, who is seen today for history and physical. Following pacemaker implantation and uptitrated beta-blocker he noted ongoing lightheadedness and shortness of breath with exertion, possibly with loss of consciousness during an episode of NSVT in 2022.  He was diagnosed with hypertrophic cardiomyopathy by CMR 9/27/2023, with septal thickness up to 32 mm and diffuse fibrosis.  He is undergoing pacemaker upgrade to defibrillator, RV lead extraction and ICD left bundle lead insertion.      He continues to endorse intermittent lightheadedness and shortness of breath, with occasional racing heart. His activity tolerance is generally poor,  though can tolerate walking his dog daily.  He denies chest discomfort, palpitations, pedal edema, presyncope or syncope.     Data Review     EKG 4/22/2024 AV sequential pacing 70 bpm    Stress echocardiogram 2/23/2024  1. Normal stress echocardiogram without evidence of stress induced ischemia,  but with significantly reduced sensitivity due to not attaining target heart  rate and lower double product achieved (patient achieved 52% predicted maximum  heart rate for age & DP of 11,340).  2. Normal resting LV systolic performance with an ejection fraction of 60%.  There is improvement in left ventricular systolic performance with a peak  ejection fraction of 70%.  3. ECG portion of the study is nondiagnostic in the presence of ventricular  pacing.  4. No anginal chest pain reported with exercise.  5. Poor functional capacity for age.  6. Spectral Doppler interrogation through the LVOT/AoV does not demonstrate  any significant increase in flow velocities compared to rest evaluation  (baseline LVOT gradient 4 mmHg & post exercise peak 6 mmHg).    CMR 9/27/2023  1.  Significant hypertrophic cardiomyopathy with maximal septal wall thickness 32 mm.  Asymmetric LVH due  to hypertrophic cardiomyopathy.  The estimated left ventricular ejection fraction is 55-60%.  LVOT  obstruction is indicated by septal motion of anterior mitral leaflet.  2.  Patch myocardial scars in septal and basal anterior walls. Diffuse fibrotic tissue in thickened  myocardium.    3.  Normal right ventricular size and systolic function.  Pacemaker lead in right ventricle.  4.  Valves are not well visualized.  Mild mitral valve regurgitation.    DEVICE: MDT (D) PPM 12/23/2021 (TBS)  Annual interrogation 3/21/2024  AP 96%,  15%  Good battery life and stable lead measurements  Underlying SB 50s with AV delay  1 episode NSVT x 6 beats up to 162 bpm  ADL response 3 --> 4         Physical Examination Review of Systems   /60 (BP Location: Right arm,  "Patient Position: Sitting, Cuff Size: Adult Regular)   Pulse 70   Resp 16   Ht 1.854 m (6' 1\")   Wt 119.3 kg (263 lb)   BMI 34.70 kg/m      Body mass index is 34.7 kg/m .    Wt Readings from Last 3 Encounters:   04/22/24 119.3 kg (263 lb)   03/21/24 119.7 kg (264 lb)   02/12/24 119.3 kg (263 lb)       General   Appearance:   Alert and oriented, in no acute distress.    HEENT:  Normocephalic and atraumatic. Conjunctiva and sclera are clear. Moist oral mucosa.    Neck: No JVP, carotid bruit or obvious thyromegaly.   Lungs:   Respirations unlabored. Clear bilaterally with no rales, rhonchi, or wheezes.     Cardiovascular:   Rhythm is regular. S1 and S2 are normal. No significant murmur is present. Lower extremities demonstrate no significant edema. Posterior tibial pulses are intact bilaterally.   Extremities: No cyanosis or clubbing   Skin: Skin is warm, dry, and otherwise intact.   Neurologic: Gait not assessed. Mood and affect appropriate.                                                Medical History  Surgical History Family History Social History   Past Medical History:   Diagnosis Date    Atrial fibrillation (H)     Benign essential hypertension     Coronary artery disease     Depression     Hyperlipidemia     Obesity     Past Surgical History:   Procedure Laterality Date    CORONARY ANGIOGRAPHY ADULT ORDER      CV CORONARY ANGIOGRAM N/A 12/2/2021    Procedure: Coronary Angiogram;  Surgeon: Radha Ferrell MD;  Location: [facility] CV    CV LEFT HEART CATH N/A 12/2/2021    Procedure: Left Heart Cath;  Surgeon: Radha Ferrell MD;  Location: [facility] CV    CV LEFT VENTRICULOGRAM N/A 12/2/2021    Procedure: Left Ventriculogram;  Surgeon: Radha Ferrell MD;  Location: [facility] CV    EP PACEMAKER N/A 12/23/2021    Procedure: EP Pacemaker;  Surgeon: Kun Lopez MD;  Location: [facility] CV    HERNIA REPAIR      IMPLANT PACEMAKER      KNEE SURGERY      TONSILLECTOMY      " "VASECTOMY      Family History   Problem Relation Age of Onset    Diabetes Mother     Hypertension Mother     Coronary Artery Disease No family hx of     Cerebrovascular Disease No family hx of     Social History     Tobacco Use    Smoking status: Never    Smokeless tobacco: Never   Substance Use Topics    Alcohol use: Yes     Alcohol/week: 6.0 standard drinks of alcohol     Types: 6 Standard drinks or equivalent per week     Comment: 7 drinks per week    Drug use: Never          Medications  Allergies   Current Outpatient Medications   Medication Sig Dispense Refill    acetaminophen-caffeine (EXCEDRIN TENSION HEADACHE) 500-65 MG TABS Take 2 tablets by mouth daily as needed for mild pain      apixaban ANTICOAGULANT (ELIQUIS ANTICOAGULANT) 5 MG tablet Take 1 tablet (5 mg) by mouth 2 times daily 180 tablet 3    atorvastatin (LIPITOR) 80 MG tablet Take 1 tablet (80 mg) by mouth daily 30 tablet 0    ezetimibe (ZETIA) 10 MG tablet Take 1 tablet (10 mg) by mouth once daily 90 tablet 3    lisinopril (ZESTRIL) 20 MG tablet Take 20 mg by mouth daily      metoprolol succinate ER (TOPROL XL) 200 MG 24 hr tablet Take 1 tablet (200 mg) by mouth daily 90 tablet 3    nitroGLYcerin (NITROSTAT) 0.4 MG sublingual tablet One tablet under the tongue every 5 minutes if needed for chest pain. May repeat every 5 minutes for a maximum of 3 doses in 15 minutes 25 tablet 3    sertraline (ZOLOFT) 100 MG tablet Take 100 mg by mouth daily       No Known Allergies      Lab Results    Chemistry/lipid CBC Cardiac Enzymes/BNP/TSH/INR   Lab Results   Component Value Date    BUN 27 (H) 12/23/2021     12/23/2021    CO2 22 12/23/2021     No results found for: \"CREATININE\"    Lab Results   Component Value Date    CHOL 138 06/27/2023    HDL 45 06/27/2023    LDL 63 06/27/2023      Lab Results   Component Value Date    WBC 5.4 12/23/2021    HGB 14.5 12/23/2021    HCT 43.2 12/23/2021    MCV 93 12/23/2021     12/23/2021    Lab Results   Component " Value Date    TROPONINI 0.04 12/03/2021    TSH 3.63 12/23/2021    INR 1.05 12/02/2021        38 minutes spent reviewing prior records (including documentation, laboratory studies, cardiac testing/imaging), history and physical exam, planning, and subsequent documentation.     The longitudinal plan of care for the diagnosis(es)/condition(s) as documented were addressed during this visit. Due to the added complexity in care, I will continue to support Abraham in the subsequent management and with ongoing continuity of care.      This note has been dictated using voice recognition software. Any grammatical, typographical, or context distortions are unintentional and inherent to the software.    Ana Lilia Avila CNP  Clinical Cardiac Electrophysiology  [address]  [place] [address]   Office: [phone]  Fax: [phone]

## 2024-04-22 NOTE — H&P (VIEW-ONLY)
[facility]ART CARE [address], [place] [address]   [email or web address]   OFFICE: [phone]        Primary Care: Clifford Gamboa MD     Assessment/Recommendations     Cardiac pacemaker in situ: We discussed lead extraction and ICD left bundle pacing lead insertion and reviewed the risks of such including but not limited to pocket infection, bleeding, pneumothorax, and possible need for emergent cardiothoracic surgery and/or blood transfusion, as well as expected recovery and follow up. Medical, past medical, surgical and social history reviewed and updated. Current medications and allergies reviewed. No personal or family history of adverse reactions to anesthesia or abnormal bleeding with surgery    Paroxysmal AF: No recent AHR by device interrogation.  [id number] of 1-2 for HTN and nonobstructive CAD, recommended to continue oral anticoagulation for history of HCM. Plan to resume Eliquis 5 mg twice a day for stroke prophylaxis post-procedure    Device clinic follow-up as previously outlined     History of Present Illness/Subjective    Abraham Dong is a 63 year old male with tachycardia-bradycardia syndrome status post Medtronic dual-chamber PPM 12/23/2021, hypertrophic cardiomyopathy, paroxysmal AF, nonobstructive CAD, dyslipidemia, who is seen today for history and physical. Following pacemaker implantation and uptitrated beta-blocker he noted ongoing lightheadedness and shortness of breath with exertion, possibly with loss of consciousness during an episode of NSVT in 2022.  He was diagnosed with hypertrophic cardiomyopathy by CMR 9/27/2023, with septal thickness up to 32 mm and diffuse fibrosis.  He is undergoing pacemaker upgrade to defibrillator, RV lead extraction and ICD left bundle lead insertion.      He continues to endorse intermittent lightheadedness and shortness of breath, with occasional racing heart. His activity tolerance is generally poor,  though can tolerate walking his dog daily.  He denies chest discomfort, palpitations, pedal edema, presyncope or syncope.     Data Review     EKG 4/22/2024 AV sequential pacing 70 bpm    Stress echocardiogram 2/23/2024  1. Normal stress echocardiogram without evidence of stress induced ischemia,  but with significantly reduced sensitivity due to not attaining target heart  rate and lower double product achieved (patient achieved 52% predicted maximum  heart rate for age & DP of 11,340).  2. Normal resting LV systolic performance with an ejection fraction of 60%.  There is improvement in left ventricular systolic performance with a peak  ejection fraction of 70%.  3. ECG portion of the study is nondiagnostic in the presence of ventricular  pacing.  4. No anginal chest pain reported with exercise.  5. Poor functional capacity for age.  6. Spectral Doppler interrogation through the LVOT/AoV does not demonstrate  any significant increase in flow velocities compared to rest evaluation  (baseline LVOT gradient 4 mmHg & post exercise peak 6 mmHg).    CMR 9/27/2023  1.  Significant hypertrophic cardiomyopathy with maximal septal wall thickness 32 mm.  Asymmetric LVH due  to hypertrophic cardiomyopathy.  The estimated left ventricular ejection fraction is 55-60%.  LVOT  obstruction is indicated by septal motion of anterior mitral leaflet.  2.  Patch myocardial scars in septal and basal anterior walls. Diffuse fibrotic tissue in thickened  myocardium.    3.  Normal right ventricular size and systolic function.  Pacemaker lead in right ventricle.  4.  Valves are not well visualized.  Mild mitral valve regurgitation.    DEVICE: MDT (D) PPM 12/23/2021 (TBS)  Annual interrogation 3/21/2024  AP 96%,  15%  Good battery life and stable lead measurements  Underlying SB 50s with AV delay  1 episode NSVT x 6 beats up to 162 bpm  ADL response 3 --> 4         Physical Examination Review of Systems   /60 (BP Location: Right arm,  "Patient Position: Sitting, Cuff Size: Adult Regular)   Pulse 70   Resp 16   Ht 1.854 m (6' 1\")   Wt 119.3 kg (263 lb)   BMI 34.70 kg/m      Body mass index is 34.7 kg/m .    Wt Readings from Last 3 Encounters:   04/22/24 119.3 kg (263 lb)   03/21/24 119.7 kg (264 lb)   02/12/24 119.3 kg (263 lb)       General   Appearance:   Alert and oriented, in no acute distress.    HEENT:  Normocephalic and atraumatic. Conjunctiva and sclera are clear. Moist oral mucosa.    Neck: No JVP, carotid bruit or obvious thyromegaly.   Lungs:   Respirations unlabored. Clear bilaterally with no rales, rhonchi, or wheezes.     Cardiovascular:   Rhythm is regular. S1 and S2 are normal. No significant murmur is present. Lower extremities demonstrate no significant edema. Posterior tibial pulses are intact bilaterally.   Extremities: No cyanosis or clubbing   Skin: Skin is warm, dry, and otherwise intact.   Neurologic: Gait not assessed. Mood and affect appropriate.                                                Medical History  Surgical History Family History Social History   Past Medical History:   Diagnosis Date     Atrial fibrillation (H)      Benign essential hypertension      Coronary artery disease      Depression      Hyperlipidemia      Obesity     Past Surgical History:   Procedure Laterality Date     CORONARY ANGIOGRAPHY ADULT ORDER       CV CORONARY ANGIOGRAM N/A 12/2/2021    Procedure: Coronary Angiogram;  Surgeon: Radha Ferrell MD;  Location: [facility] CV     CV LEFT HEART CATH N/A 12/2/2021    Procedure: Left Heart Cath;  Surgeon: Radha Ferrell MD;  Location: [facility] CV     CV LEFT VENTRICULOGRAM N/A 12/2/2021    Procedure: Left Ventriculogram;  Surgeon: Radha Ferrell MD;  Location: [facility] CV     EP PACEMAKER N/A 12/23/2021    Procedure: EP Pacemaker;  Surgeon: Kun Lopez MD;  Location: [facility] CV     HERNIA REPAIR       IMPLANT PACEMAKER       KNEE SURGERY       " "TONSILLECTOMY       VASECTOMY      Family History   Problem Relation Age of Onset     Diabetes Mother      Hypertension Mother      Coronary Artery Disease No family hx of      Cerebrovascular Disease No family hx of     Social History     Tobacco Use     Smoking status: Never     Smokeless tobacco: Never   Substance Use Topics     Alcohol use: Yes     Alcohol/week: 6.0 standard drinks of alcohol     Types: 6 Standard drinks or equivalent per week     Comment: 7 drinks per week     Drug use: Never          Medications  Allergies   Current Outpatient Medications   Medication Sig Dispense Refill     acetaminophen-caffeine (EXCEDRIN TENSION HEADACHE) 500-65 MG TABS Take 2 tablets by mouth daily as needed for mild pain       apixaban ANTICOAGULANT (ELIQUIS ANTICOAGULANT) 5 MG tablet Take 1 tablet (5 mg) by mouth 2 times daily 180 tablet 3     atorvastatin (LIPITOR) 80 MG tablet Take 1 tablet (80 mg) by mouth daily 30 tablet 0     ezetimibe (ZETIA) 10 MG tablet Take 1 tablet (10 mg) by mouth once daily 90 tablet 3     lisinopril (ZESTRIL) 20 MG tablet Take 20 mg by mouth daily       metoprolol succinate ER (TOPROL XL) 200 MG 24 hr tablet Take 1 tablet (200 mg) by mouth daily 90 tablet 3     nitroGLYcerin (NITROSTAT) 0.4 MG sublingual tablet One tablet under the tongue every 5 minutes if needed for chest pain. May repeat every 5 minutes for a maximum of 3 doses in 15 minutes 25 tablet 3     sertraline (ZOLOFT) 100 MG tablet Take 100 mg by mouth daily       No Known Allergies      Lab Results    Chemistry/lipid CBC Cardiac Enzymes/BNP/TSH/INR   Lab Results   Component Value Date    BUN 27 (H) 12/23/2021     12/23/2021    CO2 22 12/23/2021     No results found for: \"CREATININE\"    Lab Results   Component Value Date    CHOL 138 06/27/2023    HDL 45 06/27/2023    LDL 63 06/27/2023      Lab Results   Component Value Date    WBC 5.4 12/23/2021    HGB 14.5 12/23/2021    HCT 43.2 12/23/2021    MCV 93 12/23/2021     " 12/23/2021    Lab Results   Component Value Date    TROPONINI 0.04 12/03/2021    TSH 3.63 12/23/2021    INR 1.05 12/02/2021        38 minutes spent reviewing prior records (including documentation, laboratory studies, cardiac testing/imaging), history and physical exam, planning, and subsequent documentation.     The longitudinal plan of care for the diagnosis(es)/condition(s) as documented were addressed during this visit. Due to the added complexity in care, I will continue to support Abraham in the subsequent management and with ongoing continuity of care.      This note has been dictated using voice recognition software. Any grammatical, typographical, or context distortions are unintentional and inherent to the software.    Ana Lilia Avila, CNP  Clinical Cardiac Electrophysiology  [address]  [place] [address]   Office: [phone]  Fax: [phone]

## 2024-04-23 ENCOUNTER — ANESTHESIA EVENT (OUTPATIENT)
Dept: CARDIOLOGY | Facility: HOSPITAL | Age: 64
End: 2024-04-23
Payer: COMMERCIAL

## 2024-04-23 ASSESSMENT — ENCOUNTER SYMPTOMS: DYSRHYTHMIAS: 1

## 2024-04-24 ENCOUNTER — APPOINTMENT (OUTPATIENT)
Dept: RADIOLOGY | Facility: HOSPITAL | Age: 64
End: 2024-04-24
Attending: INTERNAL MEDICINE
Payer: COMMERCIAL

## 2024-04-24 ENCOUNTER — HOSPITAL ENCOUNTER (OUTPATIENT)
Facility: HOSPITAL | Age: 64
Setting detail: OBSERVATION
Discharge: HOME | End: 2024-04-24
Attending: INTERNAL MEDICINE | Admitting: INTERNAL MEDICINE
Payer: COMMERCIAL

## 2024-04-24 ENCOUNTER — ANESTHESIA (OUTPATIENT)
Dept: CARDIOLOGY | Facility: HOSPITAL | Age: 64
End: 2024-04-24
Payer: COMMERCIAL

## 2024-04-24 VITALS
HEIGHT: 73 IN | OXYGEN SATURATION: 93 % | HEART RATE: 60 BPM | WEIGHT: 258 LBS | BODY MASS INDEX: 34.19 KG/M2 | SYSTOLIC BLOOD PRESSURE: 110 MMHG | RESPIRATION RATE: 18 BRPM | DIASTOLIC BLOOD PRESSURE: 60 MMHG | TEMPERATURE: 97.8 F

## 2024-04-24 DIAGNOSIS — I42.2 HYPERTROPHIC CARDIOMYOPATHY (H): ICD-10-CM

## 2024-04-24 DIAGNOSIS — I47.29 NSVT (NONSUSTAINED VENTRICULAR TACHYCARDIA) (H): ICD-10-CM

## 2024-04-24 DIAGNOSIS — Z95.0 CARDIAC PACEMAKER IN SITU: ICD-10-CM

## 2024-04-24 LAB
BLD PROD TYP BPU: NORMAL
BLOOD COMPONENT TYPE: NORMAL
CODING SYSTEM: NORMAL
CROSSMATCH: NORMAL
ISSUE DATE AND TIME: NORMAL
UNIT ABO/RH: NORMAL
UNIT NUMBER: NORMAL
UNIT STATUS: NORMAL
UNIT TYPE ISBT: 6200

## 2024-04-24 PROCEDURE — C1759 CATH, INTRA ECHOCARDIOGRAPHY: HCPCS | Performed by: INTERNAL MEDICINE

## 2024-04-24 PROCEDURE — 250N000011 HC RX IP 250 OP 636: Performed by: REGISTERED NURSE

## 2024-04-24 PROCEDURE — 33249 INSJ/RPLCMT DEFIB W/LEAD(S): CPT | Performed by: INTERNAL MEDICINE

## 2024-04-24 PROCEDURE — 250N000009 HC RX 250: Performed by: REGISTERED NURSE

## 2024-04-24 PROCEDURE — 86923 COMPATIBILITY TEST ELECTRIC: CPT | Performed by: INTERNAL MEDICINE

## 2024-04-24 PROCEDURE — 250N000009 HC RX 250: Performed by: INTERNAL MEDICINE

## 2024-04-24 PROCEDURE — 999N000065 XR CHEST 2 VIEWS

## 2024-04-24 PROCEDURE — 258N000003 HC RX IP 258 OP 636: Performed by: INTERNAL MEDICINE

## 2024-04-24 PROCEDURE — 33234 REMOVAL OF PACEMAKER SYSTEM: CPT | Performed by: INTERNAL MEDICINE

## 2024-04-24 PROCEDURE — 93010 ELECTROCARDIOGRAM REPORT: CPT | Performed by: INTERNAL MEDICINE

## 2024-04-24 PROCEDURE — G0378 HOSPITAL OBSERVATION PER HR: HCPCS

## 2024-04-24 PROCEDURE — 33233 REMOVAL OF PM GENERATOR: CPT | Performed by: INTERNAL MEDICINE

## 2024-04-24 PROCEDURE — 250N000011 HC RX IP 250 OP 636: Performed by: ANESTHESIOLOGY

## 2024-04-24 PROCEDURE — 33249 INSJ/RPLCMT DEFIB W/LEAD(S): CPT

## 2024-04-24 PROCEDURE — 33207 INSERT HEART PM VENTRICULAR: CPT | Performed by: INTERNAL MEDICINE

## 2024-04-24 PROCEDURE — C1769 GUIDE WIRE: HCPCS | Performed by: INTERNAL MEDICINE

## 2024-04-24 PROCEDURE — 93005 ELECTROCARDIOGRAM TRACING: CPT | Mod: 59

## 2024-04-24 PROCEDURE — 33230 INSRT PULSE GEN W/DUAL LEADS: CPT | Performed by: INTERNAL MEDICINE

## 2024-04-24 PROCEDURE — 36620 INSERTION CATHETER ARTERY: CPT | Performed by: INTERNAL MEDICINE

## 2024-04-24 PROCEDURE — 255N000002 HC RX 255 OP 636: Performed by: INTERNAL MEDICINE

## 2024-04-24 PROCEDURE — 250N000009 HC RX 250: Performed by: ANESTHESIOLOGY

## 2024-04-24 PROCEDURE — C1887 CATHETER, GUIDING: HCPCS | Performed by: INTERNAL MEDICINE

## 2024-04-24 PROCEDURE — 999N000054 HC STATISTIC EKG NON-CHARGEABLE

## 2024-04-24 PROCEDURE — 258N000003 HC RX IP 258 OP 636: Performed by: REGISTERED NURSE

## 2024-04-24 PROCEDURE — 250N000011 HC RX IP 250 OP 636: Performed by: INTERNAL MEDICINE

## 2024-04-24 PROCEDURE — 33241 REMOVE PULSE GENERATOR: CPT | Performed by: INTERNAL MEDICINE

## 2024-04-24 PROCEDURE — C1777 LEAD, AICD, ENDO SINGLE COIL: HCPCS | Performed by: INTERNAL MEDICINE

## 2024-04-24 PROCEDURE — C1725 CATH, TRANSLUMIN NON-LASER: HCPCS | Performed by: INTERNAL MEDICINE

## 2024-04-24 PROCEDURE — 272N000001 HC OR GENERAL SUPPLY STERILE: Performed by: INTERNAL MEDICINE

## 2024-04-24 PROCEDURE — C1721 AICD, DUAL CHAMBER: HCPCS | Performed by: INTERNAL MEDICINE

## 2024-04-24 PROCEDURE — C1894 INTRO/SHEATH, NON-LASER: HCPCS | Performed by: INTERNAL MEDICINE

## 2024-04-24 PROCEDURE — P9016 RBC LEUKOCYTES REDUCED: HCPCS | Performed by: INTERNAL MEDICINE

## 2024-04-24 PROCEDURE — 33216 INSERT 1 ELECTRODE PM-DEFIB: CPT | Performed by: INTERNAL MEDICINE

## 2024-04-24 PROCEDURE — 93662 INTRACARDIAC ECG (ICE): CPT | Performed by: INTERNAL MEDICINE

## 2024-04-24 PROCEDURE — 93799 UNLISTED CV SVC/PROCEDURE: CPT | Performed by: INTERNAL MEDICINE

## 2024-04-24 PROCEDURE — 370N000017 HC ANESTHESIA TECHNICAL FEE, PER MIN: Performed by: INTERNAL MEDICINE

## 2024-04-24 DEVICE — LEAD ACTIVE DF4 6935M-62CM: Type: IMPLANTABLE DEVICE | Site: HEART | Status: FUNCTIONAL

## 2024-04-24 DEVICE — DEFIB ICD COBALT XT IMPLANTABLE DDPA2D4: Type: IMPLANTABLE DEVICE | Site: CHEST  WALL | Status: FUNCTIONAL

## 2024-04-24 RX ORDER — PROPOFOL 10 MG/ML
INJECTION, EMULSION INTRAVENOUS PRN
Status: DISCONTINUED | OUTPATIENT
Start: 2024-04-24 | End: 2024-04-24

## 2024-04-24 RX ORDER — FENTANYL CITRATE 50 UG/ML
INJECTION, SOLUTION INTRAMUSCULAR; INTRAVENOUS PRN
Status: DISCONTINUED | OUTPATIENT
Start: 2024-04-24 | End: 2024-04-24

## 2024-04-24 RX ORDER — HYDROMORPHONE HYDROCHLORIDE 1 MG/ML
0.2 INJECTION, SOLUTION INTRAMUSCULAR; INTRAVENOUS; SUBCUTANEOUS EVERY 5 MIN PRN
Status: DISCONTINUED | OUTPATIENT
Start: 2024-04-24 | End: 2024-04-24 | Stop reason: HOSPADM

## 2024-04-24 RX ORDER — DEXAMETHASONE SODIUM PHOSPHATE 10 MG/ML
INJECTION, SOLUTION INTRAMUSCULAR; INTRAVENOUS PRN
Status: DISCONTINUED | OUTPATIENT
Start: 2024-04-24 | End: 2024-04-24

## 2024-04-24 RX ORDER — FENTANYL CITRATE 50 UG/ML
50 INJECTION, SOLUTION INTRAMUSCULAR; INTRAVENOUS EVERY 5 MIN PRN
Status: DISCONTINUED | OUTPATIENT
Start: 2024-04-24 | End: 2024-04-24 | Stop reason: HOSPADM

## 2024-04-24 RX ORDER — ACETAMINOPHEN 325 MG/1
650 TABLET ORAL EVERY 4 HOURS PRN
Status: DISCONTINUED | OUTPATIENT
Start: 2024-04-24 | End: 2024-04-24 | Stop reason: HOSPADM

## 2024-04-24 RX ORDER — ONDANSETRON 2 MG/ML
INJECTION INTRAMUSCULAR; INTRAVENOUS PRN
Status: DISCONTINUED | OUTPATIENT
Start: 2024-04-24 | End: 2024-04-24

## 2024-04-24 RX ORDER — LIDOCAINE 40 MG/G
CREAM TOPICAL
Status: DISCONTINUED | OUTPATIENT
Start: 2024-04-24 | End: 2024-04-24 | Stop reason: HOSPADM

## 2024-04-24 RX ORDER — ONDANSETRON 2 MG/ML
4 INJECTION INTRAMUSCULAR; INTRAVENOUS EVERY 30 MIN PRN
Status: DISCONTINUED | OUTPATIENT
Start: 2024-04-24 | End: 2024-04-24 | Stop reason: HOSPADM

## 2024-04-24 RX ORDER — IODIXANOL 320 MG/ML
INJECTION, SOLUTION INTRAVASCULAR
Status: DISCONTINUED | OUTPATIENT
Start: 2024-04-24 | End: 2024-04-24 | Stop reason: HOSPADM

## 2024-04-24 RX ORDER — HYDROMORPHONE HYDROCHLORIDE 1 MG/ML
0.4 INJECTION, SOLUTION INTRAMUSCULAR; INTRAVENOUS; SUBCUTANEOUS EVERY 5 MIN PRN
Status: DISCONTINUED | OUTPATIENT
Start: 2024-04-24 | End: 2024-04-24 | Stop reason: HOSPADM

## 2024-04-24 RX ORDER — FENTANYL CITRATE 50 UG/ML
25 INJECTION, SOLUTION INTRAMUSCULAR; INTRAVENOUS EVERY 5 MIN PRN
Status: DISCONTINUED | OUTPATIENT
Start: 2024-04-24 | End: 2024-04-24 | Stop reason: HOSPADM

## 2024-04-24 RX ORDER — LIDOCAINE HYDROCHLORIDE 10 MG/ML
INJECTION, SOLUTION INFILTRATION; PERINEURAL PRN
Status: DISCONTINUED | OUTPATIENT
Start: 2024-04-24 | End: 2024-04-24

## 2024-04-24 RX ORDER — SODIUM CHLORIDE 9 MG/ML
INJECTION, SOLUTION INTRAVENOUS CONTINUOUS PRN
Status: DISCONTINUED | OUTPATIENT
Start: 2024-04-24 | End: 2024-04-24

## 2024-04-24 RX ORDER — ONDANSETRON 4 MG/1
4 TABLET, ORALLY DISINTEGRATING ORAL EVERY 30 MIN PRN
Status: DISCONTINUED | OUTPATIENT
Start: 2024-04-24 | End: 2024-04-24 | Stop reason: HOSPADM

## 2024-04-24 RX ORDER — SODIUM CHLORIDE 9 MG/ML
100 INJECTION, SOLUTION INTRAVENOUS CONTINUOUS
Status: DISCONTINUED | OUTPATIENT
Start: 2024-04-24 | End: 2024-04-24 | Stop reason: HOSPADM

## 2024-04-24 RX ORDER — DEXMEDETOMIDINE HYDROCHLORIDE 4 UG/ML
INJECTION, SOLUTION INTRAVENOUS CONTINUOUS PRN
Status: DISCONTINUED | OUTPATIENT
Start: 2024-04-24 | End: 2024-04-24

## 2024-04-24 RX ORDER — NALOXONE HYDROCHLORIDE 0.4 MG/ML
0.1 INJECTION, SOLUTION INTRAMUSCULAR; INTRAVENOUS; SUBCUTANEOUS
Status: DISCONTINUED | OUTPATIENT
Start: 2024-04-24 | End: 2024-04-24 | Stop reason: HOSPADM

## 2024-04-24 RX ADMIN — SODIUM CHLORIDE: 9 INJECTION, SOLUTION INTRAVENOUS at 10:24

## 2024-04-24 RX ADMIN — MIDAZOLAM 2 MG: 1 INJECTION INTRAMUSCULAR; INTRAVENOUS at 07:13

## 2024-04-24 RX ADMIN — ROCURONIUM BROMIDE 30 MG: 50 INJECTION, SOLUTION INTRAVENOUS at 09:22

## 2024-04-24 RX ADMIN — ONDANSETRON 4 MG: 2 INJECTION INTRAMUSCULAR; INTRAVENOUS at 10:32

## 2024-04-24 RX ADMIN — FENTANYL CITRATE 50 MCG: 50 INJECTION INTRAMUSCULAR; INTRAVENOUS at 07:20

## 2024-04-24 RX ADMIN — FENTANYL CITRATE 100 MCG: 50 INJECTION INTRAMUSCULAR; INTRAVENOUS at 10:31

## 2024-04-24 RX ADMIN — PROPOFOL 200 MG: 10 INJECTION, EMULSION INTRAVENOUS at 07:19

## 2024-04-24 RX ADMIN — DEXMEDETOMIDINE HYDROCHLORIDE 0.7 MCG/KG/HR: 400 INJECTION INTRAVENOUS at 07:33

## 2024-04-24 RX ADMIN — SODIUM CHLORIDE: 9 INJECTION, SOLUTION INTRAVENOUS at 07:51

## 2024-04-24 RX ADMIN — ROCURONIUM BROMIDE 50 MG: 50 INJECTION, SOLUTION INTRAVENOUS at 08:01

## 2024-04-24 RX ADMIN — SODIUM CHLORIDE 100 ML/HR: 9 INJECTION, SOLUTION INTRAVENOUS at 05:58

## 2024-04-24 RX ADMIN — SODIUM CHLORIDE: 9 INJECTION, SOLUTION INTRAVENOUS at 07:12

## 2024-04-24 RX ADMIN — FENTANYL CITRATE 50 MCG: 50 INJECTION INTRAMUSCULAR; INTRAVENOUS at 07:18

## 2024-04-24 RX ADMIN — LIDOCAINE HYDROCHLORIDE 5 ML: 10 INJECTION, SOLUTION INFILTRATION; PERINEURAL at 07:19

## 2024-04-24 RX ADMIN — PHENYLEPHRINE HYDROCHLORIDE 200 MCG: 10 INJECTION INTRAVENOUS at 07:39

## 2024-04-24 RX ADMIN — PHENYLEPHRINE HYDROCHLORIDE 0.5 MCG/KG/MIN: 10 INJECTION INTRAVENOUS at 07:27

## 2024-04-24 RX ADMIN — PHENYLEPHRINE HYDROCHLORIDE 200 MCG: 10 INJECTION INTRAVENOUS at 07:30

## 2024-04-24 RX ADMIN — VANCOMYCIN HYDROCHLORIDE 1500 MG: 5 INJECTION, POWDER, LYOPHILIZED, FOR SOLUTION INTRAVENOUS at 05:58

## 2024-04-24 RX ADMIN — ROCURONIUM BROMIDE 50 MG: 50 INJECTION, SOLUTION INTRAVENOUS at 07:20

## 2024-04-24 RX ADMIN — SUGAMMADEX 160 MG: 100 INJECTION, SOLUTION INTRAVENOUS at 10:33

## 2024-04-24 RX ADMIN — DEXAMETHASONE SODIUM PHOSPHATE 10 MG: 10 INJECTION, SOLUTION INTRAMUSCULAR; INTRAVENOUS at 07:21

## 2024-04-24 ASSESSMENT — ACTIVITIES OF DAILY LIVING (ADL)
ADLS_ACUITY_SCORE: 35

## 2024-04-24 NOTE — PLAN OF CARE
Pt alert and oriented. Vss on room air. Pt nsr w bbb on monitor. Denies any cardiac signs/symptoms at this time. Pt prepped and ready for cardiac procedure.

## 2024-04-24 NOTE — Clinical Note
A venogram was performed on the left subclavian. Injected with multiple hand injections. 20 mL total

## 2024-04-24 NOTE — PHARMACY-ADMISSION MEDICATION HISTORY
Pharmacist Admission Medication History    Admission medication history is complete. The information provided in this note is only as accurate as the sources available at the time of the update.    Information Source(s): Patient and CareEverywhere/SureScripts via in-person    Allergies reviewed with patient and updates made in EHR: yes    Medication History Completed By: Etelvina Malcolm RPH 4/24/2024 6:58 AM    PTA Med List   Medication Sig Last Dose    acetaminophen-caffeine (EXCEDRIN TENSION HEADACHE) 500-65 MG TABS Take 2 tablets by mouth daily as needed for mild pain Unknown at unknown    apixaban ANTICOAGULANT (ELIQUIS ANTICOAGULANT) 5 MG tablet Take 1 tablet (5 mg) by mouth 2 times daily 4/21/2024 at am    atorvastatin (LIPITOR) 80 MG tablet Take 1 tablet (80 mg) by mouth daily 4/24/2024 at am    ezetimibe (ZETIA) 10 MG tablet Take 1 tablet (10 mg) by mouth once daily 4/24/2024 at am    lisinopril (ZESTRIL) 20 MG tablet Take 20 mg by mouth daily 4/24/2024 at am    metoprolol succinate ER (TOPROL XL) 200 MG 24 hr tablet Take 1 tablet (200 mg) by mouth daily 4/24/2024 at am    nitroGLYcerin (NITROSTAT) 0.4 MG sublingual tablet One tablet under the tongue every 5 minutes if needed for chest pain. May repeat every 5 minutes for a maximum of 3 doses in 15 minutes Unknown at unknown    sertraline (ZOLOFT) 100 MG tablet Take 100 mg by mouth daily  4/24/2024 at am

## 2024-04-24 NOTE — ANESTHESIA PREPROCEDURE EVALUATION
Anesthesia Pre-Procedure Evaluation    Patient: Abraham Dong   MRN: [id number] : 1960        Procedure : Procedure(s):  Pacemaker Lead Removal - Dual or Bi-ventricular  Implantable Cardioverter Defibrillator Device & Lead Implant Biventricular  Implantable Cardioverter Defibrillator Device & Lead Implant Biventricular  Or Laser Lead Extraction - Level 2 (Standby)          Past Medical History:   Diagnosis Date    Atrial fibrillation (H)     Benign essential hypertension     Coronary artery disease     Depression     Hyperlipidemia     Obesity       Past Surgical History:   Procedure Laterality Date    CORONARY ANGIOGRAPHY ADULT ORDER      CV CORONARY ANGIOGRAM N/A 2021    Procedure: Coronary Angiogram;  Surgeon: Radha Ferrell MD;  Location: [facility] CATH LAB CV    CV LEFT HEART CATH N/A 2021    Procedure: Left Heart Cath;  Surgeon: Radha Ferrell MD;  Location: [facility] LAB CV    CV LEFT VENTRICULOGRAM N/A 2021    Procedure: Left Ventriculogram;  Surgeon: Radha Ferrell MD;  Location: [facility] LAB CV    EP PACEMAKER N/A 2021    Procedure: EP Pacemaker;  Surgeon: Kun Lopez MD;  Location: [facility] CATH LAB CV    HERNIA REPAIR      IMPLANT PACEMAKER      KNEE SURGERY      TONSILLECTOMY      VASECTOMY        No Known Allergies   Social History     Tobacco Use    Smoking status: Never    Smokeless tobacco: Never   Substance Use Topics    Alcohol use: Yes     Alcohol/week: 6.0 standard drinks of alcohol     Types: 6 Standard drinks or equivalent per week     Comment: 7 drinks per week      Wt Readings from Last 1 Encounters:   24 119.3 kg (263 lb)        Anesthesia Evaluation   Pt has had prior anesthetic.     No history of anesthetic complications       ROS/MED HX  ENT/Pulmonary:  - neg pulmonary ROS     Neurologic:  - neg neurologic ROS     Cardiovascular: Comment: Stress echocardiogram 2024  1. Normal stress echocardiogram without evidence of stress  induced ischemia,  but with significantly reduced sensitivity due to not attaining target heart  rate and lower double product achieved (patient achieved 52% predicted maximum  heart rate for age & DP of 11,340).  2. Normal resting LV systolic performance with an ejection fraction of 60%.  There is improvement in left ventricular systolic performance with a peak  ejection fraction of 70%.  3. ECG portion of the study is nondiagnostic in the presence of ventricular  pacing.  4. No anginal chest pain reported with exercise.  5. Poor functional capacity for age.  6. Spectral Doppler interrogation through the LVOT/AoV does not demonstrate  any significant increase in flow velocities compared to rest evaluation  (baseline LVOT gradient 4 mmHg & post exercise peak 6 mmHg).      (+) Dyslipidemia hypertension- -  CAD (nonobstructive) - past MI (STEMI) - -      CHF etiology: hypertrophic cardiomyopathy            ICD Reason placed:tachycardia-bradycardia syndrome status post Medtronic dual-chamber PPM 12/23/2021.    dysrhythmias, a-fib,             METS/Exercise Tolerance:  Comment: Can walk his dog without SOB.  ALAS with more exertion.   Hematologic:  - neg hematologic  ROS     Musculoskeletal:  - neg musculoskeletal ROS     GI/Hepatic:  - neg GI/hepatic ROS     Renal/Genitourinary:  - neg Renal ROS     Endo:     (+)               Obesity (BMI 35),       Psychiatric/Substance Use:       Infectious Disease:       Malignancy:       Other:            Physical Exam    Airway        Mallampati: II   TM distance: > 3 FB   Neck ROM: full   Mouth opening: > 3 cm    Respiratory Devices and Support         Dental     Comment: Good        Cardiovascular   cardiovascular exam normal          Pulmonary   pulmonary exam normal            OUTSIDE LABS:  CBC:   Lab Results   Component Value Date    WBC 8.1 04/22/2024    WBC 5.4 12/23/2021    HGB 15.0 04/22/2024    HGB 14.5 12/23/2021    HCT 44.8 04/22/2024    HCT 43.2 12/23/2021      "04/22/2024     12/23/2021     BMP:   Lab Results   Component Value Date     04/22/2024     12/23/2021    POTASSIUM 4.7 04/22/2024    POTASSIUM 4.5 12/23/2021    CHLORIDE 102 04/22/2024    CHLORIDE 106 12/23/2021    CO2 21 (L) 04/22/2024    CO2 22 12/23/2021    BUN 24.4 (H) 04/22/2024    BUN 27 (H) 12/23/2021    CR 0.86 04/22/2024    CR 0.84 12/23/2021    GLC 94 04/22/2024     12/23/2021     COAGS:   Lab Results   Component Value Date    PTT 27 12/02/2021    INR 1.05 12/02/2021     POC: No results found for: \"BGM\", \"HCG\", \"HCGS\"  HEPATIC:   Lab Results   Component Value Date    ALBUMIN 4.0 12/23/2021    PROTTOTAL 7.0 12/23/2021    ALT 35 12/23/2021    AST 28 12/23/2021    ALKPHOS 47 12/23/2021    BILITOTAL 0.4 12/23/2021     OTHER:   Lab Results   Component Value Date    TEGAN 9.6 04/22/2024    TSH 3.63 12/23/2021       Anesthesia Plan    ASA Status:  3    NPO Status:  NPO Appropriate    Anesthesia Type: General.     - Airway: ETT   Induction: Intravenous.   Maintenance: Balanced.   Techniques and Equipment:     - Airway: Video-Laryngoscope     - Lines/Monitors: 2nd IV     Consents    Anesthesia Plan(s) and associated risks, benefits, and realistic alternatives discussed. Questions answered and patient/representative(s) expressed understanding.     - Discussed: Risks, Benefits and Alternatives for BOTH SEDATION and the PROCEDURE were discussed     - Discussed with:  Patient      - Extended Intubation/Ventilatory Support Discussed: No.      - Patient is DNR/DNI Status: No     Use of blood products discussed: Yes.     - Discussed with: Patient.     - Consented: consented to blood products     Postoperative Care    Pain management: Multi-modal analgesia.   PONV prophylaxis: Dexamethasone or Solumedrol, Ondansetron (or other 5HT-3)     Comments:    Other Comments: Level 2 laser lead removal    Have a magnet immediately available    Glidescope    Phenylephrine inline    Precedex gtt      Reverse " "with Sugammadex    Invasive monitoring/lines per CVL               Ana Lilia Mathur MD    I have reviewed the pertinent notes and labs in the chart from the past 30 days and (re)examined the patient.  Any updates or changes from those notes are reflected in this note.     # Hyponatremia: Lowest Na = 135 mmol/L in last 30 days, will monitor as appropriate          # Obesity: Estimated body mass index is 34.7 kg/m  as calculated from the following:    Height as of 4/22/24: 1.854 m (6' 1\").    Weight as of 4/22/24: 119.3 kg (263 lb).      "

## 2024-04-24 NOTE — ANESTHESIA PROCEDURE NOTES
Airway       Patient location during procedure: OR       Procedure Start/Stop Times: 4/24/2024 7:23 AM and 4/24/2024 7:23 AM  Staff -        CRNA: Vasiliy Rodriguez APRN CRNA       Performed By: CRNA  Consent for Airway        Urgency: elective  Indications and Patient Condition       Indications for airway management: jim-procedural       Induction type:intravenous       Mask difficulty assessment: 2 - vent by mask + OA or adjuvant +/- NMBA    Final Airway Details       Final airway type: endotracheal airway       Successful airway: ETT - single  Endotracheal Airway Details        ETT size (mm): 8.0       Cuffed: yes       Successful intubation technique: video laryngoscopy       VL Blade Size: MAC 4       Grade View of Cords: 1       Adjucts: stylet       Position: Right       Measured from: lips       Secured at (cm): 24    Post intubation assessment        Placement verified by: capnometry, equal breath sounds and chest rise        Number of attempts at approach: 1       Number of other approaches attempted: 0       Secured with: tape       Ease of procedure: easy       Dentition: Intact and Unchanged    Medication(s) Administered   Medication Administration Time: 4/24/2024 7:23 AM    Additional Comments       Easy intubation.  Glidescope.  Gr. 1 view.  No dental damage.  No oral trauma.  Eyes protected with induction.

## 2024-04-24 NOTE — Clinical Note
suture utilizedfor a figure 8 purse-string for closure of site.  Manual pressure applied by scrub person; hemostasis achieved.

## 2024-04-24 NOTE — Clinical Note
Tested the right ventricular lead. See vendor printout/MD dictation. Chris Vickers [facility] S MRI SureScan  6935M - 62 cm  SN: [id number]  Exp date: 01-

## 2024-04-24 NOTE — ANESTHESIA CARE TRANSFER NOTE
Patient: Abraham Dong    Procedure: Procedure(s):  Pacemaker Lead Removal - Dual or Bi-ventricular  Implantable Cardioverter Defibrillator Device & Lead Implant Biventricular  Implantable Cardioverter Defibrillator Device & Lead Implant Biventricular  Or Laser Lead Extraction - Level 2 (Standby)       Diagnosis: upgrade pacemaker to implantable cardioverter defibrillator  Diagnosis Additional Information: No value filed.    Anesthesia Type:   General     Note:    Oropharynx: oropharynx clear of all foreign objects and spontaneously breathing  Level of Consciousness: awake  Oxygen Supplementation: nasal cannula  Level of Supplemental Oxygen (L/min / FiO2): 5  Independent Airway: airway patency satisfactory and stable  Dentition: dentition unchanged  Vital Signs Stable: post-procedure vital signs reviewed and stable  Report to RN Given: handoff report given  Patient transferred to: PACU    Handoff Report: Identifed the Patient, Identified the Reponsible Provider, Reviewed the pertinent medical history, Discussed the surgical course, Reviewed Intra-OP anesthesia mangement and issues during anesthesia, Set expectations for post-procedure period and Allowed opportunity for questions and acknowledgement of understanding      Vitals:  Vitals Value Taken Time   /66 04/24/24 1100   Temp     Pulse 60 04/24/24 1100   Resp 14 04/24/24 1100   SpO2 95 % 04/24/24 1100   Vitals shown include unfiled device data.    Electronically Signed By: RAGINI Amezquita CRNA  April 24, 2024  11:02 AM

## 2024-04-24 NOTE — Clinical Note
Laser delivery mode: pulsed.     Total number of laser pulses 415per artery right ventricle;total number of minutes of laser 5 per case.

## 2024-04-24 NOTE — ANESTHESIA POSTPROCEDURE EVALUATION
Patient: Abraham Dong    Procedure: Procedure(s):  Pacemaker Lead Removal - Dual or Bi-ventricular  Implantable Cardioverter Defibrillator Device & Lead Implant Biventricular  Implantable Cardioverter Defibrillator Device & Lead Implant Biventricular  Or Laser Lead Extraction - Level 2 (Standby)       Anesthesia Type:  General    Note:  Disposition: Outpatient   Postop Pain Control: Uneventful            Sign Out: Well controlled pain   PONV: No   Neuro/Psych: Uneventful            Sign Out: Acceptable/Baseline neuro status   Airway/Respiratory: Uneventful            Sign Out: Acceptable/Baseline resp. status   CV/Hemodynamics: Uneventful            Sign Out: Acceptable CV status; No obvious hypovolemia; No obvious fluid overload   Other NRE: NONE   DID A NON-ROUTINE EVENT OCCUR? No           Last vitals:  Vitals Value Taken Time   /60 04/24/24 1300   Temp 36.6  C (97.8  F) 04/24/24 1100   Pulse 60 04/24/24 1310   Resp 18 04/24/24 1146   SpO2 96 % 04/24/24 1310   Vitals shown include unfiled device data.    Electronically Signed By: Ana Lilia Mathur MD  April 24, 2024  1:16 PM

## 2024-04-24 NOTE — DISCHARGE INSTRUCTIONS
[facility]  Cardiac Electrophysiology  [address]  [place] [address]   Office: [phone]  Fax: [phone]     Cardiac Electrophysiology - Post Defibrillator Implantation Discharge Instructions      PROCEDURE   ICD upgrade         MEDICATION INSTRUCTIONS   Continue taking all home meds.         WOUND CARE   Leave the large overlying dressing in place until 2 days after discharge - this dressing can thereafter be removed by gently pulling off.  Underneath the large dressing will be steri-strips. DO NOT REMOVE these strips; please leave these in place until you are seen in clinic.  DO NOT COVER the site with any bandages or dressings. The site should be kept dry for 7 days - please avoid traditional showers or baths during this time.  Keep the site clean and dry.  No swimming, sauna, or hot tub use until incision is completely healed.         ACTIVITY   It takes approximately 4 weeks for your pacemaker/defibrillator leads to settle into place. During this time use extra precaution to avoid dislodging the leads.  Avoid the following:  Raising your arm over your head or stretching behind your back (no hyperflexion)  Pushing or pulling (mowing the lawn, vacuuming)  Lifting anything heavier than 10 pounds or a gallon of milk  Sudden or extreme motions  Be physically active every day.  Moving your arm is important       REMOTE MONITORING   You will receive a remote transmission station to monitor your device from home  Please set the transmitter up as soon as you get home from the hospital.  Directions are included in the box, and you may call our office or the company technical support line if you need assistance connecting your transmitter.  Please send a transmission the day after you go home  Automated transmissions will be sent every 3 months or sooner if device issues are detected.  You may manually send in transmissions if you are having arrhythmia symptoms or think  there may be a problem with your device.  Please call our office at [phone] if you send in a transmission off-schedule         WHAT TO WATCH OUT FOR   Contact our office or seek emergency care for any of the following:  Drainage, bleeding or oozing from the site  Redness, increased swelling, or warmth around the device site  Pain not treated with prescribed pain medication  Temperature greater than 100.4F  Chest pain, shortness of breath, dizziness/fainting         FOLLOW-UP   Our office will coordinate a follow-up visit visit in clinic for a device interrogation and an incision check 7-14 days after your procedure.   Please contact us at [phone] with any issues during your recovery.

## 2024-04-25 ENCOUNTER — PATIENT OUTREACH (OUTPATIENT)
Dept: CARE COORDINATION | Facility: CLINIC | Age: 64
End: 2024-04-25
Payer: COMMERCIAL

## 2024-04-25 LAB
ATRIAL RATE - MUSE: 60 BPM
DIASTOLIC BLOOD PRESSURE - MUSE: NORMAL MMHG
INTERPRETATION ECG - MUSE: NORMAL
P AXIS - MUSE: 37 DEGREES
PR INTERVAL - MUSE: 226 MS
QRS DURATION - MUSE: 156 MS
QT - MUSE: 492 MS
QTC - MUSE: 492 MS
R AXIS - MUSE: -3 DEGREES
SYSTOLIC BLOOD PRESSURE - MUSE: NORMAL MMHG
T AXIS - MUSE: 189 DEGREES
VENTRICULAR RATE- MUSE: 60 BPM

## 2024-04-25 NOTE — PROGRESS NOTES
[facility]    Background: Transitional Care Management program identified per system criteria and reviewed by [facility] team for possible outreach.    Assessment: Upon chart review, CCR[facility] Team member will not proceed with patient outreach related to this episode of Transitional Care Management program due to reason below:    Patient was under outpatient observation status for less than 3 hours during a planned procedure of: Pacemaker upgrade. There is not a discharge summary to reference for post-discharge outreach call and the AVS indicates no medication changes were made, therefore CCRC will not proceed with outreach     Plan: Transitional Care Management episode addressed appropriately per reason noted above.      Maricruz Flores RN  [facility]    *Connected Care Resource Team does NOT follow patient ongoing. Referrals are identified based on internal discharge reports and the outreach is to ensure patient has an understanding of their discharge instructions.

## 2024-04-30 NOTE — DISCHARGE INSTRUCTIONS
Implantable Cardiac Defibrillator (ICD) Post-operative Checklist    The Device Registered Nurse (RN) reviewed the ICD function.    The Device RN did a wound assessment and wound care teaching.    Please call the Device Nurses with any signs of infection or questions regarding wound healing. Device Nurse Line: [phone], Option#3.    The Device RN demonstrated and displayed the specific remote monitoring system for your ICD.    The Device RN reviewed the Partnership Agreement Form.    Patient Instructions  Do not lift your LEFT arm above the shoulder height, perform any vigorous arm movements such as swimming, golfing, washing windows, shoveling show, vacuuming or lifting greater than 10-15lbs with the affected arm for 4 weeks form the date of implant (until 5/22/24).    To reduce the risk of infection, try to avoid any dental procedures for the first 6 weeks after your ICD implant. If you have an emergent or urgent dental need during this time, contact the device clinic for a prescription for an antibiotic.    You will receive a device identification (ID) card in the mail from the device  within 6 weeks to replace the temporary ID card you were given in the hospital.    You may travel by any mode of transportation; just show your ICD ID card. You may be subject to a hand search or use of a handheld wand, but official should not keep the wand over the implant site for greater than 5-10 seconds.     For any surgery, let your doctor know you have an ICD.    Your ICD system is MRI safe after 6/5/24    Most household appliances, including a microwave, will not interfere with your ICD function. If you suspect interference, simply move away from the source. Cell phones do not cause a problem. Please refer to the device booklet from the  or their website under the section on electromagnetic interference (KRYSTAL) for further guidelines on things that may interfere with your ICD.     If you receive a  shock from your device:  Keep a diary of your symptoms and activities at the time of the shock.  If you receive 1 shock, your symptoms subside and you feel well, call the Device Clinic. If this occurs after hours call the next morning.   If you receive 1 shock and your symptoms do not subside, or you receive multiple shocks: Call 911.      Device Clinic Contact Information  Device Nurses: [phone], Option #3.  Device Remote Specialists: [phone], Option #2. For questions about your Remote Transmission or Transmission Schedule   Device Schedulers: [phone], Option #1

## 2024-05-01 ENCOUNTER — ANCILLARY PROCEDURE (OUTPATIENT)
Dept: CARDIOLOGY | Facility: CLINIC | Age: 64
End: 2024-05-01
Attending: INTERNAL MEDICINE
Payer: COMMERCIAL

## 2024-05-01 DIAGNOSIS — I42.9 SECONDARY CARDIOMYOPATHY (H): ICD-10-CM

## 2024-05-01 DIAGNOSIS — Z95.0 CARDIAC PACEMAKER IN SITU: ICD-10-CM

## 2024-05-01 DIAGNOSIS — Z95.810 ICD (IMPLANTABLE CARDIOVERTER-DEFIBRILLATOR) IN PLACE: Primary | ICD-10-CM

## 2024-05-01 DIAGNOSIS — I49.5 SICK SINUS SYNDROME (H): ICD-10-CM

## 2024-05-01 LAB
MDC_IDC_LEAD_CONNECTION_STATUS: NORMAL
MDC_IDC_LEAD_CONNECTION_STATUS: NORMAL
MDC_IDC_LEAD_IMPLANT_DT: NORMAL
MDC_IDC_LEAD_IMPLANT_DT: NORMAL
MDC_IDC_LEAD_LOCATION: NORMAL
MDC_IDC_LEAD_LOCATION: NORMAL
MDC_IDC_LEAD_LOCATION_DETAIL_1: NORMAL
MDC_IDC_LEAD_LOCATION_DETAIL_1: NORMAL
MDC_IDC_LEAD_MFG: NORMAL
MDC_IDC_LEAD_MFG: NORMAL
MDC_IDC_LEAD_MODEL: NORMAL
MDC_IDC_LEAD_MODEL: NORMAL
MDC_IDC_LEAD_POLARITY_TYPE: NORMAL
MDC_IDC_LEAD_POLARITY_TYPE: NORMAL
MDC_IDC_LEAD_SERIAL: NORMAL
MDC_IDC_LEAD_SERIAL: NORMAL
MDC_IDC_LEAD_SPECIAL_FUNCTION: NORMAL
MDC_IDC_LEAD_SPECIAL_FUNCTION: NORMAL
MDC_IDC_MSMT_BATTERY_DTM: NORMAL
MDC_IDC_MSMT_BATTERY_REMAINING_LONGEVITY: 164 MO
MDC_IDC_MSMT_BATTERY_RRT_TRIGGER: NORMAL
MDC_IDC_MSMT_BATTERY_VOLTAGE: 3.13 V
MDC_IDC_MSMT_CAP_CHARGE_ENERGY: 40 J
MDC_IDC_MSMT_CAP_CHARGE_TIME: 0 S
MDC_IDC_MSMT_CAP_CHARGE_TYPE: NORMAL
MDC_IDC_MSMT_LEADCHNL_RA_IMPEDANCE_VALUE: 380 OHM
MDC_IDC_MSMT_LEADCHNL_RA_PACING_THRESHOLD_AMPLITUDE: 0.88 V
MDC_IDC_MSMT_LEADCHNL_RA_PACING_THRESHOLD_AMPLITUDE: 1 V
MDC_IDC_MSMT_LEADCHNL_RA_PACING_THRESHOLD_PULSEWIDTH: 0.4 MS
MDC_IDC_MSMT_LEADCHNL_RA_PACING_THRESHOLD_PULSEWIDTH: 0.4 MS
MDC_IDC_MSMT_LEADCHNL_RA_SENSING_INTR_AMPL: 3.1 MV
MDC_IDC_MSMT_LEADCHNL_RA_SENSING_INTR_AMPL: 3.1 MV
MDC_IDC_MSMT_LEADCHNL_RV_IMPEDANCE_VALUE: 323 OHM
MDC_IDC_MSMT_LEADCHNL_RV_IMPEDANCE_VALUE: 399 OHM
MDC_IDC_MSMT_LEADCHNL_RV_PACING_THRESHOLD_AMPLITUDE: 0.5 V
MDC_IDC_MSMT_LEADCHNL_RV_PACING_THRESHOLD_AMPLITUDE: 0.75 V
MDC_IDC_MSMT_LEADCHNL_RV_PACING_THRESHOLD_PULSEWIDTH: 0.4 MS
MDC_IDC_MSMT_LEADCHNL_RV_PACING_THRESHOLD_PULSEWIDTH: 0.4 MS
MDC_IDC_MSMT_LEADCHNL_RV_SENSING_INTR_AMPL: 15.8 MV
MDC_IDC_MSMT_LEADCHNL_RV_SENSING_INTR_AMPL: 15.8 MV
MDC_IDC_PG_IMPLANT_DTM: NORMAL
MDC_IDC_PG_MFG: NORMAL
MDC_IDC_PG_MODEL: NORMAL
MDC_IDC_PG_SERIAL: NORMAL
MDC_IDC_PG_TYPE: NORMAL
MDC_IDC_SESS_CLINIC_NAME: NORMAL
MDC_IDC_SESS_DTM: NORMAL
MDC_IDC_SESS_TYPE: NORMAL
MDC_IDC_SET_BRADY_AT_MODE_SWITCH_RATE: 171 {BEATS}/MIN
MDC_IDC_SET_BRADY_LOWRATE: 60 {BEATS}/MIN
MDC_IDC_SET_BRADY_MAX_SENSOR_RATE: 130 {BEATS}/MIN
MDC_IDC_SET_BRADY_MAX_TRACKING_RATE: 130 {BEATS}/MIN
MDC_IDC_SET_BRADY_MODE: NORMAL
MDC_IDC_SET_BRADY_NIGHT_RATE: 50 {BEATS}/MIN
MDC_IDC_SET_BRADY_PAV_DELAY_LOW: 180 MS
MDC_IDC_SET_BRADY_SAV_DELAY_LOW: 150 MS
MDC_IDC_SET_LEADCHNL_RA_PACING_AMPLITUDE: NORMAL
MDC_IDC_SET_LEADCHNL_RA_PACING_ANODE_ELECTRODE_1: NORMAL
MDC_IDC_SET_LEADCHNL_RA_PACING_ANODE_LOCATION_1: NORMAL
MDC_IDC_SET_LEADCHNL_RA_PACING_CAPTURE_MODE: NORMAL
MDC_IDC_SET_LEADCHNL_RA_PACING_CATHODE_ELECTRODE_1: NORMAL
MDC_IDC_SET_LEADCHNL_RA_PACING_CATHODE_LOCATION_1: NORMAL
MDC_IDC_SET_LEADCHNL_RA_PACING_POLARITY: NORMAL
MDC_IDC_SET_LEADCHNL_RA_PACING_PULSEWIDTH: 0.4 MS
MDC_IDC_SET_LEADCHNL_RA_SENSING_ANODE_ELECTRODE_1: NORMAL
MDC_IDC_SET_LEADCHNL_RA_SENSING_ANODE_LOCATION_1: NORMAL
MDC_IDC_SET_LEADCHNL_RA_SENSING_CATHODE_ELECTRODE_1: NORMAL
MDC_IDC_SET_LEADCHNL_RA_SENSING_CATHODE_LOCATION_1: NORMAL
MDC_IDC_SET_LEADCHNL_RA_SENSING_POLARITY: NORMAL
MDC_IDC_SET_LEADCHNL_RA_SENSING_SENSITIVITY: 0.3 MV
MDC_IDC_SET_LEADCHNL_RV_PACING_AMPLITUDE: NORMAL
MDC_IDC_SET_LEADCHNL_RV_PACING_ANODE_ELECTRODE_1: NORMAL
MDC_IDC_SET_LEADCHNL_RV_PACING_ANODE_LOCATION_1: NORMAL
MDC_IDC_SET_LEADCHNL_RV_PACING_CAPTURE_MODE: NORMAL
MDC_IDC_SET_LEADCHNL_RV_PACING_CATHODE_ELECTRODE_1: NORMAL
MDC_IDC_SET_LEADCHNL_RV_PACING_CATHODE_LOCATION_1: NORMAL
MDC_IDC_SET_LEADCHNL_RV_PACING_POLARITY: NORMAL
MDC_IDC_SET_LEADCHNL_RV_PACING_PULSEWIDTH: 0.4 MS
MDC_IDC_SET_LEADCHNL_RV_SENSING_ANODE_ELECTRODE_1: NORMAL
MDC_IDC_SET_LEADCHNL_RV_SENSING_ANODE_LOCATION_1: NORMAL
MDC_IDC_SET_LEADCHNL_RV_SENSING_CATHODE_ELECTRODE_1: NORMAL
MDC_IDC_SET_LEADCHNL_RV_SENSING_CATHODE_LOCATION_1: NORMAL
MDC_IDC_SET_LEADCHNL_RV_SENSING_POLARITY: NORMAL
MDC_IDC_SET_LEADCHNL_RV_SENSING_SENSITIVITY: 0.3 MV
MDC_IDC_SET_ZONE_DETECTION_BEATS_DENOMINATOR: 16 {BEATS}
MDC_IDC_SET_ZONE_DETECTION_BEATS_DENOMINATOR: 32 {BEATS}
MDC_IDC_SET_ZONE_DETECTION_BEATS_DENOMINATOR: 40 {BEATS}
MDC_IDC_SET_ZONE_DETECTION_BEATS_NUMERATOR: 16 {BEATS}
MDC_IDC_SET_ZONE_DETECTION_BEATS_NUMERATOR: 30 {BEATS}
MDC_IDC_SET_ZONE_DETECTION_BEATS_NUMERATOR: 32 {BEATS}
MDC_IDC_SET_ZONE_DETECTION_INTERVAL: 240 MS
MDC_IDC_SET_ZONE_DETECTION_INTERVAL: 320 MS
MDC_IDC_SET_ZONE_DETECTION_INTERVAL: 350 MS
MDC_IDC_SET_ZONE_DETECTION_INTERVAL: 360 MS
MDC_IDC_SET_ZONE_DETECTION_INTERVAL: 370 MS
MDC_IDC_SET_ZONE_STATUS: NORMAL
MDC_IDC_SET_ZONE_TYPE: NORMAL
MDC_IDC_SET_ZONE_VENDOR_TYPE: NORMAL
MDC_IDC_STAT_AT_BURDEN_PERCENT: 0 %
MDC_IDC_STAT_AT_DTM_END: NORMAL
MDC_IDC_STAT_AT_DTM_START: NORMAL
MDC_IDC_STAT_AT_MODE_SW_COUNT: 0
MDC_IDC_STAT_BRADY_AP_VP_PERCENT: 17.11 %
MDC_IDC_STAT_BRADY_AP_VS_PERCENT: 53.31 %
MDC_IDC_STAT_BRADY_AS_VP_PERCENT: 0.97 %
MDC_IDC_STAT_BRADY_AS_VS_PERCENT: 28.61 %
MDC_IDC_STAT_BRADY_DTM_END: NORMAL
MDC_IDC_STAT_BRADY_DTM_START: NORMAL
MDC_IDC_STAT_BRADY_RA_PERCENT_PACED: 71 %
MDC_IDC_STAT_BRADY_RV_PERCENT_PACED: 18 %
MDC_IDC_STAT_CRT_DTM_END: NORMAL
MDC_IDC_STAT_CRT_DTM_START: NORMAL
MDC_IDC_STAT_EPISODE_RECENT_COUNT: 0
MDC_IDC_STAT_EPISODE_RECENT_COUNT_DTM_END: NORMAL
MDC_IDC_STAT_EPISODE_RECENT_COUNT_DTM_START: NORMAL
MDC_IDC_STAT_EPISODE_TOTAL_COUNT: 0
MDC_IDC_STAT_EPISODE_TOTAL_COUNT_DTM_END: NORMAL
MDC_IDC_STAT_EPISODE_TOTAL_COUNT_DTM_START: NORMAL
MDC_IDC_STAT_EPISODE_TYPE: NORMAL
MDC_IDC_STAT_TACHYTHERAPY_ATP_DELIVERED_RECENT: 0
MDC_IDC_STAT_TACHYTHERAPY_ATP_DELIVERED_TOTAL: 0
MDC_IDC_STAT_TACHYTHERAPY_RECENT_DTM_END: NORMAL
MDC_IDC_STAT_TACHYTHERAPY_RECENT_DTM_START: NORMAL
MDC_IDC_STAT_TACHYTHERAPY_SHOCKS_ABORTED_RECENT: 0
MDC_IDC_STAT_TACHYTHERAPY_SHOCKS_ABORTED_TOTAL: 0
MDC_IDC_STAT_TACHYTHERAPY_SHOCKS_DELIVERED_RECENT: 0
MDC_IDC_STAT_TACHYTHERAPY_SHOCKS_DELIVERED_TOTAL: 0
MDC_IDC_STAT_TACHYTHERAPY_TOTAL_DTM_END: NORMAL
MDC_IDC_STAT_TACHYTHERAPY_TOTAL_DTM_START: NORMAL

## 2024-05-01 PROCEDURE — 93283 PRGRMG EVAL IMPLANTABLE DFB: CPT | Performed by: INTERNAL MEDICINE

## 2024-06-03 ENCOUNTER — ANCILLARY PROCEDURE (OUTPATIENT)
Dept: CARDIOLOGY | Facility: CLINIC | Age: 64
End: 2024-06-03
Attending: INTERNAL MEDICINE
Payer: COMMERCIAL

## 2024-06-03 DIAGNOSIS — I49.5 SICK SINUS SYNDROME (H): ICD-10-CM

## 2024-06-03 DIAGNOSIS — Z95.810 ICD (IMPLANTABLE CARDIOVERTER-DEFIBRILLATOR) IN PLACE: ICD-10-CM

## 2024-06-03 DIAGNOSIS — I42.9 SECONDARY CARDIOMYOPATHY (H): ICD-10-CM

## 2024-06-03 LAB
MDC_IDC_LEAD_CONNECTION_STATUS: NORMAL
MDC_IDC_LEAD_CONNECTION_STATUS: NORMAL
MDC_IDC_LEAD_IMPLANT_DT: NORMAL
MDC_IDC_LEAD_IMPLANT_DT: NORMAL
MDC_IDC_LEAD_LOCATION: NORMAL
MDC_IDC_LEAD_LOCATION: NORMAL
MDC_IDC_LEAD_LOCATION_DETAIL_1: NORMAL
MDC_IDC_LEAD_LOCATION_DETAIL_1: NORMAL
MDC_IDC_LEAD_MFG: NORMAL
MDC_IDC_LEAD_MFG: NORMAL
MDC_IDC_LEAD_MODEL: NORMAL
MDC_IDC_LEAD_MODEL: NORMAL
MDC_IDC_LEAD_POLARITY_TYPE: NORMAL
MDC_IDC_LEAD_POLARITY_TYPE: NORMAL
MDC_IDC_LEAD_SERIAL: NORMAL
MDC_IDC_LEAD_SERIAL: NORMAL
MDC_IDC_LEAD_SPECIAL_FUNCTION: NORMAL
MDC_IDC_LEAD_SPECIAL_FUNCTION: NORMAL
MDC_IDC_MSMT_BATTERY_DTM: NORMAL
MDC_IDC_MSMT_BATTERY_REMAINING_LONGEVITY: 138 MO
MDC_IDC_MSMT_BATTERY_RRT_TRIGGER: NORMAL
MDC_IDC_MSMT_BATTERY_VOLTAGE: 3.15 V
MDC_IDC_MSMT_CAP_CHARGE_ENERGY: 40 J
MDC_IDC_MSMT_CAP_CHARGE_TIME: 0 S
MDC_IDC_MSMT_CAP_CHARGE_TYPE: NORMAL
MDC_IDC_MSMT_LEADCHNL_RA_IMPEDANCE_VALUE: 361 OHM
MDC_IDC_MSMT_LEADCHNL_RA_PACING_THRESHOLD_AMPLITUDE: 0.5 V
MDC_IDC_MSMT_LEADCHNL_RA_PACING_THRESHOLD_AMPLITUDE: 0.5 V
MDC_IDC_MSMT_LEADCHNL_RA_PACING_THRESHOLD_PULSEWIDTH: 0.4 MS
MDC_IDC_MSMT_LEADCHNL_RA_PACING_THRESHOLD_PULSEWIDTH: 0.4 MS
MDC_IDC_MSMT_LEADCHNL_RA_SENSING_INTR_AMPL: 3.6 MV
MDC_IDC_MSMT_LEADCHNL_RV_IMPEDANCE_VALUE: 323 OHM
MDC_IDC_MSMT_LEADCHNL_RV_IMPEDANCE_VALUE: 399 OHM
MDC_IDC_MSMT_LEADCHNL_RV_PACING_THRESHOLD_AMPLITUDE: 0.75 V
MDC_IDC_MSMT_LEADCHNL_RV_PACING_THRESHOLD_AMPLITUDE: 1 V
MDC_IDC_MSMT_LEADCHNL_RV_PACING_THRESHOLD_PULSEWIDTH: 0.4 MS
MDC_IDC_MSMT_LEADCHNL_RV_PACING_THRESHOLD_PULSEWIDTH: 0.4 MS
MDC_IDC_MSMT_LEADCHNL_RV_SENSING_INTR_AMPL: 16.5 MV
MDC_IDC_PG_IMPLANT_DTM: NORMAL
MDC_IDC_PG_MFG: NORMAL
MDC_IDC_PG_MODEL: NORMAL
MDC_IDC_PG_SERIAL: NORMAL
MDC_IDC_PG_TYPE: NORMAL
MDC_IDC_SESS_CLINIC_NAME: NORMAL
MDC_IDC_SESS_DTM: NORMAL
MDC_IDC_SESS_TYPE: NORMAL
MDC_IDC_SET_BRADY_AT_MODE_SWITCH_RATE: 171 {BEATS}/MIN
MDC_IDC_SET_BRADY_LOWRATE: 60 {BEATS}/MIN
MDC_IDC_SET_BRADY_MAX_SENSOR_RATE: 130 {BEATS}/MIN
MDC_IDC_SET_BRADY_MAX_TRACKING_RATE: 130 {BEATS}/MIN
MDC_IDC_SET_BRADY_MODE: NORMAL
MDC_IDC_SET_BRADY_NIGHT_RATE: 50 {BEATS}/MIN
MDC_IDC_SET_BRADY_PAV_DELAY_LOW: 180 MS
MDC_IDC_SET_BRADY_SAV_DELAY_LOW: 150 MS
MDC_IDC_SET_LEADCHNL_RA_PACING_AMPLITUDE: 1.5 V
MDC_IDC_SET_LEADCHNL_RA_PACING_ANODE_ELECTRODE_1: NORMAL
MDC_IDC_SET_LEADCHNL_RA_PACING_ANODE_LOCATION_1: NORMAL
MDC_IDC_SET_LEADCHNL_RA_PACING_CAPTURE_MODE: NORMAL
MDC_IDC_SET_LEADCHNL_RA_PACING_CATHODE_ELECTRODE_1: NORMAL
MDC_IDC_SET_LEADCHNL_RA_PACING_CATHODE_LOCATION_1: NORMAL
MDC_IDC_SET_LEADCHNL_RA_PACING_POLARITY: NORMAL
MDC_IDC_SET_LEADCHNL_RA_PACING_PULSEWIDTH: 0.4 MS
MDC_IDC_SET_LEADCHNL_RA_SENSING_ANODE_ELECTRODE_1: NORMAL
MDC_IDC_SET_LEADCHNL_RA_SENSING_ANODE_LOCATION_1: NORMAL
MDC_IDC_SET_LEADCHNL_RA_SENSING_CATHODE_ELECTRODE_1: NORMAL
MDC_IDC_SET_LEADCHNL_RA_SENSING_CATHODE_LOCATION_1: NORMAL
MDC_IDC_SET_LEADCHNL_RA_SENSING_POLARITY: NORMAL
MDC_IDC_SET_LEADCHNL_RA_SENSING_SENSITIVITY: 0.3 MV
MDC_IDC_SET_LEADCHNL_RV_PACING_AMPLITUDE: 2 V
MDC_IDC_SET_LEADCHNL_RV_PACING_ANODE_ELECTRODE_1: NORMAL
MDC_IDC_SET_LEADCHNL_RV_PACING_ANODE_LOCATION_1: NORMAL
MDC_IDC_SET_LEADCHNL_RV_PACING_CAPTURE_MODE: NORMAL
MDC_IDC_SET_LEADCHNL_RV_PACING_CATHODE_ELECTRODE_1: NORMAL
MDC_IDC_SET_LEADCHNL_RV_PACING_CATHODE_LOCATION_1: NORMAL
MDC_IDC_SET_LEADCHNL_RV_PACING_POLARITY: NORMAL
MDC_IDC_SET_LEADCHNL_RV_PACING_PULSEWIDTH: 0.4 MS
MDC_IDC_SET_LEADCHNL_RV_SENSING_ANODE_ELECTRODE_1: NORMAL
MDC_IDC_SET_LEADCHNL_RV_SENSING_ANODE_LOCATION_1: NORMAL
MDC_IDC_SET_LEADCHNL_RV_SENSING_CATHODE_ELECTRODE_1: NORMAL
MDC_IDC_SET_LEADCHNL_RV_SENSING_CATHODE_LOCATION_1: NORMAL
MDC_IDC_SET_LEADCHNL_RV_SENSING_POLARITY: NORMAL
MDC_IDC_SET_LEADCHNL_RV_SENSING_SENSITIVITY: 0.3 MV
MDC_IDC_SET_ZONE_DETECTION_BEATS_DENOMINATOR: 16 {BEATS}
MDC_IDC_SET_ZONE_DETECTION_BEATS_DENOMINATOR: 32 {BEATS}
MDC_IDC_SET_ZONE_DETECTION_BEATS_DENOMINATOR: 40 {BEATS}
MDC_IDC_SET_ZONE_DETECTION_BEATS_NUMERATOR: 16 {BEATS}
MDC_IDC_SET_ZONE_DETECTION_BEATS_NUMERATOR: 30 {BEATS}
MDC_IDC_SET_ZONE_DETECTION_BEATS_NUMERATOR: 32 {BEATS}
MDC_IDC_SET_ZONE_DETECTION_INTERVAL: 240 MS
MDC_IDC_SET_ZONE_DETECTION_INTERVAL: 320 MS
MDC_IDC_SET_ZONE_DETECTION_INTERVAL: 350 MS
MDC_IDC_SET_ZONE_DETECTION_INTERVAL: 360 MS
MDC_IDC_SET_ZONE_DETECTION_INTERVAL: 370 MS
MDC_IDC_SET_ZONE_STATUS: NORMAL
MDC_IDC_SET_ZONE_TYPE: NORMAL
MDC_IDC_SET_ZONE_VENDOR_TYPE: NORMAL
MDC_IDC_STAT_AT_BURDEN_PERCENT: 0 %
MDC_IDC_STAT_AT_DTM_END: NORMAL
MDC_IDC_STAT_AT_DTM_START: NORMAL
MDC_IDC_STAT_BRADY_AP_VP_PERCENT: 14.52 %
MDC_IDC_STAT_BRADY_AP_VS_PERCENT: 71.84 %
MDC_IDC_STAT_BRADY_AS_VP_PERCENT: 0.98 %
MDC_IDC_STAT_BRADY_AS_VS_PERCENT: 12.67 %
MDC_IDC_STAT_BRADY_DTM_END: NORMAL
MDC_IDC_STAT_BRADY_DTM_START: NORMAL
MDC_IDC_STAT_BRADY_RA_PERCENT_PACED: 86.64 %
MDC_IDC_STAT_BRADY_RV_PERCENT_PACED: 15.5 %
MDC_IDC_STAT_CRT_DTM_END: NORMAL
MDC_IDC_STAT_CRT_DTM_START: NORMAL
MDC_IDC_STAT_EPISODE_RECENT_COUNT: 0
MDC_IDC_STAT_EPISODE_RECENT_COUNT_DTM_END: NORMAL
MDC_IDC_STAT_EPISODE_RECENT_COUNT_DTM_START: NORMAL
MDC_IDC_STAT_EPISODE_TOTAL_COUNT: 0
MDC_IDC_STAT_EPISODE_TOTAL_COUNT_DTM_END: NORMAL
MDC_IDC_STAT_EPISODE_TOTAL_COUNT_DTM_START: NORMAL
MDC_IDC_STAT_EPISODE_TYPE: NORMAL
MDC_IDC_STAT_TACHYTHERAPY_ATP_DELIVERED_RECENT: 0
MDC_IDC_STAT_TACHYTHERAPY_ATP_DELIVERED_TOTAL: 0
MDC_IDC_STAT_TACHYTHERAPY_RECENT_DTM_END: NORMAL
MDC_IDC_STAT_TACHYTHERAPY_RECENT_DTM_START: NORMAL
MDC_IDC_STAT_TACHYTHERAPY_SHOCKS_ABORTED_RECENT: 0
MDC_IDC_STAT_TACHYTHERAPY_SHOCKS_ABORTED_TOTAL: 0
MDC_IDC_STAT_TACHYTHERAPY_SHOCKS_DELIVERED_RECENT: 0
MDC_IDC_STAT_TACHYTHERAPY_SHOCKS_DELIVERED_TOTAL: 0
MDC_IDC_STAT_TACHYTHERAPY_TOTAL_DTM_END: NORMAL
MDC_IDC_STAT_TACHYTHERAPY_TOTAL_DTM_START: NORMAL

## 2024-06-03 PROCEDURE — 93283 PRGRMG EVAL IMPLANTABLE DFB: CPT | Performed by: INTERNAL MEDICINE

## 2024-07-21 ENCOUNTER — APPOINTMENT (OUTPATIENT)
Dept: RADIOLOGY | Facility: HOSPITAL | Age: 64
DRG: 981 | End: 2024-07-21
Attending: EMERGENCY MEDICINE
Payer: COMMERCIAL

## 2024-07-21 ENCOUNTER — HOSPITAL ENCOUNTER (INPATIENT)
Facility: HOSPITAL | Age: 64
LOS: 4 days | Discharge: HOME HEALTH | DRG: 981 | End: 2024-07-25
Attending: EMERGENCY MEDICINE | Admitting: STUDENT IN AN ORGANIZED HEALTH CARE EDUCATION/TRAINING PROGRAM
Payer: COMMERCIAL
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
## 2024-07-21 DIAGNOSIS — I42.2 HYPERTROPHIC CARDIOMYOPATHY (H): Primary | ICD-10-CM

## 2024-07-21 DIAGNOSIS — D64.9 ANEMIA, UNSPECIFIED TYPE: ICD-10-CM

## 2024-07-21 DIAGNOSIS — R55 SYNCOPE, UNSPECIFIED SYNCOPE TYPE: ICD-10-CM

## 2024-07-21 DIAGNOSIS — S82.832A CLOSED FRACTURE OF PROXIMAL END OF LEFT FIBULA, UNSPECIFIED FRACTURE MORPHOLOGY, INITIAL ENCOUNTER: ICD-10-CM

## 2024-07-21 DIAGNOSIS — I50.9 CONGESTIVE HEART FAILURE, UNSPECIFIED HF CHRONICITY, UNSPECIFIED HEART FAILURE TYPE (H): ICD-10-CM

## 2024-07-21 LAB
ANION GAP SERPL CALCULATED.3IONS-SCNC: 12 MMOL/L (ref 7–15)
BASOPHILS # BLD AUTO: 0.1 10E3/UL (ref 0–0.2)
BASOPHILS NFR BLD AUTO: 1 %
BUN SERPL-MCNC: 18.2 MG/DL (ref 8–23)
CALCIUM SERPL-MCNC: 9.3 MG/DL (ref 8.8–10.4)
CHLORIDE SERPL-SCNC: 101 MMOL/L (ref 98–107)
CREAT SERPL-MCNC: 0.98 MG/DL (ref 0.67–1.17)
EGFRCR SERPLBLD CKD-EPI 2021: 87 ML/MIN/1.73M2
EOSINOPHIL # BLD AUTO: 0.2 10E3/UL (ref 0–0.7)
EOSINOPHIL NFR BLD AUTO: 2 %
ERYTHROCYTE [DISTWIDTH] IN BLOOD BY AUTOMATED COUNT: 13.1 % (ref 10–15)
FERRITIN SERPL-MCNC: 16 NG/ML (ref 31–409)
GLUCOSE SERPL-MCNC: 113 MG/DL (ref 70–99)
HCO3 SERPL-SCNC: 23 MMOL/L (ref 22–29)
HCT VFR BLD AUTO: 31 % (ref 40–53)
HGB BLD-MCNC: 10 G/DL (ref 13.3–17.7)
HOLD SPECIMEN: NORMAL
IMM GRANULOCYTES # BLD: 0 10E3/UL
IMM GRANULOCYTES NFR BLD: 0 %
LYMPHOCYTES # BLD AUTO: 1.2 10E3/UL (ref 0.8–5.3)
LYMPHOCYTES NFR BLD AUTO: 13 %
MAGNESIUM SERPL-MCNC: 2.1 MG/DL (ref 1.7–2.3)
MCH RBC QN AUTO: 28.7 PG (ref 26.5–33)
MCHC RBC AUTO-ENTMCNC: 32.3 G/DL (ref 31.5–36.5)
MCV RBC AUTO: 89 FL (ref 78–100)
MONOCYTES # BLD AUTO: 1.1 10E3/UL (ref 0–1.3)
MONOCYTES NFR BLD AUTO: 11 %
NEUTROPHILS # BLD AUTO: 6.9 10E3/UL (ref 1.6–8.3)
NEUTROPHILS NFR BLD AUTO: 73 %
NRBC # BLD AUTO: 0 10E3/UL
NRBC BLD AUTO-RTO: 0 /100
NT-PROBNP SERPL-MCNC: 1396 PG/ML (ref 0–900)
PLATELET # BLD AUTO: 313 10E3/UL (ref 150–450)
POTASSIUM SERPL-SCNC: 4.4 MMOL/L (ref 3.4–5.3)
RBC # BLD AUTO: 3.49 10E6/UL (ref 4.4–5.9)
SODIUM SERPL-SCNC: 136 MMOL/L (ref 135–145)
T4 FREE SERPL-MCNC: 0.78 NG/DL (ref 0.9–1.7)
TROPONIN T SERPL HS-MCNC: 43 NG/L
TSH SERPL DL<=0.005 MIU/L-ACNC: 4.84 UIU/ML (ref 0.3–4.2)
WBC # BLD AUTO: 9.5 10E3/UL (ref 4–11)

## 2024-07-21 PROCEDURE — 84443 ASSAY THYROID STIM HORMONE: CPT | Performed by: EMERGENCY MEDICINE

## 2024-07-21 PROCEDURE — 250N000011 HC RX IP 250 OP 636: Performed by: STUDENT IN AN ORGANIZED HEALTH CARE EDUCATION/TRAINING PROGRAM

## 2024-07-21 PROCEDURE — 73630 X-RAY EXAM OF FOOT: CPT | Mod: LT

## 2024-07-21 PROCEDURE — 73610 X-RAY EXAM OF ANKLE: CPT | Mod: LT

## 2024-07-21 PROCEDURE — 83880 ASSAY OF NATRIURETIC PEPTIDE: CPT | Performed by: EMERGENCY MEDICINE

## 2024-07-21 PROCEDURE — 84439 ASSAY OF FREE THYROXINE: CPT | Performed by: EMERGENCY MEDICINE

## 2024-07-21 PROCEDURE — 83735 ASSAY OF MAGNESIUM: CPT | Performed by: EMERGENCY MEDICINE

## 2024-07-21 PROCEDURE — 73590 X-RAY EXAM OF LOWER LEG: CPT | Mod: LT

## 2024-07-21 PROCEDURE — 71046 X-RAY EXAM CHEST 2 VIEWS: CPT

## 2024-07-21 PROCEDURE — 96374 THER/PROPH/DIAG INJ IV PUSH: CPT

## 2024-07-21 PROCEDURE — 93005 ELECTROCARDIOGRAM TRACING: CPT | Performed by: EMERGENCY MEDICINE

## 2024-07-21 PROCEDURE — 82728 ASSAY OF FERRITIN: CPT | Performed by: STUDENT IN AN ORGANIZED HEALTH CARE EDUCATION/TRAINING PROGRAM

## 2024-07-21 PROCEDURE — 84484 ASSAY OF TROPONIN QUANT: CPT | Performed by: EMERGENCY MEDICINE

## 2024-07-21 PROCEDURE — 85025 COMPLETE CBC W/AUTO DIFF WBC: CPT | Performed by: EMERGENCY MEDICINE

## 2024-07-21 PROCEDURE — 250N000011 HC RX IP 250 OP 636: Performed by: EMERGENCY MEDICINE

## 2024-07-21 PROCEDURE — 96375 TX/PRO/DX INJ NEW DRUG ADDON: CPT

## 2024-07-21 PROCEDURE — 120N000004 HC R&B MS OVERFLOW

## 2024-07-21 PROCEDURE — G0378 HOSPITAL OBSERVATION PER HR: HCPCS

## 2024-07-21 PROCEDURE — 99285 EMERGENCY DEPT VISIT HI MDM: CPT | Mod: 25

## 2024-07-21 PROCEDURE — 258N000003 HC RX IP 258 OP 636: Performed by: STUDENT IN AN ORGANIZED HEALTH CARE EDUCATION/TRAINING PROGRAM

## 2024-07-21 PROCEDURE — 36415 COLL VENOUS BLD VENIPUNCTURE: CPT | Performed by: EMERGENCY MEDICINE

## 2024-07-21 PROCEDURE — 99223 1ST HOSP IP/OBS HIGH 75: CPT | Performed by: STUDENT IN AN ORGANIZED HEALTH CARE EDUCATION/TRAINING PROGRAM

## 2024-07-21 PROCEDURE — 80048 BASIC METABOLIC PNL TOTAL CA: CPT | Performed by: EMERGENCY MEDICINE

## 2024-07-21 PROCEDURE — 250N000013 HC RX MED GY IP 250 OP 250 PS 637: Performed by: STUDENT IN AN ORGANIZED HEALTH CARE EDUCATION/TRAINING PROGRAM

## 2024-07-21 RX ORDER — ONDANSETRON 2 MG/ML
4 INJECTION INTRAMUSCULAR; INTRAVENOUS EVERY 6 HOURS PRN
Status: DISCONTINUED | OUTPATIENT
Start: 2024-07-21 | End: 2024-07-25 | Stop reason: HOSPADM

## 2024-07-21 RX ORDER — AMOXICILLIN 250 MG
2 CAPSULE ORAL 2 TIMES DAILY PRN
Status: DISCONTINUED | OUTPATIENT
Start: 2024-07-21 | End: 2024-07-25 | Stop reason: HOSPADM

## 2024-07-21 RX ORDER — NALOXONE HYDROCHLORIDE 0.4 MG/ML
0.4 INJECTION, SOLUTION INTRAMUSCULAR; INTRAVENOUS; SUBCUTANEOUS
Status: DISCONTINUED | OUTPATIENT
Start: 2024-07-21 | End: 2024-07-25 | Stop reason: HOSPADM

## 2024-07-21 RX ORDER — FUROSEMIDE 10 MG/ML
40 INJECTION INTRAMUSCULAR; INTRAVENOUS ONCE
Status: COMPLETED | OUTPATIENT
Start: 2024-07-21 | End: 2024-07-21

## 2024-07-21 RX ORDER — EZETIMIBE 10 MG/1
10 TABLET ORAL DAILY
Status: DISCONTINUED | OUTPATIENT
Start: 2024-07-22 | End: 2024-07-25 | Stop reason: HOSPADM

## 2024-07-21 RX ORDER — ACETAMINOPHEN 325 MG/1
975 TABLET ORAL 3 TIMES DAILY PRN
Status: DISCONTINUED | OUTPATIENT
Start: 2024-07-21 | End: 2024-07-25 | Stop reason: HOSPADM

## 2024-07-21 RX ORDER — NALOXONE HYDROCHLORIDE 0.4 MG/ML
0.2 INJECTION, SOLUTION INTRAMUSCULAR; INTRAVENOUS; SUBCUTANEOUS
Status: DISCONTINUED | OUTPATIENT
Start: 2024-07-21 | End: 2024-07-25 | Stop reason: HOSPADM

## 2024-07-21 RX ORDER — POLYETHYLENE GLYCOL 3350 17 G/17G
17 POWDER, FOR SOLUTION ORAL 2 TIMES DAILY PRN
Status: DISCONTINUED | OUTPATIENT
Start: 2024-07-21 | End: 2024-07-25 | Stop reason: HOSPADM

## 2024-07-21 RX ORDER — LIDOCAINE 40 MG/G
CREAM TOPICAL
Status: DISCONTINUED | OUTPATIENT
Start: 2024-07-21 | End: 2024-07-24

## 2024-07-21 RX ORDER — LISINOPRIL 20 MG/1
20 TABLET ORAL DAILY
Status: DISCONTINUED | OUTPATIENT
Start: 2024-07-22 | End: 2024-07-25 | Stop reason: HOSPADM

## 2024-07-21 RX ORDER — METOPROLOL SUCCINATE 100 MG/1
200 TABLET, EXTENDED RELEASE ORAL DAILY
Status: DISCONTINUED | OUTPATIENT
Start: 2024-07-22 | End: 2024-07-22

## 2024-07-21 RX ORDER — CALCIUM CARBONATE 500 MG/1
1000 TABLET, CHEWABLE ORAL 4 TIMES DAILY PRN
Status: DISCONTINUED | OUTPATIENT
Start: 2024-07-21 | End: 2024-07-25 | Stop reason: HOSPADM

## 2024-07-21 RX ORDER — PROCHLORPERAZINE 25 MG
25 SUPPOSITORY, RECTAL RECTAL EVERY 12 HOURS PRN
Status: DISCONTINUED | OUTPATIENT
Start: 2024-07-21 | End: 2024-07-25 | Stop reason: HOSPADM

## 2024-07-21 RX ORDER — ONDANSETRON 4 MG/1
4 TABLET, ORALLY DISINTEGRATING ORAL EVERY 6 HOURS PRN
Status: DISCONTINUED | OUTPATIENT
Start: 2024-07-21 | End: 2024-07-25 | Stop reason: HOSPADM

## 2024-07-21 RX ORDER — METHYLPREDNISOLONE SODIUM SUCCINATE 125 MG/2ML
125 INJECTION, POWDER, LYOPHILIZED, FOR SOLUTION INTRAMUSCULAR; INTRAVENOUS
Status: COMPLETED | OUTPATIENT
Start: 2024-07-21 | End: 2024-07-22

## 2024-07-21 RX ORDER — SERTRALINE HYDROCHLORIDE 100 MG/1
100 TABLET, FILM COATED ORAL DAILY
Status: DISCONTINUED | OUTPATIENT
Start: 2024-07-22 | End: 2024-07-25 | Stop reason: HOSPADM

## 2024-07-21 RX ORDER — AMOXICILLIN 250 MG
1 CAPSULE ORAL 2 TIMES DAILY PRN
Status: DISCONTINUED | OUTPATIENT
Start: 2024-07-21 | End: 2024-07-25 | Stop reason: HOSPADM

## 2024-07-21 RX ORDER — PROCHLORPERAZINE MALEATE 5 MG
10 TABLET ORAL EVERY 6 HOURS PRN
Status: DISCONTINUED | OUTPATIENT
Start: 2024-07-21 | End: 2024-07-25 | Stop reason: HOSPADM

## 2024-07-21 RX ORDER — ATORVASTATIN CALCIUM 40 MG/1
80 TABLET, FILM COATED ORAL DAILY
Status: DISCONTINUED | OUTPATIENT
Start: 2024-07-22 | End: 2024-07-25 | Stop reason: HOSPADM

## 2024-07-21 RX ORDER — DIPHENHYDRAMINE HYDROCHLORIDE 50 MG/ML
50 INJECTION INTRAMUSCULAR; INTRAVENOUS
Status: COMPLETED | OUTPATIENT
Start: 2024-07-21 | End: 2024-07-22

## 2024-07-21 RX ORDER — OXYCODONE HYDROCHLORIDE 5 MG/1
5 TABLET ORAL EVERY 4 HOURS PRN
Status: DISCONTINUED | OUTPATIENT
Start: 2024-07-21 | End: 2024-07-24

## 2024-07-21 RX ADMIN — SODIUM CHLORIDE 975 MG: 9 INJECTION, SOLUTION INTRAVENOUS at 20:06

## 2024-07-21 RX ADMIN — OXYCODONE HYDROCHLORIDE 5 MG: 5 TABLET ORAL at 22:10

## 2024-07-21 RX ADMIN — PANTOPRAZOLE SODIUM 40 MG: 40 INJECTION, POWDER, FOR SOLUTION INTRAVENOUS at 15:50

## 2024-07-21 RX ADMIN — ACETAMINOPHEN 975 MG: 325 TABLET ORAL at 22:10

## 2024-07-21 RX ADMIN — SODIUM CHLORIDE 25 MG: 9 INJECTION, SOLUTION INTRAVENOUS at 18:30

## 2024-07-21 RX ADMIN — OXYCODONE HYDROCHLORIDE 5 MG: 5 TABLET ORAL at 17:43

## 2024-07-21 RX ADMIN — FUROSEMIDE 40 MG: 10 INJECTION, SOLUTION INTRAMUSCULAR; INTRAVENOUS at 15:50

## 2024-07-21 ASSESSMENT — ACTIVITIES OF DAILY LIVING (ADL)
ADLS_ACUITY_SCORE: 41
ADLS_ACUITY_SCORE: 36
ADLS_ACUITY_SCORE: 36
ADLS_ACUITY_SCORE: 41
ADLS_ACUITY_SCORE: 41
ADLS_ACUITY_SCORE: 35
ADLS_ACUITY_SCORE: 36
ADLS_ACUITY_SCORE: 41

## 2024-07-21 ASSESSMENT — COLUMBIA-SUICIDE SEVERITY RATING SCALE - C-SSRS
1. IN THE PAST MONTH, HAVE YOU WISHED YOU WERE DEAD OR WISHED YOU COULD GO TO SLEEP AND NOT WAKE UP?: NO
2. HAVE YOU ACTUALLY HAD ANY THOUGHTS OF KILLING YOURSELF IN THE PAST MONTH?: NO
6. HAVE YOU EVER DONE ANYTHING, STARTED TO DO ANYTHING, OR PREPARED TO DO ANYTHING TO END YOUR LIFE?: NO

## 2024-07-21 NOTE — ED TRIAGE NOTES
"Pt reports has been \" passing out\" frequently since having defibrillator placed in April. He fell on Friday injuring L ankle.        "

## 2024-07-21 NOTE — ED PROVIDER NOTES
EMERGENCY DEPARTMENT ENCOUNTER      NAME: Abraham Dong  AGE: 64 year old male  YOB: 1960  MRN: [id number]  EVALUATION DATE & TIME: 2024  1:35 PM    PCP: Clifford Gamboa    ED PROVIDER: Vanessa Vera M.D.      Chief Complaint   Patient presents with    Syncope         FINAL IMPRESSION:  1. Syncope, unspecified syncope type    2. Anemia, unspecified type    3. Congestive heart failure, unspecified HF chronicity, unspecified heart failure type (H)    4. Closed fracture of proximal end of left fibula, unspecified fracture morphology, initial encounter    5. Fall, initial encounter        ED COURSE & MEDICAL DECISION MAKIN:23 PM Met with and introduced myself to the patient. Discussed history and plan of care.   4:06 PM Spoke with , Hospitalist, regarding plan for admission.    Pertinent Labs & Imaging studies reviewed. (See chart for details)  ED Course as of 24   Sun 2024   8328 Patient is a pleasant 64-year-old male comes in today for evaluation after he had a syncopal episode on Friday.  He reports that he been sitting on his couch and got up to get a snack for the dog and they walked a few steps and then passed out.  He did not feel dizzy or lightheaded or have any prodromal symptoms.  He complains of left ankle pain and has tenderness both to the foot, ankle, and the lower shin.  He has swelling to the area.  He had been walking on it.  He is here with his ex-wife who provides some of the history.  She reports that he had a defibrillator added to his pacemaker in April and has had increased syncopal events since then.  Patient did have an episode of vomiting today after he took his medications and that has happened occasionally in the past.  He also reports an episode last week where he had taken the dog out for a walk and passed out on the sidewalk.  He then came in and did not feel well and had a bowel movement and it was dark and tarry.  He is on  Eliquis and certainly that story is concerning for GI bleed.  Will have to check his hemoglobin.  Will do a interrogation of his pacemaker.  He has also noted some increased shortness of breath with exertion over the last few months.  He does not think it is necessarily getting worse but does not feel like it is getting better either.  He has no associated chest pain.  We will x-ray him for the trauma and then see what we find on his blood work and chest x-ray.  He is in agreement with the plan.   1431 I spoke with [employer] who reports that the dual chamber ICD. No events since battery. Possible intrathoracic fluid since early July.   1434 I reviewed the report from [employer].  It said possible OptiVol fluid accumulation July 10, 2024 and ongoing.  There have been 0 shocks and 0 paced terminated episodes.  He has had 0 episodes of V-fib since Nicole 3, 2024 and 0 episodes of V. tach.  He has not had A-fib or SVT.   1531 Patient's BNP is elevated at 1396.  I have ordered a dose of Lasix for the patient.  Initial troponin was 43 so he will need a 2-hour troponin at 4:00.  Hemoglobin is also down to 10.  It was previously 15.  Certainly this could be contributing to his syncope episodes.   1538 I discussed results with the patient.  I think he is admission to the hospital with that hemoglobin dropped and with the elevated BNP.  I just looked at his x-rays and he does have a proximal fibula fracture.  I have ordered a walking boot for him.  I suspect he may have some associated ligament injury of his ankle and possible syndesmosis injury.  I will update him on those results shortly.  I have a call out to the hospitalist now.   1550 I did ask the patient if he had a history of heartburn recently and he says he intermittently gets it.  I have ordered Protonix for him.   1608 I discussed admission with Dr. Duong which with the hospitalist service.  He agrees with a cardiac telemetry observation admission for syncope  "in the setting of anemia with likely GI bleed and new heart failure.       Medical Decision Making    History:  Supplemental history from: Patient  External Record(s) reviewed: I reviewed the note from 7/2/2024 with Dr. Gamboa.  At that time patient was still reporting brief episodes of presyncope and they recommended follow-up with cardiology.  CBC was ordered during that visit but had not been obtained.    Work Up:  Emergent/Severe conditions considered and evaluated for: Anemia, ankle fracture, foot fracture, lower leg fracture, ACS, heart failure, arrhythmia  I independently reviewed and interpreted EKG and tib-fib x-ray which showed a proximal fibular fracture.  See full radiology report for all details.  In additional to work up documented, I considered the following work up: None  Medications given that require intensive monitoring for toxicity: None    External consultation:  Discussion of management with another provider: jazzmine Grecoist    Complicating factors:  Care impacted by chronic illness: atrial fibrillation, hypertension, hyperlipidemia, and vertigo.   Care affected by social determinants of health: N/A    Disposition considerations: Admission    At the conclusion of the encounter I discussed  the results of all of the tests and the disposition with patient.   All questions were answered.  The patient acknowledged understanding and was involved in the decision making regarding the overall care plan.      MEDICATIONS GIVEN IN THE EMERGENCY:  Medications   furosemide (LASIX) injection 40 mg (40 mg Intravenous $Given 7/21/24 1550)   pantoprazole (PROTONIX) IV push injection 40 mg (40 mg Intravenous $Given 7/21/24 1550)     =================================================================    HPI    Triage Note: Pt reports has been \" passing out\" frequently since having defibrillator placed in April. He fell on Friday injuring L ankle.    Patient information was obtained from: Patient    Use of " : N/A      Abraham Dong is a 64 year old male who presents to the ED by walk in for evaluation of syncope.     The patient reports on 7/19, he had a syncopal episode where he got up from his couch to give a snack to his dog when he suddenly passed out with nothing coming on, he woke up on the floor and injured his left lower leg but is unsure how it happened. Patient says the pain on his left lower leg is tender on his feet and radiates up to his shin. Patient endorses shortness of breath. Patient had a recent episode of vomiting after taking his medications. Patient states that a couple days prior to this episode, he had another episode where he was walking his dog out at 3 AM in the morning and passed out on the side walk; he notes that after this episode he went to the bathroom and passed a stool that was tarry. Patient also notes he has a pacemaker and recently got a defibrillator placed in April that he believes has been causing him to have 4 syncopal episodes in the past month. Patient spoke to his pcp about this issue and there was nothing found to be wrong. Patient is on Eliquis.     Denies chest pain.     PAST MEDICAL HISTORY:  Past Medical History:   Diagnosis Date    Atrial fibrillation (H)     Benign essential hypertension     Coronary artery disease     Depression     Hyperlipidemia     Obesity        PAST SURGICAL HISTORY:  Past Surgical History:   Procedure Laterality Date    CORONARY ANGIOGRAPHY ADULT ORDER      CV CORONARY ANGIOGRAM N/A 12/2/2021    Procedure: Coronary Angiogram;  Surgeon: Radha Ferrell MD;  Location: [facility] CV    CV LEFT HEART CATH N/A 12/2/2021    Procedure: Left Heart Cath;  Surgeon: Radha Ferrell MD;  Location: [facility] CV    CV LEFT VENTRICULOGRAM N/A 12/2/2021    Procedure: Left Ventriculogram;  Surgeon: Radha Ferrell MD;  Location: [facility] LAB CV    EP ICD INSERT BIVENT N/A 4/24/2024    Procedure: Implantable Cardioverter  Defibrillator Device & Lead Implant Biventricular;  Surgeon: El Hagen MD;  Location: [facility] CV    EP PACEMAKER N/A 12/23/2021    Procedure: EP Pacemaker;  Surgeon: Kun Lopze MD;  Location: [facility] CV    EP PPM GENERATOR REMOVAL N/A 4/24/2024    Procedure: Implantable Cardioverter Defibrillator Device & Lead Implant Biventricular;  Surgeon: El Hagen MD;  Location: [facility]    HERNIA REPAIR      IMPLANT PACEMAKER      KNEE SURGERY      LEAD REMOVAL FOR DUAL OR BI-VENTRICULAR PACEMAKER        N/A 4/24/2024    Procedure: Pacemaker Lead Removal - Dual or Bi-ventricular;  Surgeon: El Hagen MD;  Location: [facility] CV    OR LASER LEAD EXTRACTION - LEVEL 2 (STANDBY) N/A 4/24/2024    Procedure: Or Laser Lead Extraction - Level 2 (Standby);  Surgeon: Sulma Olivarez MD;  Location: [facility]    TONSILLECTOMY      VASECTOMY         CURRENT MEDICATIONS:    No current facility-administered medications for this encounter.    Current Outpatient Medications:     acetaminophen-caffeine (EXCEDRIN TENSION HEADACHE) 500-65 MG TABS, Take 2 tablets by mouth daily as needed for mild pain, Disp: , Rfl:     apixaban ANTICOAGULANT (ELIQUIS ANTICOAGULANT) 5 MG tablet, Take 1 tablet (5 mg) by mouth 2 times daily, Disp: 180 tablet, Rfl: 3    atorvastatin (LIPITOR) 80 MG tablet, Take 1 tablet (80 mg) by mouth daily, Disp: 30 tablet, Rfl: 0    ezetimibe (ZETIA) 10 MG tablet, Take 1 tablet (10 mg) by mouth once daily, Disp: 90 tablet, Rfl: 3    lisinopril (ZESTRIL) 20 MG tablet, Take 20 mg by mouth daily, Disp: , Rfl:     metoprolol succinate ER (TOPROL XL) 200 MG 24 hr tablet, Take 1 tablet (200 mg) by mouth daily, Disp: 90 tablet, Rfl: 3    nitroGLYcerin (NITROSTAT) 0.4 MG sublingual tablet, One tablet under the tongue every 5 minutes if needed for chest pain. May repeat every 5 minutes for a maximum of 3 doses in 15 minutes, Disp: 25 tablet, Rfl: 3     "sertraline (ZOLOFT) 100 MG tablet, Take 100 mg by mouth daily , Disp: , Rfl:     ALLERGIES:  No Known Allergies    FAMILY HISTORY:  Family History   Problem Relation Age of Onset    Diabetes Mother     Hypertension Mother     Coronary Artery Disease No family hx of     Cerebrovascular Disease No family hx of        SOCIAL HISTORY:   Social History     Socioeconomic History    Marital status:    Tobacco Use    Smoking status: Never    Smokeless tobacco: Never   Substance and Sexual Activity    Alcohol use: Yes     Alcohol/week: 6.0 standard drinks of alcohol     Types: 6 Standard drinks or equivalent per week     Comment: 7 drinks per week    Drug use: Never     Social Determinants of Health     Financial Resource Strain: Low Risk  (7/2/2024)    Received from [employer]    Financial Resource Strain     Difficulty of Paying Living Expenses: 3   Food Insecurity: No Food Insecurity (7/2/2024)    Received from [employer]    Food Insecurity     Worried About Running Out of Food in the Last Year: 1   Transportation Needs: No Transportation Needs (7/2/2024)    Received from [employer]    Transportation Needs     Lack of Transportation (Medical): 1   Social Connections: Socially Integrated (7/2/2024)    Received from [employer]    Social Connections     Frequency of Communication with Friends and Family: 0   Housing Stability: Low Risk  (7/2/2024)    Received from [employer]    Housing Stability     Unable to Pay for Housing in the Last Year: 1       PHYSICAL EXAM    VITAL SIGNS: /73   Pulse 60   Temp 97.7  F (36.5  C)   Resp 27   Ht 1.854 m (6' 1\")   Wt 113.4 kg (250 lb)   SpO2 99%   BMI 32.98 kg/m     GENERAL: Awake, alert, answering questions appropriately,  SPEECH:  Easy to understand speech, Normal volume and boogie  PULMONARY: No " respiratory distress, Lungs clear to auscultation bilaterally  CARDIOVASCULAR: Regular rate and rhythm, Distal pulses present and normal.  ABDOMINAL: Soft, Nondistended, Nontender, No rebound or guarding, No palpable masses  EXTREMITIES: No lower extremity edema. Tenderness to left foot, left ankle, and left lower leg. Swelling to left ankle and left lower leg.   PSYCH: Normal mood and affect     LAB:  All pertinent labs reviewed and interpreted.  Results for orders placed or performed during the hospital encounter of 07/21/24   XR Tibia and Fibula Left 2 Views    Impression    IMPRESSION:   There is acute mildly displaced oblique fracture of the proximal fibular shaft. No evidence of a tibial fracture.    There is soft tissue swelling. There is bony proliferative change and small bone fragments about the left ankle without definitive evidence of an acute fracture. Mild tibiotalar joint arthrosis. Distal Achilles tendon enthesopathy. Small calcaneal spur.   No evidence of an acute ankle fracture.    The left foot shows no evidence of an acute displaced fracture. There is moderate to severe arthrosis at the first MTP joint. No Lisfranc subluxation. Soft tissue swelling.    NOTE: ABNORMAL REPORT    THE DICTATION ABOVE DESCRIBES AN ABNORMALITY FOR WHICH FOLLOW-UP IS NEEDED.        XR Ankle Left G/E 3 Views    Impression    IMPRESSION:   There is acute mildly displaced oblique fracture of the proximal fibular shaft. No evidence of a tibial fracture.    There is soft tissue swelling. There is bony proliferative change and small bone fragments about the left ankle without definitive evidence of an acute fracture. Mild tibiotalar joint arthrosis. Distal Achilles tendon enthesopathy. Small calcaneal spur.   No evidence of an acute ankle fracture.    The left foot shows no evidence of an acute displaced fracture. There is moderate to severe arthrosis at the first MTP joint. No Lisfranc subluxation. Soft tissue  swelling.    NOTE: ABNORMAL REPORT    THE DICTATION ABOVE DESCRIBES AN ABNORMALITY FOR WHICH FOLLOW-UP IS NEEDED.        XR Foot Left G/E 3 Views    Impression    IMPRESSION:   There is acute mildly displaced oblique fracture of the proximal fibular shaft. No evidence of a tibial fracture.    There is soft tissue swelling. There is bony proliferative change and small bone fragments about the left ankle without definitive evidence of an acute fracture. Mild tibiotalar joint arthrosis. Distal Achilles tendon enthesopathy. Small calcaneal spur.   No evidence of an acute ankle fracture.    The left foot shows no evidence of an acute displaced fracture. There is moderate to severe arthrosis at the first MTP joint. No Lisfranc subluxation. Soft tissue swelling.    NOTE: ABNORMAL REPORT    THE DICTATION ABOVE DESCRIBES AN ABNORMALITY FOR WHICH FOLLOW-UP IS NEEDED.        Extra Blue Top Tube   Result Value Ref Range    Hold Specimen JIC    Extra Red Top Tube   Result Value Ref Range    Hold Specimen JIC    Extra Green Top (Lithium Heparin) Tube   Result Value Ref Range    Hold Specimen JIC    Extra Purple Top Tube   Result Value Ref Range    Hold Specimen JIC    Basic metabolic panel   Result Value Ref Range    Sodium 136 135 - 145 mmol/L    Potassium 4.4 3.4 - 5.3 mmol/L    Chloride 101 98 - 107 mmol/L    Carbon Dioxide (CO2) 23 22 - 29 mmol/L    Anion Gap 12 7 - 15 mmol/L    Urea Nitrogen 18.2 8.0 - 23.0 mg/dL    Creatinine 0.98 0.67 - 1.17 mg/dL    GFR Estimate 87 >60 mL/min/1.73m2    Calcium 9.3 8.8 - 10.4 mg/dL    Glucose 113 (H) 70 - 99 mg/dL   Result Value Ref Range    Magnesium 2.1 1.7 - 2.3 mg/dL   Result Value Ref Range    Troponin T, High Sensitivity 43 (H) <=22 ng/L   CBC with platelets and differential   Result Value Ref Range    WBC Count 9.5 4.0 - 11.0 10e3/uL    RBC Count 3.49 (L) 4.40 - 5.90 10e6/uL    Hemoglobin 10.0 (L) 13.3 - 17.7 g/dL    Hematocrit 31.0 (L) 40.0 - 53.0 %    MCV 89 78 - 100 fL    MCH  28.7 26.5 - 33.0 pg    MCHC 32.3 31.5 - 36.5 g/dL    RDW 13.1 10.0 - 15.0 %    Platelet Count 313 150 - 450 10e3/uL    % Neutrophils 73 %    % Lymphocytes 13 %    % Monocytes 11 %    % Eosinophils 2 %    % Basophils 1 %    % Immature Granulocytes 0 %    NRBCs per 100 WBC 0 <1 /100    Absolute Neutrophils 6.9 1.6 - 8.3 10e3/uL    Absolute Lymphocytes 1.2 0.8 - 5.3 10e3/uL    Absolute Monocytes 1.1 0.0 - 1.3 10e3/uL    Absolute Eosinophils 0.2 0.0 - 0.7 10e3/uL    Absolute Basophils 0.1 0.0 - 0.2 10e3/uL    Absolute Immature Granulocytes 0.0 <=0.4 10e3/uL    Absolute NRBCs 0.0 10e3/uL   TSH with free T4 reflex   Result Value Ref Range    TSH 4.84 (H) 0.30 - 4.20 uIU/mL   Nt probnp inpatient   Result Value Ref Range    N terminal Pro BNP Inpatient 1,396 (H) 0 - 900 pg/mL       RADIOLOGY:  Chest XR,  PA & LAT   Final Result   IMPRESSION: Left pectoral transvenous pacemaker with lead wire tips in the right atrium and right ventricle. Cardiac silhouette size is within normal limits. Calcified atherosclerotic changes at the aortic arch. Lungs are clear.      XR Foot Left G/E 3 Views   Final Result   IMPRESSION:    There is acute mildly displaced oblique fracture of the proximal fibular shaft. No evidence of a tibial fracture.      There is soft tissue swelling. There is bony proliferative change and small bone fragments about the left ankle without definitive evidence of an acute fracture. Mild tibiotalar joint arthrosis. Distal Achilles tendon enthesopathy. Small calcaneal spur.    No evidence of an acute ankle fracture.      The left foot shows no evidence of an acute displaced fracture. There is moderate to severe arthrosis at the first MTP joint. No Lisfranc subluxation. Soft tissue swelling.      NOTE: ABNORMAL REPORT      THE DICTATION ABOVE DESCRIBES AN ABNORMALITY FOR WHICH FOLLOW-UP IS NEEDED.             XR Ankle Left G/E 3 Views   Final Result   IMPRESSION:    There is acute mildly displaced oblique fracture of  the proximal fibular shaft. No evidence of a tibial fracture.      There is soft tissue swelling. There is bony proliferative change and small bone fragments about the left ankle without definitive evidence of an acute fracture. Mild tibiotalar joint arthrosis. Distal Achilles tendon enthesopathy. Small calcaneal spur.    No evidence of an acute ankle fracture.      The left foot shows no evidence of an acute displaced fracture. There is moderate to severe arthrosis at the first MTP joint. No Lisfranc subluxation. Soft tissue swelling.      NOTE: ABNORMAL REPORT      THE DICTATION ABOVE DESCRIBES AN ABNORMALITY FOR WHICH FOLLOW-UP IS NEEDED.             XR Tibia and Fibula Left 2 Views   Final Result   IMPRESSION:    There is acute mildly displaced oblique fracture of the proximal fibular shaft. No evidence of a tibial fracture.      There is soft tissue swelling. There is bony proliferative change and small bone fragments about the left ankle without definitive evidence of an acute fracture. Mild tibiotalar joint arthrosis. Distal Achilles tendon enthesopathy. Small calcaneal spur.    No evidence of an acute ankle fracture.      The left foot shows no evidence of an acute displaced fracture. There is moderate to severe arthrosis at the first MTP joint. No Lisfranc subluxation. Soft tissue swelling.      NOTE: ABNORMAL REPORT      THE DICTATION ABOVE DESCRIBES AN ABNORMALITY FOR WHICH FOLLOW-UP IS NEEDED.             Echocardiogram Complete    (Results Pending)         EKG:      Date and time: July 21, 2024 at 1344  Rate: 63 bpm  Rhythm: Sinus rhythm  AZ interval: 180 ms  QRS interval: 130 ms  QT/QTc: 454/464 ms  ST changes or T wave changes: Subtle ST depression in V5 and V6  Change from prior ECG: ST depression is less pronounced than previous EKG.  I have independently reviewed and interpreted this EKG.         I, Jyothi Cantu, am serving as a scribe to document services personally performed by Dr. Jody snowden  on my observation and the provider's statements to me. I, Vanessa Vera MD attest that Jyothi Cantu is acting in a scribe capacity, has observed my performance of the services and has documented them in accordance with my direction.    Vanessa eVra M.D.  Emergency Medicine  [facility] EMERGENCY DEPARTMENT  [address] [place] [id number]  [phone]  Dept: [phone]       Vanessa Vera MD  07/21/24 2057

## 2024-07-21 NOTE — PHARMACY-ADMISSION MEDICATION HISTORY
Pharmacy Intern Admission Medication History    Admission medication history is complete. The information provided in this note is only as accurate as the sources available at the time of the update.    Information Source(s): Patient, Prescription bottles, and CareEverywhere/SureScripts via in-person    Pertinent Information: Patient and spouse both noted that medications taken today this morning may have not stayed down due to vomiting later in the day.     Changes made to PTA medication list:  Added: None  Deleted: None  Changed: None    Allergies reviewed with patient and updates made in EHR: yes    Medication History Completed By: KENNY AGUIRRE 7/21/2024 4:49 PM    PTA Med List   Medication Sig Note Last Dose    acetaminophen-caffeine (EXCEDRIN TENSION HEADACHE) 500-65 MG TABS Take 2 tablets by mouth daily as needed for mild pain  Past Month at prn    apixaban ANTICOAGULANT (ELIQUIS ANTICOAGULANT) 5 MG tablet Take 1 tablet (5 mg) by mouth 2 times daily 7/21/2024: Med may have vomited out per patient and spouse. 7/21/2024 at AM    atorvastatin (LIPITOR) 80 MG tablet Take 1 tablet (80 mg) by mouth daily 7/21/2024: Med may have vomited out per patient and spouse. 7/21/2024 at AM    ezetimibe (ZETIA) 10 MG tablet Take 1 tablet (10 mg) by mouth once daily 7/21/2024: Med may have vomited out per patient and spouse. 7/21/2024 at AM    lisinopril (ZESTRIL) 20 MG tablet Take 20 mg by mouth daily 7/21/2024: Med may have vomited out per patient and spouse. 7/21/2024 at AM    metoprolol succinate ER (TOPROL XL) 200 MG 24 hr tablet Take 1 tablet (200 mg) by mouth daily 7/21/2024: Med may have vomited out per patient and spouse. 7/21/2024 at AM    nitroGLYcerin (NITROSTAT) 0.4 MG sublingual tablet One tablet under the tongue every 5 minutes if needed for chest pain. May repeat every 5 minutes for a maximum of 3 doses in 15 minutes  More than a month at prn    sertraline (ZOLOFT) 100 MG tablet Take 100 mg by mouth daily   7/21/2024: Med may have vomited out per patient and spouse. 7/21/2024 at AM

## 2024-07-21 NOTE — ED NOTES
"Pt states \"passed out\" on Friday and hurt left ankle. This has happened 2 other times in past. Denies hitting head   "

## 2024-07-22 ENCOUNTER — ANCILLARY PROCEDURE (OUTPATIENT)
Dept: CARDIOLOGY | Facility: CLINIC | Age: 64
End: 2024-07-22
Attending: INTERNAL MEDICINE
Payer: COMMERCIAL

## 2024-07-22 ENCOUNTER — ANESTHESIA EVENT (OUTPATIENT)
Dept: SURGERY | Facility: HOSPITAL | Age: 64
DRG: 981 | End: 2024-07-22
Payer: COMMERCIAL

## 2024-07-22 ENCOUNTER — APPOINTMENT (OUTPATIENT)
Dept: CT IMAGING | Facility: HOSPITAL | Age: 64
DRG: 981 | End: 2024-07-22
Attending: PHYSICIAN ASSISTANT
Payer: COMMERCIAL

## 2024-07-22 ENCOUNTER — APPOINTMENT (OUTPATIENT)
Dept: CARDIOLOGY | Facility: HOSPITAL | Age: 64
DRG: 981 | End: 2024-07-22
Attending: STUDENT IN AN ORGANIZED HEALTH CARE EDUCATION/TRAINING PROGRAM
Payer: COMMERCIAL

## 2024-07-22 ENCOUNTER — ANESTHESIA (OUTPATIENT)
Dept: SURGERY | Facility: HOSPITAL | Age: 64
DRG: 981 | End: 2024-07-22
Payer: COMMERCIAL

## 2024-07-22 DIAGNOSIS — I42.9 SECONDARY CARDIOMYOPATHY (H): ICD-10-CM

## 2024-07-22 DIAGNOSIS — Z95.810 ICD (IMPLANTABLE CARDIOVERTER-DEFIBRILLATOR) IN PLACE: ICD-10-CM

## 2024-07-22 DIAGNOSIS — I49.5 SICK SINUS SYNDROME (H): ICD-10-CM

## 2024-07-22 LAB
ANION GAP SERPL CALCULATED.3IONS-SCNC: 11 MMOL/L (ref 7–15)
BUN SERPL-MCNC: 22.4 MG/DL (ref 8–23)
CALCIUM SERPL-MCNC: 8.7 MG/DL (ref 8.8–10.4)
CHLORIDE SERPL-SCNC: 101 MMOL/L (ref 98–107)
CREAT SERPL-MCNC: 1.23 MG/DL (ref 0.67–1.17)
EGFRCR SERPLBLD CKD-EPI 2021: 66 ML/MIN/1.73M2
ERYTHROCYTE [DISTWIDTH] IN BLOOD BY AUTOMATED COUNT: 13.2 % (ref 10–15)
GLUCOSE SERPL-MCNC: 92 MG/DL (ref 70–99)
HCO3 SERPL-SCNC: 23 MMOL/L (ref 22–29)
HCT VFR BLD AUTO: 28.5 % (ref 40–53)
HGB BLD-MCNC: 9.1 G/DL (ref 13.3–17.7)
HGB BLD-MCNC: 9.7 G/DL (ref 13.3–17.7)
HOLD SPECIMEN: NORMAL
LVEF ECHO: NORMAL
MCH RBC QN AUTO: 28.8 PG (ref 26.5–33)
MCHC RBC AUTO-ENTMCNC: 31.9 G/DL (ref 31.5–36.5)
MCV RBC AUTO: 90 FL (ref 78–100)
PLATELET # BLD AUTO: 243 10E3/UL (ref 150–450)
POTASSIUM SERPL-SCNC: 4 MMOL/L (ref 3.4–5.3)
RBC # BLD AUTO: 3.16 10E6/UL (ref 4.4–5.9)
SODIUM SERPL-SCNC: 135 MMOL/L (ref 135–145)
T4 FREE SERPL-MCNC: 0.9 NG/DL (ref 0.9–1.7)
TSH SERPL DL<=0.005 MIU/L-ACNC: 9.52 UIU/ML (ref 0.3–4.2)
UPPER GI ENDOSCOPY: NORMAL
WBC # BLD AUTO: 7.1 10E3/UL (ref 4–11)

## 2024-07-22 PROCEDURE — 84439 ASSAY OF FREE THYROXINE: CPT | Performed by: STUDENT IN AN ORGANIZED HEALTH CARE EDUCATION/TRAINING PROGRAM

## 2024-07-22 PROCEDURE — 93306 TTE W/DOPPLER COMPLETE: CPT | Mod: 26 | Performed by: INTERNAL MEDICINE

## 2024-07-22 PROCEDURE — 43239 EGD BIOPSY SINGLE/MULTIPLE: CPT | Performed by: NURSE ANESTHETIST, CERTIFIED REGISTERED

## 2024-07-22 PROCEDURE — 0DB18ZX EXCISION OF UPPER ESOPHAGUS, VIA NATURAL OR ARTIFICIAL OPENING ENDOSCOPIC, DIAGNOSTIC: ICD-10-PCS | Performed by: INTERNAL MEDICINE

## 2024-07-22 PROCEDURE — 85027 COMPLETE CBC AUTOMATED: CPT | Performed by: STUDENT IN AN ORGANIZED HEALTH CARE EDUCATION/TRAINING PROGRAM

## 2024-07-22 PROCEDURE — 360N000075 HC SURGERY LEVEL 2, PER MIN: Performed by: INTERNAL MEDICINE

## 2024-07-22 PROCEDURE — 999N000141 HC STATISTIC PRE-PROCEDURE NURSING ASSESSMENT: Performed by: INTERNAL MEDICINE

## 2024-07-22 PROCEDURE — 255N000002 HC RX 255 OP 636: Performed by: INTERNAL MEDICINE

## 2024-07-22 PROCEDURE — 36415 COLL VENOUS BLD VENIPUNCTURE: CPT | Performed by: INTERNAL MEDICINE

## 2024-07-22 PROCEDURE — 80048 BASIC METABOLIC PNL TOTAL CA: CPT | Performed by: STUDENT IN AN ORGANIZED HEALTH CARE EDUCATION/TRAINING PROGRAM

## 2024-07-22 PROCEDURE — 0DB38ZX EXCISION OF LOWER ESOPHAGUS, VIA NATURAL OR ARTIFICIAL OPENING ENDOSCOPIC, DIAGNOSTIC: ICD-10-PCS | Performed by: INTERNAL MEDICINE

## 2024-07-22 PROCEDURE — 0DB68ZX EXCISION OF STOMACH, VIA NATURAL OR ARTIFICIAL OPENING ENDOSCOPIC, DIAGNOSTIC: ICD-10-PCS | Performed by: INTERNAL MEDICINE

## 2024-07-22 PROCEDURE — 258N000003 HC RX IP 258 OP 636: Performed by: NURSE ANESTHETIST, CERTIFIED REGISTERED

## 2024-07-22 PROCEDURE — 120N000004 HC R&B MS OVERFLOW

## 2024-07-22 PROCEDURE — 36415 COLL VENOUS BLD VENIPUNCTURE: CPT | Performed by: STUDENT IN AN ORGANIZED HEALTH CARE EDUCATION/TRAINING PROGRAM

## 2024-07-22 PROCEDURE — 99232 SBSQ HOSP IP/OBS MODERATE 35: CPT | Performed by: INTERNAL MEDICINE

## 2024-07-22 PROCEDURE — 250N000009 HC RX 250: Performed by: NURSE ANESTHETIST, CERTIFIED REGISTERED

## 2024-07-22 PROCEDURE — 258N000003 HC RX IP 258 OP 636: Performed by: INTERNAL MEDICINE

## 2024-07-22 PROCEDURE — 85018 HEMOGLOBIN: CPT | Performed by: INTERNAL MEDICINE

## 2024-07-22 PROCEDURE — 43239 EGD BIOPSY SINGLE/MULTIPLE: CPT | Performed by: STUDENT IN AN ORGANIZED HEALTH CARE EDUCATION/TRAINING PROGRAM

## 2024-07-22 PROCEDURE — C8929 TTE W OR WO FOL WCON,DOPPLER: HCPCS

## 2024-07-22 PROCEDURE — 250N000009 HC RX 250: Performed by: INTERNAL MEDICINE

## 2024-07-22 PROCEDURE — 250N000011 HC RX IP 250 OP 636: Performed by: INTERNAL MEDICINE

## 2024-07-22 PROCEDURE — 250N000013 HC RX MED GY IP 250 OP 250 PS 637: Performed by: INTERNAL MEDICINE

## 2024-07-22 PROCEDURE — 84443 ASSAY THYROID STIM HORMONE: CPT | Performed by: STUDENT IN AN ORGANIZED HEALTH CARE EDUCATION/TRAINING PROGRAM

## 2024-07-22 PROCEDURE — 250N000013 HC RX MED GY IP 250 OP 250 PS 637: Performed by: STUDENT IN AN ORGANIZED HEALTH CARE EDUCATION/TRAINING PROGRAM

## 2024-07-22 PROCEDURE — 370N000017 HC ANESTHESIA TECHNICAL FEE, PER MIN: Performed by: INTERNAL MEDICINE

## 2024-07-22 PROCEDURE — 73700 CT LOWER EXTREMITY W/O DYE: CPT | Mod: LT

## 2024-07-22 PROCEDURE — 272N000001 HC OR GENERAL SUPPLY STERILE: Performed by: INTERNAL MEDICINE

## 2024-07-22 PROCEDURE — 99222 1ST HOSP IP/OBS MODERATE 55: CPT | Performed by: INTERNAL MEDICINE

## 2024-07-22 PROCEDURE — 88342 IMHCHEM/IMCYTCHM 1ST ANTB: CPT | Mod: TC | Performed by: INTERNAL MEDICINE

## 2024-07-22 PROCEDURE — 250N000011 HC RX IP 250 OP 636: Performed by: NURSE ANESTHETIST, CERTIFIED REGISTERED

## 2024-07-22 RX ORDER — FLUMAZENIL 0.1 MG/ML
0.2 INJECTION, SOLUTION INTRAVENOUS
Status: ACTIVE | OUTPATIENT
Start: 2024-07-22 | End: 2024-07-23

## 2024-07-22 RX ORDER — OXYCODONE HYDROCHLORIDE 5 MG/1
5 TABLET ORAL
Status: DISCONTINUED | OUTPATIENT
Start: 2024-07-22 | End: 2024-07-22 | Stop reason: HOSPADM

## 2024-07-22 RX ORDER — ONDANSETRON 2 MG/ML
4 INJECTION INTRAMUSCULAR; INTRAVENOUS EVERY 30 MIN PRN
Status: DISCONTINUED | OUTPATIENT
Start: 2024-07-22 | End: 2024-07-22 | Stop reason: HOSPADM

## 2024-07-22 RX ORDER — NALOXONE HYDROCHLORIDE 1 MG/ML
0.1 INJECTION INTRAMUSCULAR; INTRAVENOUS; SUBCUTANEOUS
Status: DISCONTINUED | OUTPATIENT
Start: 2024-07-22 | End: 2024-07-22 | Stop reason: HOSPADM

## 2024-07-22 RX ORDER — DEXAMETHASONE SODIUM PHOSPHATE 4 MG/ML
4 INJECTION, SOLUTION INTRA-ARTICULAR; INTRALESIONAL; INTRAMUSCULAR; INTRAVENOUS; SOFT TISSUE
Status: DISCONTINUED | OUTPATIENT
Start: 2024-07-22 | End: 2024-07-22 | Stop reason: HOSPADM

## 2024-07-22 RX ORDER — SODIUM CHLORIDE 9 MG/ML
INJECTION, SOLUTION INTRAVENOUS CONTINUOUS
Status: ACTIVE | OUTPATIENT
Start: 2024-07-22 | End: 2024-07-22

## 2024-07-22 RX ORDER — OXYCODONE HYDROCHLORIDE 5 MG/1
10 TABLET ORAL
Status: DISCONTINUED | OUTPATIENT
Start: 2024-07-22 | End: 2024-07-22 | Stop reason: HOSPADM

## 2024-07-22 RX ORDER — ONDANSETRON 4 MG/1
4 TABLET, ORALLY DISINTEGRATING ORAL EVERY 30 MIN PRN
Status: DISCONTINUED | OUTPATIENT
Start: 2024-07-22 | End: 2024-07-22 | Stop reason: HOSPADM

## 2024-07-22 RX ORDER — SODIUM CHLORIDE, SODIUM LACTATE, POTASSIUM CHLORIDE, CALCIUM CHLORIDE 600; 310; 30; 20 MG/100ML; MG/100ML; MG/100ML; MG/100ML
INJECTION, SOLUTION INTRAVENOUS CONTINUOUS PRN
Status: DISCONTINUED | OUTPATIENT
Start: 2024-07-22 | End: 2024-07-22

## 2024-07-22 RX ORDER — METOPROLOL SUCCINATE 100 MG/1
100 TABLET, EXTENDED RELEASE ORAL 2 TIMES DAILY
Status: DISCONTINUED | OUTPATIENT
Start: 2024-07-23 | End: 2024-07-25 | Stop reason: HOSPADM

## 2024-07-22 RX ORDER — LIDOCAINE HYDROCHLORIDE 20 MG/ML
INJECTION, SOLUTION INFILTRATION; PERINEURAL PRN
Status: DISCONTINUED | OUTPATIENT
Start: 2024-07-22 | End: 2024-07-22

## 2024-07-22 RX ORDER — PANTOPRAZOLE SODIUM 40 MG/1
40 TABLET, DELAYED RELEASE ORAL
Status: DISCONTINUED | OUTPATIENT
Start: 2024-07-23 | End: 2024-07-22

## 2024-07-22 RX ORDER — PANTOPRAZOLE SODIUM 40 MG/1
40 TABLET, DELAYED RELEASE ORAL
Status: DISCONTINUED | OUTPATIENT
Start: 2024-07-22 | End: 2024-07-25

## 2024-07-22 RX ORDER — PROPOFOL 10 MG/ML
INJECTION, EMULSION INTRAVENOUS PRN
Status: DISCONTINUED | OUTPATIENT
Start: 2024-07-22 | End: 2024-07-22

## 2024-07-22 RX ADMIN — PROPOFOL 20 MG: 10 INJECTION, EMULSION INTRAVENOUS at 16:08

## 2024-07-22 RX ADMIN — PANTOPRAZOLE SODIUM 40 MG: 40 TABLET, DELAYED RELEASE ORAL at 17:37

## 2024-07-22 RX ADMIN — PROPOFOL 50 MG: 10 INJECTION, EMULSION INTRAVENOUS at 15:57

## 2024-07-22 RX ADMIN — PHENYLEPHRINE HYDROCHLORIDE 50 MCG: 10 INJECTION INTRAVENOUS at 16:04

## 2024-07-22 RX ADMIN — PROPOFOL 50 MG: 10 INJECTION, EMULSION INTRAVENOUS at 15:55

## 2024-07-22 RX ADMIN — PROPOFOL 20 MG: 10 INJECTION, EMULSION INTRAVENOUS at 16:02

## 2024-07-22 RX ADMIN — OXYCODONE HYDROCHLORIDE 5 MG: 5 TABLET ORAL at 09:06

## 2024-07-22 RX ADMIN — PHENYLEPHRINE HYDROCHLORIDE 50 MCG: 10 INJECTION INTRAVENOUS at 16:01

## 2024-07-22 RX ADMIN — PHENYLEPHRINE HYDROCHLORIDE 50 MCG: 10 INJECTION INTRAVENOUS at 15:59

## 2024-07-22 RX ADMIN — ATORVASTATIN CALCIUM 80 MG: 40 TABLET, FILM COATED ORAL at 09:05

## 2024-07-22 RX ADMIN — PANTOPRAZOLE SODIUM 40 MG: 40 INJECTION, POWDER, FOR SOLUTION INTRAVENOUS at 12:00

## 2024-07-22 RX ADMIN — SERTRALINE HYDROCHLORIDE 100 MG: 100 TABLET ORAL at 09:06

## 2024-07-22 RX ADMIN — PHENYLEPHRINE HYDROCHLORIDE 100 MCG: 10 INJECTION INTRAVENOUS at 15:57

## 2024-07-22 RX ADMIN — PROPOFOL 20 MG: 10 INJECTION, EMULSION INTRAVENOUS at 15:58

## 2024-07-22 RX ADMIN — PHENYLEPHRINE HYDROCHLORIDE 50 MCG: 10 INJECTION INTRAVENOUS at 16:06

## 2024-07-22 RX ADMIN — PHENYLEPHRINE HYDROCHLORIDE 100 MCG: 10 INJECTION INTRAVENOUS at 15:55

## 2024-07-22 RX ADMIN — PROPOFOL 20 MG: 10 INJECTION, EMULSION INTRAVENOUS at 16:01

## 2024-07-22 RX ADMIN — PROPOFOL 20 MG: 10 INJECTION, EMULSION INTRAVENOUS at 15:59

## 2024-07-22 RX ADMIN — PERFLUTREN 2 ML: 6.52 INJECTION, SUSPENSION INTRAVENOUS at 07:43

## 2024-07-22 RX ADMIN — PROPOFOL 20 MG: 10 INJECTION, EMULSION INTRAVENOUS at 16:05

## 2024-07-22 RX ADMIN — METOPROLOL SUCCINATE 200 MG: 100 TABLET, EXTENDED RELEASE ORAL at 09:05

## 2024-07-22 RX ADMIN — PROPOFOL 50 MG: 10 INJECTION, EMULSION INTRAVENOUS at 15:56

## 2024-07-22 RX ADMIN — SODIUM CHLORIDE: 9 INJECTION, SOLUTION INTRAVENOUS at 12:01

## 2024-07-22 RX ADMIN — SODIUM CHLORIDE, POTASSIUM CHLORIDE, SODIUM LACTATE AND CALCIUM CHLORIDE: 600; 310; 30; 20 INJECTION, SOLUTION INTRAVENOUS at 15:52

## 2024-07-22 RX ADMIN — EZETIMIBE 10 MG: 10 TABLET ORAL at 09:06

## 2024-07-22 RX ADMIN — PROPOFOL 20 MG: 10 INJECTION, EMULSION INTRAVENOUS at 16:00

## 2024-07-22 RX ADMIN — ACETAMINOPHEN 975 MG: 325 TABLET ORAL at 09:06

## 2024-07-22 RX ADMIN — LIDOCAINE HYDROCHLORIDE 100 MG: 20 INJECTION, SOLUTION INFILTRATION; PERINEURAL at 15:55

## 2024-07-22 ASSESSMENT — ACTIVITIES OF DAILY LIVING (ADL)
ADLS_ACUITY_SCORE: 41
ADLS_ACUITY_SCORE: 37
ADLS_ACUITY_SCORE: 38
ADLS_ACUITY_SCORE: 41
ADLS_ACUITY_SCORE: 38
ADLS_ACUITY_SCORE: 41
ADLS_ACUITY_SCORE: 38
ADLS_ACUITY_SCORE: 41
ADLS_ACUITY_SCORE: 37
ADLS_ACUITY_SCORE: 38
ADLS_ACUITY_SCORE: 41
ADLS_ACUITY_SCORE: 41
ADLS_ACUITY_SCORE: 37
ADLS_ACUITY_SCORE: 38
ADLS_ACUITY_SCORE: 38
DEPENDENT_IADLS:: INDEPENDENT

## 2024-07-22 NOTE — PROGRESS NOTES
Pt tolerated test dose of Iron Dextran infusion without any s/s of hypersensitivity reaction.  VSS through out 1 hour observation.  Administered remainder of Iron Dextran infusion over 4 hours per MD order.  Pt's VS were monitored every 15 mins through first hour of infusion.  VSS, pt denies any symptoms of hypersensitivity reaction.  Pt is not on hourly VS until iron infusion is complete.  Next set of VS is due at 2215.

## 2024-07-22 NOTE — H&P
[facility]    History and Physical - Hospitalist Service       Date of Admission:  7/21/2024    Assessment & Plan   Abraham Dong is a 64 year old male with PMHx of hypertrophic CM, Afib on Eliquis, HTN, MDD who presents 2 days after a syncopal event led to a fall and left ankle pain/swelling. Noted to have an acute anemia with melena.    #Recurrent syncope  #Subacute blood loss leading to iron deficiency anemia  #Upper GI bleed  #Chronic orthostatic hypotension  Chronic orthostasis symptoms for years believed to be due to his hypertrophic CM. Then 4 episodes of syncope after standing up in the past month, now leading to a leg fracture. ~2-3 weeks ago he had a large, tarry bowel movement. On Eliquis, Hgb 10, down from 15 3 months ago, but stable from 3 weeks ago. Low concern for acute bleeding and his hemoglobin is only moderately low, but given his baseline predisposition for presyncope, his new anemia could be decreasing his oxygen reserve enough to cause syncope. Does take 2 Excedrin extra strengths daily as well.  - GI and cardiology consulted  - CLD, NPO at [facility] for possible EGD  - TTE ordered  - PPM interrogated in ED and reportedly no recent arrhythmias  - Continued home BB and lisinopril for now pending cards recs  - Ferritin 12 on 7/2, ordered 1g LMW iron dextran, won't need PO iron at discharge    #Acute traumatic left proximal fibular fracture  #Acute traumatic left ankle swelling and pain with bone fragments  Due to syncope on 7/19.  - Ortho consulted    #Possible hypothyroidism  TSH mildly elevated at 4.84, free T4 mildly low at 0.78. hypothyroidism could be contributing to his above symptoms, but his values are impressive.  - Repeat TSH and free T4 in the morning to get another set of values off of therapy, then would start levothyroxine 50mcg daily (reduced for age) and follow up labs in 4-6 weeks, consider endocrine referral for borderline diagnosis    #Hypertrophic  "cardiomyopathy  #Dual-chamber pacemaker with ICD in situ  Follows with our cardiology team for this.  - TTE  - Continue home metoprolol succinate 200mg daily    #Atrial fibrillation  - Continue metoprolol as above  - Hold home Eliquis with possible UGIB    #Primary HTN  - Continue home lisinopril 20mg daily for now pending cards recs    #HLD  - Continue home atorvastatin and Zetia    #Elevated BNP  1400 on admission, no signs of CHF on exam.  - S/p Lasix in the ED, can stop    #MDD  - Continue home Zoloft        Diet: Combination Diet Clear Liquid  NPO per Anesthesia Guidelines for Procedure/Surgery Except for: Meds    DVT Prophylaxis: Pneumatic Compression Devices  So Catheter: Not present  Lines: None     Cardiac Monitoring: ACTIVE order. Indication: Syncope- high cardiac risk (48 hours)  Code Status: Full Code      Clinically Significant Risk Factors Present on Admission               # Drug Induced Coagulation Defect: home medication list includes an anticoagulant medication    # Hypertension: Noted on problem list  # Acute heart failure with preserved ejection fraction: heart failure noted on problem list, last echo with EF >50%, and receiving IV diuretics   # Anemia: based on hgb <11           # Obesity: Estimated body mass index is 32.98 kg/m  as calculated from the following:    Height as of this encounter: 1.854 m (6' 1\").    Weight as of this encounter: 113.4 kg (250 lb).        # ICD device present       Disposition Plan     Medically Ready for Discharge: Anticipated in 2-4 Days           JORGE CHAUDHRY MD  Hospitalist Service  [facility]  Securely message with [employer] (more info)  Text page via [employer] Paging/Directory     ______________________________________________________________________    Chief Complaint   Left ankle pain and swelling    History is obtained from the patient    History of Present Illness   Jorge Dong is a 64 year old male with PMHx of hypertrophic " CM, Afib on Eliquis, HTN, MDD who presents 2 days after a syncopal event led to a fall and left ankle pain/swelling. He reports that on 7/19 he stood up from his couch and then passed out, falling to the ground. Woke up quickly to pain near his left knee and ankle. Was walking on them, but then the pain was getting worse with ankle swelling so he came to the ED.    XR left tib/fib, ankle, foot showed an acute, mildly displace fracture of the proximal fibular shaft and small bone fragments about the left ankle with clear evidence of a fracture. On exam, the left ankle is swollen and bruised.    The patient reports a several year history of chronic lightheadedness with standing. This is what led to the finding of his hypertrophic cardiomyopathy. Only in the past month has he had syncope. He reports 4 episodes of syncope in the past 4 weeks or so where he stood up, felt lightheaded, then passed out. The most recent resulting in the above fracture. Has not had syncope without positional change. In the ED he was found to have a hemoglobin of 10, stable from 2 weeks prior, but down from 15 3 months earlier. He reports that 2-3 weeks ago he had sudden onset abdominal discomfort and had a large, black, tarry bowel movement. He denies ever seeing dark stools before this and has not had another episode since. He has chronic reflux-like abdominal discomfort from time to time that improves with TUMS, but this hasn't been changing. He admits to taking Excedrin extra strength, 2 tablets daily for a long time for MSK pain control.      Past Medical History    Past Medical History:   Diagnosis Date    Atrial fibrillation (H)     Benign essential hypertension     Coronary artery disease     Depression     Hyperlipidemia     Obesity        Past Surgical History   Past Surgical History:   Procedure Laterality Date    CORONARY ANGIOGRAPHY ADULT ORDER      CV CORONARY ANGIOGRAM N/A 12/2/2021    Procedure: Coronary Angiogram;  Surgeon:  Radha Ferrell MD;  Location: [facility] LAB CV    CV LEFT HEART CATH N/A 12/2/2021    Procedure: Left Heart Cath;  Surgeon: Radha Ferrell MD;  Location: [facility] CATH LAB CV    CV LEFT VENTRICULOGRAM N/A 12/2/2021    Procedure: Left Ventriculogram;  Surgeon: Radha Ferrell MD;  Location: [facility] LAB CV    EP ICD INSERT BIVENT N/A 4/24/2024    Procedure: Implantable Cardioverter Defibrillator Device & Lead Implant Biventricular;  Surgeon: El Hagen MD;  Location: [facility] LAB CV    EP PACEMAKER N/A 12/23/2021    Procedure: EP Pacemaker;  Surgeon: Kun Lopez MD;  Location: [facility] LAB CV    EP PPM GENERATOR REMOVAL N/A 4/24/2024    Procedure: Implantable Cardioverter Defibrillator Device & Lead Implant Biventricular;  Surgeon: El Hagen MD;  Location: [facility] CV    HERNIA REPAIR      IMPLANT PACEMAKER      KNEE SURGERY      LEAD REMOVAL FOR DUAL OR BI-VENTRICULAR PACEMAKER        N/A 4/24/2024    Procedure: Pacemaker Lead Removal - Dual or Bi-ventricular;  Surgeon: El Hagen MD;  Location: [facility] LAB CV    OR LASER LEAD EXTRACTION - LEVEL 2 (STANDBY) N/A 4/24/2024    Procedure: Or Laser Lead Extraction - Level 2 (Standby);  Surgeon: Sulma Olivarez MD;  Location: [facility] CV    TONSILLECTOMY      VASECTOMY         Prior to Admission Medications   Prior to Admission Medications   Prescriptions Last Dose Informant Patient Reported? Taking?   acetaminophen-caffeine (EXCEDRIN TENSION HEADACHE) 500-65 MG TABS Past Month at prn  Yes Yes   Sig: Take 2 tablets by mouth daily as needed for mild pain   apixaban ANTICOAGULANT (ELIQUIS ANTICOAGULANT) 5 MG tablet 7/21/2024 at AM  No Yes   Sig: Take 1 tablet (5 mg) by mouth 2 times daily   atorvastatin (LIPITOR) 80 MG tablet 7/21/2024 at AM  No Yes   Sig: Take 1 tablet (80 mg) by mouth daily   ezetimibe (ZETIA) 10 MG tablet 7/21/2024 at AM  No Yes   Sig: Take 1 tablet (10 mg) by mouth once daily    lisinopril (ZESTRIL) 20 MG tablet 7/21/2024 at AM  Yes Yes   Sig: Take 20 mg by mouth daily   metoprolol succinate ER (TOPROL XL) 200 MG 24 hr tablet 7/21/2024 at AM  No Yes   Sig: Take 1 tablet (200 mg) by mouth daily   nitroGLYcerin (NITROSTAT) 0.4 MG sublingual tablet More than a month at prn  No Yes   Sig: One tablet under the tongue every 5 minutes if needed for chest pain. May repeat every 5 minutes for a maximum of 3 doses in 15 minutes   sertraline (ZOLOFT) 100 MG tablet 7/21/2024 at AM  Yes Yes   Sig: Take 100 mg by mouth daily       Facility-Administered Medications: None           Physical Exam   Vital Signs: Temp: 99.1  F (37.3  C) Temp src: Oral BP: 117/58 Pulse: 60   Resp: 18 SpO2: 96 % O2 Device: None (Room air)    Weight: 250 lbs 0 oz    General: No overt distress, conversational, non-toxic appearing  HEENT: MMM  Pulmonary: CTAB; no crackles, wheezing, or rhonchi, normal effort  Cardiac: RRR. No m/r/g  Abdomen: Soft. NT, ND.  Extremities: No bilateral LE edema. Left ankle with swelling and ecchymoses when compared to right, pain with passive ROM of the ankle, but not exquisitely tender  Neuro: alert and awake, Ox4    Medical Decision Making       85 MINUTES SPENT BY ME on the date of service doing chart review, history, exam, documentation & further activities per the note.      Data     I have personally reviewed the following data over the past 24 hrs:    9.5  \   10.0 (L)   / 313     136 101 18.2 /  113 (H)   4.4 23 0.98 \     Trop: 43 (H) BNP: 1,396 (H)     TSH: 4.84 (H) T4: 0.78 (L) A1C: N/A       Imaging results reviewed over the past 24 hrs:   Recent Results (from the past 24 hour(s))   XR Tibia and Fibula Left 2 Views    Narrative    EXAM: XR TIBIA AND FIBULA LEFT 2 VIEWS, XR ANKLE LEFT G/E 3 VIEWS, XR FOOT LEFT G/E 3 VIEWS  LOCATION: [facility]  DATE: 07/21/2024    INDICATION: Trauma.  COMPARISON: None.      Impression    IMPRESSION:   There is acute mildly  displaced oblique fracture of the proximal fibular shaft. No evidence of a tibial fracture.    There is soft tissue swelling. There is bony proliferative change and small bone fragments about the left ankle without definitive evidence of an acute fracture. Mild tibiotalar joint arthrosis. Distal Achilles tendon enthesopathy. Small calcaneal spur.   No evidence of an acute ankle fracture.    The left foot shows no evidence of an acute displaced fracture. There is moderate to severe arthrosis at the first MTP joint. No Lisfranc subluxation. Soft tissue swelling.    NOTE: ABNORMAL REPORT    THE DICTATION ABOVE DESCRIBES AN ABNORMALITY FOR WHICH FOLLOW-UP IS NEEDED.        XR Ankle Left G/E 3 Views    Narrative    EXAM: XR TIBIA AND FIBULA LEFT 2 VIEWS, XR ANKLE LEFT G/E 3 VIEWS, XR FOOT LEFT G/E 3 VIEWS  LOCATION: [facility]  DATE: 07/21/2024    INDICATION: Trauma.  COMPARISON: None.      Impression    IMPRESSION:   There is acute mildly displaced oblique fracture of the proximal fibular shaft. No evidence of a tibial fracture.    There is soft tissue swelling. There is bony proliferative change and small bone fragments about the left ankle without definitive evidence of an acute fracture. Mild tibiotalar joint arthrosis. Distal Achilles tendon enthesopathy. Small calcaneal spur.   No evidence of an acute ankle fracture.    The left foot shows no evidence of an acute displaced fracture. There is moderate to severe arthrosis at the first MTP joint. No Lisfranc subluxation. Soft tissue swelling.    NOTE: ABNORMAL REPORT    THE DICTATION ABOVE DESCRIBES AN ABNORMALITY FOR WHICH FOLLOW-UP IS NEEDED.        XR Foot Left G/E 3 Views    Narrative    EXAM: XR TIBIA AND FIBULA LEFT 2 VIEWS, XR ANKLE LEFT G/E 3 VIEWS, XR FOOT LEFT G/E 3 VIEWS  LOCATION: [facility]  DATE: 07/21/2024    INDICATION: Trauma.  COMPARISON: None.      Impression    IMPRESSION:   There is acute mildly  displaced oblique fracture of the proximal fibular shaft. No evidence of a tibial fracture.    There is soft tissue swelling. There is bony proliferative change and small bone fragments about the left ankle without definitive evidence of an acute fracture. Mild tibiotalar joint arthrosis. Distal Achilles tendon enthesopathy. Small calcaneal spur.   No evidence of an acute ankle fracture.    The left foot shows no evidence of an acute displaced fracture. There is moderate to severe arthrosis at the first MTP joint. No Lisfranc subluxation. Soft tissue swelling.    NOTE: ABNORMAL REPORT    THE DICTATION ABOVE DESCRIBES AN ABNORMALITY FOR WHICH FOLLOW-UP IS NEEDED.        Chest XR,  PA & LAT    Narrative    EXAM: XR CHEST 2 VIEWS  LOCATION: [facility]  DATE: 7/21/2024    INDICATION: Shortness of breath.  COMPARISON: Chest x-ray 4/24/2024.      Impression    IMPRESSION: Left pectoral transvenous pacemaker with lead wire tips in the right atrium and right ventricle. Cardiac silhouette size is within normal limits. Calcified atherosclerotic changes at the aortic arch. Lungs are clear.

## 2024-07-22 NOTE — CONSULTS
Care Management Initial Consult    General Information  Assessment completed with: Patient, patient  Type of CM/SW Visit: Initial Assessment    Primary Care Provider verified and updated as needed: Yes   Readmission within the last 30 days: no previous admission in last 30 days      Reason for Consult: discharge planning  Advance Care Planning: Advance Care Planning Reviewed: no concerns identified          Communication Assessment  Patient's communication style: spoken language (English or Bilingual)             Cognitive  Cognitive/Neuro/Behavioral: WDL  Level of Consciousness: alert  Arousal Level: opens eyes spontaneously  Orientation: oriented x 4     Best Language: 0 - No aphasia  Speech: clear    Living Environment:   People in home: alone     Current living Arrangements: house      Able to return to prior arrangements: yes       Family/Social Support:  Care provided by: self  Provides care for: no one  Marital Status: Single             Description of Support System: Supportive    Support Assessment: Adequate family and caregiver support    Current Resources:   Patient receiving home care services: No     Community Resources: None  Equipment currently used at home: none  Supplies currently used at home: None    Employment/Financial:  Employment Status: retired        Financial Concerns: none           Does the patient's insurance plan have a 3 day qualifying hospital stay waiver?  No    Lifestyle & Psychosocial Needs:  Social Determinants of Health     Food Insecurity: No Food Insecurity (7/2/2024)    Received from [employer]    Food Insecurity     Worried About Running Out of Food in the Last Year: 1   Depression: Not at risk (7/2/2024)    Received from [employer]    PHQ-2     PHQ-2 TOTAL SCORE: 0   Housing Stability: Low Risk  (7/2/2024)    Received from [employer]    Housing Stability     Unable to Pay  for Housing in the Last Year: 1   Tobacco Use: Low Risk  (7/21/2024)    Patient History     Smoking Tobacco Use: Never     Smokeless Tobacco Use: Never     Passive Exposure: Not on file   Financial Resource Strain: Low Risk  (7/2/2024)    [facility]    Financial Resource Strain     Difficulty of Paying Living Expenses: 3     Difficulty of Paying Living Expenses: Not on file   Alcohol Use: Not on file   Transportation Needs: No Transportation Needs (7/2/2024)    [facility]    Transportation Needs     Lack of Transportation (Medical): 1   Physical Activity: Not on file   Interpersonal Safety: Not on file   Stress: Not on file   Social Connections: Socially Integrated (7/2/2024)    [facility]    Social Connections     Frequency of Communication with Friends and Family: 0   Health Literacy: Not on file       Functional Status:  Prior to admission patient needed assistance:   Dependent ADLs:: Independent  Dependent IADLs:: Independent       Mental Health Status:  Mental Health Status: No Current Concerns       Chemical Dependency Status:  Chemical Dependency Status: No Current Concerns             Values/Beliefs:  Spiritual, Cultural Beliefs, Scientology Practices, Values that affect care: no               Additional Information:  Chart reviewed. SW met with patient at bedside to introduce self, CM role and complete initial assessment. Pt lives alone in a one level home. Lives with his dog. Pt to have ortho surgery tomorrow. CM will follow for post surgery recommendations and assist with discharge planning.     Ekta Lugo, TAQUERIASW

## 2024-07-22 NOTE — PLAN OF CARE
Problem: Anemia  Goal: Anemia Symptom Improvement  Outcome: Progressing  Intervention: Monitor and Manage Anemia  Recent Flowsheet Documentation  Taken 7/22/2024 1659 by Neeru Calle RN  Safety Promotion/Fall Prevention:   assistive device/personal items within reach   clutter free environment maintained   room near nurse's station     Problem: Pain Acute  Goal: Optimal Pain Control and Function  Outcome: Progressing  Intervention: Prevent or Manage Pain  Recent Flowsheet Documentation  Taken 7/22/2024 1659 by Neeru Calle RN  Medication Review/Management: medications reviewed     Problem: Fall Injury Risk  Goal: Absence of Fall and Fall-Related Injury  Intervention: Identify and Manage Contributors  Recent Flowsheet Documentation  Taken 7/22/2024 1659 by Neeru Calle RN  Medication Review/Management: medications reviewed  Intervention: Promote Injury-Free Environment  Recent Flowsheet Documentation  Taken 7/22/2024 1659 by Neeru Calle RN  Safety Promotion/Fall Prevention:   assistive device/personal items within reach   clutter free environment maintained   room near nurse's station   Goal Outcome Evaluation:    A&O x4. The pt came back from his EGD procedure around 1600. The pt tolerated a reg. Diet this evening, and will be NPO for his surgery scheduled tomorrow at 12:30pm. GI has signed off for now, but wants the pt to have a colonoscopy in the near future.  At 1753 the pt's q12h Hgb came back at 9.7. VSS

## 2024-07-22 NOTE — CONSULTS
Thank you, Dr. Thomas ref. provider found, for asking the [facility] team to see Mr. Abraham Dong to evaluate       Assessment/Recommendations   Assessment/Plan:  HCM with no gradient, s/p ICD - noted possible 9 beat run slow ventricular rhythm, with orthostatic hypotension causing syncopal events.  Will change metoprolol 100mg bid, stop lisinopril  Afib - in sinus rhythm, stop AC, GI today, restart AC when ok per GI and then dsicuss with team re LAAO  CAD mild dz, cont statin/zetia - goal LDL  <70  Elevated LVEDP due to diastolic dysfunction from HCM presumably - consider spironolactone and SGLT2 inhibitor  Pre op - pt is intermediate risk for CV event - would proceed, would not place defibrillator patches, and use ICD if needed for pacing/defibrillation, noted no outflow gradient, but at risk for hypotension given history, stopping lisinopril and changing metoprolol to bid, if large volume given, at risk also for heart failure given elevated LVEDP in the past.  Consider arterial line for monitoring blood pressure and use of alpha agonists if hypotensive, avoid beta agonists given concern for creating sig gradient across LV outflow track.      ICD: no arrythmias noted on interrogation  Followed by Dr. Rojas in Cardiology       History of Present Illness/Subjective    Mr. Abraham Dong is a 64 year old male with HCM septal hypertrophy 32mmHg by MRI 2023 with LGE, no gradient on cath in 2021 (mild CAD on cath as well), with LVEDP 38mmHg, afib, on metoprolol 200mg daily and lisiniopril 20mg with four syncopal events when going from sitting to standing in the past year, no discharge of defibrillator, no adm with another fall, GI bleeding and fractured left ankle.  No chest pain/pressure, PND/orthopnea, but dyspnea on exertion.  ICD apparently interrogated but can not locate report.     Rhythm strip SB, with one episode of slow ventricular escape rhythm for 9 beats rate 50.      Physical Examination  "Review of Systems   /69 (BP Location: Right arm)   Pulse 60   Temp 97.9  F (36.6  C) (Oral)   Resp 20   Ht 1.854 m (6' 1\")   Wt 113.4 kg (250 lb)   SpO2 94%   BMI 32.98 kg/m    Body mass index is 32.98 kg/m .  Wt Readings from Last 3 Encounters:   07/21/24 113.4 kg (250 lb)   04/24/24 117 kg (258 lb)   04/22/24 119.3 kg (263 lb)       Intake/Output Summary (Last 24 hours) at 7/22/2024 1252  Last data filed at 7/21/2024 2100  Gross per 24 hour   Intake 200 ml   Output 1000 ml   Net -800 ml     General Appearance:   no distress, normal body habitus   ENT/Mouth: membranes moist, no oral lesions or bleeding gums.      EYES:  no scleral icterus, normal conjunctivae   Neck: no carotid bruits or thyromegaly   Chest/Lungs:   lungs are clear to auscultation, no rales or wheezing,  sternal scar, equal chest wall expansion    Cardiovascular:   Regular. Normal first and second heart sounds with no murmurs, rubs, or gallops; the carotid, radial and posterior tibial pulses are intact, Jugular venous pressure , edema bilaterally    Abdomen:  no organomegaly, masses, bruits, or tenderness; bowel sounds are present   Extremities: no cyanosis or clubbing   Skin: no xanthelasma, warm.    Neurologic: normal  bilateral, no tremors     Psychiatric: alert and oriented x3, calm     Review of Systems - 12 points nega other than above      Medical History  Surgical History Family History Social History   Past Medical History:   Diagnosis Date    Atrial fibrillation (H)     Benign essential hypertension     Coronary artery disease     Depression     Hyperlipidemia     Obesity     Past Surgical History:   Procedure Laterality Date    CORONARY ANGIOGRAPHY ADULT ORDER      CV CORONARY ANGIOGRAM N/A 12/2/2021    Procedure: Coronary Angiogram;  Surgeon: Radha Ferrell MD;  Location: [facility] CATH LAB CV    CV LEFT HEART CATH N/A 12/2/2021    Procedure: Left Heart Cath;  Surgeon: Radha Ferrell MD;  Location: [facility] CATH LAB " CV    CV LEFT VENTRICULOGRAM N/A 12/2/2021    Procedure: Left Ventriculogram;  Surgeon: Radha Ferrell MD;  Location: [facility] LAB CV    EP ICD INSERT BIVENT N/A 4/24/2024    Procedure: Implantable Cardioverter Defibrillator Device & Lead Implant Biventricular;  Surgeon: El Hagen MD;  Location: [facility] CATH LAB CV    EP PACEMAKER N/A 12/23/2021    Procedure: EP Pacemaker;  Surgeon: Kun Lopez MD;  Location: [facility] LAB CV    EP PPM GENERATOR REMOVAL N/A 4/24/2024    Procedure: Implantable Cardioverter Defibrillator Device & Lead Implant Biventricular;  Surgeon: El Hagen MD;  Location: [facility] LAB CV    HERNIA REPAIR      IMPLANT PACEMAKER      KNEE SURGERY      LEAD REMOVAL FOR DUAL OR BI-VENTRICULAR PACEMAKER        N/A 4/24/2024    Procedure: Pacemaker Lead Removal - Dual or Bi-ventricular;  Surgeon: El Hagen MD;  Location: [facility] CV    OR LASER LEAD EXTRACTION - LEVEL 2 (STANDBY) N/A 4/24/2024    Procedure: Or Laser Lead Extraction - Level 2 (Standby);  Surgeon: Sulma Olivarez MD;  Location: [facility] CV    TONSILLECTOMY      VASECTOMY      Family History   Problem Relation Age of Onset    Diabetes Mother     Hypertension Mother     Coronary Artery Disease No family hx of     Cerebrovascular Disease No family hx of     Social History     Socioeconomic History    Marital status:      Spouse name: Not on file    Number of children: Not on file    Years of education: Not on file    Highest education level: Not on file   Occupational History    Not on file   Tobacco Use    Smoking status: Never    Smokeless tobacco: Never   Substance and Sexual Activity    Alcohol use: Yes     Alcohol/week: 6.0 standard drinks of alcohol     Types: 6 Standard drinks or equivalent per week     Comment: 7 drinks per week    Drug use: Never    Sexual activity: Not on file   Other Topics Concern    Parent/sibling w/ CABG, MI or angioplasty before 65F 55M?  Not Asked   Social History Narrative    Not on file     Social Determinants of Health     Financial Resource Strain: Low Risk  (7/2/2024)    Received from [facility]    Financial Resource Strain     Difficulty of Paying Living Expenses: 3     Difficulty of Paying Living Expenses: Not on file   Food Insecurity: No Food Insecurity (7/2/2024)    Received from [facility]    Food Insecurity     Worried About Running Out of Food in the Last Year: 1   Transportation Needs: No Transportation Needs (7/2/2024)    Received from [facility]    Transportation Needs     Lack of Transportation (Medical): 1   Physical Activity: Not on file   Stress: Not on file   Social Connections: Socially Integrated (7/2/2024)    Received from [facility]    Social Connections     Frequency of Communication with Friends and Family: 0   Interpersonal Safety: Not on file   Housing Stability: Low Risk  (7/2/2024)    Received from [facility]    Housing Stability     Unable to Pay for Housing in the Last Year: 1          Medications  Allergies   Scheduled Meds:  Current Facility-Administered Medications   Medication Dose Route Frequency Provider Last Rate Last Admin    [Held by provider] apixaban ANTICOAGULANT (ELIQUIS) tablet 5 mg  5 mg Oral BID Abraham Leblanc MD        atorvastatin (LIPITOR) tablet 80 mg  80 mg Oral Daily Abraham Leblanc MD   80 mg at 07/22/24 0905    ezetimibe (ZETIA) tablet 10 mg  10 mg Oral Daily Abraham Leblanc MD   10 mg at 07/22/24 0906    [Held by provider] lisinopril (ZESTRIL) tablet 20 mg  20 mg Oral Daily Abraham Leblanc MD        [START ON 7/23/2024] metoprolol succinate ER (TOPROL XL) 24 hr tablet 100 mg  100 mg Oral BID Pedro Larios MD        pantoprazole (PROTONIX) IV push injection 40 mg  40 mg Intravenous Q24H Loraine  MD Jorge L   40 mg at 07/22/24 1200    sertraline (ZOLOFT) tablet 100 mg  100 mg Oral Daily Abraham Leblanc MD   100 mg at 07/22/24 0906    sodium chloride (PF) 0.9% PF flush 3 mL  3 mL Intracatheter Q8H Abraham Leblanc MD   3 mL at 07/22/24 0958     Continuous Infusions:  Current Facility-Administered Medications   Medication Dose Route Frequency Provider Last Rate Last Admin    sodium chloride 0.9 % infusion   Intravenous Continuous Jorge L Baron MD 50 mL/hr at 07/22/24 1201 New Bag at 07/22/24 1201     PRN Meds:.  Current Facility-Administered Medications   Medication Dose Route Frequency Provider Last Rate Last Admin    acetaminophen (TYLENOL) tablet 975 mg  975 mg Oral TID PRN Abraham Leblanc MD   975 mg at 07/22/24 0906    calcium carbonate (TUMS) chewable tablet 1,000 mg  1,000 mg Oral 4x Daily PRN Abraham Leblanc MD        EPINEPHrine (ADRENALIN) kit 0.3 mg  0.3 mg Intramuscular Q3 Min PRN Abraham Leblanc MD        lidocaine (LMX4) cream   Topical Q1H PRN Abraham Leblanc MD        lidocaine 1 % 0.1-1 mL  0.1-1 mL Other Q1H PRN Abraham Leblanc MD        naloxone (NARCAN) injection 0.2 mg  0.2 mg Intravenous Q2 Min PRN Abraham Leblanc MD        Or    naloxone (NARCAN) injection 0.4 mg  0.4 mg Intravenous Q2 Min PRN Abraham Leblanc MD        Or    naloxone (NARCAN) injection 0.2 mg  0.2 mg Intramuscular Q2 Min PRN Abraham Leblanc MD        Or    naloxone (NARCAN) injection 0.4 mg  0.4 mg Intramuscular Q2 Min PRN Abraham Leblanc MD        ondansetron (ZOFRAN ODT) ODT tab 4 mg  4 mg Oral Q6H PRN Abraham Leblanc MD        Or    ondansetron (ZOFRAN) injection 4 mg  4 mg Intravenous Q6H PRN Abraham Leblanc MD        oxyCODONE (ROXICODONE) tablet 5 mg  5 mg Oral Q4H PRN Abraham Leblanc MD   5 mg at 07/22/24 0906    polyethylene glycol (MIRALAX) Packet 17 g  17 g Oral BID PRN Abraham Leblanc MD        prochlorperazine (COMPAZINE) injection 10 mg  10 mg  Intravenous Q6H PRN Abraham Leblanc MD        Or    prochlorperazine (COMPAZINE) tablet 10 mg  10 mg Oral Q6H PRN Abraham Leblanc MD        Or    prochlorperazine (COMPAZINE) suppository 25 mg  25 mg Rectal Q12H PRN Abraham Leblanc MD        senna-docusate (SENOKOT-S/PERICOLACE) 8.6-50 MG per tablet 1 tablet  1 tablet Oral BID PRN Abraham Leblanc MD        Or    senna-docusate (SENOKOT-S/PERICOLACE) 8.6-50 MG per tablet 2 tablet  2 tablet Oral BID PRN Abraham Leblanc MD        sodium chloride (PF) 0.9% PF flush 3 mL  3 mL Intracatheter q1 min prAbraham Galindo MD        No Known Allergies      Lab Results    Chemistry/lipid CBC Cardiac Enzymes/BNP/TSH/INR   Lab Results   Component Value Date    CHOL 138 06/27/2023    HDL 45 06/27/2023    TRIG 148 06/27/2023    BUN 22.4 07/22/2024     07/22/2024    CO2 23 07/22/2024    Lab Results   Component Value Date    WBC 7.1 07/22/2024    HGB 9.1 (L) 07/22/2024    HCT 28.5 (L) 07/22/2024    MCV 90 07/22/2024     07/22/2024    Lab Results   Component Value Date    TROPONINI 0.04 12/03/2021    TSH 9.52 (H) 07/22/2024    INR 1.05 12/02/2021              Pedro Larios MD  Interventional Cardiology  [facility]

## 2024-07-22 NOTE — PROGRESS NOTES
[facility]    Medicine Progress Note - Hospitalist Service    Date of Admission:  7/21/2024    Assessment & Plan   Abraham Dong is a 64 year old male with PMHx of hypertrophic CM, Afib on Eliquis, HTN, MDD who presents 2 days after a syncopal event led to a fall and left ankle pain/swelling. Noted to have an acute anemia with melena.    #Recurrent syncope  #Subacute blood loss leading to iron deficiency anemia  #Upper GI bleed  #Chronic orthostatic hypotension  Chronic orthostasis symptoms for years believed to be due to his hypertrophic CM. Then 4 episodes of syncope after standing up in the past month, now leading to a leg fracture. ~2-3 weeks ago he had a large, tarry bowel movement. On Eliquis, Hgb 10, down from 15 3 months ago, but stable from 3 weeks ago. Low concern for acute bleeding and his hemoglobin is only moderately low, but given his baseline predisposition for presyncope, his new anemia could be decreasing his oxygen reserve enough to cause syncope. Does take 2 Excedrin extra strengths daily as well.  --Suspect upper GI bleed  -- Hold Eliquis  -- GI consulted for upper endoscopy  -- Cardiology also consulted and lisinopril on hold.  Metoprolol started on 7/22  -- Echo is unremarkable  -- TSH is less than 10.  No workup necessary.  Updated patient.  -- Order hemoglobin checks every 12 hours.  Received IV iron as per admission hospitalist.    #Acute traumatic left proximal fibular fracture  #Acute traumatic left ankle swelling and pain with bone fragments  Due to syncope on 7/19.  - Ortho consulted and likely surgery next a.m.  -- Patient is intermediate risk for CV event but no objection to surgery as per cardiology.  May need arterial line for blood pressure monitoring during surgery.        #Hypertrophic cardiomyopathy  #Dual-chamber pacemaker with ICD in situ  Follows with our cardiology team for this.  - TTE is unremarkable  -Cardiology ordered metoprolol 100 mg twice  "daily and stop lisinopril.    #Atrial fibrillation  - Continue metoprolol as above  - Hold home Eliquis with possible UGIB    #Primary HTN  - Continue home lisinopril 20mg daily for now pending cards recs    #HLD  - Continue home atorvastatin and Zetia    #Elevated BNP  1400 on admission, no signs of CHF on exam.  - S/p Lasix in the ED, can stop    #MDD  - Continue home Zoloft     NAHID  --Likely due to GI loss and Lasix given in the ER  --     Diet: NPO per Anesthesia Guidelines for Procedure/Surgery Except for: Meds  NPO per Anesthesia Guidelines for Procedure/Surgery Except for: Meds    DVT Prophylaxis: VTE Prophylaxis contraindicated due to due to GI bleed  So Catheter: Not present  Lines: None     Cardiac Monitoring: ACTIVE order. Indication: Syncope- high cardiac risk (48 hours)  Code Status: Full Code      Clinically Significant Risk Factors Present on Admission               # Drug Induced Coagulation Defect: home medication list includes an anticoagulant medication    # Hypertension: Noted on problem list  # Acute heart failure with preserved ejection fraction: heart failure noted on problem list, last echo with EF >50%, and receiving IV diuretics   # Anemia: based on hgb <11           # Obesity: Estimated body mass index is 32.98 kg/m  as calculated from the following:    Height as of this encounter: 1.854 m (6' 1\").    Weight as of this encounter: 113.4 kg (250 lb).       # Financial/Environmental Concerns: none   # ICD device present       Disposition Plan     Medically Ready for Discharge: Anticipated in 2-4 Days             Jorge L Baron MD  Hospitalist Service  [facility]  Securely message with [employer] (more info)  Text page via [employer] Paging/Directory   ______________________________________________________________________    Interval History   Patient new to me.  Chart reviewed.  Has had 4 episodes of syncope/near syncope at home without prodromal symptoms in the past 2 " months.  He also had a large bloody bowel movement 2 weeks ago which caused him to faint.  Did not seek medical attention at that time.  Hemoglobin dropped from 15 in April to 10 on admission.  Awaiting GI upper endoscopy  Mother at bedside and informed that patient takes Excedrin headache 2 tablets almost all days of the week for chronic tension headache from neck injury.  More ever he also takes off and on ibuprofen.  He is on Eliquis for A-fib    Physical Exam   Vital Signs: Temp: 98.6  F (37  C) Temp src: Oral BP: 108/52 Pulse: 60   Resp: 18 SpO2: 97 % O2 Device: None (Room air)    Weight: 250 lbs 0 oz    Does not appear to be in distress  Obese with thick neck  Abdominal soft  Ejection systolic murmur best heard in the mitral and tricuspid area.  Murmur is not muffled during inspiration.    Medical Decision Making       35 MINUTES SPENT BY ME on the date of service doing chart review, history, exam, documentation & further activities per the note.  MANAGEMENT DISCUSSED with the following over the past 24 hours: Patient and his mother   NOTE(S)/MEDICAL RECORDS REVIEWED over the past 24 hours: Cardiology       Data     I have personally reviewed the following data over the past 24 hrs:    7.1  \   9.1 (L)   / 243     135 101 22.4 /  92   4.0 23 1.23 (H) \     TSH: 9.52 (H) T4: 0.90 A1C: N/A     Ferritin:  N/A % Retic:  N/A LDH:  N/A       Imaging results reviewed over the past 24 hrs:   Recent Results (from the past 24 hour(s))   XR Tibia and Fibula Left 2 Views    Narrative    EXAM: XR TIBIA AND FIBULA LEFT 2 VIEWS, XR ANKLE LEFT G/E 3 VIEWS, XR FOOT LEFT G/E 3 VIEWS  LOCATION: [facility]  DATE: 07/21/2024    INDICATION: Trauma.  COMPARISON: None.      Impression    IMPRESSION:   There is acute mildly displaced oblique fracture of the proximal fibular shaft. No evidence of a tibial fracture.    There is soft tissue swelling. There is bony proliferative change and small bone fragments  about the left ankle without definitive evidence of an acute fracture. Mild tibiotalar joint arthrosis. Distal Achilles tendon enthesopathy. Small calcaneal spur.   No evidence of an acute ankle fracture.    The left foot shows no evidence of an acute displaced fracture. There is moderate to severe arthrosis at the first MTP joint. No Lisfranc subluxation. Soft tissue swelling.    NOTE: ABNORMAL REPORT    THE DICTATION ABOVE DESCRIBES AN ABNORMALITY FOR WHICH FOLLOW-UP IS NEEDED.        XR Ankle Left G/E 3 Views    Narrative    EXAM: XR TIBIA AND FIBULA LEFT 2 VIEWS, XR ANKLE LEFT G/E 3 VIEWS, XR FOOT LEFT G/E 3 VIEWS  LOCATION: [facility]  DATE: 07/21/2024    INDICATION: Trauma.  COMPARISON: None.      Impression    IMPRESSION:   There is acute mildly displaced oblique fracture of the proximal fibular shaft. No evidence of a tibial fracture.    There is soft tissue swelling. There is bony proliferative change and small bone fragments about the left ankle without definitive evidence of an acute fracture. Mild tibiotalar joint arthrosis. Distal Achilles tendon enthesopathy. Small calcaneal spur.   No evidence of an acute ankle fracture.    The left foot shows no evidence of an acute displaced fracture. There is moderate to severe arthrosis at the first MTP joint. No Lisfranc subluxation. Soft tissue swelling.    NOTE: ABNORMAL REPORT    THE DICTATION ABOVE DESCRIBES AN ABNORMALITY FOR WHICH FOLLOW-UP IS NEEDED.        XR Foot Left G/E 3 Views    Narrative    EXAM: XR TIBIA AND FIBULA LEFT 2 VIEWS, XR ANKLE LEFT G/E 3 VIEWS, XR FOOT LEFT G/E 3 VIEWS  LOCATION: [facility]  DATE: 07/21/2024    INDICATION: Trauma.  COMPARISON: None.      Impression    IMPRESSION:   There is acute mildly displaced oblique fracture of the proximal fibular shaft. No evidence of a tibial fracture.    There is soft tissue swelling. There is bony proliferative change and small bone fragments  about the left ankle without definitive evidence of an acute fracture. Mild tibiotalar joint arthrosis. Distal Achilles tendon enthesopathy. Small calcaneal spur.   No evidence of an acute ankle fracture.    The left foot shows no evidence of an acute displaced fracture. There is moderate to severe arthrosis at the first MTP joint. No Lisfranc subluxation. Soft tissue swelling.    NOTE: ABNORMAL REPORT    THE DICTATION ABOVE DESCRIBES AN ABNORMALITY FOR WHICH FOLLOW-UP IS NEEDED.        Chest XR,  PA & LAT    Narrative    EXAM: XR CHEST 2 VIEWS  LOCATION: [facility]  DATE: 2024    INDICATION: Shortness of breath.  COMPARISON: Chest x-ray 2024.      Impression    IMPRESSION: Left pectoral transvenous pacemaker with lead wire tips in the right atrium and right ventricle. Cardiac silhouette size is within normal limits. Calcified atherosclerotic changes at the aortic arch. Lungs are clear.   Echocardiogram Complete   Result Value    LVEF  60-65%    Narrative    [id number]  [id number]  [id number]  [id number] [address]     Name: JORGE OQUENDO  MRN: [id number]  : 1960  Study Date: 2024 07:23 AM  Age: 64 yrs  Gender: Male  Patient Location: [facility]  Reason For Study: SOB  Ordering Physician: JORGE CHAUDHRY  Performed By: FABRICIO     BSA: 2.4 m2  Height: 73 in  Weight: 250 lb  HR: 61  ______________________________________________________________________________  Procedure  Complete Portable Echo Adult. Definity (NDC #00217-577) given intravenously.  Compared to prior study, there is no significant change. No hemodynamically  significant valvular abnormalities on 2D or color flow imaging.  ______________________________________________________________________________  Interpretation Summary     The left ventricle is normal in size.  Left ventricular function is normal.The ejection fraction is 60-65%. Moderate  to  severe assymetric septal hypertrophy. No significant LVOT obstruction.  Normal right ventricle size and systolic function.  There is a pacemaker lead in the right ventricle.  The left atrium is moderately dilated.  No hemodynamically significant valvular abnormalities on 2D or color flow  imaging.  Mildly dilated aortic root.  Compared to prior study, there is no significant change.  ______________________________________________________________________________  Left Ventricle  The left ventricle is normal in size. Left ventricular function is normal.The  ejection fraction is 60-65%. Moderate to severe assymetric septal hypertrophy.  No significant LVOT obstruction. Left ventricular diastolic function is  abnormal. No regional wall motion abnormalities noted.     Right Ventricle  Normal right ventricle size and systolic function. There is a pacemaker lead  in the right ventricle.     Atria  The left atrium is moderately dilated. Right atrial size is normal. There is  no color Doppler evidence of an atrial shunt.     Mitral Valve  Mitral valve leaflets appear normal. There is trace mitral regurgitation.     Tricuspid Valve  The tricuspid valve is not well visualized. There is trace tricuspid  regurgitation. Right ventricular systolic pressure could not be approximated  due to inadequate tricuspid regurgitation.     Aortic Valve  Aortic valve leaflets appear normal. There is no evidence of aortic stenosis  or clinically significant aortic regurgitation.     Pulmonic Valve  The pulmonic valve is not well visualized. This degree of valvular  regurgitation is within normal limits.     Vessels  Mildly dilated aortic root. IVC diameter <2.1 cm collapsing >50% with sniff  suggests a normal RA pressure of 3 mmHg.     Pericardium  There is no pericardial effusion.     ______________________________________________________________________________  MMode/2D Measurements & Calculations  IVSd: 2.0 cm  LVIDd: 6.3 cm  LVIDs: 3.3  cm  LVPWd: 1.1 cm     FS: 46.8 %  LV mass(C)d: 481.2 grams  LV mass(C)dI: 203.4 grams/m2  Ao root diam: 4.7 cm  asc Aorta Diam: 4.1 cm  LVOT diam: 2.5 cm  LVOT area: 4.9 cm2  Ao root diam index Ht(cm/m): 2.5  Ao root diam index BSA (cm/m2): 2.0  Asc Ao diam index BSA (cm/m2): 1.7  Asc Ao diam index Ht(cm/m): 2.2  EF Biplane: 71.7 %  LA Volume (BP): 73.0 ml     LA Volume Index (BP): 30.8 ml/m2  LA Volume Indexed (AL/bp): 33.3 ml/m2  RV Base: 4.5 cm  RWT: 0.34  TAPSE: 2.8 cm     Doppler Measurements & Calculations  MV E max sammy: 79.1 cm/sec  MV A max sammy: 80.7 cm/sec  MV E/A: 0.98     MV dec slope: 411.0 cm/sec2  MV dec time: 0.19 sec  Ao V2 max: 171.0 cm/sec  Ao max P.0 mmHg  Ao V2 mean: 124.0 cm/sec  Ao mean P.0 mmHg  Ao V2 VTI: 38.7 cm  BERRY(I,D): 4.1 cm2  BERRY(V,D): 3.6 cm2  LV V1 max P.4 mmHg  LV V1 max: 126.0 cm/sec  LV V1 VTI: 32.0 cm  SV(LVOT): 157.1 ml  SI(LVOT): 66.4 ml/m2  PA V2 max: 99.5 cm/sec  PA max P.0 mmHg  PA acc time: 0.14 sec  AV Sammy Ratio (DI): 0.74  BERRY Index (cm2/m2): 1.7  E/E' av.0  Lateral E/e': 11.0  Medial E/e': 16.9  RV S Sammy: 17.8 cm/sec     ______________________________________________________________________________  Report approved by: Kristie Charles 2024 08:37 AM         CT Ankle Left w/o Contrast    Narrative    EXAM: CT ANKLE LEFT W/O CONTRAST  LOCATION: [facility]  DATE: 2024    INDICATION: Assess ankle fracture.  COMPARISON: None.  TECHNIQUE: Noncontrast. Axial, sagittal and coronal thin-section reconstruction. Dose reduction techniques were used.     FINDINGS:     BONES:  -There is a cortical fracture along the anterolateral aspect of the tibial plafond which most likely represents associated avulsive injury to the anterior inferior tibiofibular ligament. This is well-seen on series 3, images 49-50. No additional   acute-appearing fractures are identified although there is irregularity along the anterior and  posterior margin of the tibial plafond which could be due to old injury. Small accessory os trigonum. Degenerative change at the tibiotalar joint as well as   the subtalar joints and calcaneocuboid cuboid articulation although this is incompletely visualized on this examination. Achilles calcaneal spurring.    SOFT TISSUES:  -Extensive soft tissue swelling about the ankle.      Impression    IMPRESSION:  1.  Cortical chip fracture along the anterolateral aspect of the distal tibia most likely represents avulsive injury to the anterior inferior tibiofibular ligament.  2.  No additional acute fractures are identified.  3.  There is irregularity along the anterior and posterior margin of the tibial plafond but this appears chronic.  4.  Degenerative change at the tibiotalar joint as well as the subtalar joints and calcaneocuboid articulation.  5.  Extensive soft tissue swelling about the ankle.     Cardiac Device Check - Remote   Result Value    Date Time Interrogation Session [id number]    Implantable Pulse Generator  Medtronic    Implantable Pulse Generator Model [id number] Cobalt XT  MRI    Implantable Pulse Generator Serial Number [id number]    Type Interrogation Session Remote Patient Initiated    Clinic Name [facility]    Implantable Pulse Generator Type Defibrillator    Implantable Pulse Generator Implant Date 20240424    Implantable Lead  Medtronic    Implantable Lead Model 6935M Sprint Quattro Secure S MRI SureScan    Implantable Lead Serial Number [id number]    Implantable Lead Implant Date 20240424    Implantable Lead Polarity Type Tripolar Lead    Implantable Lead Location Detail 1 SEPTUM    Implantable Lead Special Function 62 cm    Implantable Lead Location Right Ventricle    Implantable Lead Connection Status Connected    Implantable Lead  Medtronic    Implantable Lead Model 5076 CapSureFix Novus MRI SureScan    Implantable Lead Serial Number  [id number]    Implantable Lead Implant Date 20211223    Implantable Lead Polarity Type Bipolar Lead    Implantable Lead Location Detail 1 UNKNOWN    Implantable Lead Special Function 52 cm    Implantable Lead Location Right Atrium    Implantable Lead Connection Status Connected    [place]ander Setting Mode (NBG Code) [id number]    Alexsander Setting Lower Rate Limit 60    Alexsander Setting Maximum Tracking Rate 130    Alexsander Setting Maximum Sensor Rate 130    Alexsander Setting Night Rate 50    Alexsander Setting FABIOLA Delay Low 150    Alexsander Setting PAV Delay Low 180    Alexsander Setting AT Mode Switch Rate 171    Lead Channel Setting Sensing Polarity Bipolar    Lead Channel Setting Sensing Anode Location Right Atrium    Lead Channel Setting Sensing Anode Terminal Ring    Lead Channel Setting Sensing Cathode Location Right Atrium    Lead Channel Setting Sensing Cathode Terminal Tip    Lead Channel Setting Sensing Sensitivity 0.3    Lead Channel Setting Sensing Polarity Bipolar    Lead Channel Setting Sensing Anode Location Right Ventricle    Lead Channel Setting Sensing Anode Terminal Ring    Lead Channel Setting Sensing Cathode Location Right Ventricle    Lead Channel Setting Sensing Cathode Terminal Tip    Lead Channel Setting Sensing Sensitivity 0.3    Lead Channel Setting Pacing Polarity Bipolar    Lead Channel Setting Pacing Anode Location Right Atrium    Lead Channel Setting Pacing Anode Terminal Ring    Lead Channel Setting Sensing Cathode Location Right Atrium    Lead Channel Setting Sensing Cathode Terminal Tip    Lead Channel Setting Pacing Pulse Width 0.4    Lead Channel Setting Pacing Amplitude 1.5    Lead Channel Setting Pacing Capture Mode Adaptive    Lead Channel Setting Pacing Polarity Bipolar    Lead Channel Setting Pacing Anode Location Right Ventricle    Lead Channel Setting Pacing Anode Terminal Ring    Lead Channel Setting Sensing Cathode Location Right Ventricle    Lead Channel Setting Sensing Cathode Terminal Tip    Lead  Channel Setting Pacing Pulse Width 0.4    Lead Channel Setting Pacing Amplitude 2.0    Lead Channel Setting Pacing Capture Mode Adaptive    Zone Setting Type Category VF    Zone Setting Vendor Type Category VF    Zone Setting Status Active    Zone Setting Detection Interval 320    Zone Setting Detection Beats Numerator 30    Zone Setting Detection Beats Denominator 40    Zone Setting Type Category VT    Zone Setting Vendor Type Category FastVT    Zone Setting Status Active    Zone Setting Detection Interval 240    Zone Setting Type Category VT    Zone Setting Vendor Type Category VT    Zone Setting Status Inactive    Zone Setting Detection Interval 360    Zone Setting Detection Beats Numerator 16    Zone Setting Detection Beats Denominator 16    Zone Setting Type Category VT    Zone Setting Vendor Type Category MonVT    Zone Setting Status Monitor    Zone Setting Detection Interval 370    Zone Setting Detection Beats Numerator 32    Zone Setting Detection Beats Denominator 32    Zone Setting Type Category ATRIAL_FIBRILLATION    Zone Setting Vendor Type Category FastATAF    Zone Setting Status Monitor    Zone Setting Type Category AT/AF    Zone Setting Status Monitor    Zone Setting Detection Interval 350    Zone Setting Type Category BRADYCARDIA    Lead Channel Impedance Value 323    Lead Channel Sensing Intrinsic Amplitude 3.0    Lead Channel Pacing Threshold Amplitude 0.625    Lead Channel Pacing Threshold Pulse Width 0.4    Lead Channel Impedance Value 285    Lead Channel Impedance Value 361    Lead Channel Sensing Intrinsic Amplitude 10.4    Lead Channel Pacing Threshold Amplitude 0.75    Lead Channel Pacing Threshold Pulse Width 0.4    Battery Date Time of Measurements 29054780521937    Battery RRT Trigger 2.8 V    Battery Remaining Longevity 136    Battery Voltage 3.11    Capacitor Charge Type Shock    Capacitor Charge Time 0    Capacitor Charge Energy 40.0    Alexsander Statistic Date Time Start 09675586001464     Alexsander Statistic Date Time End 30907092849578    Alexsander Statistic RA Percent Paced 84.20    Alexsander Statistic RV Percent Paced 3.65    Alexsander Statistic AP  Percent 2.52    Alexsander Statistic AS  Percent 1.13    Alexsander Statistic AP VS Percent 81.65    Alexsander Statistic AS VS Percent 14.70    CRT Statistic Date Time Start 25397426779159    CRT Statistic Date Time End 23055889194933    Atrial Tachy Statistic Date Time Start 20240721142351    Atrial Tachy Statistic Date Time End 11518463274476    Atrial Tachy Statistic AT/AF [place] Percent 0    Therapy Statistic Recent Shocks Delivered 0    Therapy Statistic Recent Shocks Aborted 0    Therapy Statistic Recent ATP Delivered 0    Therapy Statistic Recent Date Time Start 17735324182981    Therapy Statistic Recent Date Time End 04071891340385    Therapy Statistic Total Shocks Delivered 0    Therapy Statistic Total Shocks Aborted 0    Therapy Statistic Total ATP Delivered 0    Therapy Statistic Total  Date Time Start 45954509605221    Therapy Statistic Total  Date Time End 53903218692613    Episode Statistic Recent Count 0    Episode Statistic Type Category AT/AF    Episode Statistic Recent Count 0    Episode Statistic Type Category Monitor    Episode Statistic Recent Count 0    Episode Statistic Type Category Patient Activated    Episode Statistic Recent Count 0    Episode Statistic Type Category SVT    Episode Statistic Recent Count 0    Episode Statistic Type Category VT    Episode Statistic Recent Count 0    Episode Statistic Type Category VF    Episode Statistic Recent Count 0    Episode Statistic Type Category VT    Episode Statistic Recent Count 0    Episode Statistic Type Category VT    Episode Statistic Recent Count 0    Episode Statistic Type Category VT    Episode Statistic Recent Date Time Start 14444121706900    Episode Statistic Recent Date Time End 87655679148760    Episode Statistic Recent Date Time Start 03230869771088    Episode Statistic Recent Date Time End  67299059235122    Episode Statistic Recent Date Time Start 42095783263605    Episode Statistic Recent Date Time End 75459669956878    Episode Statistic Recent Date Time Start 33468432375655    Episode Statistic Recent Date Time End 11342696562296    Episode Statistic Recent Date Time Start 36359331094958    Episode Statistic Recent Date Time End 65026527458168    Episode Statistic Recent Date Time Start 69255401482610    Episode Statistic Recent Date Time End 35487714052815    Episode Statistic Recent Date Time Start 33045309559737    Episode Statistic Recent Date Time End 18610912746324    Episode Statistic Recent Date Time Start 91654204589275    Episode Statistic Recent Date Time End 84850516566130    Episode Statistic Recent Date Time Start 53652033702048    Episode Statistic Recent Date Time End 19930586499722    Episode Statistic Total Count 0    Episode Statistic Type Category AT/AF    Episode Statistic Total Count 0    Episode Statistic Type Category Monitor    Episode Statistic Total Count 0    Episode Statistic Type Category Patient Activated    Episode Statistic Total Count 0    Episode Statistic Type Category SVT    Episode Statistic Total Count 0    Episode Statistic Type Category VT    Episode Statistic Total Count 0    Episode Statistic Type Category VF    Episode Statistic Total Count 0    Episode Statistic Type Category VT    Episode Statistic Total Count 0    Episode Statistic Type Category VT    Episode Statistic Total Count 0    Episode Statistic Type Category VT    Episode Statistic Total Date Time Start 51831402638852    Episode Statistic Total Date Time End 25398170068499    Episode Statistic Total Date Time Start 71196211835245    Episode Statistic Total Date Time End 00708318773979    Episode Statistic Total Date Time Start 27194481426851    Episode Statistic Total Date Time End 18382222067632    Episode Statistic Total Date Time Start 57300982156266    Episode Statistic Total Date Time End  37325945110337    Episode Statistic Total Date Time Start 77122494159840    Episode Statistic Total Date Time End 16249129236294    Episode Statistic Total Date Time Start 20240424102140    Episode Statistic Total Date Time End 20240722132150    Episode Statistic Total Date Time Start 20240424102140    Episode Statistic Total Date Time End 74937659667376    Episode Statistic Total Date Time Start 48373462692098    Episode Statistic Total Date Time End 47428152857068    Episode Statistic Total Date Time Start 20240424102140    Episode Statistic Total Date Time End 20240722132150    Narrative    Type: [employer] remote ICD transmission done at [facility] [facility].  Courtesy check.  Rep notes: n/a.  Presenting rhythm: AP-VS 63 bpm.  Battery longevity: 11yrs, 4mo estimated.   Lead status: stable.   Atrial high rates: none detected.   Anticoagulant: Eliquis.   Ventricular high rates: no VT/VF detected.   Comments: normal ICD function. E. Pivec, Device Specialist      Device follow up for the entirety of having the device, based on best practices determined by [employer] and in Compliance with Medicare guidelines. Continue remote device monitoring per patient plan.  I have reviewed and interpreted the device interrogation, settings, programming, and encounter summary. The device is functioning within normal device parameters. I agree with the current findings, assessment and plan.

## 2024-07-22 NOTE — PROGRESS NOTES
Educated pt on Iron Dextran need and possible symptoms during infusion.  Administered IV Iron Dextran test over 15 mins per MD order.  Pt tolerated infusion well, no s/s of hypersensitivity reaction.  VS monitored every 15 mins during and for 1 hour following infusion.  Rescue medications at bedside with RN incase hypersensitivity reaction.  Pharmacy notified to make remainder of dose.  Will continue to monitor pt closely.

## 2024-07-22 NOTE — PROGRESS NOTES
[facility] progress Note       EGD completed.  Duodenal ulcer with clean base.  Esophageal changes that looked like eosinophilic esophagitis.  Biopsies obtained.        I contacted the anesthesiologist to get their opinion about whether the colonoscopy and the patient is due for should be done outpatient or in the hospital.  They felt strongly given his cardiac issues, etc. that the colonoscopy should be done here at the hospital.  I will order that to be done here in the near future for colon cancer screening.    Otherwise, we will sign off for now.                                                Jose Galindo MD  Thank you for the opportunity to participate in the care of this patient.   Please feel free to call me with any questions or concerns.  Phone number [phone].

## 2024-07-22 NOTE — UTILIZATION REVIEW
Admission Status; Secondary Review Determination   Under the authority of the Utilization Management Committee, the utilization review process indicated a secondary review on the above patient. The review outcome is based on review of the medical records, discussions with staff, and applying clinical experience noted on the date of the review.     (x) Inpatient Status Appropriate - This patient's medical care is consistent with medical management for inpatient care and reasonable inpatient medical practice.     RATIONALE FOR DETERMINATION   63 yo male with PMH of chronic challenges with orthostatic hypotension who presents to the ED with recent melena, acute blood loss anemia and recurrent syncope at home with last even resulting in acute traumatic left proximal fibular fracture with bone fragments noted.  NPO with orthopedic consult pending; CT scan of the ankle requested this am for further evaluation and assessment to determine timing of surgical fixation.  GI consulted, as well, for acute blood loss anemia in the setting of suspected symptomatic anemia (syncopal events).  Hgb dropped this am to 9.1 from 10 on admission.  Received dose of IV iron for significant iron deficiency noted on labs.    At the time of admission with the information available to the attending physician more than 2 nights Hospital complex care was anticipated, based on patient risk of adverse outcome if treated as outpatient and complex care required. Inpatient admission is appropriate based on the Medicare guidelines.   The information on this document is developed by the utilization review team in order for the business office to ensure compliance. This only denotes the appropriateness of proper admission status and does not reflect the quality of care rendered.   The definitions of Inpatient Status and Observation Status used in making the determination above are those provided in the CMS Coverage Manual, Chapter 1 and Chapter 6,  section 70.4.     Sincerely,     Alexus Carrasco, DO  Utilization Review  Physician Advisor

## 2024-07-22 NOTE — CONSULTS
ORTHOPEDIC CONSULTATION    Consultation  Abraham Dong,  1960, MRN [id number]    Fall, initial encounter [W19.XXXA]  Syncope, unspecified syncope type [R55]  Closed fracture of proximal end of left fibula, unspecified fracture morphology, initial encounter [S82.832A]  Anemia, unspecified type [D64.9]  Congestive heart failure, unspecified HF chronicity, unspecified heart failure type (H) [I50.9]    PCP: Clifford Gamboa, [phone]   Code status:  Full Code       Extended Emergency Contact Information  Primary Emergency Contact: JONH DONG  Home Phone: [phone]  Relation: Ex-Spouse  Secondary Emergency Contact: AMY GONZALEZ  Home Phone: [phone]  Relation: Mother         IMPRESSION:  Left ankle pain concerning for occult fracture, Maisonneuve injury  Left acute closed proximal fibula fracture     PLAN:  This patient was discussed with Dr. Alcantara, on-call surgeon for [facility] and they are in agreement with the following plan.   -Patient's severe ankle pain, swelling, and ecchymosis in the setting of his proximal fibula fracture is highly suspicious for a Maisonneuve type injury and we will obtain a left ankle CT scan for further evaluation  -Will plan for surgical intervention of the Maisonneuve injury to be done tomorrow with Dr. Ratliff  -Medical optimization prior to surgery.  Pending GI and cardiology consults.  Hemoglobin 9.1  -Okay for diet today from our perspective.  N.p.o. at midnight  -Hold anticoagulation  -Cam boot ordered to be worn at all times besides hygiene cares and skin checks  -Nonweightbearing left lower extremity  -Pain control per primary team    Thank you for including [facility] in the care of Abraham Dong. It has been a pleasure participating in their care.        CHIEF COMPLAINT: <principal problem not specified>    HISTORY OF PRESENT ILLNESS:  The patient is seen in orthopedic consultation at the request of Jorge L Baron MD for left proximal fibula  fracture.  The patient is a 64 year old male    Today patient is experiencing severe pain in the left ankle.  He had a fall this past Friday, 7/19 when he lost consciousness and fell.  He is unsure of exactly how he landed as he was unconscious at the time but when he woke up he was having severe pain in his ankle making it difficult to weight-bear.  He continued to have pain and difficulty with weightbearing over the next couple days so came into the emergency department yesterday for further evaluation.  He did not have any fractures in the ankle but was found to have a proximal fibula fracture.  He does not note much pain in his ankle, feels pain starts in about the mid calf area and goes down to the ankle.  He denies any fever/chills at this time.  He denies any history of injuries in this area or surgeries.  He is on Eliquis for anticoagulation    ALLERGIES:   Review of patient's allergies indicates No Known Allergies      MEDICATIONS UPON ADMISSION:  Medications were reviewed.  They include:   Medications Prior to Admission   Medication Sig Dispense Refill Last Dose    acetaminophen-caffeine (EXCEDRIN TENSION HEADACHE) 500-65 MG TABS Take 2 tablets by mouth daily as needed for mild pain   Past Month at prn    apixaban ANTICOAGULANT (ELIQUIS ANTICOAGULANT) 5 MG tablet Take 1 tablet (5 mg) by mouth 2 times daily 180 tablet 3 7/21/2024 at AM    atorvastatin (LIPITOR) 80 MG tablet Take 1 tablet (80 mg) by mouth daily 30 tablet 0 7/21/2024 at AM    ezetimibe (ZETIA) 10 MG tablet Take 1 tablet (10 mg) by mouth once daily 90 tablet 3 7/21/2024 at AM    lisinopril (ZESTRIL) 20 MG tablet Take 20 mg by mouth daily   7/21/2024 at AM    metoprolol succinate ER (TOPROL XL) 200 MG 24 hr tablet Take 1 tablet (200 mg) by mouth daily 90 tablet 3 7/21/2024 at AM    nitroGLYcerin (NITROSTAT) 0.4 MG sublingual tablet One tablet under the tongue every 5 minutes if needed for chest pain. May repeat every 5 minutes for a maximum of  "3 doses in 15 minutes 25 tablet 3 More than a month at prn    sertraline (ZOLOFT) 100 MG tablet Take 100 mg by mouth daily    7/21/2024 at AM         SOCIAL HISTORY:   he  reports that he has never smoked. He has never used smokeless tobacco. He reports current alcohol use of about 6.0 standard drinks of alcohol per week. He reports that he does not use drugs.      FAMILY HISTORY:  family history includes Diabetes in his mother; Hypertension in his mother.      REVIEW OF SYSTEMS:   Reviewed with patient. See HPI, otherwise negative       PHYSICAL EXAMINATION:  Vitals: /67 (BP Location: Left arm)   Pulse 61   Temp 97.9  F (36.6  C) (Oral)   Resp 20   Ht 1.854 m (6' 1\")   Wt 113.4 kg (250 lb)   SpO2 93%   BMI 32.98 kg/m    General: On examination, the patient is resting comfortably, NAD, awake, and alert and oriented to person, place, time, and, and general circumstances   SKIN: There is generalized swelling/edema throughout the foot and ankle and ecchymosis mostly to the lateral aspect of the ankle  Pulses:  Dorsalis pedis and posterior tibialis pulse is intact and equal bilaterally  Sensation: intact and equal bilaterally to the distal lower extremities.  Tenderness: Generalized tenderness palpation about the ankle.  There is also tenderness palpation over the proximal fibula  ROM: Able to wiggle toes, EHL intact.  DF/PF is intact but minimal due to swelling and pain with movement    Contralateral side= Full range of motion, Negative joint instability findings, 5/5 motor groups about the joint, Non-tender.       RADIOGRAPHIC EVALUATION:  Personally reviewed  EXAM: XR TIBIA AND FIBULA LEFT 2 VIEWS, XR ANKLE LEFT G/E 3 VIEWS, XR FOOT LEFT G/E 3 VIEWS  LOCATION: [facility]  DATE: 07/21/2024     INDICATION: Trauma.  COMPARISON: None.                                                                      IMPRESSION:   There is acute mildly displaced oblique fracture of the proximal " fibular shaft. No evidence of a tibial fracture.     There is soft tissue swelling. There is bony proliferative change and small bone fragments about the left ankle without definitive evidence of an acute fracture. Mild tibiotalar joint arthrosis. Distal Achilles tendon enthesopathy. Small calcaneal spur.   No evidence of an acute ankle fracture.     The left foot shows no evidence of an acute displaced fracture. There is moderate to severe arthrosis at the first MTP joint. No Lisfranc subluxation. Soft tissue swelling.    PERTINENT LABS:  Personally reviewed  Recent Labs   Lab Test 07/22/24  0505 12/03/21  0432 12/02/21  0828   INR  --   --  1.05   HGB 9.1*   < > 15.9      < > 268    < > = values in this interval not displayed.         KHADAR JUAREZ PA-C  Date: 7/22/2024  Time: 9:07 AM  [facility]    CC1:   Jorge L Baron MD

## 2024-07-22 NOTE — H&P (VIEW-ONLY)
ORTHOPEDIC CONSULTATION    Consultation  Abraham Dong,  1960, MRN [id number]    Fall, initial encounter [W19.XXXA]  Syncope, unspecified syncope type [R55]  Closed fracture of proximal end of left fibula, unspecified fracture morphology, initial encounter [S82.832A]  Anemia, unspecified type [D64.9]  Congestive heart failure, unspecified HF chronicity, unspecified heart failure type (H) [I50.9]    PCP: Clifford Gamboa, [phone]   Code status:  Full Code       Extended Emergency Contact Information  Primary Emergency Contact: JONH DONG  Home Phone: [phone]  Relation: Ex-Spouse  Secondary Emergency Contact: AMY GONZALEZ  Home Phone: [phone]  Relation: Mother         IMPRESSION:  Left ankle pain concerning for occult fracture, Maisonneuve injury  Left acute closed proximal fibula fracture     PLAN:  This patient was discussed with Dr. Alcantara, on-call surgeon for [facility] and they are in agreement with the following plan.   -Patient's severe ankle pain, swelling, and ecchymosis in the setting of his proximal fibula fracture is highly suspicious for a Maisonneuve type injury and we will obtain a left ankle CT scan for further evaluation  -Will plan for surgical intervention of the Maisonneuve injury to be done tomorrow with Dr. Ratliff  -Medical optimization prior to surgery.  Pending GI and cardiology consults.  Hemoglobin 9.1  -Okay for diet today from our perspective.  N.p.o. at midnight  -Hold anticoagulation  -Cam boot ordered to be worn at all times besides hygiene cares and skin checks  -Nonweightbearing left lower extremity  -Pain control per primary team    Thank you for including [facility] in the care of Abraham Dong. It has been a pleasure participating in their care.        CHIEF COMPLAINT: <principal problem not specified>    HISTORY OF PRESENT ILLNESS:  The patient is seen in orthopedic consultation at the request of Jorge L Baron MD for left proximal fibula  fracture.  The patient is a 64 year old male    Today patient is experiencing severe pain in the left ankle.  He had a fall this past Friday, 7/19 when he lost consciousness and fell.  He is unsure of exactly how he landed as he was unconscious at the time but when he woke up he was having severe pain in his ankle making it difficult to weight-bear.  He continued to have pain and difficulty with weightbearing over the next couple days so came into the emergency department yesterday for further evaluation.  He did not have any fractures in the ankle but was found to have a proximal fibula fracture.  He does not note much pain in his ankle, feels pain starts in about the mid calf area and goes down to the ankle.  He denies any fever/chills at this time.  He denies any history of injuries in this area or surgeries.  He is on Eliquis for anticoagulation    ALLERGIES:   Review of patient's allergies indicates No Known Allergies      MEDICATIONS UPON ADMISSION:  Medications were reviewed.  They include:   Medications Prior to Admission   Medication Sig Dispense Refill Last Dose    acetaminophen-caffeine (EXCEDRIN TENSION HEADACHE) 500-65 MG TABS Take 2 tablets by mouth daily as needed for mild pain   Past Month at prn    apixaban ANTICOAGULANT (ELIQUIS ANTICOAGULANT) 5 MG tablet Take 1 tablet (5 mg) by mouth 2 times daily 180 tablet 3 7/21/2024 at AM    atorvastatin (LIPITOR) 80 MG tablet Take 1 tablet (80 mg) by mouth daily 30 tablet 0 7/21/2024 at AM    ezetimibe (ZETIA) 10 MG tablet Take 1 tablet (10 mg) by mouth once daily 90 tablet 3 7/21/2024 at AM    lisinopril (ZESTRIL) 20 MG tablet Take 20 mg by mouth daily   7/21/2024 at AM    metoprolol succinate ER (TOPROL XL) 200 MG 24 hr tablet Take 1 tablet (200 mg) by mouth daily 90 tablet 3 7/21/2024 at AM    nitroGLYcerin (NITROSTAT) 0.4 MG sublingual tablet One tablet under the tongue every 5 minutes if needed for chest pain. May repeat every 5 minutes for a maximum of  "3 doses in 15 minutes 25 tablet 3 More than a month at prn    sertraline (ZOLOFT) 100 MG tablet Take 100 mg by mouth daily    7/21/2024 at AM         SOCIAL HISTORY:   he  reports that he has never smoked. He has never used smokeless tobacco. He reports current alcohol use of about 6.0 standard drinks of alcohol per week. He reports that he does not use drugs.      FAMILY HISTORY:  family history includes Diabetes in his mother; Hypertension in his mother.      REVIEW OF SYSTEMS:   Reviewed with patient. See HPI, otherwise negative       PHYSICAL EXAMINATION:  Vitals: /67 (BP Location: Left arm)   Pulse 61   Temp 97.9  F (36.6  C) (Oral)   Resp 20   Ht 1.854 m (6' 1\")   Wt 113.4 kg (250 lb)   SpO2 93%   BMI 32.98 kg/m    General: On examination, the patient is resting comfortably, NAD, awake, and alert and oriented to person, place, time, and, and general circumstances   SKIN: There is generalized swelling/edema throughout the foot and ankle and ecchymosis mostly to the lateral aspect of the ankle  Pulses:  Dorsalis pedis and posterior tibialis pulse is intact and equal bilaterally  Sensation: intact and equal bilaterally to the distal lower extremities.  Tenderness: Generalized tenderness palpation about the ankle.  There is also tenderness palpation over the proximal fibula  ROM: Able to wiggle toes, EHL intact.  DF/PF is intact but minimal due to swelling and pain with movement    Contralateral side= Full range of motion, Negative joint instability findings, 5/5 motor groups about the joint, Non-tender.       RADIOGRAPHIC EVALUATION:  Personally reviewed  EXAM: XR TIBIA AND FIBULA LEFT 2 VIEWS, XR ANKLE LEFT G/E 3 VIEWS, XR FOOT LEFT G/E 3 VIEWS  LOCATION: [facility]  DATE: 07/21/2024     INDICATION: Trauma.  COMPARISON: None.                                                                      IMPRESSION:   There is acute mildly displaced oblique fracture of the proximal " fibular shaft. No evidence of a tibial fracture.     There is soft tissue swelling. There is bony proliferative change and small bone fragments about the left ankle without definitive evidence of an acute fracture. Mild tibiotalar joint arthrosis. Distal Achilles tendon enthesopathy. Small calcaneal spur.   No evidence of an acute ankle fracture.     The left foot shows no evidence of an acute displaced fracture. There is moderate to severe arthrosis at the first MTP joint. No Lisfranc subluxation. Soft tissue swelling.    PERTINENT LABS:  Personally reviewed  Recent Labs   Lab Test 07/22/24  0505 12/03/21  0432 12/02/21  0828   INR  --   --  1.05   HGB 9.1*   < > 15.9      < > 268    < > = values in this interval not displayed.         KHADAR JUAREZ PA-C  Date: 7/22/2024  Time: 9:07 AM  [facility]    CC1:   Jorge L Baron MD

## 2024-07-22 NOTE — CONSULTS
"[facility] consult     Impression: 1.  Anemia-the patient has had 1 black stool 2 or 3 weeks ago.  MCV is normal.  BUN is normal.  No evidence of acute GI bleeding.  Patient on Excedrin which could cause peptic ulcer disease.  He also has esophageal symptoms which make something in the esophagus potentially source for bleeding.    2.  Dysphagia-the patient also has \"heartburn\".  This could potentially be related to reflux esophagitis, peptic stricture, Schatzki's ring, esophageal tumor, other etiologies. We should proceed with EGD    Recommendation: EGD today.    Name: Abraham Dong    Medical Record #: [id number]    YOB: 1960    Date/Time: 7/22/2024/8:00 AM    Reason for Consultation: Jorge L Baron MD has asked me to evaluate Abraham Dong regarding anemia, melena.    HPI: This is a 64-year-old male with a history of chronic orthostasis, hypertrophic cardiomyopathy, pacemaker with ICD, depression, obesity, atrial fibrillation on Eliquis, hypertension, hyperlipidemia who had a syncopal episode and a fall and is noted to be anemic with a ferritin of 12.  He received IV iron.  Patient takes 2 Excedrin daily.  X-rays confirm a left tib/fib ankle fracture.  2 to 3 weeks ago he had a large black tarry bowel movement.  Patient on Protonix 40 mg IV daily here.  Cardiology will be seeing the patient.  Echocardiogram ordered.    Patient with screening colonoscopy 6/7/2013 that was entirely normal.  Recommended follow-up was 10 years.  I do not see that that has been completed yet.    The patient states he had 1 black stool 2 to 3 weeks ago.  No other obvious blood loss (no epistaxis, blood in the urine, donating blood etc.).  He denies aspirin.    The patient has also had \"heartburn\".  With this he feels a burning transversely under both ribs.  This tends to come on suddenly and he burps and that will help decrease symptoms.  Rolaids also helps decrease those symptoms.  The symptoms tend to occur " once or twice per week.  He also notes esophageal dysphagia occurring 2-3 times per month.  About every 2 weeks he needs to vomit up food that he swallows that does not go down.  The symptoms have not been progressive.  No weight loss.      Review of Systems (ROS): Complete ROS otherwise negative except for as above.    Past Medical History:  Patient Active Problem List    Diagnosis Date Noted    Fall, initial encounter 07/21/2024     Priority: Medium    Syncope, unspecified syncope type 07/21/2024     Priority: Medium    Closed fracture of proximal end of left fibula, unspecified fracture morphology, initial encounter 07/21/2024     Priority: Medium    Anemia, unspecified type 07/21/2024     Priority: Medium    Congestive heart failure, unspecified HF chronicity, unspecified heart failure type (H) 07/21/2024     Priority: Medium    Cardiac pacemaker in situ 04/24/2024     Priority: Medium    Hypertrophic cardiomyopathy (H) 04/24/2024     Priority: Medium    NSVT (nonsustained ventricular tachycardia) (H) 04/24/2024     Priority: Medium    PAF (paroxysmal atrial fibrillation) (H) 01/24/2022     Priority: Medium    Atrioventricular block      Priority: Medium    Depression 12/03/2021     Priority: Medium    HTN (hypertension) 12/03/2021     Priority: Medium     Formatting of this note might be different from the original.  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     Priority: Medium    Right frontal developmental venous  12/03/2021     Priority: Medium    ST elevation MI (STEMI) (H) 12/03/2021     Priority: Medium    Status post coronary angiogram 12/02/2021     Priority: Medium    ACS (acute coronary syndrome) (H)      Priority: Medium    Hypertensive emergency      Priority: Medium    Vertigo      Priority: Medium    Hyperlipidemia 05/15/2019     Priority: Medium    Plantar fasciitis 08/28/2009     Priority: Medium       Medications:   Medications Prior to Admission    Medication Sig Dispense Refill Last Dose    acetaminophen-caffeine (EXCEDRIN TENSION HEADACHE) 500-65 MG TABS Take 2 tablets by mouth daily as needed for mild pain   Past Month at prn    apixaban ANTICOAGULANT (ELIQUIS ANTICOAGULANT) 5 MG tablet Take 1 tablet (5 mg) by mouth 2 times daily 180 tablet 3 7/21/2024 at AM    atorvastatin (LIPITOR) 80 MG tablet Take 1 tablet (80 mg) by mouth daily 30 tablet 0 7/21/2024 at AM    ezetimibe (ZETIA) 10 MG tablet Take 1 tablet (10 mg) by mouth once daily 90 tablet 3 7/21/2024 at AM    lisinopril (ZESTRIL) 20 MG tablet Take 20 mg by mouth daily   7/21/2024 at AM    metoprolol succinate ER (TOPROL XL) 200 MG 24 hr tablet Take 1 tablet (200 mg) by mouth daily 90 tablet 3 7/21/2024 at AM    nitroGLYcerin (NITROSTAT) 0.4 MG sublingual tablet One tablet under the tongue every 5 minutes if needed for chest pain. May repeat every 5 minutes for a maximum of 3 doses in 15 minutes 25 tablet 3 More than a month at prn    sertraline (ZOLOFT) 100 MG tablet Take 100 mg by mouth daily    7/21/2024 at AM       Current Facility-Administered Medications:     acetaminophen (TYLENOL) tablet 975 mg, 975 mg, Oral, TID PRN, Abraham Leblanc MD, 975 mg at 07/21/24 2210    [Held by provider] apixaban ANTICOAGULANT (ELIQUIS) tablet 5 mg, 5 mg, Oral, BID, Abraham Leblanc MD    atorvastatin (LIPITOR) tablet 80 mg, 80 mg, Oral, Daily, Abraham Leblanc MD    calcium carbonate (TUMS) chewable tablet 1,000 mg, 1,000 mg, Oral, 4x Daily PRN, Abraham Leblanc MD    EPINEPHrine (ADRENALIN) kit 0.3 mg, 0.3 mg, Intramuscular, Q3 Min PRN, Abraham Leblanc MD    ezetimibe (ZETIA) tablet 10 mg, 10 mg, Oral, Daily, Abraham Leblanc MD    lidocaine (LMX4) cream, , Topical, Q1H PRN, Abraham Leblanc MD    lidocaine 1 % 0.1-1 mL, 0.1-1 mL, Other, Q1H PRN, Abraham Leblanc MD    [Held by provider] lisinopril (ZESTRIL) tablet 20 mg, 20 mg, Oral, Daily, Abraham Leblanc MD    metoprolol  succinate ER (TOPROL XL) 24 hr tablet 200 mg, 200 mg, Oral, Daily, Abraham Leblanc MD    naloxone (NARCAN) injection 0.2 mg, 0.2 mg, Intravenous, Q2 Min PRN **OR** naloxone (NARCAN) injection 0.4 mg, 0.4 mg, Intravenous, Q2 Min PRN **OR** naloxone (NARCAN) injection 0.2 mg, 0.2 mg, Intramuscular, Q2 Min PRN **OR** naloxone (NARCAN) injection 0.4 mg, 0.4 mg, Intramuscular, Q2 Min PRN, Abraham Leblanc MD    ondansetron (ZOFRAN ODT) ODT tab 4 mg, 4 mg, Oral, Q6H PRN **OR** ondansetron (ZOFRAN) injection 4 mg, 4 mg, Intravenous, Q6H PRN, Abraham Leblanc MD    oxyCODONE (ROXICODONE) tablet 5 mg, 5 mg, Oral, Q4H PRN, Abraham Leblanc MD, 5 mg at 07/21/24 2210    polyethylene glycol (MIRALAX) Packet 17 g, 17 g, Oral, BID PRN, Abraham Leblanc MD    prochlorperazine (COMPAZINE) injection 10 mg, 10 mg, Intravenous, Q6H PRN **OR** prochlorperazine (COMPAZINE) tablet 10 mg, 10 mg, Oral, Q6H PRN **OR** prochlorperazine (COMPAZINE) suppository 25 mg, 25 mg, Rectal, Q12H PRN, Abraham Leblanc MD    senna-docusate (SENOKOT-S/PERICOLACE) 8.6-50 MG per tablet 1 tablet, 1 tablet, Oral, BID PRN **OR** senna-docusate (SENOKOT-S/PERICOLACE) 8.6-50 MG per tablet 2 tablet, 2 tablet, Oral, BID PRN, Abraham Leblanc MD    sertraline (ZOLOFT) tablet 100 mg, 100 mg, Oral, Daily, Abraham Leblanc MD    sodium chloride (PF) 0.9% PF flush 3 mL, 3 mL, Intracatheter, Q8H, Abraham Leblanc MD, 3 mL at 07/21/24 1743    sodium chloride (PF) 0.9% PF flush 3 mL, 3 mL, Intracatheter, q1 min prn, Abraham Leblanc MD       Allergies: Patient has no known allergies.    Family History:  Family History   Problem Relation Age of Onset    Diabetes Mother     Hypertension Mother     Coronary Artery Disease No family hx of     Cerebrovascular Disease No family hx of         Social History:  Social History     Tobacco Use    Smoking status: Never    Smokeless tobacco: Never   Substance Use Topics    Alcohol use: Yes      "Alcohol/week: 6.0 standard drinks of alcohol     Types: 6 Standard drinks or equivalent per week     Comment: 7 drinks per week    Drug use: Never          Physical Exam: /65 (BP Location: Left arm)   Pulse 52   Temp 97.7  F (36.5  C) (Oral)   Resp 20   Ht 1.854 m (6' 1\")   Wt 113.4 kg (250 lb)   SpO2 94%   BMI 32.98 kg/m      General: NAD  Eyes: No scleral icterus or conjunctivitis  Oropharynx: WNL  Neck/Thyroid: No neck masses or thyromegaly  Pulmonary: Lungs are clear to auscultation bilaterally  Cardiovascular: Regular, no lower extermity edema   Gastrointestinal: Positive bowel sounds, soft, non-tender, no rebound or guarding. No HSM.  Lymph nodes: No cervical or supraclavicular lymphadenopathy  Skin: The patient is not jaundiced.  Back: No spinal/paraspinal tenderness, no CVA tenderness.  Neurologic: Alert and oriented ×3    Labs:    CBC RESULTS:   Recent Labs   Lab Test 07/22/24  0505   WBC 7.1   RBC 3.16*   HGB 9.1*   HCT 28.5*   MCV 90   MCH 28.8   MCHC 31.9   RDW 13.2           CMP Results:   Recent Labs   Lab Test 07/22/24  0505 04/22/24  1418 12/23/21  0702      < > 138   POTASSIUM 4.0   < > 4.5   CHLORIDE 101   < > 106   CO2 23   < > 22   ANIONGAP 11   < > 10   GLC 92   < > 100   BUN 22.4   < > 27*   CR 1.23*   < > 0.84   BILITOTAL  --   --  0.4   ALKPHOS  --   --  47   ALT  --   --  35   AST  --   --  28    < > = values in this interval not displayed.        INR Results:   Recent Labs   Lab Test 12/02/21  0828   INR 1.05          Radiology: Chest XR,  PA & LAT    Result Date: 7/21/2024  EXAM: XR CHEST 2 VIEWS LOCATION: [facility] DATE: 7/21/2024 INDICATION: Shortness of breath. COMPARISON: Chest x-ray 4/24/2024.     IMPRESSION: Left pectoral transvenous pacemaker with lead wire tips in the right atrium and right ventricle. Cardiac silhouette size is within normal limits. Calcified atherosclerotic changes at the aortic arch. Lungs are clear.    XR Tibia " and Fibula Left 2 Views    Result Date: 7/21/2024  EXAM: XR TIBIA AND FIBULA LEFT 2 VIEWS, XR ANKLE LEFT G/E 3 VIEWS, XR FOOT LEFT G/E 3 VIEWS LOCATION: [facility] DATE: 07/21/2024 INDICATION: Trauma. COMPARISON: None.     IMPRESSION: There is acute mildly displaced oblique fracture of the proximal fibular shaft. No evidence of a tibial fracture. There is soft tissue swelling. There is bony proliferative change and small bone fragments about the left ankle without definitive evidence of an acute fracture. Mild tibiotalar joint arthrosis. Distal Achilles tendon enthesopathy. Small calcaneal spur. No evidence of an acute ankle fracture. The left foot shows no evidence of an acute displaced fracture. There is moderate to severe arthrosis at the first MTP joint. No Lisfranc subluxation. Soft tissue swelling. NOTE: ABNORMAL REPORT THE DICTATION ABOVE DESCRIBES AN ABNORMALITY FOR WHICH FOLLOW-UP IS NEEDED.     XR Ankle Left G/E 3 Views    Result Date: 7/21/2024  EXAM: XR TIBIA AND FIBULA LEFT 2 VIEWS, XR ANKLE LEFT G/E 3 VIEWS, XR FOOT LEFT G/E 3 VIEWS LOCATION: [facility] DATE: 07/21/2024 INDICATION: Trauma. COMPARISON: None.     IMPRESSION: There is acute mildly displaced oblique fracture of the proximal fibular shaft. No evidence of a tibial fracture. There is soft tissue swelling. There is bony proliferative change and small bone fragments about the left ankle without definitive evidence of an acute fracture. Mild tibiotalar joint arthrosis. Distal Achilles tendon enthesopathy. Small calcaneal spur. No evidence of an acute ankle fracture. The left foot shows no evidence of an acute displaced fracture. There is moderate to severe arthrosis at the first MTP joint. No Lisfranc subluxation. Soft tissue swelling. NOTE: ABNORMAL REPORT THE DICTATION ABOVE DESCRIBES AN ABNORMALITY FOR WHICH FOLLOW-UP IS NEEDED.     XR Foot Left G/E 3 Views    Result Date: 7/21/2024  EXAM:  XR TIBIA AND FIBULA LEFT 2 VIEWS, XR ANKLE LEFT G/E 3 VIEWS, XR FOOT LEFT G/E 3 VIEWS LOCATION: [facility] DATE: 07/21/2024 INDICATION: Trauma. COMPARISON: None.     IMPRESSION: There is acute mildly displaced oblique fracture of the proximal fibular shaft. No evidence of a tibial fracture. There is soft tissue swelling. There is bony proliferative change and small bone fragments about the left ankle without definitive evidence of an acute fracture. Mild tibiotalar joint arthrosis. Distal Achilles tendon enthesopathy. Small calcaneal spur. No evidence of an acute ankle fracture. The left foot shows no evidence of an acute displaced fracture. There is moderate to severe arthrosis at the first MTP joint. No Lisfranc subluxation. Soft tissue swelling. NOTE: ABNORMAL REPORT THE DICTATION ABOVE DESCRIBES AN ABNORMALITY FOR WHICH FOLLOW-UP IS NEEDED.        The total time spent with this patient was 40 minutes                                             Jose Galindo M.D.  Thank you for the opportunity to participate in the care of this patient.   Please feel free to call me with any questions or concerns.  Phone number [phone].

## 2024-07-22 NOTE — ANESTHESIA POSTPROCEDURE EVALUATION
Patient: bAraham Dong    Procedure: Procedure(s):  ESOPHAGOGASTRODUODENOSCOPY WITH BIOPSIES       Anesthesia Type:  MAC    Note:  Disposition: Outpatient   Postop Pain Control: Uneventful            Sign Out: Well controlled pain   PONV: No   Neuro/Psych: Uneventful            Sign Out: Acceptable/Baseline neuro status   Airway/Respiratory: Uneventful            Sign Out: Acceptable/Baseline resp. status   CV/Hemodynamics: Uneventful            Sign Out: Acceptable CV status; No obvious hypovolemia; No obvious fluid overload   Other NRE: NONE   DID A NON-ROUTINE EVENT OCCUR? No    Event details/Postop Comments:  Patient recovering comfortably.        Last vitals:  Vitals:    07/22/24 0905 07/22/24 1125 07/22/24 1442   BP:  111/69 108/52   Pulse: 61 60 60   Resp:  20 18   Temp:  36.6  C (97.9  F) 37  C (98.6  F)   SpO2:  94% 97%       Electronically Signed By: Stephan Eddy DO  July 22, 2024  4:22 PM

## 2024-07-22 NOTE — PLAN OF CARE
Problem: Adult Inpatient Plan of Care  Goal: Absence of Hospital-Acquired Illness or Injury  Intervention: Identify and Manage Fall Risk  Recent Flowsheet Documentation  Taken 7/22/2024 1215 by Farhana Vega RN  Safety Promotion/Fall Prevention: patient and family education  Taken 7/22/2024 0805 by Farhana Vega RN  Safety Promotion/Fall Prevention: patient and family education     Problem: Adult Inpatient Plan of Care  Goal: Absence of Hospital-Acquired Illness or Injury  Intervention: Prevent Skin Injury  Recent Flowsheet Documentation  Taken 7/22/2024 1215 by Farhana Vega RN  Body Position: (bedrest) position changed independently  Taken 7/22/2024 0805 by Farhana Vega RN  Body Position: (bedrest) position changed independently     Problem: Pain Acute  Goal: Optimal Pain Control and Function  Outcome: Progressing   Goal Outcome Evaluation:             Pt is progressing with these goals.  Pt's vss, afebrile, tolerated ct of Left ankle well, currently down for egd.  Explained all cares and plan of care to pt, Pt has been on bedrest and non wt bearing of left ankle.  Awaiting surgery on left ankle tomorrow at 1230.  Medicated with prn po pain meds with relief.  Ice, ace, and pillows for comfort.  Will continue to monitor.

## 2024-07-22 NOTE — ANESTHESIA CARE TRANSFER NOTE
Patient: Abraham Dong    Procedure: Procedure(s):  ESOPHAGOGASTRODUODENOSCOPY WITH BIOPSIES       Diagnosis: Anemia, unspecified type [D64.9]  Diagnosis Additional Information: No value filed.    Anesthesia Type:   MAC     Note:    Oropharynx: oropharynx clear of all foreign objects and spontaneously breathing  Level of Consciousness: awake  Oxygen Supplementation: face mask  Level of Supplemental Oxygen (L/min / FiO2): 2  Independent Airway: airway patency satisfactory and stable  Dentition: dentition unchanged  Vital Signs Stable: post-procedure vital signs reviewed and stable  Report to RN Given: handoff report given  Patient transferred to: Phase II          Vitals:  Vitals Value Taken Time   /64    Temp     Pulse 87    Resp 12    SpO2 94        Electronically Signed By: RAGINI Reyes CRNA  July 22, 2024  4:21 PM

## 2024-07-22 NOTE — PLAN OF CARE
"  Problem: Adult Inpatient Plan of Care  Goal: Plan of Care Review  Description: The Plan of Care Review/Shift note should be completed every shift.  The Outcome Evaluation is a brief statement about your assessment that the patient is improving, declining, or no change.  This information will be displayed automatically on your shift  note.  Outcome: Progressing  Flowsheets (Taken 7/21/2024 1943)  Plan of Care Reviewed With: patient  Goal: Patient-Specific Goal (Individualized)  Description: You can add care plan individualizations to a care plan. Examples of Individualization might be:  \"Parent requests to be called daily at 9am for status\", \"I have a hard time hearing out of my right ear\", or \"Do not touch me to wake me up as it startles  me\".  Outcome: Progressing  Goal: Absence of Hospital-Acquired Illness or Injury  Outcome: Progressing  Intervention: Identify and Manage Fall Risk  Recent Flowsheet Documentation  Taken 7/21/2024 1727 by Janice Slater RN  Safety Promotion/Fall Prevention:   increased rounding and observation   room door open   room near nurse's station  Goal: Optimal Comfort and Wellbeing  Outcome: Progressing  Intervention: Monitor Pain and Promote Comfort  Recent Flowsheet Documentation  Taken 7/21/2024 1824 by Janice Slater RN  Pain Management Interventions: (declined tylenol) declines  Taken 7/21/2024 1743 by Janice Slater RN  Pain Management Interventions: medication (see MAR)  Taken 7/21/2024 1700 by Janice Slater RN  Pain Management Interventions: repositioned  Goal: Readiness for Transition of Care  Outcome: Progressing     Problem: Anemia  Goal: Anemia Symptom Improvement  Outcome: Progressing  Intervention: Monitor and Manage Anemia  Recent Flowsheet Documentation  Taken 7/21/2024 1727 by Janice Slater RN  Safety Promotion/Fall Prevention:   increased rounding and observation   room door open   room near nurse's station     Problem: Pain " Acute  Goal: Optimal Pain Control and Function  Outcome: Progressing  Intervention: Develop Pain Management Plan  Recent Flowsheet Documentation  Taken 7/21/2024 1824 by Janice Slater RN  Pain Management Interventions: (declined tylenol) declines  Taken 7/21/2024 1743 by Janice Slater RN  Pain Management Interventions: medication (see MAR)  Taken 7/21/2024 1700 by Janice Slater RN  Pain Management Interventions: repositioned  Intervention: Prevent or Manage Pain  Recent Flowsheet Documentation  Taken 7/21/2024 1727 by Janice Slater RN  Medication Review/Management: medications reviewed   Goal Outcome Evaluation:      Plan of Care Reviewed With: patient      Writer received this pt from ED nurse at 1655. Pt alert and oriented times 4. Vitals stable. C/o left ankle pain. Left ankle bruised and swollen. Applied ice pack and elevated on pillows. Notified MD and got prn order for pain. Gave oxycodone for pain which brought pain not much down. Offered some tylenol but pt declined. Tele- v-paced. Another nurse took over this pt at 1830.

## 2024-07-22 NOTE — ANESTHESIA PREPROCEDURE EVALUATION
Anesthesia Pre-Procedure Evaluation    Patient: Abraham Dong   MRN: [id number] : 1960        Procedure : Procedure(s):  ESOPHAGOGASTRODUODENOSCOPY          Past Medical History:   Diagnosis Date     Atrial fibrillation (H)      Benign essential hypertension      Coronary artery disease      Depression      Hyperlipidemia      Obesity       Past Surgical History:   Procedure Laterality Date     CORONARY ANGIOGRAPHY ADULT ORDER       CV CORONARY ANGIOGRAM N/A 2021    Procedure: Coronary Angiogram;  Surgeon: Radha Ferrell MD;  Location: [facility]     CV LEFT HEART CATH N/A 2021    Procedure: Left Heart Cath;  Surgeon: Radha Ferrell MD;  Location: [facility]     CV LEFT VENTRICULOGRAM N/A 2021    Procedure: Left Ventriculogram;  Surgeon: Radha Ferrell MD;  Location: [facility]     EP ICD INSERT BIVENT N/A 2024    Procedure: Implantable Cardioverter Defibrillator Device & Lead Implant Biventricular;  Surgeon: El Hagen MD;  Location: [facility]     EP PACEMAKER N/A 2021    Procedure: EP Pacemaker;  Surgeon: Kun Lopez MD;  Location: [facility]     EP PPM GENERATOR REMOVAL N/A 2024    Procedure: Implantable Cardioverter Defibrillator Device & Lead Implant Biventricular;  Surgeon: El Hagen MD;  Location: [facility]     HERNIA REPAIR       IMPLANT PACEMAKER       KNEE SURGERY       LEAD REMOVAL FOR DUAL OR BI-VENTRICULAR PACEMAKER        N/A 2024    Procedure: Pacemaker Lead Removal - Dual or Bi-ventricular;  Surgeon: El Hagen MD;  Location: [facility]     OR LASER LEAD EXTRACTION - LEVEL 2 (STANDBY) N/A 2024    Procedure: Or Laser Lead Extraction - Level 2 (Standby);  Surgeon: Sulma Olivarez MD;  Location: [facility]     TONSILLECTOMY       VASECTOMY        No Known Allergies   Social History     Tobacco Use     Smoking status: Never     Smokeless  tobacco: Never   Substance Use Topics     Alcohol use: Yes     Alcohol/week: 6.0 standard drinks of alcohol     Types: 6 Standard drinks or equivalent per week     Comment: 7 drinks per week      Wt Readings from Last 1 Encounters:   07/21/24 113.4 kg (250 lb)        Anesthesia Evaluation   Pt has had prior anesthetic.         ROS/MED HX  ENT/Pulmonary:       Neurologic:       Cardiovascular: Comment: 7/22/24   The left ventricle is normal in size.  Left ventricular function is normal.The ejection fraction is 60-65%. Moderate  to severe assymetric septal hypertrophy. No significant LVOT obstruction.  Normal right ventricle size and systolic function.  There is a pacemaker lead in the right ventricle.  The left atrium is moderately dilated.  No hemodynamically significant valvular abnormalities on 2D or color flow  imaging.  Mildly dilated aortic root.  Compared to prior study, there is no significant change    Device: [employer], Cobalt (D) ICD  Pacing %/Programmed: AP- 86.6%, - 15.5%,  AAIR-DDDR 60/130   Lead(s): Stable  Presenting Rhythm: AP/VS @ 63 bpm  Underlying Rhythm: SB @ 40's        (+)  hypertension- -  CAD -  - -   Taking blood thinners   CHF        pacemaker,     ICD                   METS/Exercise Tolerance:     Hematologic:       Musculoskeletal:       GI/Hepatic:       Renal/Genitourinary:       Endo:     (+)               Obesity,       Psychiatric/Substance Use:     (+) psychiatric history depression       Infectious Disease:       Malignancy:       Other:            Physical Exam    Airway        Mallampati: II   TM distance: > 3 FB   Neck ROM: full     Respiratory Devices and Support         Dental       (+) Minor Abnormalities - some fillings, tiny chips      Cardiovascular   cardiovascular exam normal          Pulmonary   pulmonary exam normal            Other findings: EKG noted inverted T waves, ST depression in PREOP. EKG identical to one recorded in April. No sx of CP, SOB.   OUTSIDE  "LABS:  CBC:   Lab Results   Component Value Date    WBC 7.1 07/22/2024    WBC 9.5 07/21/2024    HGB 9.1 (L) 07/22/2024    HGB 10.0 (L) 07/21/2024    HCT 28.5 (L) 07/22/2024    HCT 31.0 (L) 07/21/2024     07/22/2024     07/21/2024     BMP:   Lab Results   Component Value Date     07/22/2024     07/21/2024    POTASSIUM 4.0 07/22/2024    POTASSIUM 4.4 07/21/2024    CHLORIDE 101 07/22/2024    CHLORIDE 101 07/21/2024    CO2 23 07/22/2024    CO2 23 07/21/2024    BUN 22.4 07/22/2024    BUN 18.2 07/21/2024    CR 1.23 (H) 07/22/2024    CR 0.98 07/21/2024    GLC 92 07/22/2024     (H) 07/21/2024     COAGS:   Lab Results   Component Value Date    PTT 27 12/02/2021    INR 1.05 12/02/2021     POC: No results found for: \"BGM\", \"HCG\", \"HCGS\"  HEPATIC:   Lab Results   Component Value Date    ALBUMIN 4.0 12/23/2021    PROTTOTAL 7.0 12/23/2021    ALT 35 12/23/2021    AST 28 12/23/2021    ALKPHOS 47 12/23/2021    BILITOTAL 0.4 12/23/2021     OTHER:   Lab Results   Component Value Date    TEGAN 8.7 (L) 07/22/2024    MAG 2.1 07/21/2024    TSH 9.52 (H) 07/22/2024    T4 0.90 07/22/2024       Anesthesia Plan    ASA Status:  4    NPO Status:  NPO Appropriate    Anesthesia Type: MAC.              Consents    Anesthesia Plan(s) and associated risks, benefits, and realistic alternatives discussed. Questions answered and patient/representative(s) expressed understanding.     - Discussed: Risks, Benefits and Alternatives for BOTH SEDATION and the PROCEDURE were discussed     - Discussed with:  Patient      - Extended Intubation/Ventilatory Support Discussed: No.      - Patient is DNR/DNI Status: No     Use of blood products discussed: No .     Postoperative Care    Pain management: IV analgesics, Oral pain medications.   PONV prophylaxis: Ondansetron (or other 5HT-3)     Comments:               Stephan Eddy,     I have reviewed the pertinent notes and labs in the chart from the past 30 days and " "(re)examined the patient.  Any updates or changes from those notes are reflected in this note.     # Hyponatremia: Lowest Na = 135 mmol/L in last 30 days, will monitor as appropriate        # Drug Induced Coagulation Defect: home medication list includes an anticoagulant medication   # Obesity: Estimated body mass index is 32.98 kg/m  as calculated from the following:    Height as of this encounter: 1.854 m (6' 1\").    Weight as of this encounter: 113.4 kg (250 lb).      "

## 2024-07-22 NOTE — PLAN OF CARE
Problem: Pain Acute  Goal: Optimal Pain Control and Function  Outcome: Progressing  Intervention: Develop Pain Management Plan  Recent Flowsheet Documentation  Taken 7/21/2024 2310 by Mary Vogt RN  Pain Management Interventions: cold applied  Intervention: Prevent or Manage Pain  Recent Flowsheet Documentation  Taken 7/22/2024 0030 by Mary Vogt RN  Sleep/Rest Enhancement:   room darkened   regular sleep/rest pattern promoted  Medication Review/Management: medications reviewed     Problem: Adult Inpatient Plan of Care  Goal: Absence of Hospital-Acquired Illness or Injury  Outcome: Met  Intervention: Identify and Manage Fall Risk  Recent Flowsheet Documentation  Taken 7/22/2024 0030 by Mary Vogt RN  Safety Promotion/Fall Prevention:   activity supervised   clutter free environment maintained   patient and family education   room near nurse's station  Intervention: Prevent Skin Injury  Recent Flowsheet Documentation  Taken 7/22/2024 0030 by Mary Vogt RN  Body Position: position changed independently  Intervention: Prevent Infection  Recent Flowsheet Documentation  Taken 7/22/2024 0030 by Mary Vogt RN  Infection Prevention: hand hygiene promoted        Problem: Fall Injury Risk  Goal: Absence of Fall and Fall-Related Injury  Outcome: Met  Intervention: Identify and Manage Contributors  Recent Flowsheet Documentation  Taken 7/22/2024 0030 by Mary Vogt RN  Medication Review/Management: medications reviewed  Intervention: Promote Injury-Free Environment  Recent Flowsheet Documentation  Taken 7/22/2024 0030 by Mary Vogt RN  Safety Promotion/Fall Prevention:   activity supervised   clutter free environment maintained   patient and family education   room near nurse's station       Goal Outcome Evaluation:    Pt. Alert and oriented. On telemetry sinus bradycardia 1st degree AVB, BBB with intermittent V pacing. Left ankle painful, swollen, bruised. NPO 0000 for consults including  ortho, GI, and cardiology. Using urinal to void. Per pt. No noted melena X 2 weeks. LBM 7/20. Using call light for all needs. Continue with current plan of care.    Mary Vogt RN

## 2024-07-23 ENCOUNTER — APPOINTMENT (OUTPATIENT)
Dept: RADIOLOGY | Facility: HOSPITAL | Age: 64
DRG: 981 | End: 2024-07-23
Attending: STUDENT IN AN ORGANIZED HEALTH CARE EDUCATION/TRAINING PROGRAM
Payer: COMMERCIAL

## 2024-07-23 ENCOUNTER — ANESTHESIA (OUTPATIENT)
Dept: SURGERY | Facility: HOSPITAL | Age: 64
DRG: 981 | End: 2024-07-23
Payer: COMMERCIAL

## 2024-07-23 LAB
ATRIAL RATE - MUSE: 63 BPM
DIASTOLIC BLOOD PRESSURE - MUSE: 62 MMHG
GLUCOSE BLDC GLUCOMTR-MCNC: 93 MG/DL (ref 70–99)
HGB BLD-MCNC: 9 G/DL (ref 13.3–17.7)
HGB BLD-MCNC: 9.5 G/DL (ref 13.3–17.7)
INTERPRETATION ECG - MUSE: NORMAL
P AXIS - MUSE: 43 DEGREES
PR INTERVAL - MUSE: 180 MS
QRS DURATION - MUSE: 130 MS
QT - MUSE: 454 MS
QTC - MUSE: 464 MS
R AXIS - MUSE: -19 DEGREES
SYSTOLIC BLOOD PRESSURE - MUSE: 99 MMHG
T AXIS - MUSE: 143 DEGREES
VENTRICULAR RATE- MUSE: 63 BPM

## 2024-07-23 PROCEDURE — 36415 COLL VENOUS BLD VENIPUNCTURE: CPT | Performed by: INTERNAL MEDICINE

## 2024-07-23 PROCEDURE — 250N000013 HC RX MED GY IP 250 OP 250 PS 637: Performed by: INTERNAL MEDICINE

## 2024-07-23 PROCEDURE — 85018 HEMOGLOBIN: CPT | Performed by: INTERNAL MEDICINE

## 2024-07-23 PROCEDURE — 93005 ELECTROCARDIOGRAM TRACING: CPT

## 2024-07-23 PROCEDURE — C1713 ANCHOR/SCREW BN/BN,TIS/BN: HCPCS | Performed by: STUDENT IN AN ORGANIZED HEALTH CARE EDUCATION/TRAINING PROGRAM

## 2024-07-23 PROCEDURE — 250N000011 HC RX IP 250 OP 636: Performed by: ANESTHESIOLOGY

## 2024-07-23 PROCEDURE — 258N000003 HC RX IP 258 OP 636: Performed by: STUDENT IN AN ORGANIZED HEALTH CARE EDUCATION/TRAINING PROGRAM

## 2024-07-23 PROCEDURE — 0QSK04Z REPOSITION LEFT FIBULA WITH INTERNAL FIXATION DEVICE, OPEN APPROACH: ICD-10-PCS | Performed by: STUDENT IN AN ORGANIZED HEALTH CARE EDUCATION/TRAINING PROGRAM

## 2024-07-23 PROCEDURE — 0MQR0ZZ REPAIR LEFT ANKLE BURSA AND LIGAMENT, OPEN APPROACH: ICD-10-PCS | Performed by: STUDENT IN AN ORGANIZED HEALTH CARE EDUCATION/TRAINING PROGRAM

## 2024-07-23 PROCEDURE — 27766 OPTX MEDIAL ANKLE FX: CPT | Performed by: ANESTHESIOLOGY

## 2024-07-23 PROCEDURE — 93010 ELECTROCARDIOGRAM REPORT: CPT | Performed by: INTERNAL MEDICINE

## 2024-07-23 PROCEDURE — 250N000013 HC RX MED GY IP 250 OP 250 PS 637: Performed by: STUDENT IN AN ORGANIZED HEALTH CARE EDUCATION/TRAINING PROGRAM

## 2024-07-23 PROCEDURE — 999N000141 HC STATISTIC PRE-PROCEDURE NURSING ASSESSMENT: Performed by: STUDENT IN AN ORGANIZED HEALTH CARE EDUCATION/TRAINING PROGRAM

## 2024-07-23 PROCEDURE — 360N000084 HC SURGERY LEVEL 4 W/ FLUORO, PER MIN: Performed by: STUDENT IN AN ORGANIZED HEALTH CARE EDUCATION/TRAINING PROGRAM

## 2024-07-23 PROCEDURE — 120N000004 HC R&B MS OVERFLOW

## 2024-07-23 PROCEDURE — 370N000017 HC ANESTHESIA TECHNICAL FEE, PER MIN: Performed by: STUDENT IN AN ORGANIZED HEALTH CARE EDUCATION/TRAINING PROGRAM

## 2024-07-23 PROCEDURE — 250N000011 HC RX IP 250 OP 636: Performed by: STUDENT IN AN ORGANIZED HEALTH CARE EDUCATION/TRAINING PROGRAM

## 2024-07-23 PROCEDURE — 99232 SBSQ HOSP IP/OBS MODERATE 35: CPT | Performed by: INTERNAL MEDICINE

## 2024-07-23 PROCEDURE — 999N000182 XR SURGERY CARM FLUORO GREATER THAN 5 MIN: Mod: TC

## 2024-07-23 PROCEDURE — 272N000001 HC OR GENERAL SUPPLY STERILE: Performed by: STUDENT IN AN ORGANIZED HEALTH CARE EDUCATION/TRAINING PROGRAM

## 2024-07-23 PROCEDURE — 250N000009 HC RX 250: Performed by: ANESTHESIOLOGY

## 2024-07-23 PROCEDURE — 27766 OPTX MEDIAL ANKLE FX: CPT | Performed by: NURSE ANESTHETIST, CERTIFIED REGISTERED

## 2024-07-23 DEVICE — LO-PRO SCRW,TI,3.5MMX 14MM
Type: IMPLANTABLE DEVICE | Site: ANKLE | Status: FUNCTIONAL
Brand: ARTHREX®

## 2024-07-23 DEVICE — K-LESS T-ROPE W/DRV, SYN REPR, TI
Type: IMPLANTABLE DEVICE | Site: ANKLE | Status: FUNCTIONAL
Brand: ARTHREX®

## 2024-07-23 DEVICE — DX FIBERTAK SUTURE ANCHOR, #2 MTS W/ NDL
Type: IMPLANTABLE DEVICE | Site: ANKLE | Status: FUNCTIONAL
Brand: ARTHREX®

## 2024-07-23 DEVICE — LO-PRO SCRW,TI,4.0MMX 14MM
Type: IMPLANTABLE DEVICE | Site: ANKLE | Status: FUNCTIONAL
Brand: ARTHREX®

## 2024-07-23 DEVICE — LOCKING THIRD TUBULAR PLATE, TI, 4H
Type: IMPLANTABLE DEVICE | Site: ANKLE | Status: FUNCTIONAL
Brand: ARTHREX®

## 2024-07-23 RX ORDER — LIDOCAINE 40 MG/G
CREAM TOPICAL
Status: DISCONTINUED | OUTPATIENT
Start: 2024-07-23 | End: 2024-07-23 | Stop reason: HOSPADM

## 2024-07-23 RX ORDER — LIDOCAINE 40 MG/G
CREAM TOPICAL
Status: DISCONTINUED | OUTPATIENT
Start: 2024-07-23 | End: 2024-07-25 | Stop reason: HOSPADM

## 2024-07-23 RX ORDER — SODIUM CHLORIDE, SODIUM LACTATE, POTASSIUM CHLORIDE, CALCIUM CHLORIDE 600; 310; 30; 20 MG/100ML; MG/100ML; MG/100ML; MG/100ML
INJECTION, SOLUTION INTRAVENOUS CONTINUOUS
Status: DISCONTINUED | OUTPATIENT
Start: 2024-07-23 | End: 2024-07-23 | Stop reason: HOSPADM

## 2024-07-23 RX ORDER — FENTANYL CITRATE 50 UG/ML
100 INJECTION, SOLUTION INTRAMUSCULAR; INTRAVENOUS ONCE
Status: COMPLETED | OUTPATIENT
Start: 2024-07-23 | End: 2024-07-23

## 2024-07-23 RX ORDER — BUPIVACAINE HYDROCHLORIDE AND EPINEPHRINE 5; 5 MG/ML; UG/ML
INJECTION, SOLUTION PERINEURAL
Status: COMPLETED | OUTPATIENT
Start: 2024-07-23 | End: 2024-07-23

## 2024-07-23 RX ORDER — CEFAZOLIN SODIUM 2 G/100ML
2 INJECTION, SOLUTION INTRAVENOUS EVERY 8 HOURS
Status: COMPLETED | OUTPATIENT
Start: 2024-07-23 | End: 2024-07-24

## 2024-07-23 RX ORDER — CEFAZOLIN SODIUM/WATER 2 G/20 ML
2 SYRINGE (ML) INTRAVENOUS SEE ADMIN INSTRUCTIONS
Status: DISCONTINUED | OUTPATIENT
Start: 2024-07-23 | End: 2024-07-23 | Stop reason: HOSPADM

## 2024-07-23 RX ORDER — PROPOFOL 10 MG/ML
INJECTION, EMULSION INTRAVENOUS CONTINUOUS PRN
Status: DISCONTINUED | OUTPATIENT
Start: 2024-07-23 | End: 2024-07-23

## 2024-07-23 RX ORDER — BUPIVACAINE HYDROCHLORIDE AND EPINEPHRINE 2.5; 5 MG/ML; UG/ML
INJECTION, SOLUTION INFILTRATION; PERINEURAL
Status: COMPLETED | OUTPATIENT
Start: 2024-07-23 | End: 2024-07-23

## 2024-07-23 RX ORDER — CEFAZOLIN SODIUM/WATER 2 G/20 ML
2 SYRINGE (ML) INTRAVENOUS
Status: COMPLETED | OUTPATIENT
Start: 2024-07-23 | End: 2024-07-23

## 2024-07-23 RX ORDER — PROPOFOL 10 MG/ML
INJECTION, EMULSION INTRAVENOUS PRN
Status: DISCONTINUED | OUTPATIENT
Start: 2024-07-23 | End: 2024-07-23

## 2024-07-23 RX ADMIN — METOPROLOL SUCCINATE 100 MG: 100 TABLET, EXTENDED RELEASE ORAL at 20:57

## 2024-07-23 RX ADMIN — SERTRALINE HYDROCHLORIDE 100 MG: 100 TABLET ORAL at 09:08

## 2024-07-23 RX ADMIN — BUPIVACAINE HYDROCHLORIDE AND EPINEPHRINE BITARTRATE 10 ML: 2.5; .005 INJECTION, SOLUTION INFILTRATION; PERINEURAL at 12:25

## 2024-07-23 RX ADMIN — Medication 2 G: at 12:52

## 2024-07-23 RX ADMIN — METOPROLOL SUCCINATE 100 MG: 100 TABLET, EXTENDED RELEASE ORAL at 09:08

## 2024-07-23 RX ADMIN — EZETIMIBE 10 MG: 10 TABLET ORAL at 09:08

## 2024-07-23 RX ADMIN — SODIUM CHLORIDE, POTASSIUM CHLORIDE, SODIUM LACTATE AND CALCIUM CHLORIDE: 600; 310; 30; 20 INJECTION, SOLUTION INTRAVENOUS at 11:59

## 2024-07-23 RX ADMIN — FENTANYL CITRATE 100 MCG: 50 INJECTION, SOLUTION INTRAMUSCULAR; INTRAVENOUS at 12:25

## 2024-07-23 RX ADMIN — BUPIVACAINE HYDROCHLORIDE AND EPINEPHRINE BITARTRATE 25 ML: 5; .005 INJECTION, SOLUTION PERINEURAL at 12:25

## 2024-07-23 RX ADMIN — ATORVASTATIN CALCIUM 80 MG: 40 TABLET, FILM COATED ORAL at 09:08

## 2024-07-23 RX ADMIN — CEFAZOLIN SODIUM 2 G: 2 INJECTION, SOLUTION INTRAVENOUS at 20:59

## 2024-07-23 RX ADMIN — MIDAZOLAM HYDROCHLORIDE 2 MG: 1 INJECTION, SOLUTION INTRAMUSCULAR; INTRAVENOUS at 12:24

## 2024-07-23 RX ADMIN — PANTOPRAZOLE SODIUM 40 MG: 40 TABLET, DELAYED RELEASE ORAL at 16:02

## 2024-07-23 RX ADMIN — PROPOFOL 50 MG: 10 INJECTION, EMULSION INTRAVENOUS at 12:53

## 2024-07-23 RX ADMIN — PROPOFOL 75 MCG/KG/MIN: 10 INJECTION, EMULSION INTRAVENOUS at 12:53

## 2024-07-23 RX ADMIN — PANTOPRAZOLE SODIUM 40 MG: 40 TABLET, DELAYED RELEASE ORAL at 07:07

## 2024-07-23 ASSESSMENT — ACTIVITIES OF DAILY LIVING (ADL)
ADLS_ACUITY_SCORE: 25
ADLS_ACUITY_SCORE: 38
ADLS_ACUITY_SCORE: 25
ADLS_ACUITY_SCORE: 38
ADLS_ACUITY_SCORE: 42
ADLS_ACUITY_SCORE: 25
ADLS_ACUITY_SCORE: 25
ADLS_ACUITY_SCORE: 38
ADLS_ACUITY_SCORE: 38
ADLS_ACUITY_SCORE: 42
ADLS_ACUITY_SCORE: 38
ADLS_ACUITY_SCORE: 25
ADLS_ACUITY_SCORE: 38
ADLS_ACUITY_SCORE: 42
ADLS_ACUITY_SCORE: 25
ADLS_ACUITY_SCORE: 38
ADLS_ACUITY_SCORE: 38
ADLS_ACUITY_SCORE: 25

## 2024-07-23 ASSESSMENT — ENCOUNTER SYMPTOMS: DYSRHYTHMIAS: 1

## 2024-07-23 NOTE — SIGNIFICANT EVENT
Around 1545 the pt had a asymptomatic 9 beat run of Vtach. Dr. Baron was updated via [employer], and Dr. Larios was updated in person, and ordered a EKG.

## 2024-07-23 NOTE — INTERVAL H&P NOTE
I have reviewed the surgical (or preoperative) H&P that is linked to this encounter, and examined the patient. There are no significant changes    Clinical Conditions Present on Arrival:  Clinically Significant Risk Factors Present on Admission                   65 yo M who sustained fall and LOC. On elliquis. Found to have proximal fibula fracture. Lives alone.     On exam, patient is alert and oriented. Diffuse swelling around ankle with bruising, tenderness medial and lateral.     XR and CT scan with proximal fibula fracture, AITFL avulsion and medial clear space widening.     I discussed the above findings with the patient.  Given the unstable nature of his fracture, I did recommend surgical intervention despite his multiple medical comorbidities.  We discussed the surgical procedure in the form of a syndesmotic ORIF as well as likely fixation of the AITFL.  I discussed he will be nonweightbearing for 6 weeks following surgery.  He is okay to restart his Eliquis on postop day 1 from a surgery standpoint.  Discussed risk benefits surgery including risk of bleeding, infection, damage nerves and blood vessels, nonunion, malunion and painful hardware requiring additional surgeries.  Also discussed risk of anesthetic complications including heart attack, stroke, blood clot and even death.  Patient has been seen and medically optimized by our hospitalist and GI team.  No concern for ongoing GI bleed.  Plan for surgery as scheduled.    Hector Ratliff MD  07/23/24   [facility]

## 2024-07-23 NOTE — PLAN OF CARE
"Goal Outcome Evaluation:             Patient is alert and oriented.  He reported some pain in left ankle 5/10, but said it was manageable, and declined prn pain medication.  Patient NPO since midnight for surgery at noon.  CHG wipes and linen changed at midnight, patient wipes as orders instructed.  Staff grouped cares as patient was exhausted.  Will continue to monitor.      Problem: Adult Inpatient Plan of Care  Goal: Plan of Care Review  Description: The Plan of Care Review/Shift note should be completed every shift.  The Outcome Evaluation is a brief statement about your assessment that the patient is improving, declining, or no change.  This information will be displayed automatically on your shift  note.  Outcome: Progressing  Goal: Patient-Specific Goal (Individualized)  Description: You can add care plan individualizations to a care plan. Examples of Individualization might be:  \"Parent requests to be called daily at 9am for status\", \"I have a hard time hearing out of my right ear\", or \"Do not touch me to wake me up as it startles  me\".  Outcome: Progressing  Goal: Absence of Hospital-Acquired Illness or Injury  Intervention: Identify and Manage Fall Risk  Recent Flowsheet Documentation  Taken 7/23/2024 0030 by Lisa Dukes RN  Safety Promotion/Fall Prevention:   activity supervised   nonskid shoes/slippers when out of bed   lighting adjusted   increased rounding and observation   clutter free environment maintained   safety round/check completed   supervised activity  Intervention: Prevent Skin Injury  Recent Flowsheet Documentation  Taken 7/23/2024 0030 by Lisa Dukes, RN  Body Position: position changed independently  Goal: Optimal Comfort and Wellbeing  Intervention: Monitor Pain and Promote Comfort  Recent Flowsheet Documentation  Taken 7/23/2024 0500 by Lisa Dukes, RN  Pain Management Interventions: (patient said pain manageable right now) medication offered but refused  Taken " 7/23/2024 0030 by Lisa Dukes, RN  Pain Management Interventions: (jacob declined pain medicine and declined ice pack) medication offered but refused  Goal: Readiness for Transition of Care  Outcome: Progressing

## 2024-07-23 NOTE — ANESTHESIA PROCEDURE NOTES
Adductor canal Procedure Note    Pre-Procedure   Staff -        Anesthesiologist:  Good Ken MD       Performed By: anesthesiologist       Location: pre-op       Procedure Start/Stop Times: 7/23/2024 12:25 PM and 7/23/2024 12:35 PM       Pre-Anesthestic Checklist: patient identified, IV checked, site marked, risks and benefits discussed, informed consent, monitors and equipment checked, pre-op evaluation, at physician/surgeon's request and post-op pain management  Timeout:       Correct Patient: Yes        Correct Procedure: Yes        Correct Site: Yes        Correct Position: Yes        Correct Laterality: Yes        Site Marked: Yes  Procedure Documentation  Procedure: Adductor canal       Laterality: left       Patient Position: supine       Patient Prep/Sterile Barriers: sterile gloves, mask       Skin prep: Chloraprep       Needle Type: short bevel       Needle Gauge: 21.        Needle Length (Inches): 4        Ultrasound guided       1. Ultrasound was used to identify targeted nerve, plexus, vascular marker, or fascial plane and place a needle adjacent to it in real-time.       2. Ultrasound was used to visualize the spread of anesthetic in close proximity to the above referenced structure.       3. A permanent image is entered into the patient's record.       4. The visualized anatomic structures appeared normal.       5. There were no apparent abnormal pathologic findings.    Assessment/Narrative         The placement was negative for: blood aspirated, painful injection and site bleeding       Paresthesias: No.       Bolus given via needle..        Secured via.        Insertion/Infusion Method: Single Shot       Complications: none       Injection made incrementally with aspirations every 5 mL.    Medication(s) Administered   Bupivacaine 0.25% w/ 1:200K Epi (Injection) - Injection   10 mL - 7/23/2024 12:25:00 PM  Medication Administration Time: 7/23/2024 12:25 PM      FOR [facility] ([facility])  "ONLY:   Pain Team Contact information: please page the Pain Team Via [facility]. Search \"Pain\". During daytime hours, please page the attending first. At night please page the resident first.      "

## 2024-07-23 NOTE — PLAN OF CARE
Problem: Surgery Nonspecified  Goal: Absence of Infection Signs and Symptoms  Outcome: Progressing  Goal: Anesthesia/Sedation Recovery  Outcome: Progressing     Problem: Anemia  Goal: Anemia Symptom Improvement  Outcome: Progressing  Intervention: Monitor and Manage Anemia  Recent Flowsheet Documentation  Taken 7/23/2024 1600 by Neeru Calle RN  Safety Promotion/Fall Prevention:   activity supervised   assistive device/personal items within reach   nonskid shoes/slippers when out of bed   room near nurse's station     Problem: Adult Inpatient Plan of Care  Goal: Optimal Comfort and Wellbeing  Intervention: Provide Person-Centered Care  Recent Flowsheet Documentation  Taken 7/23/2024 1600 by Neeru Calle RN  Trust Relationship/Rapport: care explained   Goal Outcome Evaluation:    A&O x4. No c/o pain. The pt said his ankle feels so much better after the surgery. Around 1545 the pt had 9 beats of asymptomatic V-Tach ( see significant event note). The pt's CAM boot was delivered, its in his room. His 1800 Hgb came back at 9.5. He can make his needs known. Call-light within reach.

## 2024-07-23 NOTE — PROGRESS NOTES
[facility]    Medicine Progress Note - Hospitalist Service    Date of Admission:  7/21/2024    Assessment & Plan   Abraham Dong is a 64 year old male with PMHx of hypertrophic CM, Afib on Eliquis, HTN, MDD who presents 2 days after a syncopal event led to a fall and left ankle pain/swelling. Noted to have an acute anemia with melena.    #Recurrent syncope  #Subacute blood loss leading to iron deficiency anemia  #Upper GI bleed  #Chronic orthostatic hypotension  Chronic orthostasis symptoms for years believed to be due to his hypertrophic CM. Then 4 episodes of syncope after standing up in the past month, now leading to a leg fracture. ~2-3 weeks ago he had a large, tarry bowel movement. On Eliquis, Hgb 10, down from 15 3 months ago, but stable from 3 weeks ago. Low concern for acute bleeding and his hemoglobin is only moderately low, but given his baseline predisposition for presyncope, his new anemia could be decreasing his oxygen reserve enough to cause syncope. Does take 2 Excedrin extra strengths daily as well.  -- EGD on 7/22 showed nonbleeding duodenal ulcer, granular gastric mucosa, eosinophilic esophagitis, small hiatal hernia.  Continue with PPI.  Awaiting H. pylori testing.  -- Hold Eliquis  -- Cardiology also consulted and lisinopril on hold.  Metoprolol started on 7/22  -- Echo is unremarkable  -- TSH is less than 10.  No workup necessary.  Updated patient.  -- Hemoglobin is stable.  Patient is asymptomatic and therefore will defer blood transfusion.  --Transfuse if hemoglobin less than 8 due to cardiac condition.  -- Defer timing of colonoscopy to GI    #Acute traumatic left proximal fibular fracture  #Acute traumatic left ankle swelling and pain with bone fragments  Due to syncope on 7/19.  -Patient went to operating room on 7/23  -- Patient is intermediate risk for CV event but no objection to surgery as per cardiology.  May need arterial line for blood pressure monitoring  "during surgery.        #Hypertrophic cardiomyopathy  #Dual-chamber pacemaker with ICD in situ  Follows with our cardiology team for this.  - TTE is unremarkable  -Cardiology ordered metoprolol 100 mg twice daily and stop lisinopril.    #Atrial fibrillation  - Continue metoprolol as above  - Hold home Eliquis with UGIB/duodenal ulcer.  Patient may need Watchman device.  Defer it to cardiology.    #Primary HTN  - Continue home lisinopril 20mg daily for now pending cards recs    #HLD  - Continue home atorvastatin and Zetia    #Elevated BNP  1400 on admission, no signs of CHF on exam.  - S/p Lasix in the ED, can stop    #MDD  - Continue home Zoloft     NAHID  --Likely due to GI loss and Lasix given in the ER  --  Creatinine   Date Value Ref Range Status   07/22/2024 1.23 (H) 0.67 - 1.17 mg/dL Final   -- Recheck creatinine next a.m.       Diet:      DVT Prophylaxis: VTE Prophylaxis contraindicated due to due to GI bleed  So Catheter: Not present  Lines: None     Cardiac Monitoring: ACTIVE order. Indication: Syncope- high cardiac risk (48 hours)  Code Status: Full Code      Clinically Significant Risk Factors                  # Hypertension: Noted on problem list  # Acute heart failure with preserved ejection fraction: heart failure noted on problem list, last echo with EF >50%, and receiving IV diuretics               # Obesity: Estimated body mass index is 33.51 kg/m  as calculated from the following:    Height as of this encounter: 1.854 m (6' 1\").    Weight as of this encounter: 115.2 kg (253 lb 15.5 oz)., PRESENT ON ADMISSION       # Financial/Environmental Concerns: none   # ICD device present       Disposition Plan     Medically Ready for Discharge: Anticipated in 2-4 Days             Jorge L Baron MD  Hospitalist Service  [facility]  Securely message with [employer] (more info)  Text page via [employer] Paging/Directory " "  ______________________________________________________________________    Interval History   Patient denies any dizziness or syncope.  No bleeding from GERD.  Hemoglobin is stable.  Will have ankle surgery today    Physical Exam   Vital Signs: Temp: 98.6  F (37  C) Temp src: Oral BP: 108/60 Pulse: 61   Resp: 25 SpO2: 96 % O2 Device: Nasal cannula Oxygen Delivery: 1 LPM  Weight: 253 lbs 15.52 oz    Does not appear to be in distress  Obese with thick neck  Abdominal soft  Ejection systolic murmur best heard in the mitral and tricuspid area.  Murmur is not muffled during inspiration.    Medical Decision Making       35 MINUTES SPENT BY ME on the date of service doing chart review, history, exam, documentation & further activities per the note.  MANAGEMENT DISCUSSED with the following over the past 24 hours: Patient and his mother   NOTE(S)/MEDICAL RECORDS REVIEWED over the past 24 hours: Cardiology       Data     I have personally reviewed the following data over the past 24 hrs:    N/A  \   9.0 (L)   / N/A     N/A N/A N/A /  93   N/A N/A N/A \       Imaging results reviewed over the past 24 hrs:   Recent Results (from the past 24 hour(s))   POC US Guidance Needle Placement    Narrative    Ultrasound was performed as guidance to an anesthesia procedure.  Click   \"PACS images\" hyperlink below to view any stored images.  For specific   procedure details, view procedure note authored by anesthesia.   XR Surgery ADALI  Fluoro G/T 5 Min    Narrative    This exam was marked as non-reportable because it will not be read by a   radiologist or a [place] non-radiologist provider.           "

## 2024-07-23 NOTE — ANESTHESIA PROCEDURE NOTES
Sciatic Procedure Note    Pre-Procedure   Staff -        Anesthesiologist:  Good Ken MD       Performed By: anesthesiologist       Location: pre-op       Procedure Start/Stop Times: 7/23/2024 12:25 PM and 7/23/2024 12:35 PM       Pre-Anesthestic Checklist: patient identified, IV checked, site marked, risks and benefits discussed, informed consent, monitors and equipment checked, pre-op evaluation, at physician/surgeon's request and post-op pain management  Timeout:       Correct Patient: Yes        Correct Procedure: Yes        Correct Site: Yes        Correct Position: Yes        Correct Laterality: Yes        Site Marked: Yes  Procedure Documentation  Procedure: Sciatic       Patient Position: supine       Patient Prep/Sterile Barriers: sterile gloves, mask       Skin prep: Chloraprep (gluteal (Block done at the bifurcation of the sciatic nerve.) approach).       Needle Type: short bevel       Needle Gauge: 21.        Needle Length (Inches): 4        Ultrasound guided       1. Ultrasound was used to identify targeted nerve, plexus, vascular marker, or fascial plane and place a needle adjacent to it in real-time.       2. Ultrasound was used to visualize the spread of anesthetic in close proximity to the above referenced structure.       3. A permanent image is entered into the patient's record.       4. The visualized anatomic structures appeared normal.       5. There were no apparent abnormal pathologic findings.    Assessment/Narrative         The placement was negative for: blood aspirated, painful injection and site bleeding       Paresthesias: No.       Bolus given via needle..        Secured via.        Insertion/Infusion Method: Single Shot       Complications: none       Injection made incrementally with aspirations every 5 mL.    Medication(s) Administered   Bupivacaine 0.5% w/ 1:200K Epi (Other) - Other   25 mL - 7/23/2024 12:25:00 PM  Medication Administration Time: 7/23/2024 12:25  "PM      FOR [facility] ([facility] ONLY:   Pain Team Contact information: please page the Pain Team Via [employer]. Search \"Pain\". During daytime hours, please page the attending first. At night please page the resident first.      "

## 2024-07-23 NOTE — PROGRESS NOTES
March 8, 2021       Shelley Puentes MD  [address] [facility] [address]  Via In Basket      Patient: Kimberley Gleason   YOB: 1943   Date of Visit: 3/8/2021       Dear Dr. Puentes:    I saw your patient, Kimberley Gleason, for an evaluation. Below are my notes for this visit with her.    If you have questions, please do not hesitate to call me.      Sincerely,        Kera Monterroso MD        CC: No Recipients  Kera Monterroso MD  3/8/2021  3:13 PM  Signed  HPI:  77 year old female coming in with a screen detected newly diagnosed  bilateral ILC  Grade 1, on the left, grade 2 on the right (both ER+ AL+ Her-2- ).   The patient denies any palpable masses, skin changes, nipple discharge, or breast pain.  The patient also denies any recent new onset headaches, weight loss, point tenderness in the spine, or new cough.  She has a long standing history of left nipple inversion, imaging had been negative.    Biopsy of a second right mass 2/24/21 was also showed invasive breast cancer.  Here with her daughter to discuss surgical options.      Imaging Work Up: Reviewed and demonstrated    BLDM (bilateral diagnostic mammogram):  on 12/30/20,  findings benign;  BDM 1/7/21:  FINDINGS:  The tissue of both breasts is heterogeneously dense. This may lower the sensitivity of mammography.    There is architectural distortion in the right breast at 11 o'clock anterior depth.    There are multiple areas of architectural distortions in the left breast superior lateral quadrant middle depth.    No other significant masses or calcifications are seen in either breast.    BLUS (bilateral ultrasound):  1/7/21: IMPRESSION: ULTRASOUND HIGHLY SUGGESTIVE OF MALIGNANCY   The 1.7 cm irregular mass in the right breast at 11 o'clock middle depth is suspicious of malignancy.  An ultrasound guided biopsy is recommended.    The extensive irregular area in the left breast at 1 o'clock anterior depth is highly suggestive of malignancy.  An  Orthotics:  S: Patient was seen in his hospital room at [facility] for the delivery of a tall, left cam boot size large.  The patient was fit with the ossur cam boot.  O: The patient's limb was in a splint at the time of fitting.  A: The patient's physician was not able to be contacted at the time of fitting for instruction on removing the splint.  The boot was rough sized and left in the room with the patient.  P: We will follow up once the splint is removed and verify the fit of the cam boot.    FLAQUITA Mcmahon LO, Board Eligible Prosthetist.   ultrasound guided biopsy is recommended.    SUMMARY:The results and recommendations of this examination were explained to the patient at the time of this study.    MAMMOGRAPHY BI-RADS: 0 Incomplete Need Additional Imaging Evaluation  ULTRASOUND BI-RADS: 5 HIGHLY SUGGESTIVE OF MALIGNANCY    Breast MRI: :2/13/21  SUSPICIOUS OF MALIGNANCY   The 4 mm mass in the right breast central to the nipple anterior depth is suspicious of malignancy.  A second look ultrasound is recommended if breast conservation is being considered for the right breast.  If a mass is seen, biopsy is recommended.  If non sonographic mass is seen, MRI biopsy is recommended.     The 2.9 cm spiculated mass in the right breast at 10 o'clock middle depth is consistent with the known biopsy positive lobular carcinoma.   The 5 cm area in the left breast at 2-3 o'clock middle depth is consistent with the known lobular carcinoma.  Abnormal enhancement extends to the subareolar and 6 o'clock locations.  5cm area of non mass enhancement in the far lateral left breast, 3 o'clock, also highly suspicious for malignancy.  3.7cm area of non mass enhancement 5cm from the nipple in the upper outer left breast.  This area is highly suspicous for malignancy and is superior to the area recently biopsied.    The patient’s last imaging prior to this was: 2019   Any Previous Biopsies/Personal Hx of breast CA: No    Family history of breast or ovarian cancer: Yes      Past Medical History: Reviewed  Past Medical History:   Diagnosis Date   • Hypothyroidism    • IBS (irritable bowel syndrome)    • Lump or mass in breast    • Thyroid nodule        Past Surgical History: Reviewed  Past Surgical History:   Procedure Laterality Date   • Cholecystectomy     • Fine needle aspiration     • Us guided breast core biopsy Bilateral 01/27/2021    SATHISH ibarra       Current Medication: Reviewed and updated  Current Outpatient Medications   Medication Sig Dispense Refill   • levothyroxine  50 MCG tablet Take 1 tablet by mouth daily. DUE FOR OFFICE VISIT AND LABS BEFORE NEXT REFILL. 90 tablet 3   • Cholecalciferol (VITAMIN D-3 PO)        No current facility-administered medications for this visit.        Allergies: Reviewed   ALLERGIES:  Codeine and Novocain    SOCIAL HISTORY: Reviewed   Social History     Socioeconomic History   • Marital status: /Civil Union     Spouse name: Not on file   • Number of children: Not on file   • Years of education: Not on file   • Highest education level: Not on file   Occupational History   • Not on file   Social Needs   • Financial resource strain: Not on file   • Food insecurity     Worry: Not on file     Inability: Not on file   • Transportation needs     Medical: Not on file     Non-medical: Not on file   Tobacco Use   • Smoking status: Never Smoker   • Smokeless tobacco: Never Used   Substance and Sexual Activity   • Alcohol use: Yes     Comment: social   • Drug use: Never   • Sexual activity: Not on file   Lifestyle   • Physical activity     Days per week: Not on file     Minutes per session: Not on file   • Stress: Not on file   Relationships   • Social connections     Talks on phone: Not on file     Gets together: Not on file     Attends Rastafarian service: Not on file     Active member of club or organization: Not on file     Attends meetings of clubs or organizations: Not on file     Relationship status: Not on file   • Intimate partner violence     Fear of current or ex partner: Not on file     Emotionally abused: Not on file     Physically abused: Not on file     Forced sexual activity: Not on file   Other Topics Concern   • Not on file   Social History Narrative   • Not on file       Family History: Reviewed  Family History   Problem Relation Age of Onset   • Depression Mother    • Diabetes Mother    • Myocardial Infarction Mother    • Hypertension Mother    • Diabetes Father    • Heart disease Father    • Cancer, Breast Sister 50   • Stroke Maternal  Grandmother    • Cancer, Colon Paternal Grandfather         possible?    • Cancer, Breast Maternal Cousin 60       GYN History:    G: 3  P: 3 1st birth at  27  Breast feeding no   Menarche: 15  OCP Use: yes HRT Use: no      Review of Symptoms:   General: No n/f/v/c, no recent weight loss  Eyes:  No recent vision changes  ENT: No new epistaxis  Cardiovascular:  No chest pain or palpitations   Respiratory:  No new onset shortness of breath   Gastrointestinal:  No new diarrhea or constipation, no new abdominal pain  Genitourinary:  No dysuria  Musculoskeletal:  No new injuries  Neurologic:  No new memory loss  Endocrine: No new hot or cold intolerance    Physical Exam:  General: NAD  Head: Atraumatic  Eyes: No scleral icterus  Mouth: Moist mucous membranes  CV: Regular rate and rhythm  Respiratory: Normal respiratory effort  Lymph: No supraclavicular, posterior/anterior cervical LAD  Breast: Symmetrical Breasts           Right breast: No dominant masses, ND, or skin changes.           Left breast: No dominant masses, ND, or skin changes. Slight pulling of the left nipple to the upper outer quadrant, thicker tissue upper outer breast (patient states this has been present for 35 years)          Axilla: No axillary lymphadenopathy bilaterally.   Abdomen: Soft, non tender  Genitourinary: No flank tenderness  Musculoskeletal: No significant calf swelling  Neurologic: No gross deficits    Physical Exam    Pathology:   Breast: bilateral multifocal  Type: ILC, grade 1 left, grade 2 right  Stage: clinical T1N0  Surgery:   Date of surgery:    ER: right %; left %  MS: right 81-90%; left 5%    Oko8Eqq: neg   Ki67: both %  Oncotype:    Chemotherapy:    XRT:   Endocrine:   Genetics:   Oncology:   Radiology Oncologist:      Assessment:   This is a 77 year old patient with newly diagnosed bilateral ILC.  A detailed discussion (>50% for counseling and coordination of care) ensued with the patient including natural history  of breast cancer (including if left untreated, could lead to spread of the cancer cells and ultimately reduction in survival), and rationale for treatment. We discussed the patient’s pathology report and a copy was given to the patient for their records in addition to other supplies to help them through their treatment course. We discussed treatment options for local therapy (including recurrence and survival rates) including lumpectomy, mastectomy, reconstruction, sentinel node biopsy, axillary lymph node dissection, radiation, and systemic therapy including chemotherapy, anti-estrogen therapy, monoclonal antibodies, tumor genomics, genetics, as well as expected postop recovery, including the risks of anesthesia. We also discussed that if breast conserving surgery was chosen, that negative margins will need to be obtained and there is a possibility for return to the OR to achieve this.   Discussed the right pathology findings.  With this, I recommended bilateral mastectomy and sentinel node biopsy.  She is now considering reconstruction and has been referred to Dr. Hoover.    Reviewed the planned procedure in detail and reviewed risks, including but not limited to bleeding, infection, arm swelling, nerve damage, recurrence, change in breast cosmesis, possible need for reexcision either for positive/close margin or positive sentinel nodes.  All questions were answered and she was given related written material.   I answered all questions and the patient and her daughter verbalized understanding the content of our discussion. I acknowledge discussing the current standard of care and surgical treatment options according to the NCCN guidelines.      The patient does qualify for genetic testing.    Spent a total of 50 minutes with this patient with greater than 50% being spent in direct, face-to-face consultation.    Plan:   Bilateral mastectomy and SLN biopsy, possible reconstruction

## 2024-07-23 NOTE — ANESTHESIA PREPROCEDURE EVALUATION
Anesthesia Pre-Procedure Evaluation    Patient: Abraham Dong   MRN: [id number] : 1960        Procedure : Procedure(s):  ANKLE FRACTURE ORIF          Past Medical History:   Diagnosis Date     Atrial fibrillation (H)      Benign essential hypertension      Coronary artery disease      Depression      Hyperlipidemia      Obesity       Past Surgical History:   Procedure Laterality Date     CORONARY ANGIOGRAPHY ADULT ORDER       CV CORONARY ANGIOGRAM N/A 2021    Procedure: Coronary Angiogram;  Surgeon: Radha Ferrell MD;  Location: [facility]     CV LEFT HEART CATH N/A 2021    Procedure: Left Heart Cath;  Surgeon: Radha Ferrell MD;  Location: [facility]     CV LEFT VENTRICULOGRAM N/A 2021    Procedure: Left Ventriculogram;  Surgeon: Radha Ferrell MD;  Location: [facility]     EP ICD INSERT BIVENT N/A 2024    Procedure: Implantable Cardioverter Defibrillator Device & Lead Implant Biventricular;  Surgeon: El Hagen MD;  Location: [facility]     EP PACEMAKER N/A 2021    Procedure: EP Pacemaker;  Surgeon: Kun Lopez MD;  Location: [facility]     EP PPM GENERATOR REMOVAL N/A 2024    Procedure: Implantable Cardioverter Defibrillator Device & Lead Implant Biventricular;  Surgeon: El Hagen MD;  Location: [facility]     ESOPHAGOSCOPY, GASTROSCOPY, DUODENOSCOPY (EGD), COMBINED N/A 2024    Procedure: ESOPHAGOGASTRODUODENOSCOPY WITH BIOPSIES;  Surgeon: Jose Perales MD;  Location: [facility] OR     HERNIA REPAIR       IMPLANT PACEMAKER       KNEE SURGERY       LEAD REMOVAL FOR DUAL OR BI-VENTRICULAR PACEMAKER        N/A 2024    Procedure: Pacemaker Lead Removal - Dual or Bi-ventricular;  Surgeon: El Hagen MD;  Location: [facility]     OR LASER LEAD EXTRACTION - LEVEL 2 (STANDBY) N/A 2024    Procedure: Or Laser Lead Extraction - Level 2 (Standby);   Surgeon: Sulma Olivarez MD;  Location: [facility] CATH LAB CV     TONSILLECTOMY       VASECTOMY        No Known Allergies   Social History     Tobacco Use     Smoking status: Never     Smokeless tobacco: Never   Substance Use Topics     Alcohol use: Yes     Alcohol/week: 6.0 standard drinks of alcohol     Types: 6 Standard drinks or equivalent per week     Comment: 7 drinks per week      Wt Readings from Last 1 Encounters:   07/23/24 115.2 kg (253 lb 15.5 oz)        Anesthesia Evaluation   Pt has had prior anesthetic. Type: General.    History of anesthetic complications       ROS/MED HX  ENT/Pulmonary:       Neurologic:       Cardiovascular: Comment: 7/22/24   The left ventricle is normal in size.  Left ventricular function is normal.The ejection fraction is 60-65%. Moderate  to severe assymetric septal hypertrophy. No significant LVOT obstruction.  Normal right ventricle size and systolic function.  There is a pacemaker lead in the right ventricle.  The left atrium is moderately dilated.  No hemodynamically significant valvular abnormalities on 2D or color flow  imaging.  Mildly dilated aortic root.  Compared to prior study, there is no significant change    Device: [employer], Cobalt (D) ICD  Pacing %/Programmed: AP- 86.6%, - 15.5%,  AAIR-DDDR 60/130   Lead(s): Stable  Presenting Rhythm: AP/VS @ 63 bpm  Underlying Rhythm: SB @ 40's        (+) Dyslipidemia hypertension- -  CAD -  - -   Taking blood thinners   CHF        pacemaker,     ICD     dysrhythmias, a-fib,             METS/Exercise Tolerance:     Hematologic:       Musculoskeletal:       GI/Hepatic:       Renal/Genitourinary:       Endo:     (+)               Obesity,       Psychiatric/Substance Use:     (+) psychiatric history depression       Infectious Disease:       Malignancy:       Other:            Physical Exam    Airway        Mallampati: II   TM distance: > 3 FB   Neck ROM: full     Respiratory Devices and Support         Dental       (+) Minor  "Abnormalities - some fillings, tiny chips      Cardiovascular   cardiovascular exam normal          Pulmonary   pulmonary exam normal            Other findings: EKG noted inverted T waves, ST depression in PREOP. EKG identical to one recorded in April. No sx of CP, SOB.   OUTSIDE LABS:  CBC:   Lab Results   Component Value Date    WBC 7.1 07/22/2024    WBC 9.5 07/21/2024    HGB 9.0 (L) 07/23/2024    HGB 9.7 (L) 07/22/2024    HCT 28.5 (L) 07/22/2024    HCT 31.0 (L) 07/21/2024     07/22/2024     07/21/2024     BMP:   Lab Results   Component Value Date     07/22/2024     07/21/2024    POTASSIUM 4.0 07/22/2024    POTASSIUM 4.4 07/21/2024    CHLORIDE 101 07/22/2024    CHLORIDE 101 07/21/2024    CO2 23 07/22/2024    CO2 23 07/21/2024    BUN 22.4 07/22/2024    BUN 18.2 07/21/2024    CR 1.23 (H) 07/22/2024    CR 0.98 07/21/2024    GLC 93 07/23/2024    GLC 92 07/22/2024     COAGS:   Lab Results   Component Value Date    PTT 27 12/02/2021    INR 1.05 12/02/2021     POC: No results found for: \"BGM\", \"HCG\", \"HCGS\"  HEPATIC:   Lab Results   Component Value Date    ALBUMIN 4.0 12/23/2021    PROTTOTAL 7.0 12/23/2021    ALT 35 12/23/2021    AST 28 12/23/2021    ALKPHOS 47 12/23/2021    BILITOTAL 0.4 12/23/2021     OTHER:   Lab Results   Component Value Date    TEGAN 8.7 (L) 07/22/2024    MAG 2.1 07/21/2024    TSH 9.52 (H) 07/22/2024    T4 0.90 07/22/2024       Anesthesia Plan    ASA Status:  4    NPO Status:  NPO Appropriate    Anesthesia Type: General.     - Airway: Mask Only   Induction: Propofol, Intravenous.   Maintenance: TIVA.        Consents    Anesthesia Plan(s) and associated risks, benefits, and realistic alternatives discussed. Questions answered and patient/representative(s) expressed understanding.     - Discussed: Risks, Benefits and Alternatives for BOTH SEDATION and the PROCEDURE were discussed     - Discussed with:  Patient      - Extended Intubation/Ventilatory Support Discussed: No.      - " Patient is DNR/DNI Status: No     Use of blood products discussed: No .     Postoperative Care    Pain management: IV analgesics, Oral pain medications, Peripheral nerve block (Single Shot), Multi-modal analgesia.   PONV prophylaxis: Ondansetron (or other 5HT-3)     Comments:    Other Comments: Sciatic and saphenous nerve blocks for POP per surgeon request.  Decadron, Zofran.  Diprivan infusion.  Ketamine (0.5 mg/kg).           Good Ken MD    I have reviewed the pertinent notes and labs in the chart from the past 30 days and (re)examined the patient.  Any updates or changes from those notes are reflected in this note.

## 2024-07-23 NOTE — ANESTHESIA POSTPROCEDURE EVALUATION
Patient: Abraham Dong    Procedure: Procedure(s):  OPEN REDUCTION INTERNAL FIXATION, FRACTURE, ANKLE       Anesthesia Type:  General    Note:  Disposition: Inpatient   Postop Pain Control: Uneventful            Sign Out: Well controlled pain   PONV: No   Neuro/Psych: Uneventful            Sign Out: Acceptable/Baseline neuro status   Airway/Respiratory: Uneventful            Sign Out: Acceptable/Baseline resp. status   CV/Hemodynamics: Uneventful            Sign Out: Acceptable CV status; No obvious hypovolemia; No obvious fluid overload   Other NRE: NONE   DID A NON-ROUTINE EVENT OCCUR? No           Last vitals:  Vitals:    07/23/24 1245 07/23/24 1445 07/23/24 1510   BP:  (!) 145/79 133/66   Pulse: 60 61 60   Resp: (!) 32  16   Temp:   36.6  C (97.8  F)   SpO2: 95% 98% 97%       Electronically Signed By: Good Ken MD  July 23, 2024  3:58 PM

## 2024-07-23 NOTE — ANESTHESIA CARE TRANSFER NOTE
Patient: Abraham Dong    Procedure: Procedure(s):  OPEN REDUCTION INTERNAL FIXATION, FRACTURE, ANKLE       Diagnosis: Closed fracture of proximal end of left fibula, unspecified fracture morphology, initial encounter [S84.927G]  Diagnosis Additional Information: No value filed.    Anesthesia Type:   General     Note:    Oropharynx: oropharynx clear of all foreign objects and spontaneously breathing  Level of Consciousness: awake  Oxygen Supplementation: room air    Independent Airway: airway patency satisfactory and stable  Dentition: dentition unchanged  Vital Signs Stable: post-procedure vital signs reviewed and stable  Report to RN Given: handoff report given  Patient transferred to: Telemetry/Step Down Unit    Handoff Report: Identifed the Patient, Identified the Reponsible Provider, Reviewed the pertinent medical history, Discussed the surgical course, Reviewed Intra-OP anesthesia mangement and issues during anesthesia, Set expectations for post-procedure period and Allowed opportunity for questions and acknowledgement of understanding      Vitals:  Vitals Value Taken Time   /74    Temp 98    Pulse 60    Resp     SpO2 99        Electronically Signed By: RAGINI Morales CRNA  July 23, 2024  2:38 PM

## 2024-07-23 NOTE — PROGRESS NOTES
"Imp/plan:     Post-op patient had a 9 beat run of wide complex rhythm per telemetry. Patient denied chest pain, shortness of breath, dizziness, lightheadedness, palpitations, syncope, or pre-syncope during this episode. On review of telemetry,  possibly short run of Afib with RVR given irregularity and loss of p-waves. Repeat 12 lead EKG no chnage. Will continue to monitor. Otherwise, continue with previous plan of care.   HCM with no gradient, s/p ICD, with orthostatic hypotension causing syncopal events. Continue metoprolol 100 mg bid.  Afib - in sinus rhythm. Resume anticoagulation with Eliquis for Afib when ok per GI and ortho. Outpatient discussion of LAAO.  CAD mild dz, cont statin/zetia - goal LDL  <70  Elevated LVEDP due to diastolic dysfunction from HCM presumably - consider spironolactone and SGLT2 inhibitor      If no issues overnight from cardiac perspective will sign off in AM    Review of Systems: 12 points negative other than above    BP (!) 141/68   Pulse 61   Temp 97.8  F (36.6  C) (Oral)   Resp 16   Ht 1.854 m (6' 1\")   Wt 115.2 kg (253 lb 15.5 oz)   SpO2 97%   BMI 33.51 kg/m    Lungs: clear  Cardiac: RRR no c,r,m  Ext: no edema    Lab Results   Component Value Date    HGB 9.0 (L) 07/23/2024     Lab Results   Component Value Date     07/22/2024       Yaakov Adkins, MS4    "

## 2024-07-23 NOTE — OP NOTE
PATIENT: Abraham Dong  MR# :   [id number]  DATE OF OPERATION: 07/23/24    SURGEON:  Hector Ratliff MD     ASSISTANT: None      Preoperative diagnosis:   Left proximal fibula fracture with syndesmotic injury (maisoneuve fracture)  Left AITFL avulsion   Left deltoid ligament injury     Postoperative diagnosis:   1. Left proximal fibula fracture with syndesmotic injury (maisoneuve fracture)  2. Left AITFL avulsion   2. Left deltoid ligament injury     Surgical procedure:   Left ankle syndesmosis open reduction internal fixation   Repair Left AIFTL avulsion   Left deltoid ligament repair    Anesthesia:  MAC with regional block    Drains: None    Estimated blood loss: 10 cc    IV fluids: Please see Anesthesia Report    Complications: None identified intraoperatively     Blood products: none given     Specimens: * No specimens in log *    Findings:   Obvious syndesmotic disruption with gross instability of the fibula. AITFL avulsion off the distal fibula. Avulsion of the superficial deltoid ligament.     COMPONENTS IMPLANTED:  Implant Name Type Inv. Item Serial No.  Lot No. LRB No. Used Action   IMP SYNDESMOSIS TIGHTROPE XP TI AR-8925T - [id number] Metallic Hardware/[place] IMP SYNDESMOSIS TIGHTROPE XP TI AR-8925T  ARTH[employer]X [id number] Left 1 Implanted   IMP SYNDESMOSIS TIGHTROPE XP TI AR-8925T - [id number] Metallic Hardware/[place] IMP SYNDESMOSIS TIGHTROPE XP TI AR-8925T  ARTHREX [id number] Left 1 Implanted   ANCHOR SUT 2 DX FIBERTAK NDL - [id number] Metallic Hardware/[place] ANCHOR SUT 2 DX FIBERTAK NDL  ARTHREX [id number] Left 1 Implanted   LOCKING THIRD TUBULAR PLATE TI 4H AR-9943T-04 - [id number] Metallic Hardware/[place] LOCKING THIRD TUBULAR PLATE TI 4H AR-9943T-04  ARTHREX N/A Left 1 Implanted   ANCHOR SUT 2 DX FIBERTAK NDL - [id number] Metallic Hardware/[place] ANCHOR SUT 2 DX FIBERTAK NDL  ARTHREX [id number] Left 1 Implanted   IMP SCR ARTHREX CORTICAL [id number] AR-8935-14 - [id number] Metallic Hardware/[place]  IMP SCR ARTHREX CORTICAL [id number] AR-8935-14  ARTHREX N/A Left 1 Implanted   IMP SCR ARTHREX CAN 4.0X14MM AR-8940-14 - [id number] Metallic Hardware/[place] IMP SCR ARTHREX CAN 4.0X14MM AR-8940-14  ARTHREX N/A Left 1 Implanted       Tourniquet time: 54 minutes    INDICATIONS:    Abraham Dong is a 64 year old male admitted for a Left proximal fibula fracture and ankle injury he sustained after ground-level fall.    Preoperatively, the nature of the procedure, risks and benefits, as well as alternatives including nonsurgical management were discussed in detail with the patient. I reviewed and discussed the patient's condition and relevant images with the patient. We discussed options for further evaluation and treatment, including conservative non-operative management versus surgical intervention.  Given the unstable nature of the fracture, I recommended surgical intervention.    We also discussed at length the risks and benefits of surgery. Our discussion included but was not limited to the risk of pain, bleeding, infection, blood clots (DVT, PE), wound issues, continued cpain, post-operative joint stiffness, painful arc of motion, difficulty with ambulation, damage to nearby neurovascular structures, and need for further surgeries. The possibility of intra-operative and/or post-operative medical complications such as anesthesia complications or reactions, respiratory and cardiovascular events, stroke, heart attack and/or death were also discussed.     Specific details of the surgical procedure, hospitalization, recovery, rehabilitation, and long-term precautions were also presented. The final choices will be made at the time of procedure to match the anatomy and condition of the bone, ligaments, tendons, and muscles. All of patients questions were answered preoperatively at which time informed consent was taken.      PROCEDURE:    After proper identification of the patient including verification and marking of the  surgical site, the patient was brought to the operating room. MAC anesthesia was given without complications and prophylactic IV Ancef was confirmed to have been administered within the appropriate timeframe(s). The patient was placed in the supine position with All bony prominences were well padded.  The surgical site which was properly marked, was then prepped and draped in the usual sterile fashion. A timeout was done utilizing protocol to again identify the correct patient and operative site, which was marked appropriately.  The lower leg was exsanguinated and a thigh tourniquet was inflated.    I began by making a longitudinal incision in line with the distal fibula.  I did encounter the superficial peroneal nerve which was protected for the entirety of the case.  Upon dissection down to the distal fibula, I was able to see the obvious disruption of the syndesmosis.  There was also an avulsion of the AITFL off the distal fibula.  I was able to see directly into the ankle joint.  I did copiously irrigate the ankle joint out with normal saline.  At this point, I performed a manual reduction of the fibula within the syndesmosis as well as direct visualization of the fibula reduction over the anterior incisura.  I then placed a small 4 hole one third tubular plate along the distal fibula and secured it proximally with a 3.5 mm cortical screw and distally with a 4.0 cancellous screw.  I then proceeded to drill and placed 2 Arthrex tight ropes, drilling from approximately 30 degrees posterior to anterior with the ankle held in neutral dorsiflexion.  Upon tightening of these tight ropes, this did appropriately reduce the syndesmosis.  Repeat external rotation stress test and cotton test were performed which did not show any evidence of syndesmotic widening.  Upon the external rotation stress test however, I did notice widening of the medial clear space.  At this point, I did make the decision to place an anchor into  the distal fibula to repair the AITFL avulsion.  An Arthrex knotless suture anchor was drilled into the anterior aspect of the distal fibula at the anatomic footprint of the ATFL.  The stitch was then passed through the ligament in horizontal mattress fashion.  With the ankle held in neutral dorsiflexion, the stitch was then secured down to the bone.    I then turned my attention to the medial side of the ankle.  A J type curvilinear incision was made over the medial ankle.  The saphenous neurovasculature was protected throughout this approach.  I was able to identify an obvious avulsion of the superficial deltoid ligament.  I then proceeded to place another Arthrex knotless anchor within the medial malleolus near the footprint of the deltoid ligament.  A suture was passed in horizontal mattress fashion through the deltoid and the deltoid was secured down to the footprint.  I then oversewed this repair with a 2-0 Vicryl suture.  Repeat stress test did not show any medial clear space widening.  Wounds were then copiously irrigated.  Final AP and lateral x-rays were taken.  Incisions were closed with 2-0 Vicryl, 3-0 Monocryl and 3-0 nylon sutures.  The tourniquet was let down.  A soft sterile dressing was placed followed by well-padded posterior slab splint.      The patient was then awakened from anesthesia and transferred the PACU in stable condition.    Sponge, needle, and instrument counts were correct at the conclusion of the procedure. The patient has palpable distal pulses in both lower extremity.     Postoperative Plan:  Pain Control: Continue per pain protocol.  Weight Bearing: Non weight bearing on affected lower extremity x 6 weeks  DVT Prophylaxis: OK to restart home Eliquis from an orthopedic standpoint on postop day 1.  Antibiotics: 24 hours of perioperative antibiotics  GI: Plan for aggressive bowel regimen to prevent constipation from narcotic medications  Lines: HLIV once tolerating PO  PT/OT: [facility]  and treatment. Will follow up on recommendations.  Follow up:  in 2 weeks in clinic for wound check  Discharge plan: to home vs. rehab pending progress with PT.    Dispo: stable to pacu    Hector Ratliff MD  Orthopedic Surgery  [facility]

## 2024-07-23 NOTE — PROGRESS NOTES
Pts mother Pat will be waiting for pt in surgical waiting room. Would like update when pt done with procedure. Number in pts chart.

## 2024-07-24 ENCOUNTER — APPOINTMENT (OUTPATIENT)
Dept: OCCUPATIONAL THERAPY | Facility: HOSPITAL | Age: 64
DRG: 981 | End: 2024-07-24
Attending: STUDENT IN AN ORGANIZED HEALTH CARE EDUCATION/TRAINING PROGRAM
Payer: COMMERCIAL

## 2024-07-24 ENCOUNTER — APPOINTMENT (OUTPATIENT)
Dept: PHYSICAL THERAPY | Facility: HOSPITAL | Age: 64
DRG: 981 | End: 2024-07-24
Payer: COMMERCIAL

## 2024-07-24 LAB
ANION GAP SERPL CALCULATED.3IONS-SCNC: 6 MMOL/L (ref 7–15)
ATRIAL RATE - MUSE: 61 BPM
BUN SERPL-MCNC: 15 MG/DL (ref 8–23)
CALCIUM SERPL-MCNC: 8.5 MG/DL (ref 8.8–10.4)
CHLORIDE SERPL-SCNC: 104 MMOL/L (ref 98–107)
CREAT SERPL-MCNC: 0.85 MG/DL (ref 0.67–1.17)
DIASTOLIC BLOOD PRESSURE - MUSE: NORMAL MMHG
EGFRCR SERPLBLD CKD-EPI 2021: >90 ML/MIN/1.73M2
GLUCOSE SERPL-MCNC: 121 MG/DL (ref 70–99)
HCO3 SERPL-SCNC: 23 MMOL/L (ref 22–29)
HGB BLD-MCNC: 9 G/DL (ref 13.3–17.7)
HGB BLD-MCNC: 9.1 G/DL (ref 13.3–17.7)
INTERPRETATION ECG - MUSE: NORMAL
P AXIS - MUSE: -2 DEGREES
POTASSIUM SERPL-SCNC: 4 MMOL/L (ref 3.4–5.3)
PR INTERVAL - MUSE: 246 MS
QRS DURATION - MUSE: 152 MS
QT - MUSE: 442 MS
QTC - MUSE: 444 MS
R AXIS - MUSE: -15 DEGREES
SODIUM SERPL-SCNC: 133 MMOL/L (ref 135–145)
SYSTOLIC BLOOD PRESSURE - MUSE: NORMAL MMHG
T AXIS - MUSE: 155 DEGREES
VENTRICULAR RATE- MUSE: 61 BPM

## 2024-07-24 PROCEDURE — 250N000013 HC RX MED GY IP 250 OP 250 PS 637: Performed by: STUDENT IN AN ORGANIZED HEALTH CARE EDUCATION/TRAINING PROGRAM

## 2024-07-24 PROCEDURE — 97535 SELF CARE MNGMENT TRAINING: CPT | Mod: GO

## 2024-07-24 PROCEDURE — 99233 SBSQ HOSP IP/OBS HIGH 50: CPT | Performed by: STUDENT IN AN ORGANIZED HEALTH CARE EDUCATION/TRAINING PROGRAM

## 2024-07-24 PROCEDURE — 97161 PT EVAL LOW COMPLEX 20 MIN: CPT | Mod: GP

## 2024-07-24 PROCEDURE — 36415 COLL VENOUS BLD VENIPUNCTURE: CPT | Performed by: INTERNAL MEDICINE

## 2024-07-24 PROCEDURE — 120N000004 HC R&B MS OVERFLOW

## 2024-07-24 PROCEDURE — 80048 BASIC METABOLIC PNL TOTAL CA: CPT | Performed by: INTERNAL MEDICINE

## 2024-07-24 PROCEDURE — 99231 SBSQ HOSP IP/OBS SF/LOW 25: CPT | Performed by: INTERNAL MEDICINE

## 2024-07-24 PROCEDURE — 97530 THERAPEUTIC ACTIVITIES: CPT | Mod: GP

## 2024-07-24 PROCEDURE — 97116 GAIT TRAINING THERAPY: CPT | Mod: GP

## 2024-07-24 PROCEDURE — 97165 OT EVAL LOW COMPLEX 30 MIN: CPT | Mod: GO

## 2024-07-24 PROCEDURE — 250N000013 HC RX MED GY IP 250 OP 250 PS 637: Performed by: PHYSICIAN ASSISTANT

## 2024-07-24 PROCEDURE — 85018 HEMOGLOBIN: CPT | Performed by: INTERNAL MEDICINE

## 2024-07-24 PROCEDURE — 250N000011 HC RX IP 250 OP 636: Mod: JZ | Performed by: STUDENT IN AN ORGANIZED HEALTH CARE EDUCATION/TRAINING PROGRAM

## 2024-07-24 RX ORDER — OXYCODONE HYDROCHLORIDE 5 MG/1
5 TABLET ORAL EVERY 4 HOURS PRN
Status: DISCONTINUED | OUTPATIENT
Start: 2024-07-24 | End: 2024-07-25 | Stop reason: HOSPADM

## 2024-07-24 RX ORDER — OXYCODONE HYDROCHLORIDE 5 MG/1
10 TABLET ORAL EVERY 4 HOURS PRN
Status: DISCONTINUED | OUTPATIENT
Start: 2024-07-24 | End: 2024-07-25 | Stop reason: HOSPADM

## 2024-07-24 RX ORDER — ACETAMINOPHEN 325 MG/1
975 TABLET ORAL 3 TIMES DAILY PRN
Qty: 100 TABLET | Refills: 0 | Status: SHIPPED | OUTPATIENT
Start: 2024-07-24

## 2024-07-24 RX ORDER — AMOXICILLIN 250 MG
1 CAPSULE ORAL 2 TIMES DAILY PRN
Qty: 30 TABLET | Refills: 0 | Status: SHIPPED | OUTPATIENT
Start: 2024-07-24

## 2024-07-24 RX ADMIN — ATORVASTATIN CALCIUM 80 MG: 40 TABLET, FILM COATED ORAL at 08:54

## 2024-07-24 RX ADMIN — ACETAMINOPHEN 975 MG: 325 TABLET ORAL at 03:18

## 2024-07-24 RX ADMIN — EZETIMIBE 10 MG: 10 TABLET ORAL at 08:56

## 2024-07-24 RX ADMIN — PANTOPRAZOLE SODIUM 40 MG: 40 TABLET, DELAYED RELEASE ORAL at 17:16

## 2024-07-24 RX ADMIN — METOPROLOL SUCCINATE 100 MG: 100 TABLET, EXTENDED RELEASE ORAL at 20:31

## 2024-07-24 RX ADMIN — CEFAZOLIN SODIUM 2 G: 2 INJECTION, SOLUTION INTRAVENOUS at 06:07

## 2024-07-24 RX ADMIN — OXYCODONE HYDROCHLORIDE 5 MG: 5 TABLET ORAL at 14:13

## 2024-07-24 RX ADMIN — PANTOPRAZOLE SODIUM 40 MG: 40 TABLET, DELAYED RELEASE ORAL at 06:12

## 2024-07-24 RX ADMIN — SERTRALINE HYDROCHLORIDE 100 MG: 100 TABLET ORAL at 08:56

## 2024-07-24 RX ADMIN — METOPROLOL SUCCINATE 100 MG: 100 TABLET, EXTENDED RELEASE ORAL at 08:56

## 2024-07-24 ASSESSMENT — ACTIVITIES OF DAILY LIVING (ADL)
ADLS_ACUITY_SCORE: 25

## 2024-07-24 NOTE — PLAN OF CARE
"Goal Outcome Evaluation:             Patient is alert and oriented.  At midnight, he denied pain in left leg.  Dressing covering surgical area intact, some sanguinous drainage noted, outline marked.  No change to area during shift.  Temp at midnight was 100.4, patient declined tylenol, saying he felt fine and would be willing to take it with second set of vital signs if temperature was still high.  MD updated, told staff to document refusal, made sure there were antibiotics ordered post surgery.    After 0300, vital signs rechecked and temperature was 100.3.  Patient took tylenol and agreed to remove some blankets.  Patient also reported some pain in left leg, 5/10 but said it was manageable and though he took tylenol declined offer for stronger pain medication.  Also declined ice pack.  Left foot kept elevated on 2 pillows as ordered.  Temperature rechecked at 0600 for 98.8.    Patient wanted to walk to restroom to void.  Staff reminded patient he was non weight bearing in left leg.  Walker brought to room, as patient continued to walk to restroom and sat on toilet.  Will continue to monitor.       Patient expressed desire to discharge today, says he has had enough of this place.  Day shift nurse updated.  Problem: Adult Inpatient Plan of Care  Goal: Plan of Care Review  Description: The Plan of Care Review/Shift note should be completed every shift.  The Outcome Evaluation is a brief statement about your assessment that the patient is improving, declining, or no change.  This information will be displayed automatically on your shift  note.  Outcome: Progressing  Goal: Patient-Specific Goal (Individualized)  Description: You can add care plan individualizations to a care plan. Examples of Individualization might be:  \"Parent requests to be called daily at 9am for status\", \"I have a hard time hearing out of my right ear\", or \"Do not touch me to wake me up as it startles  me\".  Outcome: Progressing  Goal: Absence of " Hospital-Acquired Illness or Injury  Intervention: Identify and Manage Fall Risk  Recent Flowsheet Documentation  Taken 7/24/2024 0010 by Lisa Dukes RN  Safety Promotion/Fall Prevention:   activity supervised   nonskid shoes/slippers when out of bed   lighting adjusted   increased rounding and observation   clutter free environment maintained   increase visualization of patient   supervised activity   safety round/check completed  Intervention: Prevent Skin Injury  Recent Flowsheet Documentation  Taken 7/24/2024 0010 by Lisa Dukes RN  Body Position: position changed independently  Goal: Optimal Comfort and Wellbeing  Intervention: Monitor Pain and Promote Comfort  Recent Flowsheet Documentation  Taken 7/24/2024 0418 by Lisa Dukes RN  Pain Management Interventions: (declined offer for ice pack) medication offered but refused  Taken 7/24/2024 0318 by Lisa Dukes RN  Pain Management Interventions: (patient declined stronger pain medication offered) medication (see MAR)  Intervention: Provide Person-Centered Care  Recent Flowsheet Documentation  Taken 7/24/2024 0010 by Lisa Dukes RN  Trust Relationship/Rapport: care explained  Goal: Readiness for Transition of Care  Outcome: Progressing

## 2024-07-24 NOTE — PROGRESS NOTES
"Care Management Follow Up    Length of Stay (days): 3    Expected Discharge Date: 07/26/2024    Anticipated Discharge Plan:   home with home care     Transportation: Anticipate Family/friend    PT Recommendations: Transitional Care Facility  OT Recommendations:  Transitional Care Facility     Barriers to Discharge: medical stability    Prior Living Situation: house with alone     Patient/Spokesperson Updated: Yes. Who? Patient     Additional Information:  SW met with patient at bedside, introduced self and CM role. Discussed therapy recommendations for transitional care. Pt declines TCU. SW provided education related to TCU stay and rational for recommendation based on therapy evaluations. Patient continues to decline TCU. Pt reports he does not have anyone who will be able to stay with him to provide assist, but reports he has support to \"periodically check in\".  Discussed home care as a secondary option. Pt agreeable to this and denies any agency preference. Referral sent to [facility] for PT/OT/RN.    Reviewed sticky note indicating potential financial concerns. SW met with pt to address and discuss. Pt denies any concerns related to finances or health insurance. SW encouraged pt to reach out if needs arise.     SW walking past pt's room and pt shouted for SW to come in. SW met with pt. Pt reports he spoke with his ex-wife who brought up concern about pt being able to pay co-pays for all of the follow up appointments he will have. Pt unsure of co-pay for doctor visits. Pt reports being retired and only source of income is social security. Due to income limit, pt would likely not qualify for medical assistance at this time, however SW provided education regarding medical assistance, possible spend down. Provided information in pt's discharge paperwork as well as list of low cost pharmacies and low cost clinics.    Home care referral accepted by [facility].     BERNY Reynoso      "

## 2024-07-24 NOTE — PROGRESS NOTES
Physical Therapy        07/24/24 0900   Appointment Info   Signing Clinician's Name / Credentials (PT) DANIEL Brown   Student Supervision Therapy services provided with the co-signing licensed therapist guiding and directing the services, and providing the skilled judgement and assessment throughout the session;Direct Patient Contact Provided   Living Environment   People in Home alone   Current Living Arrangements house   Home Accessibility no concerns   Living Environment Comments flat entrance, no stairs   Self-Care   Usual Activity Tolerance good   Current Activity Tolerance good   Equipment Currently Used at Home none   Fall history within last six months yes   Number of times patient has fallen within last six months 4   Activity/Exercise/Self-Care Comment independent with mobility, ADLs, IADLs, drving; can work from home   General Information   Onset of Illness/Injury or Date of Surgery 07/23/24   Referring Physician Hector Ratliff MD   Patient/Family Therapy Goals Statement (PT) Pt would like to go home   Pertinent History of Current Problem (include personal factors and/or comorbidities that impact the POC) 64 year old male with PMHx of hypertrophic CM, Afib on Eliquis, HTN, MDD who presents 2 days after a syncopal event led to a fall and left ankle pain/swelling. Noted to have an acute anemia with melena. s/p left ankle ORIF 7/23/24   Existing Precautions/Restrictions fall   Weight-Bearing Status - LLE nonweight-bearing   Cognition   Safety Deficit (Cognition) impulsivity   Cognitive Status Comments A&Ox4   Pain Assessment   Patient Currently in Pain No   Range of Motion (ROM)   Range of Motion ROM is WFL   Strength (Manual Muscle Testing)   Strength (Manual Muscle Testing) Deficits observed during functional mobility   Bed Mobility   Bed Mobility supine-sit   Supine-Sit [place] (Bed Mobility) supervision;1 person assist   Transfers   Transfers sit-stand transfer   Maintains Weight-bearing Status  (Transfers) verbal cues to maintain   Sit-Stand Transfer   Sit-Stand [place] (Transfers) contact guard;1 person assist   Assistive Device (Sit-Stand Transfers) walker, front-wheeled   Gait/Stairs (Locomotion)   [place] Level (Gait) contact guard;1 person assist   Assistive Device (Gait) walker, front-wheeled   Distance in Feet (Gait) 2'   Maintains Weight-bearing Status (Gait) verbal cues to maintain   Comment, (Gait/Stairs) No stairs at home   Balance   Balance no deficits were identified   Balance Comments Maintains standing balance w/ FWW and cues to maintain NWB precaution of LLE   Clinical Impression   Criteria for Skilled Therapeutic Intervention Yes, treatment indicated   PT Diagnosis (PT) Impaired transfers and ambulation   Influenced by the following impairments s/p left ankle ORIF, NWB precaution of LLE, impaired strength   Functional limitations due to impairments Home environment navigation   Clinical Presentation (PT Evaluation Complexity) stable   Clinical Presentation Rationale Presents as medically diagnosed   Clinical Decision Making (Complexity) low complexity   Planned Therapy Interventions (PT) balance training;bed mobility training;gait training;home exercise program;patient/family education;strengthening;transfer training;progressive activity/exercise;risk factor education   Risk & Benefits of therapy have been explained evaluation/treatment results reviewed;care plan/treatment goals reviewed;participants voiced agreement with care plan;participants included;patient   PT Total Evaluation Time   PT Eval, Low Complexity Minutes (77235) 10   Physical Therapy Goals   PT Frequency Daily   PT Predicted Duration/Target Date for Goal Attainment 07/31/24   PT Goals Bed Mobility;Transfers;Gait   PT: Bed Mobility Independent;Supine to/from sit;Within precautions   PT: Transfers Supervision/stand-by assist;Sit to/from stand;Assistive device;Within precautions   PT: Gait Supervision/stand-by  assist;Rolling walker;Within precautions;25 feet   Interventions   Interventions Quick Adds Gait Training;Therapeutic Activity   Therapeutic Activity   Therapeutic Activities: dynamic activities to improve functional performance Minutes (27830) 10   Symptoms Noted During/After Treatment Dizziness   Treatment Detail/Skilled Intervention Educated pt about NWB precaution of LLE during sitting and w/ mobility and ambulation. Demonstrated hand sequence and maintaing NWB precaution for sit<>stand to FWW which pt demonstrates x2 w/ CGA and cues to have LLE extend to maintain NWB. Instructed pt for bed>chair transfer w/ CGA, pt reports of dizziness which improves w/ rest. Pt in chair w/ alarm and button w/in reach.   Gait Training   Gait Training Minutes (00459) 8   Symptoms Noted During/After Treatment (Gait Training) fatigue   Treatment Detail/Skilled Intervention Instructed pt on amb w/ FWW while maintaining NWB precaution of LLE, cues for short steps w/ FWW to reduce fall risk w/ amb. Frequent cues for patient to maintain NWB precaution w/ amb, instruct to have LLE hip flx or LLE extended. Educated pt on appropriate AD for pt s/p surgery, indicating pt would not be appropriate for a knee scooter for mobility because of injury.   Distance in Feet 18'   [place] Level (Gait Training) contact guard   Physical Assistance Level (Gait Training) supervision;verbal cues;1 person assist   Weight Bearing (Gait Training) nonweight-bearing  (LLE)   Assistive Device (Gait Training) rolling walker   Pattern Analysis (Gait Training) 2-point gait   PT Discharge Planning   PT Plan sit<>stand transfers, short amb w/ FWW, maintain NWB precaution w/ mobility   PT Discharge Recommendation (DC Rec) Transitional Care Facility   PT Rationale for DC Rec Pt lives alone and requires assist of 1 w/ mobility. Pt has a history of sudden falls w/ mobility and would be unsafe to return home alone (4 w/in the past 6 months). Pt would benefit from  more therapy at TCU to progress towards independence w/ FWW and new NWB precuation of LLE. Pending progress, may d/c home w/ home therapy to assess safety w/ home mobility and assistance w/ IADLs.   PT Brief overview of current status CGA w/ transfers and ambulation, amb 10' w/ FWW and CGA   PT Equipment Needed at Discharge walker, rolling   Total Session Time   Timed Code Treatment Minutes 18   Total Session Time (sum of timed and untimed services) 28       Teresa Lawton, DPT 7/24/2024

## 2024-07-24 NOTE — DISCHARGE INSTRUCTIONS
Low Cost Options for Prescription Drugs:    - Partnership for Prescription Assistance  This Internet resource states it is a  single point of access to more than 475 public and private patient assistance programs, including nearly 200 offered by pharmaceutical companies.  It provides information to access over 2500 prescription drugs. From the home page click on  Get Help  to see the programs that you may qualify for, the products covered by the programs, and to download applications for the programs. Click on  About Us  to see a directory of the companies offering prescription drugs and their programs. There you will also see a Frequently Asked Questions section which provides much information about how the programs work and who is eligible. Help is also available by phone to ask if you are eligible. This site also has a Free/Low Cost Clinic Finder when you enter your zip code. The site also lists copayment programs which provide financial assistance for certain health care costs to patients who qualify financially and medically.  Call: 1-327.174.8146  Visit: www.JamHubx.org/Intro.php     - [employer]SecondLeap  This resource provides information on programs that offer lower priced drugs. Under Patient Assistance Programs, click on Generic Name Drugs or Brand Name Drugs to see if your drug is listed and which programs may be available to you. Click on Company List for a list of companies that have and do not have prescription assistance programs. The web site includes information on disease-based assistance, free/low cost clinics in your area, and discount drug cards.  Visit: www.RoomReveal     - RxAssist  You must register on-line to use their site to locate patient assistance programs. Search their database of prescription drugs by drug name or the name of the company that manufactures your drug. Some requests may be completed on line. Their web site is updated daily and they state they are  the Web s most current and  comprehensive directory of patient assistance programs.  The  Frequently Asked Questions about Patient Assistance Programs  section provides a more detailed explanation of how these programs work. There are eligibility requirements. Sponsored by [employer] and supported by the [employer].  Call: 1-847.977.6158 or 788-500-2124  Visit: www.rxassist.org     - RxOutreach  [facility] provides prescription drugs to people who are uninsured and to people who have limited prescription drug coverage. It is a fully-licensed mail order pharmacy offering lower prices for over 400 medications. Prescriptions are mailed to your home. There is an income limit to qualify.    Call: 1-942.351.5415 Internet: www.Sur[employer] Media     - Tip or Skip RX Access Program  This prescription savings program is sponsored by 10 pharmaceutical companies. You apply for a free card and get a savings of 25-40% on some brand-name prescriptions and certain products. The amount you save depends on the prescription drug purchased, the quantity purchased, and the pharmacy where you purchase it. Depending on the pharmacy you may save money on generics as well. There is no cost to enroll or use the card. There are eligibility requirements including an income limit. Also, you cannot be eligible for Medicare or have any prescription drug coverage.  Call: 1-596.544.4571 Internet: wwwLikeastore  [employer], [employer] and [employer] have reasonable prices on many meds.       Low Cost Clinics:  [facility] [address] in [place] (ph:[phone])  [facility] (ph: [phone])              [facility] [address] [place] [address] In [address] in [facility] in [facility] (ph: [phone])  [facility] [address] in [place] (ph: [phone])  [facility] in [facility] (ph:  [phone])  [facility] [address] in [place] (ph: [phone])  [facility] Charlene Garciaza [address] in [place] (ph: [phone])        DO YOU NEED HELP APPLYING FOR MEDICAL ASSISTANCE?  [place] offers help to hospital patients applying for Medical Assistance. If you do not have any health insurance at this time, and if you have no income or very low income, you may be eligible for Medical Assistance to help you with your medical bills and ongoing medical coverage.     **Medical Assistance income limit for single adults is approximately $1387 per month.  To apply for Medical Assistance on your own you can go to  mnsure.org  (Tele:1.937.335.9408)     Or for help with applying for Medical Assistance call:  [employer]   [facility]  [phone]

## 2024-07-24 NOTE — PROGRESS NOTES
Occupational Therapy      07/24/24 1020   Appointment Info   Signing Clinician's Name / Credentials (OT) Kristin Lemon OTR/L   Living Environment   People in Home alone   Current Living Arrangements apartment   Home Accessibility no concerns   Transportation Anticipated family or friend will provide   Living Environment Comments No stairs, W/I Shower   Self-Care   Usual Activity Tolerance good   Current Activity Tolerance good   Regular Exercise No   Equipment Currently Used at Home none   Fall history within last six months yes   Number of times patient has fallen within last six months 4   Activity/Exercise/Self-Care Comment ind. w/ mob.   Instrumental Activities of Daily Living (IADL)   IADL Comments Ind. w/ IADL tasks, pt is retired   General Information   Onset of Illness/Injury or Date of Surgery 07/21/24   Referring Physician Ann Padilla MD   Additional Occupational Profile Info/Pertinent History of Current Problem 64 year old male with PMHx of hypertrophic CM, Afib on Eliquis, HTN, MDD who presents 2 days after a syncopal event led to a fall and left ankle pain/swelling. Noted to have an acute anemia with melena Left proximal fibula fracture and ankle injury s/p L. ankle ORIF   Existing Precautions/Restrictions (S)  weight bearing;fall   Left Lower Extremity (Weight-bearing Status) (S)  non weight-bearing (NWB)   General Observations and Info CAM boot in room but has LLE splint on. Per pt report he will be able to wear CAM boot once splint is removed.   Cognitive Status Examination   Orientation Status orientation to person, place and time   Range of Motion Comprehensive   General Range of Motion bilateral upper extremity ROM WNL   Strength Comprehensive (MMT)   General Manual Muscle Testing (MMT) Assessment no strength deficits identified   Bed Mobility   Bed Mobility supine-sit   Supine-Sit [place] (Bed Mobility) supervision   Transfers   Transfers sit-stand transfer   Sit-Stand Transfer    Sit-Stand [place] (Transfers) contact guard   Assistive Device (Sit-Stand Transfers) walker, front-wheeled   Balance   Balance Assessment sit to stand dynamic balance   Sit-to-Stand Balance contact guard   Activities of Daily Living   BADL Assessment/Intervention lower body dressing   Lower Body Dressing Assessment/Training   Position (Lower Body Dressing) edge of bed sitting   Comment, (Lower Body Dressing) supervision fig. 4 tech for RLE. shoe   [place] Level (Lower Body Dressing) shoes/slippers   Clinical Impression   Criteria for Skilled Therapeutic Interventions Met (OT) Yes, treatment indicated   OT Diagnosis decreased ADL ind/act. tolerance.   OT Problem List-Impairments impacting ADL problems related to;activity tolerance impaired;balance;strength;post-surgical precautions   Assessment of Occupational Performance 1-3 Performance Deficits   Identified Performance Deficits dressing, toileting, trnf safety NWB LLE   Planned Therapy Interventions (OT) ADL retraining;balance training;strengthening;transfer training;home program guidelines;progressive activity/exercise   Clinical Decision Making Complexity (OT) problem focused assessment/low complexity   Risk & Benefits of therapy have been explained evaluation/treatment results reviewed;risks/benefits reviewed;patient   OT Total Evaluation Time   OT Eval, Low Complexity Minutes (49877) 10   OT Goals   Therapy Frequency (OT) Daily   OT Predicted Duration/Target Date for Goal Attainment 07/31/24   OT Goals Hygiene/Grooming;Lower Body Dressing;Transfers;Toilet Transfer/Toileting   OT: Hygiene/Grooming supervision/stand-by assist;while standing;within precautions;using adaptive equipment   OT: Lower Body Dressing Supervision/stand-by assist;using adaptive equipment;within precautions;including set-up/clothing retrieval   OT: Transfer Supervision/stand-by assist;with assistive device;within precautions   OT: Toilet Transfer/Toileting Supervision/stand-by  assist;using adaptive equipment;within precautions   Self-Care/Home Management   Self-Care/Home Mgmt/ADL, Compensatory, Meal Prep Minutes (84208) 23   Symptoms Noted During/After Treatment (Meal Preparation/Planning Training) fatigue   Treatment Detail/Skilled Intervention Pt up in bed upon OT arrival pt agreeable to session after increased education/encouragement. Pt ed. on importance of NWB w/ functional trnfs w/in home. Pt reporting he does live alone in one level apartment, OT rec. possible shower chair/GB for safer bathing upon d/c home. Pt has CAM boot w/in room but stated that he cant wear it until his splint on LLE is taken off. Pt SBA bed mobility cues for safety/tech, Pt stating he occasionally has felt dizzy w/ trnfs leading to falls, no dizzines w/ positional changes upon evaluation. pt completed additional STS w/ CGA w/ fww cues for tech/safety pt intially maintaining NWB w/ LLE CGA/Min.A , as pt started to ambulate to bathroom pt started fully putting weight through affected LLE OT ed.and cued on importance of maintaining NWB w/ LLE to ensure pt L. ankle heals properly. Pt unable to maintain NWB despite continuous cues and visual demo on proper tech. Pt ed. after trnf that if he is unable to maintain NWB w/ LLE w/ ambulation/trnfs- pt may need bedside commode. RN notified after session that pt was having increased difficulty w/ WB restrictions despite encouragement. Pt back in bed at end alarm on.   OT Discharge Planning   OT Plan NWB LLE, close trnfs, toileting   OT Discharge Recommendation (DC Rec) Transitional Care Facility   OT Rationale for DC Rec Pt lives alone at baseline, rec. assistx1 for trnfs/self cares at this time d/t NWB LLE- pt having difficulty ind. maintaining NWB w/ LLE. rec. TCU upon dc.   OT Brief overview of current status CGA/Min.A trnfs fww, having difficulty maintaining NWB w/ LLE   Total Session Time   Timed Code Treatment Minutes 23   Total Session Time (sum of timed and  untimed services) 33

## 2024-07-24 NOTE — PROGRESS NOTES
"Orthopedic Progress Note      Assessment: 1 Day Post-Op  S/P Procedure(s):  OPEN REDUCTION INTERNAL FIXATION, FRACTURE, ANKLE     Plan:   - Continue PT/OT  - Weightbearing status: NWB LLE  - Activity: Up with assist and assistive device until independent.  - Anticoagulation: Restart eliquis today but defer to primary/GI if needing to be held still due to GI ulcer.  SCDs, sal stockings and early ambulation.  - Antibiotics: 24 hours periop  - Pain Management; continue current regimen  - Diet: progress diet as tolerated  - Labs: hgb 9.0, transfuse if <7.0. No indication today  - Dressing: Keep dry and intact.  Splint to remain on at all times  - Elevation: Elevate LLE on pillow to keep above the level of the heart as much as possible  - Follow-up: Outpatient follow up in 2 weeks  - Disposition: Anticipate discharge pending work with therapy, medical stability      Subjective:  Pain: moderate  Nausea, Vomiting:  No  Lightheadedness, Dizziness:  No  Neuro:  Patient denies new onset numbness or paresthesias  Fever, chills: No  Chest pain: No  SOB: No    Patient reports feeling well today. Patient reports pain is tolerable with current pain regimen.  Notes some mild numbness in LLE. Has not eaten yet, states he is not hungry but will eat some lunch. Patient voiding and passing gas however no BM. All questions/concerns answered.      Objective:  /64 (BP Location: Right arm)   Pulse 62   Temp 98.8  F (37.1  C) (Oral)   Resp 18   Ht 1.854 m (6' 1\")   Wt 116 kg (255 lb 11.7 oz)   SpO2 96%   BMI 33.74 kg/m      The patient is A&Ox3. Appears comfortable, sitting up in bed  Calves without tenderness, neg Gallo's  Brisk capillary refill in the toes.   Pulses not palpable d/t splint.  Toes warm and well perfused  Sensation intact in toes, slightly reduced sensation in lateral foot  ROM: Appropriately wiggles toes.   Splint C/D/I      No drain     Pertinent Labs   Lab Results: personally reviewed.   Lab Results "   Component Value Date    INR 1.05 12/02/2021     Lab Results   Component Value Date    WBC 7.1 07/22/2024    HGB 9.0 (L) 07/24/2024    HCT 28.5 (L) 07/22/2024    MCV 90 07/22/2024     07/22/2024     Lab Results   Component Value Date     (L) 07/24/2024    CO2 23 07/24/2024         Report completed by:  KHADAR JUAREZ PA-C  Date: 07/24/2024  [facility]

## 2024-07-24 NOTE — PROGRESS NOTES
"Imp/plan:  Post-op patient doing well no complaints  HCM with no gradient, s/p ICD. Continue metoprolol 100 mg bid.  Afib - in sinus rhythm. Resume anticoagulation with Eliquis for Afib when ok per GI and ortho. Outpatient discussion of LAAO.  CAD mild dz, cont statin/zetia - goal LDL  <70  Elevated LVEDP due to diastolic dysfunction from HCM presumably - consider spironolactone and SGLT2 inhibitor     Follow up with Dr. Rojas in Cardiology     will sign off     Review of Systems: 12 points negative other than above    /64 (BP Location: Right arm)   Pulse 62   Temp 98.8  F (37.1  C) (Oral)   Resp 18   Ht 1.854 m (6' 1\")   Wt 116 kg (255 lb 11.7 oz)   SpO2 96%   BMI 33.74 kg/m        Lab Results   Component Value Date    HGB 9.0 (L) 07/24/2024     Lab Results   Component Value Date     07/22/2024     No results found for: \"CREATININE\"  No components found for: \"K\"          Pedro Larios MD  Interventional Cardiology   [facility]  [phone]    "

## 2024-07-24 NOTE — PROGRESS NOTES
[facility]    Medicine Progress Note - Hospitalist Service    Date of Admission:  7/21/2024    Assessment & Plan   Abraham Dong is a 64 year old male with PMHx of hypertrophic CM, Afib on Eliquis, HTN, MDD who presents 2 days after a syncopal event led to a fall and left ankle pain/swelling. Noted to have an acute anemia with melena.     Recurrent syncope  Subacute blood loss leading to iron deficiency anemia  Upper GI bleed  Chronic orthostatic hypotension  -Chronic orthostasis symptoms for years believed to be due to his hypertrophic CM. Then 4 episodes of syncope after standing up in the past month, now leading to a leg fracture. ~2-3 weeks ago he had a large, tarry bowel movement.   -On Eliquis, Hgb 10, down from 15, 3 months ago, but stable from 3 weeks ago.   -Low concern for acute bleeding and his hemoglobin is only moderately low, but given his baseline predisposition for presyncope, his new anemia could be decreasing his oxygen reserve enough to cause syncope. Does take 2 Excedrin extra strengths daily as well.  -- EGD on 7/22 showed nonbleeding duodenal ulcer, granular gastric mucosa, eosinophilic esophagitis, small hiatal hernia.  Continue with PPI.  Awaiting H. pylori testing.  -- Hold Eliquis  -- Cardiology also consulted and lisinopril on hold.  Metoprolol started on 7/22  -- Echo is unremarkable  -- TSH is less than 10.  No workup necessary.    -- Hemoglobin is stable.  Patient is asymptomatic and therefore will defer blood transfusion.  --Transfuse if hemoglobin less than 8 due to cardiac condition.  -- Defer timing of colonoscopy to GI     Acute traumatic left proximal fibular fracture  Acute traumatic left ankle swelling and pain with bone fragments  Due to syncope on 7/19.  -s/p ORIF ankle frature 07/23  -NWB LLE  -PT/OT recommend TCU    Mild Hyponatremia  Na 133  Check BMP tomorrow        Hypertrophic cardiomyopathy  Dual-chamber pacemaker with ICD in situ  Follows with  "our cardiology team for this.  - TTE is unremarkable  -Cardiology ordered metoprolol 100 mg twice daily and stop lisinopril.     Atrial fibrillation  - Continue metoprolol as above  - Hold home Eliquis with UGIB/duodenal ulcer.  - Outpatient consideration of LAAO     Primary HTN  - Held home lisinopril 20mg daily     HLD  - Continue home atorvastatin and Zetia     Elevated BNP  1400 on admission, no signs of CHF on exam.  - S/p Lasix in the ED, can stop  - Consider Spironolactone and SGLT2 inhibitor     MDD  - Continue home Zoloft    NAHID - resolved  --Likely due to GI loss and Lasix given in the ER    Constipation  Last BM 07/21  Refusing suppository/enema  On Miralax and senna prn - did not receive any doses          Diet: Advance Diet as Tolerated: Regular Diet Adult  Discharge Instruction - Regular Diet Adult    DVT Prophylaxis: VTE Prophylaxis contraindicated due to GI bleed  So Catheter: Not present  Lines: None     Cardiac Monitoring: ACTIVE order. Indication: Syncope- high cardiac risk (48 hours)  Code Status: Full Code      Clinically Significant Risk Factors                  # Hypertension: Noted on problem list  # Acute heart failure with preserved ejection fraction: heart failure noted on problem list, last echo with EF >50%, and receiving IV diuretics             # Obesity: Estimated body mass index is 33.74 kg/m  as calculated from the following:    Height as of this encounter: 1.854 m (6' 1\").    Weight as of this encounter: 116 kg (255 lb 11.7 oz)., PRESENT ON ADMISSION     # Financial/Environmental Concerns: none   # ICD device present       Disposition Plan     Medically Ready for Discharge: Anticipated Tomorrow             Ann Padilla MD  Hospitalist Service  [facility]  Securely message with App[employer]ver (more info)  Text page via [facility] Paging/Directory   ______________________________________________________________________    Interval History   Patient was examined at " the bedside, new to me today.  States pain is controlled decently.  Has been constipated for the last 4 days, denies bowel regimen    Physical Exam   Vital Signs: Temp: 98.8  F (37.1  C) Temp src: Oral BP: 107/64 Pulse: 62   Resp: 18 SpO2: 96 % O2 Device: None (Room air) Oxygen Delivery: 1 LPM  Weight: 255 lbs 11.74 oz    General:  Appears stated age, no acute distress.  Chest: Clear to auscultation bilaterally  Cardiovascular: Systolic ejection murmur in mitral and tricuspid area.  Abdomen:  Soft, non-tender, non-distended.  Musculoskeletal: LLE in dressing  Neurological:  No gross focal deficits    Medical Decision Making       50 MINUTES SPENT BY ME on the date of service doing chart review, history, exam, documentation & further activities per the note.      Data     I have personally reviewed the following data over the past 24 hrs:    N/A  \   9.1 (L)   / N/A     133 (L) 104 15.0 /  121 (H)   4.0 23 0.85 \       Imaging results reviewed over the past 24 hrs:   No results found for this or any previous visit (from the past 24 hour(s)).

## 2024-07-24 NOTE — PLAN OF CARE
"  Patient is A/O, reported 5 out of 10 pain at left ankle.  Reports 5/10 pain is tolerable.  At 1400 patient reported his left ankle was 7/10 pain, PRN oxy 5 mg given.  Patient is more understanding on the need to remain in patient until providers can determine a discharge time.  Patient is working with PT, and learning how to be non-weight bearing on his left foot.  Left foot has surgical wrap in place, no evidence of extra drainage.      Patient had low appetite today, missed breakfast and ordered food in 1400 hour.  He has only wanted to nap since ambulating with Therapy.  Bed alarm on.  Patient is constipated and has not had BM since 7/20, did not want to take senna PRN.    Tele: Sinus with First degree AVB, has permanent pacemaker with occasional spike, depressed Twave.     Cardiology has signed off.    Problem: Adult Inpatient Plan of Care  Goal: Plan of Care Review  Description: The Plan of Care Review/Shift note should be completed every shift.  The Outcome Evaluation is a brief statement about your assessment that the patient is improving, declining, or no change.  This information will be displayed automatically on your shift  note.  Outcome: Progressing  Goal: Patient-Specific Goal (Individualized)  Description: You can add care plan individualizations to a care plan. Examples of Individualization might be:  \"Parent requests to be called daily at 9am for status\", \"I have a hard time hearing out of my right ear\", or \"Do not touch me to wake me up as it startles  me\".  Outcome: Progressing  Goal: Readiness for Transition of Care  Outcome: Progressing     Problem: Anemia  Goal: Anemia Symptom Improvement  Outcome: Progressing  Intervention: Monitor and Manage Anemia  Recent Flowsheet Documentation  Taken 7/24/2024 2311 by Kun Plunkett, RN  Safety Promotion/Fall Prevention:   activity supervised   nonskid shoes/slippers when out of bed   lighting adjusted   increased rounding and observation   clutter " free environment maintained   increase visualization of patient   supervised activity   safety round/check completed     Problem: Pain Acute  Goal: Optimal Pain Control and Function  Outcome: Progressing  Intervention: Develop Pain Management Plan  Recent Flowsheet Documentation  Taken 7/24/2024 0847 by Kun Plunkett RN  Pain Management Interventions:   medication offered but refused   quiet environment facilitated  Intervention: Prevent or Manage Pain  Recent Flowsheet Documentation  Taken 7/24/2024 0847 by Kun Plunkett, RN  Medication Review/Management: medications reviewed     Problem: Surgery Nonspecified  Goal: Absence of Bleeding  Outcome: Progressing  Goal: Effective Bowel Elimination  Outcome: Progressing  Goal: Fluid and Electrolyte Balance  Outcome: Progressing  Goal: Blood Glucose Level Within Targeted Range  Outcome: Progressing  Goal: Absence of Infection Signs and Symptoms  Outcome: Progressing  Goal: Anesthesia/Sedation Recovery  Outcome: Progressing  Intervention: Optimize Anesthesia Recovery  Recent Flowsheet Documentation  Taken 7/24/2024 0847 by Kun Plunkett RN  Safety Promotion/Fall Prevention:   activity supervised   nonskid shoes/slippers when out of bed   lighting adjusted   increased rounding and observation   clutter free environment maintained   increase visualization of patient   supervised activity   safety round/check completed  Goal: Optimal Pain Control and Function  Outcome: Progressing  Intervention: Prevent or Manage Pain  Recent Flowsheet Documentation  Taken 7/24/2024 0847 by Kun Plunkett, RN  Pain Management Interventions:   medication offered but refused   quiet environment facilitated  Goal: Nausea and Vomiting Relief  Outcome: Progressing  Goal: Effective Urinary Elimination  Outcome: Progressing  Goal: Effective Oxygenation and Ventilation  Outcome: Progressing  Intervention: Optimize Oxygenation and Ventilation  Recent Flowsheet Documentation  Taken  7/24/2024 0703 by Kun Plunkett RN  Head of Bed (HOB) Positioning: HOB at 15 degrees     Will Plunkett RN

## 2024-07-25 ENCOUNTER — APPOINTMENT (OUTPATIENT)
Dept: PHYSICAL THERAPY | Facility: HOSPITAL | Age: 64
DRG: 981 | End: 2024-07-25
Payer: COMMERCIAL

## 2024-07-25 ENCOUNTER — APPOINTMENT (OUTPATIENT)
Dept: OCCUPATIONAL THERAPY | Facility: HOSPITAL | Age: 64
DRG: 981 | End: 2024-07-25
Payer: COMMERCIAL

## 2024-07-25 VITALS
BODY MASS INDEX: 34.27 KG/M2 | DIASTOLIC BLOOD PRESSURE: 67 MMHG | HEIGHT: 73 IN | WEIGHT: 258.6 LBS | TEMPERATURE: 98.8 F | HEART RATE: 63 BPM | SYSTOLIC BLOOD PRESSURE: 111 MMHG | RESPIRATION RATE: 18 BRPM | OXYGEN SATURATION: 97 %

## 2024-07-25 LAB
ANION GAP SERPL CALCULATED.3IONS-SCNC: 7 MMOL/L (ref 7–15)
BUN SERPL-MCNC: 13.8 MG/DL (ref 8–23)
CALCIUM SERPL-MCNC: 8.6 MG/DL (ref 8.8–10.4)
CHLORIDE SERPL-SCNC: 105 MMOL/L (ref 98–107)
CREAT SERPL-MCNC: 0.74 MG/DL (ref 0.67–1.17)
EGFRCR SERPLBLD CKD-EPI 2021: >90 ML/MIN/1.73M2
GLUCOSE SERPL-MCNC: 105 MG/DL (ref 70–99)
HCO3 SERPL-SCNC: 23 MMOL/L (ref 22–29)
HGB BLD-MCNC: 9 G/DL (ref 13.3–17.7)
POTASSIUM SERPL-SCNC: 3.9 MMOL/L (ref 3.4–5.3)
SODIUM SERPL-SCNC: 135 MMOL/L (ref 135–145)

## 2024-07-25 PROCEDURE — 250N000013 HC RX MED GY IP 250 OP 250 PS 637: Performed by: STUDENT IN AN ORGANIZED HEALTH CARE EDUCATION/TRAINING PROGRAM

## 2024-07-25 PROCEDURE — 97530 THERAPEUTIC ACTIVITIES: CPT | Mod: GP

## 2024-07-25 PROCEDURE — 85018 HEMOGLOBIN: CPT | Performed by: STUDENT IN AN ORGANIZED HEALTH CARE EDUCATION/TRAINING PROGRAM

## 2024-07-25 PROCEDURE — 99239 HOSP IP/OBS DSCHRG MGMT >30: CPT | Performed by: STUDENT IN AN ORGANIZED HEALTH CARE EDUCATION/TRAINING PROGRAM

## 2024-07-25 PROCEDURE — 36415 COLL VENOUS BLD VENIPUNCTURE: CPT | Performed by: STUDENT IN AN ORGANIZED HEALTH CARE EDUCATION/TRAINING PROGRAM

## 2024-07-25 PROCEDURE — 97116 GAIT TRAINING THERAPY: CPT | Mod: GP

## 2024-07-25 PROCEDURE — 80048 BASIC METABOLIC PNL TOTAL CA: CPT | Performed by: STUDENT IN AN ORGANIZED HEALTH CARE EDUCATION/TRAINING PROGRAM

## 2024-07-25 PROCEDURE — 97535 SELF CARE MNGMENT TRAINING: CPT | Mod: GO

## 2024-07-25 RX ORDER — PANTOPRAZOLE SODIUM 40 MG/1
40 TABLET, DELAYED RELEASE ORAL
Status: DISCONTINUED | OUTPATIENT
Start: 2024-07-26 | End: 2024-07-25 | Stop reason: HOSPADM

## 2024-07-25 RX ORDER — METOPROLOL SUCCINATE 100 MG/1
100 TABLET, EXTENDED RELEASE ORAL 2 TIMES DAILY
Qty: 60 TABLET | Refills: 1 | Status: SHIPPED | OUTPATIENT
Start: 2024-07-25

## 2024-07-25 RX ORDER — PANTOPRAZOLE SODIUM 40 MG/1
40 TABLET, DELAYED RELEASE ORAL
Qty: 90 TABLET | Refills: 1 | Status: SHIPPED | OUTPATIENT
Start: 2024-07-26

## 2024-07-25 RX ORDER — OXYCODONE HYDROCHLORIDE 5 MG/1
5 TABLET ORAL EVERY 4 HOURS PRN
Qty: 15 TABLET | Refills: 0 | Status: SHIPPED | OUTPATIENT
Start: 2024-07-25

## 2024-07-25 RX ADMIN — SERTRALINE HYDROCHLORIDE 100 MG: 100 TABLET ORAL at 09:21

## 2024-07-25 RX ADMIN — APIXABAN 5 MG: 5 TABLET, FILM COATED ORAL at 09:22

## 2024-07-25 RX ADMIN — METOPROLOL SUCCINATE 100 MG: 100 TABLET, EXTENDED RELEASE ORAL at 09:21

## 2024-07-25 RX ADMIN — ATORVASTATIN CALCIUM 80 MG: 40 TABLET, FILM COATED ORAL at 09:22

## 2024-07-25 RX ADMIN — PANTOPRAZOLE SODIUM 40 MG: 40 TABLET, DELAYED RELEASE ORAL at 06:39

## 2024-07-25 RX ADMIN — EZETIMIBE 10 MG: 10 TABLET ORAL at 09:21

## 2024-07-25 ASSESSMENT — ACTIVITIES OF DAILY LIVING (ADL)
ADLS_ACUITY_SCORE: 23
ADLS_ACUITY_SCORE: 25
ADLS_ACUITY_SCORE: 23
ADLS_ACUITY_SCORE: 23
ADLS_ACUITY_SCORE: 25
ADLS_ACUITY_SCORE: 23
ADLS_ACUITY_SCORE: 25
ADLS_ACUITY_SCORE: 25
ADLS_ACUITY_SCORE: 23
ADLS_ACUITY_SCORE: 23
ADLS_ACUITY_SCORE: 25
ADLS_ACUITY_SCORE: 23

## 2024-07-25 NOTE — DISCHARGE SUMMARY
Patient discharged at 1410. AVS reviewed with patient. No questions or concerns. All discharge medications handed to patient before discharge. Orthotic boot sent home and instructions reviewed with RN. Pt was escorted out of the hospital in a wheelchair by RN. Ex-spouse providing transportation home.

## 2024-07-25 NOTE — PLAN OF CARE
Occupational Therapy Discharge Summary    Reason for therapy discharge:    Discharged to home with home therapy.    Progress towards therapy goal(s). See goals on Care Plan in [facility] electronic health record for goal details.  Goals not met.  Barriers to achieving goals:   limited tolerance for therapy.    Therapy recommendation(s):    Continued therapy is recommended.  Rationale/Recommendations:  Pt would benefit from HC OT to facilitate safe transition home, improve IND w/ ADLs/IADLs.    ADAMS Zabala/L. 7/25/2024, 1:38 PM     The patient is a 83y Female complaining of pain, lower leg.

## 2024-07-25 NOTE — PROGRESS NOTES
Care Management Discharge Note    Discharge Date: 07/26/2024       Discharge Disposition:  home    Discharge Services:  Home OT PT    Discharge DME:  knee scooter, order placed    Discharge Transportation: family or friend will provide    Private pay costs discussed: Not applicable    Education Provided on the Discharge Plan:    Persons Notified of Discharge Plans: patient and his mother  Patient/Family in Agreement with the Plan:      Handoff Referral Completed: Yes    Additional Information:  SW met with patient and his at bedside today - PT provided pt with medical equipment companies for a rolling knee scooter, SW also provided pt with a list that may be [facility] or affordable options for renting a knee scooter. PT and OT will be ordered and patient is agreeable to in home PT OT.   Update from MD- pt will discharge today.    Ekta Lugo LGSW

## 2024-07-25 NOTE — DISCHARGE SUMMARY
[facility]    Hospitalist Discharge Summary       Date of Admission:  7/21/2024  Date of Discharge:  7/25/2024  2:10 PM  Discharging Provider: Ann Padilla MD      Discharge Diagnoses   Recurrent syncope  Subacute blood loss leading to iron deficiency anemia  Traumatic left ankle fracture s/p ORIF  Traumatic/proximal fibular fracture  NAHID  Hypertrophic cardiomyopathy  Atrial fibrillation  HTN  HLD  MDD      Follow-ups Needed After Discharge   Follow-up Appointments     Follow Up Care      Please follow-up with Dr. Ratliff' team in 2 weeks at [facility].   Call our scheduling line at [phone] to make an appointment, if you do   not already have one scheduled.        Follow-up and recommended labs and tests       Follow up with primary care provider, Clifford Gamboa, within 7 days to   evaluate medication change and for hospital follow- up.  The following   labs/tests are recommended: Hb - resumed Eliquis.  Lisinopril   discontinued, can resume if BP elevated.     Follow up with primary Cardiologist for ongoing care  Follow up with Orthopedics within 2 weeks, appointment will be made  Follow up with Gastroenterology for outpatient colonoscopy, appointment   will be made    Call our scheduling line at [phone] to make an   appointment, if you do not already have one scheduled            Unresulted Labs Ordered in the Past 30 Days of this Admission       Date and Time Order Name Status Description    7/22/2024  4:03 PM Surgical Pathology Exam In process         These results will be followed up by Gastroenterology    Hospital Course   Abraham Dong is a 64 year old male with PMHx of hypertrophic CM, Afib on Eliquis, HTN, MDD who presents 2 days after a syncopal event led to a fall and left ankle pain/swelling. Noted to have an acute anemia with melena.     Recurrent syncope  Subacute blood loss leading to iron deficiency anemia  Upper GI bleed  Chronic orthostatic  hypotension  -Chronic orthostasis symptoms for years believed to be due to his hypertrophic CM. Then 4 episodes of syncope after standing up in the past month, now leading to a leg fracture. ~2-3 weeks ago he had a large, tarry bowel movement.   -On Eliquis, Hgb 10, down from 15, 3 months ago, but stable from 3 weeks ago.   -Low concern for acute bleeding and his hemoglobin is only moderately low, but given his baseline predisposition for presyncope, his new anemia could be decreasing his oxygen reserve enough to cause syncope  -- EGD on 7/22 showed nonbleeding duodenal ulcer, granular gastric mucosa, eosinophilic esophagitis, small hiatal hernia.  Continue with PPI.  Awaiting H. pylori testing.  -- Resumed Eliquis 07/25, discussed with GI  -- Continue PPI daily, outpatient colonoscopy  -- Cardiology also consulted, lisinopril on hold, Metoprolol started on 7/22  -- Echo is unremarkable     Acute traumatic left proximal fibular fracture  Acute traumatic left ankle swelling and pain with bone fragments  Due to syncope on 7/19.  -s/p ORIF ankle frature 07/23  -NWB LLE  -Oxycodone, senna, Tylenol sent  -PT/OT recommend TCU, patient declined discharged home with walker     Mild Hyponatremia - resolved     Hypertrophic cardiomyopathy  Dual-chamber pacemaker with ICD in situ  - TTE is unremarkable  -Cardiology ordered metoprolol 100 mg twice daily and stop lisinopril     Atrial fibrillation  - Continue metoprolol as above  - Resume Eliquis  - Outpatient consideration of LAAO     Primary HTN  - On Metoprolol  Lisinopril held as BP controlled     HLD  - Continue home atorvastatin and Zetia     Elevated BNP  1400 on admission, no signs of CHF on exam.  - S/p Lasix in the ED, can stop  - Consider Spironolactone and SGLT2 inhibitor     MDD  - Continue home Zoloft     NAHID - resolved  --Likely due to GI loss and Lasix     Constipation  Last BM 07/21  Refusing suppository/enema  On Miralax and senna prn - did not receive any  doses  Senna at home    Consultations This Hospital Stay   GASTROENTEROLOGY IP CONSULT  ORTHOPEDIC SURGERY IP CONSULT  CARDIOLOGY IP CONSULT  CARE MANAGEMENT / SOCIAL WORK IP CONSULT  PHYSICAL THERAPY ADULT IP CONSULT  OCCUPATIONAL THERAPY ADULT IP CONSULT    Code Status   Full Code    Time Spent on this Encounter   I,Ann Padilla, personally saw the patient today and spent approximately 35 minutes discharging this patient.       Ann Padilla MD  [facility]  ______________________________________________________________________    Physical Exam   Vital Signs: Temp: 98.8  F (37.1  C) Temp src: Oral BP: 111/67 Pulse: 63   Resp: 18 SpO2: 97 % O2 Device: None (Room air)    Weight: 258 lbs 9.59 oz    Physical Exam    General:  Appears stated age, no acute distress.  Chest: Clear to auscultation bilaterally  Cardiovascular: Systolic ejection murmur in mitral and tricuspid area.  Abdomen:  Soft, non-tender, non-distended.  Musculoskeletal: LLE in dressing  Neurological:  No gross focal deficits      Primary Care Physician   Clifford Gamboa    Discharge Disposition   Discharged to home  Condition at discharge: Fair    Significant Results and Procedures   Most Recent 3 CBC's:  Recent Labs   Lab Test 07/25/24  0448 07/24/24  1730 07/24/24  0557 07/22/24  1753 07/22/24  0505 07/21/24  1400 04/22/24  1418   WBC  --   --   --   --  7.1 9.5 8.1   HGB 9.0* 9.1* 9.0*   < > 9.1* 10.0* 15.0   MCV  --   --   --   --  90 89 91   PLT  --   --   --   --  243 313 236    < > = values in this interval not displayed.     Most Recent 3 BMP's:  Recent Labs   Lab Test 07/25/24  0448 07/24/24  0557 07/23/24  1127 07/22/24  0505    133*  --  135   POTASSIUM 3.9 4.0  --  4.0   CHLORIDE 105 104  --  101   CO2 23 23  --  23   BUN 13.8 15.0  --  22.4   CR 0.74 0.85  --  1.23*   ANIONGAP 7 6*  --  11   TEGAN 8.6* 8.5*  --  8.7*   * 121* 93 92     Most Recent 2 LFT's:  Recent Labs   Lab Test 12/23/21  0702   AST  28   ALT 35   ALKPHOS 47   BILITOTAL 0.4     Most Recent 3 INR's:  Recent Labs   Lab Test 12/02/21  0828   INR 1.05     Most Recent 3 Troponin's:No lab results found.  Most Recent 3 BNP's:  Recent Labs   Lab Test 07/21/24  1400   NTBNPI 1,396*     Most Recent D-dimer:No lab results found.  Most Recent Cholesterol Panel:  Recent Labs   Lab Test 06/27/23  1628   CHOL 138   LDL 63   HDL 45   TRIG 148       Results for orders placed or performed during the hospital encounter of 07/21/24   XR Tibia and Fibula Left 2 Views    Narrative    EXAM: XR TIBIA AND FIBULA LEFT 2 VIEWS, XR ANKLE LEFT G/E 3 VIEWS, XR FOOT LEFT G/E 3 VIEWS  LOCATION: [facility]  DATE: 07/21/2024    INDICATION: Trauma.  COMPARISON: None.      Impression    IMPRESSION:   There is acute mildly displaced oblique fracture of the proximal fibular shaft. No evidence of a tibial fracture.    There is soft tissue swelling. There is bony proliferative change and small bone fragments about the left ankle without definitive evidence of an acute fracture. Mild tibiotalar joint arthrosis. Distal Achilles tendon enthesopathy. Small calcaneal spur.   No evidence of an acute ankle fracture.    The left foot shows no evidence of an acute displaced fracture. There is moderate to severe arthrosis at the first MTP joint. No Lisfranc subluxation. Soft tissue swelling.    NOTE: ABNORMAL REPORT    THE DICTATION ABOVE DESCRIBES AN ABNORMALITY FOR WHICH FOLLOW-UP IS NEEDED.        XR Ankle Left G/E 3 Views    Narrative    EXAM: XR TIBIA AND FIBULA LEFT 2 VIEWS, XR ANKLE LEFT G/E 3 VIEWS, XR FOOT LEFT G/E 3 VIEWS  LOCATION: [facility]  DATE: 07/21/2024    INDICATION: Trauma.  COMPARISON: None.      Impression    IMPRESSION:   There is acute mildly displaced oblique fracture of the proximal fibular shaft. No evidence of a tibial fracture.    There is soft tissue swelling. There is bony proliferative change and small bone  fragments about the left ankle without definitive evidence of an acute fracture. Mild tibiotalar joint arthrosis. Distal Achilles tendon enthesopathy. Small calcaneal spur.   No evidence of an acute ankle fracture.    The left foot shows no evidence of an acute displaced fracture. There is moderate to severe arthrosis at the first MTP joint. No Lisfranc subluxation. Soft tissue swelling.    NOTE: ABNORMAL REPORT    THE DICTATION ABOVE DESCRIBES AN ABNORMALITY FOR WHICH FOLLOW-UP IS NEEDED.        XR Foot Left G/E 3 Views    Narrative    EXAM: XR TIBIA AND FIBULA LEFT 2 VIEWS, XR ANKLE LEFT G/E 3 VIEWS, XR FOOT LEFT G/E 3 VIEWS  LOCATION: [facility]  DATE: 07/21/2024    INDICATION: Trauma.  COMPARISON: None.      Impression    IMPRESSION:   There is acute mildly displaced oblique fracture of the proximal fibular shaft. No evidence of a tibial fracture.    There is soft tissue swelling. There is bony proliferative change and small bone fragments about the left ankle without definitive evidence of an acute fracture. Mild tibiotalar joint arthrosis. Distal Achilles tendon enthesopathy. Small calcaneal spur.   No evidence of an acute ankle fracture.    The left foot shows no evidence of an acute displaced fracture. There is moderate to severe arthrosis at the first MTP joint. No Lisfranc subluxation. Soft tissue swelling.    NOTE: ABNORMAL REPORT    THE DICTATION ABOVE DESCRIBES AN ABNORMALITY FOR WHICH FOLLOW-UP IS NEEDED.        Chest XR,  PA & LAT    Narrative    EXAM: XR CHEST 2 VIEWS  LOCATION: [facility]  DATE: 7/21/2024    INDICATION: Shortness of breath.  COMPARISON: Chest x-ray 4/24/2024.      Impression    IMPRESSION: Left pectoral transvenous pacemaker with lead wire tips in the right atrium and right ventricle. Cardiac silhouette size is within normal limits. Calcified atherosclerotic changes at the aortic arch. Lungs are clear.   CT Ankle Left w/o Contrast  "   Narrative    EXAM: CT ANKLE LEFT W/O CONTRAST  LOCATION: [facility]  DATE: 7/22/2024    INDICATION: Assess ankle fracture.  COMPARISON: None.  TECHNIQUE: Noncontrast. Axial, sagittal and coronal thin-section reconstruction. Dose reduction techniques were used.     FINDINGS:     BONES:  -There is a cortical fracture along the anterolateral aspect of the tibial plafond which most likely represents associated avulsive injury to the anterior inferior tibiofibular ligament. This is well-seen on series 3, images 49-50. No additional   acute-appearing fractures are identified although there is irregularity along the anterior and posterior margin of the tibial plafond which could be due to old injury. Small accessory os trigonum. Degenerative change at the tibiotalar joint as well as   the subtalar joints and calcaneocuboid cuboid articulation although this is incompletely visualized on this examination. Achilles calcaneal spurring.    SOFT TISSUES:  -Extensive soft tissue swelling about the ankle.      Impression    IMPRESSION:  1.  Cortical chip fracture along the anterolateral aspect of the distal tibia most likely represents avulsive injury to the anterior inferior tibiofibular ligament.  2.  No additional acute fractures are identified.  3.  There is irregularity along the anterior and posterior margin of the tibial plafond but this appears chronic.  4.  Degenerative change at the tibiotalar joint as well as the subtalar joints and calcaneocuboid articulation.  5.  Extensive soft tissue swelling about the ankle.     POC US Guidance Needle Placement    Narrative    Ultrasound was performed as guidance to an anesthesia procedure.  Click   \"PACS images\" hyperlink below to view any stored images.  For specific   procedure details, view procedure note authored by anesthesia.   XR Surgery ADALI  Fluoro G/T 5 Min    Narrative    This exam was marked as non-reportable because it will not be read by a "   radiologist or a [place] non-radiologist provider.         Echocardiogram Complete     Value    LVEF  60-65%    Narrative    [id number]  [id number]  [id number]  [id number]^ISIDORO^[place] [address]     Name: JORGE OQUENDO  MRN: [id number]  : 1960  Study Date: 2024 07:23 AM  Age: 64 yrs  Gender: Male  Patient Location: [facility]  Reason For Study: SOB  Ordering Physician: JORGE CHAUDHRY  Performed By:      BSA: 2.4 m2  Height: 73 in  Weight: 250 lb  HR: 61  ______________________________________________________________________________  Procedure  Complete Portable Echo Adult. Definity (NDC #49764-237) given intravenously.  Compared to prior study, there is no significant change. No hemodynamically  significant valvular abnormalities on 2D or color flow imaging.  ______________________________________________________________________________  Interpretation Summary     The left ventricle is normal in size.  Left ventricular function is normal.The ejection fraction is 60-65%. Moderate  to severe assymetric septal hypertrophy. No significant LVOT obstruction.  Normal right ventricle size and systolic function.  There is a pacemaker lead in the right ventricle.  The left atrium is moderately dilated.  No hemodynamically significant valvular abnormalities on 2D or color flow  imaging.  Mildly dilated aortic root.  Compared to prior study, there is no significant change.  ______________________________________________________________________________  Left Ventricle  The left ventricle is normal in size. Left ventricular function is normal.The  ejection fraction is 60-65%. Moderate to severe assymetric septal hypertrophy.  No significant LVOT obstruction. Left ventricular diastolic function is  abnormal. No regional wall motion abnormalities noted.     Right Ventricle  Normal right ventricle size and systolic function. There is a pacemaker lead  in the right  ventricle.     Atria  The left atrium is moderately dilated. Right atrial size is normal. There is  no color Doppler evidence of an atrial shunt.     Mitral Valve  Mitral valve leaflets appear normal. There is trace mitral regurgitation.     Tricuspid Valve  The tricuspid valve is not well visualized. There is trace tricuspid  regurgitation. Right ventricular systolic pressure could not be approximated  due to inadequate tricuspid regurgitation.     Aortic Valve  Aortic valve leaflets appear normal. There is no evidence of aortic stenosis  or clinically significant aortic regurgitation.     Pulmonic Valve  The pulmonic valve is not well visualized. This degree of valvular  regurgitation is within normal limits.     Vessels  Mildly dilated aortic root. IVC diameter <2.1 cm collapsing >50% with sniff  suggests a normal RA pressure of 3 mmHg.     Pericardium  There is no pericardial effusion.     ______________________________________________________________________________  MMode/2D Measurements & Calculations  IVSd: 2.0 cm  LVIDd: 6.3 cm  LVIDs: 3.3 cm  LVPWd: 1.1 cm     FS: 46.8 %  LV mass(C)d: 481.2 grams  LV mass(C)dI: 203.4 grams/m2  Ao root diam: 4.7 cm  asc Aorta Diam: 4.1 cm  LVOT diam: 2.5 cm  LVOT area: 4.9 cm2  Ao root diam index Ht(cm/m): 2.5  Ao root diam index BSA (cm/m2): 2.0  Asc Ao diam index BSA (cm/m2): 1.7  Asc Ao diam index Ht(cm/m): 2.2  EF Biplane: 71.7 %  LA Volume (BP): 73.0 ml     LA Volume Index (BP): 30.8 ml/m2  LA Volume Indexed (AL/bp): 33.3 ml/m2  RV Base: 4.5 cm  RWT: 0.34  TAPSE: 2.8 cm     Doppler Measurements & Calculations  MV E max venkatesh: 79.1 cm/sec  MV A max venkatesh: 80.7 cm/sec  MV E/A: 0.98     MV dec slope: 411.0 cm/sec2  MV dec time: 0.19 sec  Ao V2 max: 171.0 cm/sec  Ao max P.0 mmHg  Ao V2 mean: 124.0 cm/sec  Ao mean P.0 mmHg  Ao V2 VTI: 38.7 cm  BERRY(I,D): 4.1 cm2  BERRY(V,D): 3.6 cm2  LV V1 max P.4 mmHg  LV V1 max: 126.0 cm/sec  LV V1 VTI: 32.0 cm  SV(LVOT): 157.1  "ml  SI(LVOT): 66.4 ml/m2  PA V2 max: 99.5 cm/sec  PA max P.0 mmHg  PA acc time: 0.14 sec  AV Sammy Ratio (DI): 0.74  BERRY Index (cm2/m2): 1.7  E/E' av.0  Lateral E/e': 11.0  Medial E/e': 16.9  RV S Sammy: 17.8 cm/sec     ______________________________________________________________________________  Report approved by: Kristie Charles 2024 08:37 AM             Discharge Orders      Home Care Referral      When to call - Contact Surgeon Team    You may experience symptoms that require follow-up before your scheduled appointment. Refer to the \"Stoplight Tool\" for instructions on when to contact your Surgeon Team if you are concerned about pain control, blood clots, constipation, or if you are unable to urinate.     When to call - Reach out to Urgent Care    If you are not able to reach your Surgeon Team and you need immediate care, go to the Orthopedic Walk-in Clinic or Urgent Care at your Surgeon's office.  Do NOT go to the Emergency Room unless you have shortness of breath, chest pain, or other signs of a medical emergency.     When to call - Reasons to Call 911    Call 911 immediately if you experience sudden-onset chest pain, arm weakness/numbness, slurred speech, or shortness of breath     Discharge Instruction - Breathing exercises    Perform breathing exercises using your Incentive Spirometer 10 times per hour while awake for 2 weeks.     Symptoms - Fever Management    A low grade fever can be expected after surgery.  Use acetaminophen (TYLENOL) as needed for fever management.  Contact your Surgeon Team if you have a fever greater than 101.5 F, chills, and/or night sweats.     Symptoms - Constipation management    Constipation (hard, dry bowel movements) is expected after surgery due to the combination of being less active, the anesthetic, and the opioid pain medication.  You can do the following to help reduce constipation:  ~  FLUIDS:  Drink clear liquids (water or Gatorade), or juice " (apple/prune).  ~  DIET:  Eat a fiber rich diet.    ~  ACTIVITY:  Get up and move around several times a day.  Increase your activity as you are able.  MEDICATIONS:  Reduce the risk of constipation by starting medications before you are constipated.  You can take Miralax   (1 packet as directed) and/or a stool softener (Senokot 1-2 tablets 1-2 times a day).  If you already have constipation and these medications are not working, you can get magnesium citrate and use as directed.  If you continue to have constipation you can try an over the counter suppository or enema.  Call your Surgeon Team if it has been greater than 3 days since your last bowel movement.     Symptoms - Reduced Urine Output    Changes in the amount of fluids you drank before and after surgery may result in problems urinating.  It is important to stay well-hydrated after surgery and drink plenty of water. If it has been greater than 8 hours since you have urinated despite drinking plenty of water, call your Surgeon Team.     Activity - Exercises to prevent blood clots    Unless otherwise directed by your Surgeon team, perform the following exercises at least three times per day for the first four weeks after surgery to prevent blood clots in your legs: 1) Point and flex your feet (Ankle Pumps), 2) Move your ankle around in big circles, 3) Wiggle your toes, 4) Walk, even for short distances, several times a day, will help decrease the risk of blood clots.     Comfort and Pain Management - Pain after Surgery    Pain after surgery is normal and expected.  You will have some amount of pain for several weeks after surgery.  Your pain will improve with time.  There are several things you can do to help reduce your pain including: rest, ice, elevation, and using pain medications as needed. Contact your Surgeon Team if you have pain that persists or worsens after surgery despite rest, ice, elevation, and taking your medication(s) as prescribed. Contact  "your Surgeon Team if you have new numbness, tingling, or weakness in your operative extremity.     Comfort and Pain Management - Swelling after Surgery    Swelling and/or bruising of the surgical extremity is common and may persist for several months after surgery. In addition to frequent icing and elevation, gentle compressive support with an ACE wrap or tubigrip may help with swelling. Apply compression regularly, removing at least twice daily to perform skin checks. Contact your Surgeon Team if your swelling increases and is NOT associated with an increase in your activity level, or if your swelling increases and is associated with redness and pain.     Comfort and Pain Management - LOWER Extremity Elevation    Swelling is expected for several months after surgery. This type of swelling is usually associated with gravity and activity, and can be improved with elevation.   The best way to do this is to get your \"toes above your nose\" by laying down and placing several pillows lengthwise under your calf and heel. When elevating your leg keep your knee completely straight. Perform this elevation as often as possible especially for the first two weeks after surgery.     Comfort and Pain Management - Cold therapy    Ice can be used to control swelling and discomfort after surgery. Place a thin towel over your operative site and apply the ice pack overtop. Leave ice pack in place for 20 minutes, then remove for 20 minutes. Repeat this 20 minutes on/20 minutes off routine as often as tolerated.     Medication Instructions - Acetaminophen (TYLENOL) Instructions    You were discharged with acetaminophen (TYLENOL) for pain management after surgery. Acetaminophen most effectively manages pain symptoms when it is taken on a schedule without missing doses (every four, six, or eight hours). Your Provider will prescribe a safe daily dose between 3000 - 4000 mg.  Do NOT exceed this daily dose. Most patients use acetaminophen for " pain control for the first four weeks after surgery.  You can wean from this medication as your pain decreases.     Follow Up Care    Please follow-up with Dr. Ratliff' team in 2 weeks at [facility]. Call our scheduling line at [phone] to make an appointment, if you do not already have one scheduled.     NO range of motion     Return to Driving    Return to driving - Driving is NOT permitted until directed by your provider. Under no circumstance are you permitted to drive while using narcotic pain medications.     NO Precautions    No precautions directed by your Provider.  You may perform range of motion activities as tolerated.     NO weight bearing    No weight bearing on your operative extremity.     Splint / Cast    Do NOT remove your splint/cast.  Keep your splint/cast clean and dry.  If your splint/cast gets wet, contact your surgeon team.     Dressing / Wound Care - Wound    You have a clean splint on your surgical wound. Dressing change instructions as follows: splint will be removed at your follow-up appointment. Contact your Surgeon Team if you have increased redness, warmth around the surgical wound, and/or drainage from the surgical wound.     Dressing / Wound care - Shower with wound/dressing covered    Splint must be covered while in the shower.  You may shower starting POD2.  Use a plastic bag sealed at the top or another impermeable wrap to keep dry.     Dressing / Wound Care - NO Tub Bathing    Tub bathing, swimming, or any other activities that will cause your incision to be submerged in water should be avoided until allowed by your Surgeon.     Reason for your hospital stay    S/p ankle open reduction internal fixation  Recurrent syncope  Subacute blood loss leading to iron deficiency anemia     Follow-up and recommended labs and tests     Follow up with primary care provider, Clifford Gamboa, within 7 days to evaluate medication change and for hospital follow- up.  The following  labs/tests are recommended: Hb - resumed Eliquis.  Lisinopril discontinued, can resume if BP elevated.     Follow up with primary Cardiologist for ongoing care  Follow up with Orthopedics within 2 weeks, appointment will be made  Follow up with Gastroenterology for outpatient colonoscopy, appointment will be made    Call our scheduling line at [phone] to make an   appointment, if you do not already have one scheduled     Activity    Your activity upon discharge: activity as tolerated - NWB LLE     Rolling Knee Walker DME    DME Documentation: Describe the reason for need to support medical necessity: Impaired gait due to Foot/Ankle surgery. Anticipated length of need: 3 months     Walker DME    DME Documentation: Describe the reason for need to support medical necessity: Impaired gait due to Foot/Ankle surgery. Anticipated length of need: 3 months     Rolling Knee Walker/Scooter Order    DME Documentation:   Describe the reason for need to support medical necessity: NWB LLE.     I, the undersigned, certify that the above prescribed supplies are medically necessary for this patient and is both reasonable and necessary in reference to accepted standards of medical and necessary in reference to accepted standards of medical practice in the treatment of this patient's condition and is not prescribed as a convenience.     Discharge Instruction - Regular Diet Adult    Return to your pre-surgery diet unless instructed otherwise     Discharge Medications   Current Discharge Medication List        START taking these medications    Details   acetaminophen (TYLENOL) 325 MG tablet Take 3 tablets (975 mg) by mouth 3 times daily as needed for mild pain  Qty: 100 tablet, Refills: 0    Associated Diagnoses: Closed fracture of proximal end of left fibula, unspecified fracture morphology, initial encounter      oxyCODONE (ROXICODONE) 5 MG tablet Take 1 tablet (5 mg) by mouth every 4 hours as needed for moderate pain  Qty: 15  tablet, Refills: 0    Associated Diagnoses: Closed fracture of proximal end of left fibula, unspecified fracture morphology, initial encounter      pantoprazole (PROTONIX) 40 MG EC tablet Take 1 tablet (40 mg) by mouth every morning (before breakfast)  Qty: 90 tablet, Refills: 1    Associated Diagnoses: Anemia, unspecified type      senna-docusate (SENOKOT-S/PERICOLACE) 8.6-50 MG tablet Take 1 tablet by mouth 2 times daily as needed for constipation  Qty: 30 tablet, Refills: 0    Associated Diagnoses: Closed fracture of proximal end of left fibula, unspecified fracture morphology, initial encounter           CONTINUE these medications which have CHANGED    Details   metoprolol succinate ER (TOPROL XL) 100 MG 24 hr tablet Take 1 tablet (100 mg) by mouth 2 times daily  Qty: 60 tablet, Refills: 1    Associated Diagnoses: Hypertrophic cardiomyopathy (H)           CONTINUE these medications which have NOT CHANGED    Details   acetaminophen-caffeine (EXCEDRIN TENSION HEADACHE) 500-65 MG TABS Take 2 tablets by mouth daily as needed for mild pain      apixaban ANTICOAGULANT (ELIQUIS ANTICOAGULANT) 5 MG tablet Take 1 tablet (5 mg) by mouth 2 times daily  Qty: 180 tablet, Refills: 3    Associated Diagnoses: Paroxysmal atrial fibrillation (H)      atorvastatin (LIPITOR) 80 MG tablet Take 1 tablet (80 mg) by mouth daily  Qty: 30 tablet, Refills: 0    Associated Diagnoses: Hyperlipidemia, unspecified hyperlipidemia type      ezetimibe (ZETIA) 10 MG tablet Take 1 tablet (10 mg) by mouth once daily  Qty: 90 tablet, Refills: 3    Associated Diagnoses: Hyperlipidemia      nitroGLYcerin (NITROSTAT) 0.4 MG sublingual tablet One tablet under the tongue every 5 minutes if needed for chest pain. May repeat every 5 minutes for a maximum of 3 doses in 15 minutes  Qty: 25 tablet, Refills: 3    Associated Diagnoses: Hypertensive emergency      sertraline (ZOLOFT) 100 MG tablet Take 100 mg by mouth daily            STOP taking these  medications       lisinopril (ZESTRIL) 20 MG tablet Comments:   Reason for Stopping:             Allergies   No Known Allergies

## 2024-07-25 NOTE — PLAN OF CARE
Problem: Adult Inpatient Plan of Care  Goal: Absence of Hospital-Acquired Illness or Injury  Intervention: Prevent Skin Injury  Recent Flowsheet Documentation  Taken 7/25/2024 0102 by Adilia Ambrosio RN  Body Position: position changed independently  Taken 7/24/2024 2035 by Adilia Ambrosio RN  Body Position: position changed independently     Problem: Adult Inpatient Plan of Care  Goal: Optimal Comfort and Wellbeing  Intervention: Monitor Pain and Promote Comfort  Recent Flowsheet Documentation  Taken 7/24/2024 2035 by Adilia Ambrosio RN  Pain Management Interventions: medication offered but refused     Problem: Surgery Nonspecified  Goal: Effective Oxygenation and Ventilation  Intervention: Optimize Oxygenation and Ventilation  Recent Flowsheet Documentation  Taken 7/25/2024 0102 by Adilia Ambrosio RN  Head of Bed (HOB) Positioning: HOB at 15 degrees  Taken 7/24/2024 2035 by Adilia Ambrosio RN  Head of Bed (HOB) Positioning: HOB at 15 degrees    Pt alert and oriented x4, vitals stable reported a pain of 3, meds offered but declined, pt is able to make needs known, compliant wit bed alarm. Care is ongoing.

## 2024-07-25 NOTE — PROGRESS NOTES
"Orthopedic Progress Note      Assessment: 2 Days Post-Op  S/P Procedure(s):  OPEN REDUCTION INTERNAL FIXATION, FRACTURE, ANKLE     Plan:   - Continue PT/OT -trialing knee scooter today before going home.  Per Dr. Ratliff okay to kneel on the knee scooter even with the proximal fibular fracture if able to tolerate from a pain standpoint  - Weightbearing status: NWB LLE  - Activity: Up with assist and assistive device until independent.  - Anticoagulation: Eliquis in addition to SCDs, sal stockings and early ambulation.  - Pain Management; continue current regimen  - Diet: progress diet as tolerated  - Labs: hgb 9.0, transfuse if <7.0. No indication today  - Dressing: Keep dry and intact.  Splint to remain on at all times  - Elevation: Elevate LLE on pillow to keep above the level of the heart as much as possible  - Follow-up: Outpatient follow up in 2 weeks  - Disposition: Patient refusing therapy recommendation of TCU.  Patient is orthopedically stable and okay to discharge home from an orthopedic standpoint if doing well enough from a therapy standpoint to go home    Subjective:  Pain: moderate  Nausea, Vomiting:  No  Lightheadedness, Dizziness:  No  Neuro:  Patient denies new onset numbness or paresthesias  Fever, chills: No  Chest pain: No  SOB: No    Patient reports feeling well today. Patient reports pain is tolerable with current pain regimen.  Numbness in left foot has resolved.  Eating and drinking well.  Patient voiding and passing gas however no BM. All questions/concerns answered.      Objective:  /71 (BP Location: Right arm)   Pulse 62   Temp 98.7  F (37.1  C) (Oral)   Resp 18   Ht 1.854 m (6' 1\")   Wt 117.3 kg (258 lb 9.6 oz)   SpO2 96%   BMI 34.12 kg/m      The patient is A&Ox3. Appears comfortable, sitting up in bed  Calves without tenderness, neg Gallo's  Brisk capillary refill in the toes.   Pulses not palpable d/t splint.  Toes warm and well perfused  Sensation intact in toes, slightly " reduced sensation in lateral foot  ROM: Appropriately wiggles toes.   Splint C/D/I      No drain     Pertinent Labs   Lab Results: personally reviewed.   Lab Results   Component Value Date    INR 1.05 12/02/2021     Lab Results   Component Value Date    WBC 7.1 07/22/2024    HGB 9.0 (L) 07/25/2024    HCT 28.5 (L) 07/22/2024    MCV 90 07/22/2024     07/22/2024     Lab Results   Component Value Date     07/25/2024    CO2 23 07/25/2024         Report completed by:  KHADAR JUAREZ PA-C  Date: 07/25/2024  [facility]

## 2024-07-27 ENCOUNTER — PATIENT OUTREACH (OUTPATIENT)
Dept: CARE COORDINATION | Facility: CLINIC | Age: 64
End: 2024-07-27
Payer: COMMERCIAL

## 2024-07-27 NOTE — PROGRESS NOTES
[facility]:   [facility] Contact  [facility]/Voicemail     Clinical Data: Post-Discharge Outreach     Outreach attempted x 2.  Left message on patient's voicemail, providing [facility]'s central phone number of [phone] ([phone]) for questions/concerns and/or to schedule an appt with an [facility] provider, if they do not have a PCP.      Plan:  [facility] will do no further outreaches at this time.       Karley Meza  Community Health Worker  [facility], [facility]  [phone]      *Connected Care Resource Team does NOT follow patient ongoing. Referrals are identified based on internal discharge reports and the outreach is to ensure patient has an understanding of their discharge instructions.

## 2024-07-29 LAB
PATH REPORT.COMMENTS IMP SPEC: NORMAL
PATH REPORT.COMMENTS IMP SPEC: NORMAL
PATH REPORT.FINAL DX SPEC: NORMAL
PATH REPORT.GROSS SPEC: NORMAL
PATH REPORT.MICROSCOPIC SPEC OTHER STN: NORMAL
PATH REPORT.RELEVANT HX SPEC: NORMAL
PHOTO IMAGE: NORMAL

## 2024-07-29 PROCEDURE — 88342 IMHCHEM/IMCYTCHM 1ST ANTB: CPT | Mod: 26 | Performed by: PATHOLOGY

## 2024-07-29 PROCEDURE — 88305 TISSUE EXAM BY PATHOLOGIST: CPT | Mod: 26 | Performed by: PATHOLOGY

## 2024-08-12 LAB
MDC_IDC_LEAD_CONNECTION_STATUS: NORMAL
MDC_IDC_LEAD_CONNECTION_STATUS: NORMAL
MDC_IDC_LEAD_IMPLANT_DT: NORMAL
MDC_IDC_LEAD_IMPLANT_DT: NORMAL
MDC_IDC_LEAD_LOCATION: NORMAL
MDC_IDC_LEAD_LOCATION: NORMAL
MDC_IDC_LEAD_LOCATION_DETAIL_1: NORMAL
MDC_IDC_LEAD_LOCATION_DETAIL_1: NORMAL
MDC_IDC_LEAD_MFG: NORMAL
MDC_IDC_LEAD_MFG: NORMAL
MDC_IDC_LEAD_MODEL: NORMAL
MDC_IDC_LEAD_MODEL: NORMAL
MDC_IDC_LEAD_POLARITY_TYPE: NORMAL
MDC_IDC_LEAD_POLARITY_TYPE: NORMAL
MDC_IDC_LEAD_SERIAL: NORMAL
MDC_IDC_LEAD_SERIAL: NORMAL
MDC_IDC_LEAD_SPECIAL_FUNCTION: NORMAL
MDC_IDC_LEAD_SPECIAL_FUNCTION: NORMAL
MDC_IDC_MSMT_BATTERY_DTM: NORMAL
MDC_IDC_MSMT_BATTERY_REMAINING_LONGEVITY: 136 MO
MDC_IDC_MSMT_BATTERY_RRT_TRIGGER: NORMAL
MDC_IDC_MSMT_BATTERY_VOLTAGE: 3.11 V
MDC_IDC_MSMT_CAP_CHARGE_ENERGY: 40 J
MDC_IDC_MSMT_CAP_CHARGE_TIME: 0 S
MDC_IDC_MSMT_CAP_CHARGE_TYPE: NORMAL
MDC_IDC_MSMT_LEADCHNL_RA_IMPEDANCE_VALUE: 323 OHM
MDC_IDC_MSMT_LEADCHNL_RA_PACING_THRESHOLD_AMPLITUDE: 0.62 V
MDC_IDC_MSMT_LEADCHNL_RA_PACING_THRESHOLD_PULSEWIDTH: 0.4 MS
MDC_IDC_MSMT_LEADCHNL_RA_SENSING_INTR_AMPL: 3 MV
MDC_IDC_MSMT_LEADCHNL_RV_IMPEDANCE_VALUE: 285 OHM
MDC_IDC_MSMT_LEADCHNL_RV_IMPEDANCE_VALUE: 361 OHM
MDC_IDC_MSMT_LEADCHNL_RV_PACING_THRESHOLD_AMPLITUDE: 0.75 V
MDC_IDC_MSMT_LEADCHNL_RV_PACING_THRESHOLD_PULSEWIDTH: 0.4 MS
MDC_IDC_MSMT_LEADCHNL_RV_SENSING_INTR_AMPL: 10.4 MV
MDC_IDC_PG_IMPLANT_DTM: NORMAL
MDC_IDC_PG_MFG: NORMAL
MDC_IDC_PG_MODEL: NORMAL
MDC_IDC_PG_SERIAL: NORMAL
MDC_IDC_PG_TYPE: NORMAL
MDC_IDC_SESS_CLINIC_NAME: NORMAL
MDC_IDC_SESS_DTM: NORMAL
MDC_IDC_SESS_TYPE: NORMAL
MDC_IDC_SET_BRADY_AT_MODE_SWITCH_RATE: 171 {BEATS}/MIN
MDC_IDC_SET_BRADY_LOWRATE: 60 {BEATS}/MIN
MDC_IDC_SET_BRADY_MAX_SENSOR_RATE: 130 {BEATS}/MIN
MDC_IDC_SET_BRADY_MAX_TRACKING_RATE: 130 {BEATS}/MIN
MDC_IDC_SET_BRADY_MODE: NORMAL
MDC_IDC_SET_BRADY_NIGHT_RATE: 50 {BEATS}/MIN
MDC_IDC_SET_BRADY_PAV_DELAY_LOW: 180 MS
MDC_IDC_SET_BRADY_SAV_DELAY_LOW: 150 MS
MDC_IDC_SET_LEADCHNL_RA_PACING_AMPLITUDE: 1.5 V
MDC_IDC_SET_LEADCHNL_RA_PACING_ANODE_ELECTRODE_1: NORMAL
MDC_IDC_SET_LEADCHNL_RA_PACING_ANODE_LOCATION_1: NORMAL
MDC_IDC_SET_LEADCHNL_RA_PACING_CAPTURE_MODE: NORMAL
MDC_IDC_SET_LEADCHNL_RA_PACING_CATHODE_ELECTRODE_1: NORMAL
MDC_IDC_SET_LEADCHNL_RA_PACING_CATHODE_LOCATION_1: NORMAL
MDC_IDC_SET_LEADCHNL_RA_PACING_POLARITY: NORMAL
MDC_IDC_SET_LEADCHNL_RA_PACING_PULSEWIDTH: 0.4 MS
MDC_IDC_SET_LEADCHNL_RA_SENSING_ANODE_ELECTRODE_1: NORMAL
MDC_IDC_SET_LEADCHNL_RA_SENSING_ANODE_LOCATION_1: NORMAL
MDC_IDC_SET_LEADCHNL_RA_SENSING_CATHODE_ELECTRODE_1: NORMAL
MDC_IDC_SET_LEADCHNL_RA_SENSING_CATHODE_LOCATION_1: NORMAL
MDC_IDC_SET_LEADCHNL_RA_SENSING_POLARITY: NORMAL
MDC_IDC_SET_LEADCHNL_RA_SENSING_SENSITIVITY: 0.3 MV
MDC_IDC_SET_LEADCHNL_RV_PACING_AMPLITUDE: 2 V
MDC_IDC_SET_LEADCHNL_RV_PACING_ANODE_ELECTRODE_1: NORMAL
MDC_IDC_SET_LEADCHNL_RV_PACING_ANODE_LOCATION_1: NORMAL
MDC_IDC_SET_LEADCHNL_RV_PACING_CAPTURE_MODE: NORMAL
MDC_IDC_SET_LEADCHNL_RV_PACING_CATHODE_ELECTRODE_1: NORMAL
MDC_IDC_SET_LEADCHNL_RV_PACING_CATHODE_LOCATION_1: NORMAL
MDC_IDC_SET_LEADCHNL_RV_PACING_POLARITY: NORMAL
MDC_IDC_SET_LEADCHNL_RV_PACING_PULSEWIDTH: 0.4 MS
MDC_IDC_SET_LEADCHNL_RV_SENSING_ANODE_ELECTRODE_1: NORMAL
MDC_IDC_SET_LEADCHNL_RV_SENSING_ANODE_LOCATION_1: NORMAL
MDC_IDC_SET_LEADCHNL_RV_SENSING_CATHODE_ELECTRODE_1: NORMAL
MDC_IDC_SET_LEADCHNL_RV_SENSING_CATHODE_LOCATION_1: NORMAL
MDC_IDC_SET_LEADCHNL_RV_SENSING_POLARITY: NORMAL
MDC_IDC_SET_LEADCHNL_RV_SENSING_SENSITIVITY: 0.3 MV
MDC_IDC_SET_ZONE_DETECTION_BEATS_DENOMINATOR: 16 {BEATS}
MDC_IDC_SET_ZONE_DETECTION_BEATS_DENOMINATOR: 32 {BEATS}
MDC_IDC_SET_ZONE_DETECTION_BEATS_DENOMINATOR: 40 {BEATS}
MDC_IDC_SET_ZONE_DETECTION_BEATS_NUMERATOR: 16 {BEATS}
MDC_IDC_SET_ZONE_DETECTION_BEATS_NUMERATOR: 30 {BEATS}
MDC_IDC_SET_ZONE_DETECTION_BEATS_NUMERATOR: 32 {BEATS}
MDC_IDC_SET_ZONE_DETECTION_INTERVAL: 240 MS
MDC_IDC_SET_ZONE_DETECTION_INTERVAL: 320 MS
MDC_IDC_SET_ZONE_DETECTION_INTERVAL: 350 MS
MDC_IDC_SET_ZONE_DETECTION_INTERVAL: 360 MS
MDC_IDC_SET_ZONE_DETECTION_INTERVAL: 370 MS
MDC_IDC_SET_ZONE_STATUS: NORMAL
MDC_IDC_SET_ZONE_TYPE: NORMAL
MDC_IDC_SET_ZONE_VENDOR_TYPE: NORMAL
MDC_IDC_STAT_AT_BURDEN_PERCENT: 0 %
MDC_IDC_STAT_AT_DTM_END: NORMAL
MDC_IDC_STAT_AT_DTM_START: NORMAL
MDC_IDC_STAT_BRADY_AP_VP_PERCENT: 2.52 %
MDC_IDC_STAT_BRADY_AP_VS_PERCENT: 81.65 %
MDC_IDC_STAT_BRADY_AS_VP_PERCENT: 1.13 %
MDC_IDC_STAT_BRADY_AS_VS_PERCENT: 14.7 %
MDC_IDC_STAT_BRADY_DTM_END: NORMAL
MDC_IDC_STAT_BRADY_DTM_START: NORMAL
MDC_IDC_STAT_BRADY_RA_PERCENT_PACED: 84.2 %
MDC_IDC_STAT_BRADY_RV_PERCENT_PACED: 3.65 %
MDC_IDC_STAT_CRT_DTM_END: NORMAL
MDC_IDC_STAT_CRT_DTM_START: NORMAL
MDC_IDC_STAT_EPISODE_RECENT_COUNT: 0
MDC_IDC_STAT_EPISODE_RECENT_COUNT_DTM_END: NORMAL
MDC_IDC_STAT_EPISODE_RECENT_COUNT_DTM_START: NORMAL
MDC_IDC_STAT_EPISODE_TOTAL_COUNT: 0
MDC_IDC_STAT_EPISODE_TOTAL_COUNT_DTM_END: NORMAL
MDC_IDC_STAT_EPISODE_TOTAL_COUNT_DTM_START: NORMAL
MDC_IDC_STAT_EPISODE_TYPE: NORMAL
MDC_IDC_STAT_TACHYTHERAPY_ATP_DELIVERED_RECENT: 0
MDC_IDC_STAT_TACHYTHERAPY_ATP_DELIVERED_TOTAL: 0
MDC_IDC_STAT_TACHYTHERAPY_RECENT_DTM_END: NORMAL
MDC_IDC_STAT_TACHYTHERAPY_RECENT_DTM_START: NORMAL
MDC_IDC_STAT_TACHYTHERAPY_SHOCKS_ABORTED_RECENT: 0
MDC_IDC_STAT_TACHYTHERAPY_SHOCKS_ABORTED_TOTAL: 0
MDC_IDC_STAT_TACHYTHERAPY_SHOCKS_DELIVERED_RECENT: 0
MDC_IDC_STAT_TACHYTHERAPY_SHOCKS_DELIVERED_TOTAL: 0
MDC_IDC_STAT_TACHYTHERAPY_TOTAL_DTM_END: NORMAL
MDC_IDC_STAT_TACHYTHERAPY_TOTAL_DTM_START: NORMAL

## 2024-09-11 ENCOUNTER — ANCILLARY PROCEDURE (OUTPATIENT)
Dept: CARDIOLOGY | Facility: CLINIC | Age: 64
End: 2024-09-11
Attending: INTERNAL MEDICINE
Payer: COMMERCIAL

## 2024-09-11 DIAGNOSIS — Z95.810 ICD (IMPLANTABLE CARDIOVERTER-DEFIBRILLATOR) IN PLACE: ICD-10-CM

## 2024-09-11 DIAGNOSIS — I42.9 SECONDARY CARDIOMYOPATHY (H): ICD-10-CM

## 2024-09-11 DIAGNOSIS — I49.5 SICK SINUS SYNDROME (H): ICD-10-CM

## 2024-09-30 LAB
MDC_IDC_LEAD_CONNECTION_STATUS: NORMAL
MDC_IDC_LEAD_CONNECTION_STATUS: NORMAL
MDC_IDC_LEAD_IMPLANT_DT: NORMAL
MDC_IDC_LEAD_IMPLANT_DT: NORMAL
MDC_IDC_LEAD_LOCATION: NORMAL
MDC_IDC_LEAD_LOCATION: NORMAL
MDC_IDC_LEAD_LOCATION_DETAIL_1: NORMAL
MDC_IDC_LEAD_LOCATION_DETAIL_1: NORMAL
MDC_IDC_LEAD_MFG: NORMAL
MDC_IDC_LEAD_MFG: NORMAL
MDC_IDC_LEAD_MODEL: NORMAL
MDC_IDC_LEAD_MODEL: NORMAL
MDC_IDC_LEAD_POLARITY_TYPE: NORMAL
MDC_IDC_LEAD_POLARITY_TYPE: NORMAL
MDC_IDC_LEAD_SERIAL: NORMAL
MDC_IDC_LEAD_SERIAL: NORMAL
MDC_IDC_LEAD_SPECIAL_FUNCTION: NORMAL
MDC_IDC_LEAD_SPECIAL_FUNCTION: NORMAL
MDC_IDC_MSMT_BATTERY_DTM: NORMAL
MDC_IDC_MSMT_BATTERY_REMAINING_LONGEVITY: 134 MO
MDC_IDC_MSMT_BATTERY_RRT_TRIGGER: NORMAL
MDC_IDC_MSMT_BATTERY_VOLTAGE: 3.06 V
MDC_IDC_MSMT_CAP_CHARGE_DTM: NORMAL
MDC_IDC_MSMT_CAP_CHARGE_ENERGY: 18 J
MDC_IDC_MSMT_CAP_CHARGE_TIME: 3.6 S
MDC_IDC_MSMT_CAP_CHARGE_TYPE: NORMAL
MDC_IDC_MSMT_LEADCHNL_RA_IMPEDANCE_VALUE: 361 OHM
MDC_IDC_MSMT_LEADCHNL_RA_PACING_THRESHOLD_AMPLITUDE: 0.62 V
MDC_IDC_MSMT_LEADCHNL_RA_PACING_THRESHOLD_PULSEWIDTH: 0.4 MS
MDC_IDC_MSMT_LEADCHNL_RA_SENSING_INTR_AMPL: 4 MV
MDC_IDC_MSMT_LEADCHNL_RV_IMPEDANCE_VALUE: 285 OHM
MDC_IDC_MSMT_LEADCHNL_RV_IMPEDANCE_VALUE: 380 OHM
MDC_IDC_MSMT_LEADCHNL_RV_PACING_THRESHOLD_AMPLITUDE: 0.75 V
MDC_IDC_MSMT_LEADCHNL_RV_PACING_THRESHOLD_PULSEWIDTH: 0.4 MS
MDC_IDC_MSMT_LEADCHNL_RV_SENSING_INTR_AMPL: 12.4 MV
MDC_IDC_PG_IMPLANT_DTM: NORMAL
MDC_IDC_PG_MFG: NORMAL
MDC_IDC_PG_MODEL: NORMAL
MDC_IDC_PG_SERIAL: NORMAL
MDC_IDC_PG_TYPE: NORMAL
MDC_IDC_SESS_CLINIC_NAME: NORMAL
MDC_IDC_SESS_DTM: NORMAL
MDC_IDC_SESS_TYPE: NORMAL
MDC_IDC_SET_BRADY_AT_MODE_SWITCH_RATE: 171 {BEATS}/MIN
MDC_IDC_SET_BRADY_LOWRATE: 60 {BEATS}/MIN
MDC_IDC_SET_BRADY_MAX_SENSOR_RATE: 130 {BEATS}/MIN
MDC_IDC_SET_BRADY_MAX_TRACKING_RATE: 130 {BEATS}/MIN
MDC_IDC_SET_BRADY_MODE: NORMAL
MDC_IDC_SET_BRADY_NIGHT_RATE: 50 {BEATS}/MIN
MDC_IDC_SET_BRADY_PAV_DELAY_LOW: 180 MS
MDC_IDC_SET_BRADY_SAV_DELAY_LOW: 150 MS
MDC_IDC_SET_LEADCHNL_RA_PACING_AMPLITUDE: 1.5 V
MDC_IDC_SET_LEADCHNL_RA_PACING_ANODE_ELECTRODE_1: NORMAL
MDC_IDC_SET_LEADCHNL_RA_PACING_ANODE_LOCATION_1: NORMAL
MDC_IDC_SET_LEADCHNL_RA_PACING_CAPTURE_MODE: NORMAL
MDC_IDC_SET_LEADCHNL_RA_PACING_CATHODE_ELECTRODE_1: NORMAL
MDC_IDC_SET_LEADCHNL_RA_PACING_CATHODE_LOCATION_1: NORMAL
MDC_IDC_SET_LEADCHNL_RA_PACING_POLARITY: NORMAL
MDC_IDC_SET_LEADCHNL_RA_PACING_PULSEWIDTH: 0.4 MS
MDC_IDC_SET_LEADCHNL_RA_SENSING_ANODE_ELECTRODE_1: NORMAL
MDC_IDC_SET_LEADCHNL_RA_SENSING_ANODE_LOCATION_1: NORMAL
MDC_IDC_SET_LEADCHNL_RA_SENSING_CATHODE_ELECTRODE_1: NORMAL
MDC_IDC_SET_LEADCHNL_RA_SENSING_CATHODE_LOCATION_1: NORMAL
MDC_IDC_SET_LEADCHNL_RA_SENSING_POLARITY: NORMAL
MDC_IDC_SET_LEADCHNL_RA_SENSING_SENSITIVITY: 0.3 MV
MDC_IDC_SET_LEADCHNL_RV_PACING_AMPLITUDE: 2 V
MDC_IDC_SET_LEADCHNL_RV_PACING_ANODE_ELECTRODE_1: NORMAL
MDC_IDC_SET_LEADCHNL_RV_PACING_ANODE_LOCATION_1: NORMAL
MDC_IDC_SET_LEADCHNL_RV_PACING_CAPTURE_MODE: NORMAL
MDC_IDC_SET_LEADCHNL_RV_PACING_CATHODE_ELECTRODE_1: NORMAL
MDC_IDC_SET_LEADCHNL_RV_PACING_CATHODE_LOCATION_1: NORMAL
MDC_IDC_SET_LEADCHNL_RV_PACING_POLARITY: NORMAL
MDC_IDC_SET_LEADCHNL_RV_PACING_PULSEWIDTH: 0.4 MS
MDC_IDC_SET_LEADCHNL_RV_SENSING_ANODE_ELECTRODE_1: NORMAL
MDC_IDC_SET_LEADCHNL_RV_SENSING_ANODE_LOCATION_1: NORMAL
MDC_IDC_SET_LEADCHNL_RV_SENSING_CATHODE_ELECTRODE_1: NORMAL
MDC_IDC_SET_LEADCHNL_RV_SENSING_CATHODE_LOCATION_1: NORMAL
MDC_IDC_SET_LEADCHNL_RV_SENSING_POLARITY: NORMAL
MDC_IDC_SET_LEADCHNL_RV_SENSING_SENSITIVITY: 0.3 MV
MDC_IDC_SET_ZONE_DETECTION_BEATS_DENOMINATOR: 16 {BEATS}
MDC_IDC_SET_ZONE_DETECTION_BEATS_DENOMINATOR: 32 {BEATS}
MDC_IDC_SET_ZONE_DETECTION_BEATS_DENOMINATOR: 40 {BEATS}
MDC_IDC_SET_ZONE_DETECTION_BEATS_NUMERATOR: 16 {BEATS}
MDC_IDC_SET_ZONE_DETECTION_BEATS_NUMERATOR: 30 {BEATS}
MDC_IDC_SET_ZONE_DETECTION_BEATS_NUMERATOR: 32 {BEATS}
MDC_IDC_SET_ZONE_DETECTION_INTERVAL: 240 MS
MDC_IDC_SET_ZONE_DETECTION_INTERVAL: 320 MS
MDC_IDC_SET_ZONE_DETECTION_INTERVAL: 350 MS
MDC_IDC_SET_ZONE_DETECTION_INTERVAL: 360 MS
MDC_IDC_SET_ZONE_DETECTION_INTERVAL: 370 MS
MDC_IDC_SET_ZONE_STATUS: NORMAL
MDC_IDC_SET_ZONE_TYPE: NORMAL
MDC_IDC_SET_ZONE_VENDOR_TYPE: NORMAL
MDC_IDC_STAT_AT_BURDEN_PERCENT: 0 %
MDC_IDC_STAT_AT_DTM_END: NORMAL
MDC_IDC_STAT_AT_DTM_START: NORMAL
MDC_IDC_STAT_BRADY_AP_VP_PERCENT: 30.71 %
MDC_IDC_STAT_BRADY_AP_VS_PERCENT: 34.46 %
MDC_IDC_STAT_BRADY_AS_VP_PERCENT: 13.38 %
MDC_IDC_STAT_BRADY_AS_VS_PERCENT: 21.45 %
MDC_IDC_STAT_BRADY_DTM_END: NORMAL
MDC_IDC_STAT_BRADY_DTM_START: NORMAL
MDC_IDC_STAT_BRADY_RA_PERCENT_PACED: 65.27 %
MDC_IDC_STAT_BRADY_RV_PERCENT_PACED: 44.1 %
MDC_IDC_STAT_CRT_DTM_END: NORMAL
MDC_IDC_STAT_CRT_DTM_START: NORMAL
MDC_IDC_STAT_EPISODE_RECENT_COUNT: 0
MDC_IDC_STAT_EPISODE_RECENT_COUNT_DTM_END: NORMAL
MDC_IDC_STAT_EPISODE_RECENT_COUNT_DTM_START: NORMAL
MDC_IDC_STAT_EPISODE_TOTAL_COUNT: 0
MDC_IDC_STAT_EPISODE_TOTAL_COUNT_DTM_END: NORMAL
MDC_IDC_STAT_EPISODE_TOTAL_COUNT_DTM_START: NORMAL
MDC_IDC_STAT_EPISODE_TYPE: NORMAL
MDC_IDC_STAT_TACHYTHERAPY_ATP_DELIVERED_RECENT: 0
MDC_IDC_STAT_TACHYTHERAPY_ATP_DELIVERED_TOTAL: 0
MDC_IDC_STAT_TACHYTHERAPY_RECENT_DTM_END: NORMAL
MDC_IDC_STAT_TACHYTHERAPY_RECENT_DTM_START: NORMAL
MDC_IDC_STAT_TACHYTHERAPY_SHOCKS_ABORTED_RECENT: 0
MDC_IDC_STAT_TACHYTHERAPY_SHOCKS_ABORTED_TOTAL: 0
MDC_IDC_STAT_TACHYTHERAPY_SHOCKS_DELIVERED_RECENT: 0
MDC_IDC_STAT_TACHYTHERAPY_SHOCKS_DELIVERED_TOTAL: 0
MDC_IDC_STAT_TACHYTHERAPY_TOTAL_DTM_END: NORMAL
MDC_IDC_STAT_TACHYTHERAPY_TOTAL_DTM_START: NORMAL

## 2024-09-30 PROCEDURE — 93296 REM INTERROG EVL PM/IDS: CPT | Performed by: INTERNAL MEDICINE

## 2024-09-30 PROCEDURE — 93295 DEV INTERROG REMOTE 1/2/MLT: CPT | Performed by: INTERNAL MEDICINE

## 2025-02-04 ENCOUNTER — TELEPHONE (OUTPATIENT)
Dept: CARDIOLOGY | Facility: CLINIC | Age: 65
End: 2025-02-04

## 2025-02-04 ENCOUNTER — ANCILLARY PROCEDURE (OUTPATIENT)
Dept: CARDIOLOGY | Facility: CLINIC | Age: 65
End: 2025-02-04
Attending: INTERNAL MEDICINE
Payer: COMMERCIAL

## 2025-02-04 ENCOUNTER — OFFICE VISIT (OUTPATIENT)
Dept: CARDIOLOGY | Facility: CLINIC | Age: 65
End: 2025-02-04
Payer: COMMERCIAL

## 2025-02-04 VITALS
DIASTOLIC BLOOD PRESSURE: 60 MMHG | SYSTOLIC BLOOD PRESSURE: 118 MMHG | RESPIRATION RATE: 14 BRPM | HEART RATE: 65 BPM | WEIGHT: 268 LBS | BODY MASS INDEX: 35.36 KG/M2

## 2025-02-04 DIAGNOSIS — E78.5 HYPERLIPIDEMIA, UNSPECIFIED HYPERLIPIDEMIA TYPE: ICD-10-CM

## 2025-02-04 DIAGNOSIS — I49.5 SICK SINUS SYNDROME (H): ICD-10-CM

## 2025-02-04 DIAGNOSIS — I21.9 MYOCARDIAL INFARCTION WITH NONOBSTRUCTIVE CORONARY ARTERIES (H): ICD-10-CM

## 2025-02-04 DIAGNOSIS — Z95.0 CARDIAC PACEMAKER IN SITU: Primary | ICD-10-CM

## 2025-02-04 DIAGNOSIS — I47.29 NSVT (NONSUSTAINED VENTRICULAR TACHYCARDIA) (H): ICD-10-CM

## 2025-02-04 DIAGNOSIS — I48.0 PAF (PAROXYSMAL ATRIAL FIBRILLATION) (H): ICD-10-CM

## 2025-02-04 DIAGNOSIS — R06.09 DYSPNEA ON EXERTION: ICD-10-CM

## 2025-02-04 DIAGNOSIS — I42.2 HYPERTROPHIC CARDIOMYOPATHY (H): ICD-10-CM

## 2025-02-04 DIAGNOSIS — I42.2 HYPERTROPHIC CARDIOMYOPATHY (H): Primary | ICD-10-CM

## 2025-02-04 PROCEDURE — 99214 OFFICE O/P EST MOD 30 MIN: CPT | Performed by: GENERAL ACUTE CARE HOSPITAL

## 2025-02-04 PROCEDURE — G2211 COMPLEX E/M VISIT ADD ON: HCPCS | Performed by: GENERAL ACUTE CARE HOSPITAL

## 2025-02-04 RX ORDER — DABIGATRAN ETEXILATE 150 MG/1
150 CAPSULE ORAL 2 TIMES DAILY
Qty: 180 CAPSULE | Refills: 3 | Status: SHIPPED | OUTPATIENT
Start: 2025-02-04 | End: 2025-02-04

## 2025-02-04 RX ORDER — METOPROLOL SUCCINATE 100 MG/1
100 TABLET, EXTENDED RELEASE ORAL 2 TIMES DAILY
Qty: 180 TABLET | Refills: 3 | Status: SHIPPED | OUTPATIENT
Start: 2025-02-04

## 2025-02-04 RX ORDER — DABIGATRAN ETEXILATE 150 MG/1
150 CAPSULE ORAL 2 TIMES DAILY
Qty: 180 CAPSULE | Refills: 3 | Status: SHIPPED | OUTPATIENT
Start: 2025-02-04

## 2025-02-04 RX ORDER — ATORVASTATIN CALCIUM 80 MG/1
80 TABLET, FILM COATED ORAL DAILY
Qty: 90 TABLET | Refills: 3 | Status: SHIPPED | OUTPATIENT
Start: 2025-02-04

## 2025-02-04 NOTE — LETTER
2/4/2025    Clifford Gamboa MD  [employer] [address]  [facility] [address]    RE: Abraham Dong       Dear Colleague,     I had the pleasure of seeing Abraham Dong in the [facility].  HEART CARE ENCOUNTER NOTE        Assessment/Recommendations   Assessment:    Hypertrophic cardiomyopathy with no evidence of resting left ventricular outflow tract obstruction but left ventricular end diastolic pressure was markedly elevated at 38 mmHg on cardiac catheterization 12/2/2021 and he reports exertional dyspnea which may be a symptom of obstruction. His maximal wall thickness of 32 mm and presence of myocardial fibrosis noted on cardiac MRI 9/27/2023 as well as nonsustained ventricular tachycardia are risk factors for sudden cardiac death.  Sick sinus syndrome status post Medtronic dual-chamber permanent pacemaker placement 12/23/2021 with extraction of his right ventricular lead and upgrade to a dual-chamber implantable cardioverter-defibrillator placement for the primary prevention of sudden cardiac death 4/24/2024.  Paroxysmal atrial fibrillation. This has been quiescent. [id number] score is at least 1 (hypertension) although the presence of hypertrophic cardiomyopathy may further increase his risk of thromboembolism   Nonsustained ventricular tachycardia 8/25/2022 noted on pacemaker interrogation possibly associated with symptoms.  Nonobstructive coronary artery disease seen on coronary angiography 12/2/2021.  Hyperlipidemia. Last LDL 63 mg/dL.  Essential hypertension.  Body mass index 36.36 kg/m .    Plan:  Metoprolol succinate 200 mg daily.  A trial of a diuretic could be considered but this may precipitate/worsen left ventricular outflow tract obstruction.  Referral to [facility] to discuss advanced medical therapies as well as septal myectomy.  We will try switching apixaban to dabigatran 150 mg twice daily which may be more affordable.  Continue atorvastatin 80  mg daily.  He probably does not need to take ezetimibe. We will discontinue this.  Follow-up with me in 1 year.         History of Present Illness   Mr. Abraham Dong is a 64 year old male with a significant past history of HCM, SSS s/p Medtronic dual-chamber PPM 12/23/2021 upgraded to ICD 4/24/2024, paroxysmal AF, nonobstructive CAD and HLD presenting for follow-up.    He has his RV pacer lead extracted and ICD placed 4/24/2024 without complication. He still gets out of breath easily and was only able to do 2 minutes on the Alex protocol during his stress test last year. His lightheadedness does not seem as bothersome. No chest pain/pressure/tightness, shortness of breath at rest, pre-syncope, syncope, lower extremity swelling, palpitations, paroxysmal nocturnal dyspnea (PND), or orthopnea.    With his new insurance, his apixaban copay has increased to $600 which he cannot afford.     Cardiac Problems and Cardiac Diagnostics     Most Recent Cardiac testing:  ECG dated 12/2/2021 (personaly reviewed and interpreted): SR, RBBB, LVH with repolarization abnormality     Event monitor 12/9/2021 (report reviewed):  Results:  Indication for study: Bradycardia  Time monitored: 10 days 16 hours.  Time analyzed: 7 days 11 hours.    The baseline rhythm transmission demonstrated normal sinus rhythm with heart rates in the 70s.  The [place] interval is normal.  The QRS duration is at the upper limits of normal or demonstrating true IVCD throughout all recordings.  The patient had 24 manually activated rhythm recordings.  Symptoms were reported and included lightheadedness, dizziness, heart racing, shortness of breath, and chest pain.  In 14 of the 24 recordings the patient demonstrated some form of arrhythmia ranging from simple ectopy (PACs + PVCs) to atrial fibrillation on 2 different days.  10 of the 24 recordings however demonstrated sinus rhythm and/or sinus tachycardia with IVCD but no other pathologic rhythm disturbance.  The  patient had 3 auto triggered recordings.  Symptoms were now reported.  The patient's rhythm demonstrated normal sinus rhythm with heart rates ranging between 58 and 84 bpm with one of the recordings showing isolated PVCs.     Impression:  Abnormal multi day patient activated monitor by virtue of the presence of atrial fibrillation.  The recordings from December 11 and December 13 appear to be consistent with atrial fibrillation with a ventricular response as high as 122 bpm.  The automated over read from December 10 and December 17 misdiagnosed atrial fibrillation when the rhythm is actually sinus rhythm with frequent runs of ectopic atrial tachycardia.  The patient's symptomatic recordings do not consistently correlate with a pathologic rhythm disturbance with roughly 40% demonstrating sinus rhythm alone.  Indication for study: Bradycardia.  The patient demonstrated some mild bradycardia and conduction system disease (IVCD) but no profound bradycardia or pauses.  No significant ventricular arrhythmia.     Device interrogation 2/4/2025 (report reviewed):  Encounter type: Pt. seen in clinic for annual device evaluation and iterative programming. Followed by an appointment with Dr. Rojas for f/up.  Device: Medtronic, Cobalt CRT-D  Pacing %/Programmed: AP- 74.4%, - 26.6%, AAIR<=>DDDR 60/130  Lead(s): Stable  Battery longevity: Estimating 10.8 years remaining.  Presenting rhythm: AP/ @ 67 bpm  Underlying rhythm: SB @ 50's (AV: 238 ms)  Heart rates: Histograms show primarily 50-60 bpm.  Atrial arrhythmias: Since 7/25/2024- no AT/AF  Anticoagulant: none. Patient plans on stopping taking Eliquis d/t cost.    Ventricular arrhythmias: Since 7/25/2024- no VT/VF  Comments: Normal device function. Changes RV amplitude to 1.5 V. Turned AT/AF alerts back on. Shweta was updated on OAC status.   Plan: Remote scheduled for 5/8/2024. Patient offered remote handout.  Device follow up for the entirety of having the PPM/ICD, based  on best practices determined by [employer] and in Compliance with Medicare guidelines. Continue remote device monitoring per patient plan.    Stress test 2/23/2024 (results reviewed):  1. Normal stress echocardiogram without evidence of stress induced ischemia, but with significantly reduced sensitivity due to not attaining target heart rate and lower double product achieved (patient achieved 52% predicted maximum heart rate for age & DP of 11,340).  2. Normal resting LV systolic performance with an ejection fraction of 60%. There is improvement in left ventricular systolic performance with a peak ejection fraction of 70%.  3. ECG portion of the study is nondiagnostic in the presence of ventricular pacing.  4. No anginal chest pain reported with exercise.  5. Poor functional capacity for age.  6. Spectral Doppler interrogation through the LVOT/AoV does not demonstrate any significant increase in flow velocities compared to rest evaluation (baseline LVOT gradient 4 mmHg & post exercise peak 6 mmHg).    ECHO 7/19/2023 (report reviewed):   1. Normal left ventricular size and systolic performance with a visually estimated ejection fraction of 55%.  2. There is moderate concentric increase in left ventricular wall thickness is more moderate-severe septal thickening.  3. No significant valvular heart disease is identified on this study.  4. Normal right ventricular size with low normal right ventricular systolic performance.  5. There is mild left atrial enlargement.  6. There is moderate aortic root enlargement.  7. When compared to the prior real-time echocardiogram dated 2 December 2021, the findings are felt to be fairly similar on both examinations.     Cardiac MRI 9/27/2023 (report reviewed):  1.  Significant hypertrophic cardiomyopathy with maximal septal wall thickness 32 mm.  Asymmetric LVH due to hypertrophic cardiomyopathy.  The estimated left ventricular ejection fraction is 55-60%.  LVOT obstruction  is indicated by septal motion of anterior mitral leaflet.  2.  Patch myocardial scars in septal and basal anterior walls. Diffuse fibrotic tissue in thickened myocardium.    3.  Normal right ventricular size and systolic function.  Pacemaker lead in right ventricle.  4.  Valves are not well visualized.  Mild mitral valve regurgitation.     Cardiac cath 12/2/2021 (report reviewed):  Diffusely tortuous coronary arteries with mild atherosclerosis consistent with hypertensive coronary disease. Large, ectatic RCA.  LV EDP extremely elevated at 38 mmHg.  LV EF normal with possible apical hypertrophy vs diverticulum.     Medications  Allergies   Current Outpatient Medications   Medication Sig Dispense Refill     acetaminophen (TYLENOL) 325 MG tablet Take 3 tablets (975 mg) by mouth 3 times daily as needed for mild pain 100 tablet 0     acetaminophen-caffeine (EXCEDRIN TENSION HEADACHE) 500-65 MG TABS Take 2 tablets by mouth daily as needed for mild pain       apixaban ANTICOAGULANT (ELIQUIS ANTICOAGULANT) 5 MG tablet Take 1 tablet (5 mg) by mouth 2 times daily 180 tablet 3     atorvastatin (LIPITOR) 80 MG tablet Take 1 tablet (80 mg) by mouth daily 30 tablet 0     ezetimibe (ZETIA) 10 MG tablet Take 1 tablet (10 mg) by mouth once daily 90 tablet 3     metoprolol succinate ER (TOPROL XL) 100 MG 24 hr tablet Take 1 tablet (100 mg) by mouth 2 times daily 60 tablet 1     nitroGLYcerin (NITROSTAT) 0.4 MG sublingual tablet One tablet under the tongue every 5 minutes if needed for chest pain. May repeat every 5 minutes for a maximum of 3 doses in 15 minutes 25 tablet 3     pantoprazole (PROTONIX) 40 MG EC tablet Take 1 tablet (40 mg) by mouth every morning (before breakfast) 90 tablet 1     sertraline (ZOLOFT) 100 MG tablet Take 100 mg by mouth daily        oxyCODONE (ROXICODONE) 5 MG tablet Take 1 tablet (5 mg) by mouth every 4 hours as needed for moderate pain (Patient not taking: Reported on 2/4/2025) 15 tablet 0      senna-docusate (SENOKOT-S/PERICOLACE) 8.6-50 MG tablet Take 1 tablet by mouth 2 times daily as needed for constipation (Patient not taking: Reported on 2/4/2025) 30 tablet 0      No Known Allergies     Physical Examination Review of Systems   /60 (BP Location: Right arm, Patient Position: Sitting, Cuff Size: Adult Large)   Pulse 65   Resp 14   Wt 121.6 kg (268 lb)   BMI 35.36 kg/m    Body mass index is 35.36 kg/m .  Wt Readings from Last 3 Encounters:   02/04/25 121.6 kg (268 lb)   07/25/24 117.3 kg (258 lb 9.6 oz)   04/24/24 117 kg (258 lb)       General Appearance:   Pleasant  male, appears  stated age. no acute distress, obese body habitus   ENT/Mouth: membranes moist, no apparent gingival bleeding.      EYES:  no scleral icterus, normal conjunctivae   Neck: no carotid bruits. No anterior cervical lymphadenopaty   Respiratory:   lungs are clear to auscultation, no rales or wheezing, equal chest wall expansion    Cardiovascular:   Regular rhythm, normal rate. Soft systolic murmur at the right upper sternal border. No rubs or gallops; the carotid, radial and posterior tibial pulses are intact, Jugular venous pressure not elevated, no edema bilaterally    Abdomen/GI:  no organomegaly, masses, bruits, or tenderness; bowel sounds are present   Extremities: no cyanosis or clubbing   Skin: no xanthelasma, warm.    Heme/lymph/ Immunology No apparent bleeding noted.   Neurologic: Alert and oriented. normal gait, no tremors     Psychiatric: Pleasant, calm, appropriate affect.    A complete 10 system review of systems was performed and is negative except as mentioned in the HPI/subjective.         Past History   Past Medical History:   Past Medical History:   Diagnosis Date     Atrial fibrillation (H)      Benign essential hypertension      Coronary artery disease      Depression      Hyperlipidemia      Obesity        Past Surgical History:   Past Surgical History:   Procedure Laterality Date     CORONARY  ANGIOGRAPHY ADULT ORDER       CV CORONARY ANGIOGRAM N/A 12/2/2021    Procedure: Coronary Angiogram;  Surgeon: Radha Ferrell MD;  Location: [facility]     CV LEFT HEART CATH N/A 12/2/2021    Procedure: Left Heart Cath;  Surgeon: Radha Ferrell MD;  Location: [facility]     CV LEFT VENTRICULOGRAM N/A 12/2/2021    Procedure: Left Ventriculogram;  Surgeon: Radha Ferrell MD;  Location: [facility]     EP ICD INSERT BIVENT N/A 4/24/2024    Procedure: Implantable Cardioverter Defibrillator Device & Lead Implant Biventricular;  Surgeon: El Hagen MD;  Location: [facility]     EP PACEMAKER N/A 12/23/2021    Procedure: EP Pacemaker;  Surgeon: Kun Lopez MD;  Location: [facility]     EP PPM GENERATOR REMOVAL N/A 4/24/2024    Procedure: Implantable Cardioverter Defibrillator Device & Lead Implant Biventricular;  Surgeon: El Hagen MD;  Location: [facility]     ESOPHAGOSCOPY, GASTROSCOPY, DUODENOSCOPY (EGD), COMBINED N/A 7/22/2024    Procedure: ESOPHAGOGASTRODUODENOSCOPY WITH BIOPSIES;  Surgeon: Jose Perales MD;  Location: [facility] OR     HERNIA REPAIR       IMPLANT PACEMAKER       KNEE SURGERY       LEAD REMOVAL FOR DUAL OR BI-VENTRICULAR PACEMAKER        N/A 4/24/2024    Procedure: Pacemaker Lead Removal - Dual or Bi-ventricular;  Surgeon: El Hagen MD;  Location: [facility]     OPEN REDUCTION INTERNAL FIXATION ANKLE Left 7/23/2024    Procedure: OPEN REDUCTION INTERNAL FIXATION, FRACTURE, LEFT ANKLE;  Surgeon: Hector Ratliff MD;  Location: [facility] OR     OR LASER LEAD EXTRACTION - LEVEL 2 (STANDBY) N/A 4/24/2024    Procedure: Or Laser Lead Extraction - Level 2 (Standby);  Surgeon: Sulma Olivarez MD;  Location: [facility]     TONSILLECTOMY       VASECTOMY         Family History:   Family History   Problem Relation Age of Onset     Diabetes Mother      Hypertension Mother      Coronary  Artery Disease No family hx of      Cerebrovascular Disease No family hx of         Social History:   Social History     Socioeconomic History     Marital status:      Spouse name: Not on file     Number of children: Not on file     Years of education: Not on file     Highest education level: Not on file   Occupational History     Not on file   Tobacco Use     Smoking status: Never     Smokeless tobacco: Never   Substance and Sexual Activity     Alcohol use: Yes     Alcohol/week: 6.0 standard drinks of alcohol     Types: 6 Standard drinks or equivalent per week     Comment: 7 drinks per week     Drug use: Never     Sexual activity: Not on file   Other Topics Concern     Parent/sibling w/ CABG, MI or angioplasty before 65F 55M? Not Asked   Social History Narrative     Not on file     Social Drivers of Health     Financial Resource Strain: Low Risk  (7/2/2024)    Received from [employer]    Financial Resource Strain      Difficulty of Paying Living Expenses: 3      Difficulty of Paying Living Expenses: Not on file   Food Insecurity: No Food Insecurity (7/2/2024)    [facility]    Food Insecurity      Do you worry your food will run out before you are able to buy more?: 1   Transportation Needs: No Transportation Needs (7/2/2024)    Received from [employer]    Transportation Needs      Does lack of transportation keep you from medical appointments?: 1      Does lack of transportation keep you from work, meetings or getting things that you need?: 1   Physical Activity: Not on file   Stress: Not on file   Social Connections: Socially Integrated (7/2/2024)    [facility]    Social Connections      Do you often feel lonely or isolated from those around you?: 0   Interpersonal Safety: Not on file   Housing Stability: Low Risk  (7/2/2024)    Received from  [facility]    Housing Stability      What is your housing situation today?: 1              Lab Results    Chemistry/lipid CBC Cardiac Enzymes/BNP/TSH/INR   Lab Results   Component Value Date    CHOL 138 06/27/2023    HDL 45 06/27/2023    LDL 63 06/27/2023    TRIG 148 06/27/2023    CR 0.74 07/25/2024    BUN 13.8 07/25/2024    POTASSIUM 3.9 07/25/2024     07/25/2024    CO2 23 07/25/2024      Lab Results   Component Value Date    WBC 7.1 07/22/2024    HGB 9.0 (L) 07/25/2024    HCT 28.5 (L) 07/22/2024    MCV 90 07/22/2024     07/22/2024      Lab Results   Component Value Date    TROPONINI 0.04 12/03/2021    TSH 9.52 (H) 07/22/2024    INR 1.05 12/02/2021          Leandro Rojas MD [facility]  Non-Invasive Cardiologist  [facility]  Pager [phone]      Thank you for allowing me to participate in the care of your patient.      Sincerely,     Leandro Rojas MD     [facility]  cc:   Leandro Rojas MD  [address]  [place] [address]

## 2025-02-04 NOTE — TELEPHONE ENCOUNTER
[facility]    Phone Message    May a detailed message be left on voicemail: yes     Reason for Call: Appointment Intake    Referring Provider Name: Dr. Leandro Rojas  Diagnosis and/or Symptoms: Hypertrophic cardiomyopathy (H) [I42.2]- requesting [facility] Congen[facility]/Genetics    Action Taken: Message routed to:  [facility] Cardio    Travel Screening: Not Applicable     Date of Service:

## 2025-02-04 NOTE — PROGRESS NOTES
HEART CARE ENCOUNTER NOTE        Assessment/Recommendations   Assessment:    Hypertrophic cardiomyopathy with no evidence of resting left ventricular outflow tract obstruction but left ventricular end diastolic pressure was markedly elevated at 38 mmHg on cardiac catheterization 12/2/2021 and he reports exertional dyspnea which may be a symptom of obstruction. His maximal wall thickness of 32 mm and presence of myocardial fibrosis noted on cardiac MRI 9/27/2023 as well as nonsustained ventricular tachycardia are risk factors for sudden cardiac death.  Sick sinus syndrome status post Medtronic dual-chamber permanent pacemaker placement 12/23/2021 with extraction of his right ventricular lead and upgrade to a dual-chamber implantable cardioverter-defibrillator placement for the primary prevention of sudden cardiac death 4/24/2024.  Paroxysmal atrial fibrillation. This has been quiescent. [id number] score is at least 1 (hypertension) although the presence of hypertrophic cardiomyopathy may further increase his risk of thromboembolism   Nonsustained ventricular tachycardia 8/25/2022 noted on pacemaker interrogation possibly associated with symptoms.  Nonobstructive coronary artery disease seen on coronary angiography 12/2/2021.  Hyperlipidemia. Last LDL 63 mg/dL.  Essential hypertension.  Body mass index 36.36 kg/m .    Plan:  Metoprolol succinate 200 mg daily.  A trial of a diuretic could be considered but this may precipitate/worsen left ventricular outflow tract obstruction.  Referral to [facility] to discuss advanced medical therapies as well as septal myectomy.  We will try switching apixaban to dabigatran 150 mg twice daily which may be more affordable.  Continue atorvastatin 80 mg daily.  He probably does not need to take ezetimibe. We will discontinue this.  Follow-up with me in 1 year.         History of Present Illness   Mr. Abraham Dong is a 64 year old male with a significant  past history of HCM, SSS s/p Medtronic dual-chamber PPM 12/23/2021 upgraded to ICD 4/24/2024, paroxysmal AF, nonobstructive CAD and HLD presenting for follow-up.    He has his RV pacer lead extracted and ICD placed 4/24/2024 without complication. He still gets out of breath easily and was only able to do 2 minutes on the Alex protocol during his stress test last year. His lightheadedness does not seem as bothersome. No chest pain/pressure/tightness, shortness of breath at rest, pre-syncope, syncope, lower extremity swelling, palpitations, paroxysmal nocturnal dyspnea (PND), or orthopnea.    With his new insurance, his apixaban copay has increased to $600 which he cannot afford.     Cardiac Problems and Cardiac Diagnostics     Most Recent Cardiac testing:  ECG dated 12/2/2021 (personaly reviewed and interpreted): SR, RBBB, LVH with repolarization abnormality     Event monitor 12/9/2021 (report reviewed):  Results:  Indication for study: Bradycardia  Time monitored: 10 days 16 hours.  Time analyzed: 7 days 11 hours.    The baseline rhythm transmission demonstrated normal sinus rhythm with heart rates in the 70s.  The [place] interval is normal.  The QRS duration is at the upper limits of normal or demonstrating true IVCD throughout all recordings.  The patient had 24 manually activated rhythm recordings.  Symptoms were reported and included lightheadedness, dizziness, heart racing, shortness of breath, and chest pain.  In 14 of the 24 recordings the patient demonstrated some form of arrhythmia ranging from simple ectopy (PACs + PVCs) to atrial fibrillation on 2 different days.  10 of the 24 recordings however demonstrated sinus rhythm and/or sinus tachycardia with IVCD but no other pathologic rhythm disturbance.  The patient had 3 auto triggered recordings.  Symptoms were now reported.  The patient's rhythm demonstrated normal sinus rhythm with heart rates ranging between 58 and 84 bpm with one of the recordings showing  isolated PVCs.     Impression:  Abnormal multi day patient activated monitor by virtue of the presence of atrial fibrillation.  The recordings from December 11 and December 13 appear to be consistent with atrial fibrillation with a ventricular response as high as 122 bpm.  The automated over read from December 10 and December 17 misdiagnosed atrial fibrillation when the rhythm is actually sinus rhythm with frequent runs of ectopic atrial tachycardia.  The patient's symptomatic recordings do not consistently correlate with a pathologic rhythm disturbance with roughly 40% demonstrating sinus rhythm alone.  Indication for study: Bradycardia.  The patient demonstrated some mild bradycardia and conduction system disease (IVCD) but no profound bradycardia or pauses.  No significant ventricular arrhythmia.     Device interrogation 2/4/2025 (report reviewed):  Encounter type: Pt. seen in clinic for annual device evaluation and iterative programming. Followed by an appointment with Dr. Rojas for f/up.  Device: Medtronic, Cobalt CRT-D  Pacing %/Programmed: AP- 74.4%, - 26.6%, AAIR<=>DDDR 60/130  Lead(s): Stable  Battery longevity: Estimating 10.8 years remaining.  Presenting rhythm: AP/ @ 67 bpm  Underlying rhythm: SB @ 50's (AV: 238 ms)  Heart rates: Histograms show primarily 50-60 bpm.  Atrial arrhythmias: Since 7/25/2024- no AT/AF  Anticoagulant: none. Patient plans on stopping taking Eliquis d/t cost.    Ventricular arrhythmias: Since 7/25/2024- no VT/VF  Comments: Normal device function. Changes RV amplitude to 1.5 V. Turned AT/AF alerts back on. Shweta was updated on OAC status.   Plan: Remote scheduled for 5/8/2024. Patient offered remote handout.  Device follow up for the entirety of having the PPM/ICD, based on best practices determined by [employer] and in Compliance with Medicare guidelines. Continue remote device monitoring per patient plan.    Stress test 2/23/2024 (results reviewed):  1. Normal  stress echocardiogram without evidence of stress induced ischemia, but with significantly reduced sensitivity due to not attaining target heart rate and lower double product achieved (patient achieved 52% predicted maximum heart rate for age & DP of 11,340).  2. Normal resting LV systolic performance with an ejection fraction of 60%. There is improvement in left ventricular systolic performance with a peak ejection fraction of 70%.  3. ECG portion of the study is nondiagnostic in the presence of ventricular pacing.  4. No anginal chest pain reported with exercise.  5. Poor functional capacity for age.  6. Spectral Doppler interrogation through the LVOT/AoV does not demonstrate any significant increase in flow velocities compared to rest evaluation (baseline LVOT gradient 4 mmHg & post exercise peak 6 mmHg).    ECHO 7/19/2023 (report reviewed):   1. Normal left ventricular size and systolic performance with a visually estimated ejection fraction of 55%.  2. There is moderate concentric increase in left ventricular wall thickness is more moderate-severe septal thickening.  3. No significant valvular heart disease is identified on this study.  4. Normal right ventricular size with low normal right ventricular systolic performance.  5. There is mild left atrial enlargement.  6. There is moderate aortic root enlargement.  7. When compared to the prior real-time echocardiogram dated 2 December 2021, the findings are felt to be fairly similar on both examinations.     Cardiac MRI 9/27/2023 (report reviewed):  1.  Significant hypertrophic cardiomyopathy with maximal septal wall thickness 32 mm.  Asymmetric LVH due to hypertrophic cardiomyopathy.  The estimated left ventricular ejection fraction is 55-60%.  LVOT obstruction is indicated by septal motion of anterior mitral leaflet.  2.  Patch myocardial scars in septal and basal anterior walls. Diffuse fibrotic tissue in thickened myocardium.    3.  Normal right ventricular  size and systolic function.  Pacemaker lead in right ventricle.  4.  Valves are not well visualized.  Mild mitral valve regurgitation.     Cardiac cath 12/2/2021 (report reviewed):  Diffusely tortuous coronary arteries with mild atherosclerosis consistent with hypertensive coronary disease. Large, ectatic RCA.  LV EDP extremely elevated at 38 mmHg.  LV EF normal with possible apical hypertrophy vs diverticulum.     Medications  Allergies   Current Outpatient Medications   Medication Sig Dispense Refill    acetaminophen (TYLENOL) 325 MG tablet Take 3 tablets (975 mg) by mouth 3 times daily as needed for mild pain 100 tablet 0    acetaminophen-caffeine (EXCEDRIN TENSION HEADACHE) 500-65 MG TABS Take 2 tablets by mouth daily as needed for mild pain      apixaban ANTICOAGULANT (ELIQUIS ANTICOAGULANT) 5 MG tablet Take 1 tablet (5 mg) by mouth 2 times daily 180 tablet 3    atorvastatin (LIPITOR) 80 MG tablet Take 1 tablet (80 mg) by mouth daily 30 tablet 0    ezetimibe (ZETIA) 10 MG tablet Take 1 tablet (10 mg) by mouth once daily 90 tablet 3    metoprolol succinate ER (TOPROL XL) 100 MG 24 hr tablet Take 1 tablet (100 mg) by mouth 2 times daily 60 tablet 1    nitroGLYcerin (NITROSTAT) 0.4 MG sublingual tablet One tablet under the tongue every 5 minutes if needed for chest pain. May repeat every 5 minutes for a maximum of 3 doses in 15 minutes 25 tablet 3    pantoprazole (PROTONIX) 40 MG EC tablet Take 1 tablet (40 mg) by mouth every morning (before breakfast) 90 tablet 1    sertraline (ZOLOFT) 100 MG tablet Take 100 mg by mouth daily       oxyCODONE (ROXICODONE) 5 MG tablet Take 1 tablet (5 mg) by mouth every 4 hours as needed for moderate pain (Patient not taking: Reported on 2/4/2025) 15 tablet 0    senna-docusate (SENOKOT-S/PERICOLACE) 8.6-50 MG tablet Take 1 tablet by mouth 2 times daily as needed for constipation (Patient not taking: Reported on 2/4/2025) 30 tablet 0      No Known Allergies     Physical Examination  Review of Systems   /60 (BP Location: Right arm, Patient Position: Sitting, Cuff Size: Adult Large)   Pulse 65   Resp 14   Wt 121.6 kg (268 lb)   BMI 35.36 kg/m    Body mass index is 35.36 kg/m .  Wt Readings from Last 3 Encounters:   02/04/25 121.6 kg (268 lb)   07/25/24 117.3 kg (258 lb 9.6 oz)   04/24/24 117 kg (258 lb)       General Appearance:   Pleasant  male, appears  stated age. no acute distress, obese body habitus   ENT/Mouth: membranes moist, no apparent gingival bleeding.      EYES:  no scleral icterus, normal conjunctivae   Neck: no carotid bruits. No anterior cervical lymphadenopaty   Respiratory:   lungs are clear to auscultation, no rales or wheezing, equal chest wall expansion    Cardiovascular:   Regular rhythm, normal rate. Soft systolic murmur at the right upper sternal border. No rubs or gallops; the carotid, radial and posterior tibial pulses are intact, Jugular venous pressure not elevated, no edema bilaterally    Abdomen/GI:  no organomegaly, masses, bruits, or tenderness; bowel sounds are present   Extremities: no cyanosis or clubbing   Skin: no xanthelasma, warm.    Heme/lymph/ Immunology No apparent bleeding noted.   Neurologic: Alert and oriented. normal gait, no tremors     Psychiatric: Pleasant, calm, appropriate affect.    A complete 10 system review of systems was performed and is negative except as mentioned in the HPI/subjective.         Past History   Past Medical History:   Past Medical History:   Diagnosis Date    Atrial fibrillation (H)     Benign essential hypertension     Coronary artery disease     Depression     Hyperlipidemia     Obesity        Past Surgical History:   Past Surgical History:   Procedure Laterality Date    CORONARY ANGIOGRAPHY ADULT ORDER      CV CORONARY ANGIOGRAM N/A 12/2/2021    Procedure: Coronary Angiogram;  Surgeon: Radha Ferrell MD;  Location: [facility] CATH LAB CV    CV LEFT HEART CATH N/A 12/2/2021    Procedure: Left Heart Cath;   Surgeon: Radha Ferrell MD;  Location: [facility]    CV LEFT VENTRICULOGRAM N/A 12/2/2021    Procedure: Left Ventriculogram;  Surgeon: Radha Ferrell MD;  Location: [facility]    EP ICD INSERT BIVENT N/A 4/24/2024    Procedure: Implantable Cardioverter Defibrillator Device & Lead Implant Biventricular;  Surgeon: El Hagen MD;  Location: [facility]    EP PACEMAKER N/A 12/23/2021    Procedure: EP Pacemaker;  Surgeon: Kun Lopez MD;  Location: [facility]    EP PPM GENERATOR REMOVAL N/A 4/24/2024    Procedure: Implantable Cardioverter Defibrillator Device & Lead Implant Biventricular;  Surgeon: El Hagen MD;  Location: [facility]    ESOPHAGOSCOPY, GASTROSCOPY, DUODENOSCOPY (EGD), COMBINED N/A 7/22/2024    Procedure: ESOPHAGOGASTRODUODENOSCOPY WITH BIOPSIES;  Surgeon: Jose Perales MD;  Location: [facility] OR    HERNIA REPAIR      IMPLANT PACEMAKER      KNEE SURGERY      LEAD REMOVAL FOR DUAL OR BI-VENTRICULAR PACEMAKER        N/A 4/24/2024    Procedure: Pacemaker Lead Removal - Dual or Bi-ventricular;  Surgeon: El Hagen MD;  Location: [facility]    OPEN REDUCTION INTERNAL FIXATION ANKLE Left 7/23/2024    Procedure: OPEN REDUCTION INTERNAL FIXATION, FRACTURE, LEFT ANKLE;  Surgeon: Hector Ratliff MD;  Location: [facility] OR    OR LASER LEAD EXTRACTION - LEVEL 2 (STANDBY) N/A 4/24/2024    Procedure: Or Laser Lead Extraction - Level 2 (Standby);  Surgeon: Sulma Olivarez MD;  Location: [facility]    TONSILLECTOMY      VASECTOMY         Family History:   Family History   Problem Relation Age of Onset    Diabetes Mother     Hypertension Mother     Coronary Artery Disease No family hx of     Cerebrovascular Disease No family hx of         Social History:   Social History     Socioeconomic History    Marital status:      Spouse name: Not on file    Number of children: Not on file    Years of  education: Not on file    Highest education level: Not on file   Occupational History    Not on file   Tobacco Use    Smoking status: Never    Smokeless tobacco: Never   Substance and Sexual Activity    Alcohol use: Yes     Alcohol/week: 6.0 standard drinks of alcohol     Types: 6 Standard drinks or equivalent per week     Comment: 7 drinks per week    Drug use: Never    Sexual activity: Not on file   Other Topics Concern    Parent/sibling w/ CABG, MI or angioplasty before 65F 55M? Not Asked   Social History Narrative    Not on file     Social Drivers of Health     Financial Resource Strain: Low Risk  (7/2/2024)    [facility]    Financial Resource Strain     Difficulty of Paying Living Expenses: 3     Difficulty of Paying Living Expenses: Not on file   Food Insecurity: No Food Insecurity (7/2/2024)    Received from [facility]    Food Insecurity     Do you worry your food will run out before you are able to buy more?: 1   Transportation Needs: No Transportation Needs (7/2/2024)    Received from [facility]    Transportation Needs     Does lack of transportation keep you from medical appointments?: 1     Does lack of transportation keep you from work, meetings or getting things that you need?: 1   Physical Activity: Not on file   Stress: Not on file   Social Connections: Socially Integrated (7/2/2024)    Received from [facility]    Social Connections     Do you often feel lonely or isolated from those around you?: 0   Interpersonal Safety: Not on file   Housing Stability: Low Risk  (7/2/2024)    Received from [facility]    Housing Stability     What is your housing situation today?: 1              Lab Results    Chemistry/lipid CBC Cardiac Enzymes/BNP/TSH/INR   Lab Results   Component Value Date    CHOL 138 06/27/2023    HDL 45 06/27/2023     LDL 63 06/27/2023    TRIG 148 06/27/2023    CR 0.74 07/25/2024    BUN 13.8 07/25/2024    POTASSIUM 3.9 07/25/2024     07/25/2024    CO2 23 07/25/2024      Lab Results   Component Value Date    WBC 7.1 07/22/2024    HGB 9.0 (L) 07/25/2024    HCT 28.5 (L) 07/22/2024    MCV 90 07/22/2024     07/22/2024      Lab Results   Component Value Date    TROPONINI 0.04 12/03/2021    TSH 9.52 (H) 07/22/2024    INR 1.05 12/02/2021          Leandro Rojas MD [facility]  Non-Invasive Cardiologist  [facility]  Pager [phone]

## 2025-02-04 NOTE — PATIENT INSTRUCTIONS
"Let's try a new blood thinner called \"Pradaxa\" (dabigatran).  If this is not affordable, then we can try a drug called \"warfarin\".  We will have you see someone at the [facility] to see a specialist for your heart disease to see if new medication or even surgery is an option.  Stop ezetimibe.  See me back in 1 year.  "

## 2025-02-05 LAB
MDC_IDC_LEAD_CONNECTION_STATUS: NORMAL
MDC_IDC_LEAD_CONNECTION_STATUS: NORMAL
MDC_IDC_LEAD_IMPLANT_DT: NORMAL
MDC_IDC_LEAD_IMPLANT_DT: NORMAL
MDC_IDC_LEAD_LOCATION: NORMAL
MDC_IDC_LEAD_LOCATION: NORMAL
MDC_IDC_LEAD_LOCATION_DETAIL_1: NORMAL
MDC_IDC_LEAD_LOCATION_DETAIL_1: NORMAL
MDC_IDC_LEAD_MFG: NORMAL
MDC_IDC_LEAD_MFG: NORMAL
MDC_IDC_LEAD_MODEL: NORMAL
MDC_IDC_LEAD_MODEL: NORMAL
MDC_IDC_LEAD_POLARITY_TYPE: NORMAL
MDC_IDC_LEAD_POLARITY_TYPE: NORMAL
MDC_IDC_LEAD_SERIAL: NORMAL
MDC_IDC_LEAD_SERIAL: NORMAL
MDC_IDC_LEAD_SPECIAL_FUNCTION: NORMAL
MDC_IDC_LEAD_SPECIAL_FUNCTION: NORMAL
MDC_IDC_MSMT_BATTERY_DTM: NORMAL
MDC_IDC_MSMT_BATTERY_REMAINING_LONGEVITY: 132 MO
MDC_IDC_MSMT_BATTERY_RRT_TRIGGER: NORMAL
MDC_IDC_MSMT_BATTERY_VOLTAGE: 3.02 V
MDC_IDC_MSMT_CAP_CHARGE_DTM: NORMAL
MDC_IDC_MSMT_CAP_CHARGE_ENERGY: 18 J
MDC_IDC_MSMT_CAP_CHARGE_TIME: 3.7 S
MDC_IDC_MSMT_CAP_CHARGE_TYPE: NORMAL
MDC_IDC_MSMT_LEADCHNL_RA_IMPEDANCE_VALUE: 380 OHM
MDC_IDC_MSMT_LEADCHNL_RA_IMPEDANCE_VALUE: 380 OHM
MDC_IDC_MSMT_LEADCHNL_RA_PACING_THRESHOLD_AMPLITUDE: 0.62 V
MDC_IDC_MSMT_LEADCHNL_RA_PACING_THRESHOLD_AMPLITUDE: 1 V
MDC_IDC_MSMT_LEADCHNL_RA_PACING_THRESHOLD_AMPLITUDE: 1 V
MDC_IDC_MSMT_LEADCHNL_RA_PACING_THRESHOLD_PULSEWIDTH: 0.4 MS
MDC_IDC_MSMT_LEADCHNL_RA_SENSING_INTR_AMPL: 4 MV
MDC_IDC_MSMT_LEADCHNL_RA_SENSING_INTR_AMPL: 4 MV
MDC_IDC_MSMT_LEADCHNL_RV_IMPEDANCE_VALUE: 266 OHM
MDC_IDC_MSMT_LEADCHNL_RV_IMPEDANCE_VALUE: 361 OHM
MDC_IDC_MSMT_LEADCHNL_RV_IMPEDANCE_VALUE: 361 OHM
MDC_IDC_MSMT_LEADCHNL_RV_PACING_THRESHOLD_AMPLITUDE: 0.75 V
MDC_IDC_MSMT_LEADCHNL_RV_PACING_THRESHOLD_PULSEWIDTH: 0.4 MS
MDC_IDC_MSMT_LEADCHNL_RV_SENSING_INTR_AMPL: 18.1 MV
MDC_IDC_MSMT_LEADCHNL_RV_SENSING_INTR_AMPL: 18.1 MV
MDC_IDC_PG_IMPLANT_DTM: NORMAL
MDC_IDC_PG_MFG: NORMAL
MDC_IDC_PG_MODEL: NORMAL
MDC_IDC_PG_SERIAL: NORMAL
MDC_IDC_PG_TYPE: NORMAL
MDC_IDC_SESS_CLINIC_NAME: NORMAL
MDC_IDC_SESS_DTM: NORMAL
MDC_IDC_SESS_TYPE: NORMAL
MDC_IDC_SET_BRADY_AT_MODE_SWITCH_RATE: 171 {BEATS}/MIN
MDC_IDC_SET_BRADY_LOWRATE: 60 {BEATS}/MIN
MDC_IDC_SET_BRADY_MAX_SENSOR_RATE: 130 {BEATS}/MIN
MDC_IDC_SET_BRADY_MAX_TRACKING_RATE: 130 {BEATS}/MIN
MDC_IDC_SET_BRADY_MODE: NORMAL
MDC_IDC_SET_BRADY_NIGHT_RATE: 50 {BEATS}/MIN
MDC_IDC_SET_BRADY_PAV_DELAY_LOW: 180 MS
MDC_IDC_SET_BRADY_SAV_DELAY_LOW: 150 MS
MDC_IDC_SET_LEADCHNL_RA_PACING_AMPLITUDE: 1.5 V
MDC_IDC_SET_LEADCHNL_RA_PACING_ANODE_ELECTRODE_1: NORMAL
MDC_IDC_SET_LEADCHNL_RA_PACING_ANODE_LOCATION_1: NORMAL
MDC_IDC_SET_LEADCHNL_RA_PACING_CAPTURE_MODE: NORMAL
MDC_IDC_SET_LEADCHNL_RA_PACING_CATHODE_ELECTRODE_1: NORMAL
MDC_IDC_SET_LEADCHNL_RA_PACING_CATHODE_LOCATION_1: NORMAL
MDC_IDC_SET_LEADCHNL_RA_PACING_POLARITY: NORMAL
MDC_IDC_SET_LEADCHNL_RA_PACING_PULSEWIDTH: 0.4 MS
MDC_IDC_SET_LEADCHNL_RA_SENSING_ANODE_ELECTRODE_1: NORMAL
MDC_IDC_SET_LEADCHNL_RA_SENSING_ANODE_LOCATION_1: NORMAL
MDC_IDC_SET_LEADCHNL_RA_SENSING_CATHODE_ELECTRODE_1: NORMAL
MDC_IDC_SET_LEADCHNL_RA_SENSING_CATHODE_LOCATION_1: NORMAL
MDC_IDC_SET_LEADCHNL_RA_SENSING_POLARITY: NORMAL
MDC_IDC_SET_LEADCHNL_RA_SENSING_SENSITIVITY: 0.3 MV
MDC_IDC_SET_LEADCHNL_RV_PACING_AMPLITUDE: 1.5 V
MDC_IDC_SET_LEADCHNL_RV_PACING_ANODE_ELECTRODE_1: NORMAL
MDC_IDC_SET_LEADCHNL_RV_PACING_ANODE_LOCATION_1: NORMAL
MDC_IDC_SET_LEADCHNL_RV_PACING_CAPTURE_MODE: NORMAL
MDC_IDC_SET_LEADCHNL_RV_PACING_CATHODE_ELECTRODE_1: NORMAL
MDC_IDC_SET_LEADCHNL_RV_PACING_CATHODE_LOCATION_1: NORMAL
MDC_IDC_SET_LEADCHNL_RV_PACING_POLARITY: NORMAL
MDC_IDC_SET_LEADCHNL_RV_PACING_PULSEWIDTH: 0.4 MS
MDC_IDC_SET_LEADCHNL_RV_SENSING_ANODE_ELECTRODE_1: NORMAL
MDC_IDC_SET_LEADCHNL_RV_SENSING_ANODE_LOCATION_1: NORMAL
MDC_IDC_SET_LEADCHNL_RV_SENSING_CATHODE_ELECTRODE_1: NORMAL
MDC_IDC_SET_LEADCHNL_RV_SENSING_CATHODE_LOCATION_1: NORMAL
MDC_IDC_SET_LEADCHNL_RV_SENSING_POLARITY: NORMAL
MDC_IDC_SET_LEADCHNL_RV_SENSING_SENSITIVITY: 0.3 MV
MDC_IDC_SET_ZONE_DETECTION_BEATS_DENOMINATOR: 16 {BEATS}
MDC_IDC_SET_ZONE_DETECTION_BEATS_DENOMINATOR: 32 {BEATS}
MDC_IDC_SET_ZONE_DETECTION_BEATS_DENOMINATOR: 40 {BEATS}
MDC_IDC_SET_ZONE_DETECTION_BEATS_NUMERATOR: 16 {BEATS}
MDC_IDC_SET_ZONE_DETECTION_BEATS_NUMERATOR: 30 {BEATS}
MDC_IDC_SET_ZONE_DETECTION_BEATS_NUMERATOR: 32 {BEATS}
MDC_IDC_SET_ZONE_DETECTION_INTERVAL: 240 MS
MDC_IDC_SET_ZONE_DETECTION_INTERVAL: 320 MS
MDC_IDC_SET_ZONE_DETECTION_INTERVAL: 350 MS
MDC_IDC_SET_ZONE_DETECTION_INTERVAL: 360 MS
MDC_IDC_SET_ZONE_DETECTION_INTERVAL: 370 MS
MDC_IDC_SET_ZONE_STATUS: NORMAL
MDC_IDC_SET_ZONE_TYPE: NORMAL
MDC_IDC_SET_ZONE_VENDOR_TYPE: NORMAL
MDC_IDC_STAT_AT_BURDEN_PERCENT: 0 %
MDC_IDC_STAT_AT_DTM_END: NORMAL
MDC_IDC_STAT_AT_DTM_START: NORMAL
MDC_IDC_STAT_BRADY_AP_VP_PERCENT: 20.26 %
MDC_IDC_STAT_BRADY_AP_VS_PERCENT: 54.13 %
MDC_IDC_STAT_BRADY_AS_VP_PERCENT: 6.29 %
MDC_IDC_STAT_BRADY_AS_VS_PERCENT: 19.32 %
MDC_IDC_STAT_BRADY_DTM_END: NORMAL
MDC_IDC_STAT_BRADY_DTM_START: NORMAL
MDC_IDC_STAT_BRADY_RA_PERCENT_PACED: 74.38 %
MDC_IDC_STAT_BRADY_RV_PERCENT_PACED: 26.55 %
MDC_IDC_STAT_EPISODE_RECENT_COUNT: 0
MDC_IDC_STAT_EPISODE_RECENT_COUNT_DTM_END: NORMAL
MDC_IDC_STAT_EPISODE_RECENT_COUNT_DTM_START: NORMAL
MDC_IDC_STAT_EPISODE_TOTAL_COUNT: 0
MDC_IDC_STAT_EPISODE_TOTAL_COUNT_DTM_END: NORMAL
MDC_IDC_STAT_EPISODE_TOTAL_COUNT_DTM_START: NORMAL
MDC_IDC_STAT_EPISODE_TYPE: NORMAL
MDC_IDC_STAT_TACHYTHERAPY_ATP_DELIVERED_RECENT: 0
MDC_IDC_STAT_TACHYTHERAPY_ATP_DELIVERED_TOTAL: 0
MDC_IDC_STAT_TACHYTHERAPY_RECENT_DTM_END: NORMAL
MDC_IDC_STAT_TACHYTHERAPY_RECENT_DTM_START: NORMAL
MDC_IDC_STAT_TACHYTHERAPY_SHOCKS_ABORTED_RECENT: 0
MDC_IDC_STAT_TACHYTHERAPY_SHOCKS_ABORTED_TOTAL: 0
MDC_IDC_STAT_TACHYTHERAPY_SHOCKS_DELIVERED_RECENT: 0
MDC_IDC_STAT_TACHYTHERAPY_SHOCKS_DELIVERED_TOTAL: 0
MDC_IDC_STAT_TACHYTHERAPY_TOTAL_DTM_END: NORMAL
MDC_IDC_STAT_TACHYTHERAPY_TOTAL_DTM_START: NORMAL

## 2025-02-05 PROCEDURE — 93283 PRGRMG EVAL IMPLANTABLE DFB: CPT | Performed by: INTERNAL MEDICINE

## 2025-02-05 NOTE — TELEPHONE ENCOUNTER
Date: 2/5/2025    Time of Call: 11:37 AM     Diagnosis: HCM    [ TORB ] Ordering provider: Dr. Otero  Order: Establish care with Dr. Otero with CMR, CPX/stress echo and labs prior.       Order received by: João Roldan RN      Follow-up/additional notes: Called patient and left message with introduction to [facility] team.  Left call back number [phone] for return call. Sent to scheduling.      Referred by Dr. Leandro Rojas    Clinic Notes from 2/4/25  Assessment:    Hypertrophic cardiomyopathy with no evidence of resting left ventricular outflow tract obstruction but left ventricular end diastolic pressure was markedly elevated at 38 mmHg on cardiac catheterization 12/2/2021 and he reports exertional dyspnea which may be a symptom of obstruction. His maximal wall thickness of 32 mm and presence of myocardial fibrosis noted on cardiac MRI 9/27/2023 as well as nonsustained ventricular tachycardia are risk factors for sudden cardiac death.  Sick sinus syndrome status post Medtronic dual-chamber permanent pacemaker placement 12/23/2021 with extraction of his right ventricular lead and upgrade to a dual-chamber implantable cardioverter-defibrillator placement for the primary prevention of sudden cardiac death 4/24/2024.  Paroxysmal atrial fibrillation. This has been quiescent. [id number] score is at least 1 (hypertension) although the presence of hypertrophic cardiomyopathy may further increase his risk of thromboembolism   Nonsustained ventricular tachycardia 8/25/2022 noted on pacemaker interrogation possibly associated with symptoms.  Nonobstructive coronary artery disease seen on coronary angiography 12/2/2021.  Hyperlipidemia. Last LDL 63 mg/dL.  Essential hypertension.  Body mass index 36.36 kg/m .     Plan:  Metoprolol succinate 200 mg daily.  A trial of a diuretic could be considered but this may precipitate/worsen left ventricular outflow tract obstruction.  Referral to [facility]  [facility] to discuss advanced medical therapies as well as septal myectomy.  We will try switching apixaban to dabigatran 150 mg twice daily which may be more affordable.  Continue atorvastatin 80 mg daily.  He probably does not need to take ezetimibe. We will discontinue this.  Follow-up with me in 1 year.    Genetic testing: none prior    Prior Testing:   Images in chart  *CMR  9/27/23   *Echo 7/22/24  *Echo 2/23/24

## 2025-03-17 ENCOUNTER — TELEPHONE (OUTPATIENT)
Dept: CARDIOLOGY | Facility: CLINIC | Age: 65
End: 2025-03-17
Payer: COMMERCIAL

## 2025-03-17 DIAGNOSIS — I48.0 PAF (PAROXYSMAL ATRIAL FIBRILLATION) (H): ICD-10-CM

## 2025-03-17 DIAGNOSIS — Z95.0 CARDIAC PACEMAKER IN SITU: Primary | ICD-10-CM

## 2025-03-17 DIAGNOSIS — I42.2 HYPERTROPHIC CARDIOMYOPATHY (H): ICD-10-CM

## 2025-03-17 DIAGNOSIS — I47.29 NSVT (NONSUSTAINED VENTRICULAR TACHYCARDIA) (H): ICD-10-CM

## 2025-03-17 NOTE — TELEPHONE ENCOUNTER
[facility] Call Center    Phone Message    May a detailed message be left on voicemail: yes     Reason for Call: Other: Maryjo with [employer] in regards to medication authorization for dabigatran ANTICOAGULANT (PRADAXA) 150 MG capsule. Stated to need the diagnosis and additional information. Please call back to discuss at [phone] opt 3.      Action Taken: Other: cardio     Travel Screening: Not Applicable     Date of Service:       Thank you!  [facility]

## 2025-03-17 NOTE — TELEPHONE ENCOUNTER
Central Prior Authorization Team   Phone: [phone]    PA Initiation    Medication: Tier Exception- Dabigatran etexilate 150mg BID    Insurance Company: [employer]) - Phone [phone] Fax [phone]  Pharmacy Filling the Rx: [facility] [place] - [address] AT [facility]  Filling Pharmacy Phone: [phone]  Filling Pharmacy Fax:    Start Date: 3/17/2025

## 2025-03-17 NOTE — TELEPHONE ENCOUNTER
"Noted per Dr. Rojas's 2-4-25 visit note \"We will try switching apixaban to dabigatran 150 mg twice daily which may be more affordable.\" 1yr follow-up recommended - order placed per protocol.    Phone call to [employer] with request to fax PA request to clinic for PA team to address.  mg  "

## 2025-03-20 NOTE — TELEPHONE ENCOUNTER
PRIOR AUTHORIZATION DENIED    Medication: Tier Exception- Dabigatran etexilate 150mg BID      Denial Date: 3/20/2025    Denial Rational: Tier exception denied due to reasoning below.     Dabigatran etexilate is still covered under patient's plan but will remain on the current drug copay tier        Appeal Information: This medication was denied. If physician would like to appeal because patient has contraindication or allergy to covered medication please write letter of medical necessity and route back to PA team to initiate.  If no further action is needed please close encounter thank you.

## 2025-03-20 NOTE — TELEPHONE ENCOUNTER
Phone call to patient with update r.e. denial for Tier exception request for Pradaxa to reduce cost - patient explained that cost of 90 day supply for Eliquis 90 day supply is $600 and for Pradaxa $380 - patient has not inquired about Xarelto's cost and stated he did send in an appeal letter - patient agreed to call back if Xarelto is more affordable and / or appeal is denied - noted yrly follow-up pending 2/2026.  mg

## 2025-04-08 NOTE — TELEPHONE ENCOUNTER
RECORDS RECEIVED FROM: Internal   DATE RECEIVED: Rosa Ramirez on 4/8/2025     GENERAL RECORDS STATUS DETAILS   OFFICE NOTE from cardiologists Internal 2-4-25 White, Leandro   LABS Internal    EKG (STRIPS & REPORTS) Internal 7-21-24   MONITORS (STRIPS & REPORTS) Internal [employer] 12-16-21   ECHOS (IMAGES AND REPORTS) Internal 7-21-24   CONGENITAL AND GENETICS     OPERATIVE REPORTS  (ALL FROM BIRTH - PRESENT) Internal Device Implant 4-24-24   STRESS TESTS  (IMAGES AND REPORTS) Internal 2-23-24   CATH (ALL IMAGES AND REPORTS FROM BIRTH - PRESENT) Internal 12-2-21   MRI/MRA (ALL IMAGES AND REPORTS FROM BIRTH - PRESENT) Internal MR Cardiac 9-27-23   CT/CTA (ALL IMAGES AND REPORTS FROM BIRTH - PRESENT) Internal CT Head 12-2-21   CHEST XRAY Internal 7-21-24   DEVICE CHECK   (FOR DEVICE PATIENTS ONLY) Internal 2-4-25

## 2025-04-16 ENCOUNTER — HOSPITAL ENCOUNTER (OUTPATIENT)
Dept: CARDIOLOGY | Facility: CLINIC | Age: 65
Discharge: HOME | End: 2025-04-16
Attending: INTERNAL MEDICINE
Payer: COMMERCIAL

## 2025-04-16 DIAGNOSIS — I48.0 PAF (PAROXYSMAL ATRIAL FIBRILLATION) (H): ICD-10-CM

## 2025-04-16 DIAGNOSIS — Z95.0 CARDIAC PACEMAKER IN SITU: ICD-10-CM

## 2025-04-16 DIAGNOSIS — I42.2 HYPERTROPHIC CARDIOMYOPATHY (H): ICD-10-CM

## 2025-04-16 DIAGNOSIS — I47.29 NSVT (NONSUSTAINED VENTRICULAR TACHYCARDIA) (H): ICD-10-CM

## 2025-04-16 PROCEDURE — 93350 STRESS TTE ONLY: CPT | Mod: TC

## 2025-04-16 PROCEDURE — 93325 DOPPLER ECHO COLOR FLOW MAPG: CPT | Mod: TC

## 2025-04-16 PROCEDURE — 94621 CARDIOPULM EXERCISE TESTING: CPT

## 2025-04-17 LAB
CARDIOPULMONARY BLOOD PRESSURE REST: NORMAL MMHG
CARDIOPULMONARY BREATHING RESERVE REST: 90.8
CARDIOPULMONARY BREATHING RESERVE V02MAX: 45.3
CARDIOPULMONARY CO2 OUTPUT REST: 292 ML/MIN
CARDIOPULMONARY CO2 OUTPUT VO2MAX: 2261 ML/MIN
CARDIOPULMONARY FEV 1.0 (L) ACTUAL: 3.51
CARDIOPULMONARY FEV 1.0 (L) PRECENT: 90 %
CARDIOPULMONARY FEV 1.0 (L) PREDICTED: 3.9
CARDIOPULMONARY FEV 1.0 FVC (%) ACTUAL: 75.2
CARDIOPULMONARY FEV 1.0 FVC (%) PERCENT: 100 %
CARDIOPULMONARY FEV 1.0 FVC (%) PREDICTED: 75.1
CARDIOPULMONARY FUNCTIONAL CAPACITY MAX ML/KG/MIN: 18.49 ML/KG/MIN
CARDIOPULMONARY FUNCTIONAL CAPACITY PERCENT: 61 %
CARDIOPULMONARY FUNCTIONAL CAPACITY PREDICTED: 30.1 ML/KG/MIN
CARDIOPULMONARY FVC (L) ACTUAL: 4.67
CARDIOPULMONARY FVC (L) PERCENT: 90 %
CARDIOPULMONARY FVC (L) PREDICTED: 5.19
CARDIOPULMONARY HEART RATE REST: 67 BPM
CARDIOPULMONARY MET'S REST: 0.9
CARDIOPULMONARY MINUTE VENTILATION REST: 11.3 L/MIN
CARDIOPULMONARY MINUTE VENTILATION VO2MAX: 67.1 L/MIN
CARDIOPULMONARY MYOCARDIAC O2 DEMAND MAX: NORMAL
CARDIOPULMONARY OXYGEN CONSUMPTION REST: 3.3 ML/KG/MIN
CARDIOPULMONARY OXYGEN CONSUMPTION VO2MAX: 18.49 ML/KG/MIN
CARDIOPULMONARY OXYGEN PULSE REST: 6 ML/BEAT
CARDIOPULMONARY OXYGEN PULSE VO2MAX: 14 ML/BEAT
CARDIOPULMONARY OXYGEN SATURATION- OXIMETRY REST: 100 %
CARDIOPULMONARY OXYGEN SATURATION- OXIMETRY VO2MAX: 99 %
CARDIOPULMONARY PET C02 REST: 33
CARDIOPULMONARY PET C02 VO2MAX: 37
CARDIOPULMONARY PET02 REST: 103
CARDIOPULMONARY PET02 V02 MAX: 108
CARDIOPULMONARY RER: 0.99
CARDIOPULMONARY RESPIRALORY EXCHANGE RATIO VO2MAX: 0.99
CARDIOPULMONARY RESPIRALORY EXCHANGE RATIO: 0.75
CARDIOPULMONARY RESPIRATORY RATE REST: 16 BR/MIN
CARDIOPULMONARY RESPIRATORY RATE VO2MAX: 28 BR/MIN
CARDIOPULMONARY STRESS BASE 1 BP MMHG: NORMAL MMHG
CARDIOPULMONARY STRESS BASE 1 BPA: 83 BPM
CARDIOPULMONARY STRESS BASE 1 SPO2: 98 % SPO2
CARDIOPULMONARY STRESS BASE 1 TIME: 1 MINS
CARDIOPULMONARY STRESS BASE 2 BP MMHG: NORMAL MMHG
CARDIOPULMONARY STRESS BASE 2 BPA: 2 BPM
CARDIOPULMONARY STRESS BASE 2 SPO2: 99 % SPO2
CARDIOPULMONARY STRESS BASE 2 TIME: 3 MINS
CARDIOPULMONARY STRESS BASE 3 BP MMHG: NORMAL MMHG
CARDIOPULMONARY STRESS BASE 3 BPA: 65 BPM
CARDIOPULMONARY STRESS BASE 3 SPO2: 100 % SPO2
CARDIOPULMONARY STRESS BASE 3 TIME: 6 MINS
CARDIOPULMONARY STRESS PHASE 1 BP MMHG: NORMAL MMHG
CARDIOPULMONARY STRESS PHASE 1 BPM: 83 BPM
CARDIOPULMONARY STRESS PHASE 1 SPO2: 99 % SPO2
CARDIOPULMONARY STRESS PHASE 1 TIME: 2 MINS
CARDIOPULMONARY STRESS PHASE 2 BP MMHG: NORMAL MMHG
CARDIOPULMONARY STRESS PHASE 2 BPM: 91 BPM
CARDIOPULMONARY STRESS PHASE 2 SPO2: 100 % SPO2
CARDIOPULMONARY STRESS PHASE 2 TIME: 4 MINS
CARDIOPULMONARY STRESS PHASE 3 BP MMHG: NORMAL MMHG
CARDIOPULMONARY STRESS PHASE 3 BPM: 95 BPM
CARDIOPULMONARY STRESS PHASE 3 SPO2: 98 % SPO2
CARDIOPULMONARY STRESS PHASE 3 TIME: 6 MINS
CARDIOPULMONARY STRESS PHASE 4 BPM: 107 BPM
CARDIOPULMONARY STRESS PHASE 4 SPO2: 99 % SPO2
CARDIOPULMONARY STRESS PHASE 4 TIME SEC: 22 SEC
CARDIOPULMONARY STRESS PHASE 4 TIME: 7 MINS
CARDIOPULMONARY SVC (L) ACTUAL: 4.48
CARDIOPULMONARY SVC (L) PERCENT: 86 %
CARDIOPULMONARY SVC (L) PREDICTED: 5.19
CARDIOPULMONARY TIDAL VOLUME REST: 705 ML
CARDIOPULMONARY TIDAL VOLUME VO2MAX: 2436 ML
CARDIOPULMONARY VE/VCO2 SLOPE: 28.09
CARDIOPULMONARY VENTILATORY EQUIVALENT 02 REST: 29
CARDIOPULMONARY VENTILATORY EQUIVALENT 02 V02: 29
CARDIOPULMONARY VENTILATORY EQUIVALENT C02 REST: 39
CARDIOPULMONARY VENTILATORY EQUIVALENT C02 SLOPE VO2MAX: 28.09
CARDIOPULMONARY VENTILATORY EQUIVALENT C02 VO2MAX: 30
CV STRESS MAX HR HE: 107
PREDICTED VO2MAX: 30.1
STRESS ANGINA INDEX: 0
STRESS ECHO BASELINE BP: NORMAL MMHG
STRESS ECHO BASELINE HR: 61 BPM
STRESS ECHO CALCULATED PERCENT HR: 68 %
STRESS ECHO LAST STRESS BP: NORMAL MMHG
STRESS ECHO POST ESTIMATED WORKLOAD: 4.4 METS
STRESS ECHO POST EXERCISE DUR MIN: 7 MIN
STRESS ECHO POST EXERCISE DUR SEC: 22 SEC
STRESS ECHO TARGET HR: 157

## 2025-05-05 ENCOUNTER — TELEPHONE (OUTPATIENT)
Dept: CARDIOLOGY | Facility: CLINIC | Age: 65
End: 2025-05-05
Payer: COMMERCIAL

## 2025-05-05 NOTE — TELEPHONE ENCOUNTER
PC to patient and confirmed he is waiting for warfarin Rx from mail order pharmacy.  PC to pharmacist, Maricruz, and instructed to send warfarin Rx and patient will stop Pradaxa once reviewed. Understanding verbalized.

## 2025-05-05 NOTE — TELEPHONE ENCOUNTER
[facility] Call Center    Phone Message    May a detailed message be left on voicemail: yes     Reason for Call: Medication Question or concern regarding medication   Prescription Clarification  Name of Medication: warfarin ANTICOAGULANT (COUMADIN) 5 MG  and  dabigatran ANTICOAGULANT (PRADAXA) 150 MG capsule  Prescribing Provider: Dr. Rojas   Pharmacy: [facility] - [place], [place] - [address] AT [address]    What on the order needs clarification? warfarin ANTICOAGULANT (COUMADIN) 5 MG tablet  Drug interaction w/dabigatran ANTICOAGULANT (PRADAXA) 150 MG capsule.   Please call pharmacy to discuss this concern.      Action Taken: Message routed to:  [facility] cardio    Travel Screening: Not Applicable    Thank you!  [facility]       Date of Service:

## 2025-05-16 ENCOUNTER — LAB (OUTPATIENT)
Dept: CARDIOLOGY | Facility: CLINIC | Age: 65
End: 2025-05-16
Payer: COMMERCIAL

## 2025-05-16 DIAGNOSIS — I48.0 PAF (PAROXYSMAL ATRIAL FIBRILLATION) (H): ICD-10-CM

## 2025-05-16 LAB — INR POINT OF CARE: 2.4 (ref 0.9–1.1)

## 2025-05-16 PROCEDURE — 36416 COLLJ CAPILLARY BLOOD SPEC: CPT

## 2025-05-16 PROCEDURE — 85610 PROTHROMBIN TIME: CPT

## 2025-05-21 ENCOUNTER — LAB (OUTPATIENT)
Dept: CARDIOLOGY | Facility: CLINIC | Age: 65
End: 2025-05-21
Payer: COMMERCIAL

## 2025-05-21 ENCOUNTER — TELEPHONE (OUTPATIENT)
Dept: CARDIOLOGY | Facility: CLINIC | Age: 65
End: 2025-05-21

## 2025-05-21 ENCOUNTER — ANTICOAGULATION THERAPY VISIT (OUTPATIENT)
Dept: ANTICOAGULATION | Facility: CLINIC | Age: 65
End: 2025-05-21

## 2025-05-21 DIAGNOSIS — Z95.0 CARDIAC PACEMAKER IN SITU: Primary | ICD-10-CM

## 2025-05-21 DIAGNOSIS — I48.0 PAF (PAROXYSMAL ATRIAL FIBRILLATION) (H): ICD-10-CM

## 2025-05-21 DIAGNOSIS — I42.2 HYPERTROPHIC CARDIOMYOPATHY (H): ICD-10-CM

## 2025-05-21 DIAGNOSIS — I47.29 NSVT (NONSUSTAINED VENTRICULAR TACHYCARDIA) (H): ICD-10-CM

## 2025-05-21 LAB — INR POINT OF CARE: 4.8 (ref 0.9–1.1)

## 2025-05-21 PROCEDURE — 36416 COLLJ CAPILLARY BLOOD SPEC: CPT

## 2025-05-21 PROCEDURE — 85610 PROTHROMBIN TIME: CPT

## 2025-05-21 NOTE — PROGRESS NOTES
ANTICOAGULATION MANAGEMENT     Abraham Dong 64 year old male is on warfarin with supratherapeutic INR result. (Goal INR 2.0-3.0)    Recent labs: (last 7 days)     05/21/25  0942   INR 4.8*       ASSESSMENT     Source(s): Chart Review, Patient/Caregiver Call, and Template     Warfarin doses taken: Warfarin taken as instructed  Diet: No new diet changes identified-he denies any change in diet or alcohol.   Medication/supplement changes: None noted  New illness, injury, or hospitalization: No  Signs or symptoms of bleeding or clotting: No  Previous result: Therapeutic last visit; previously outside of goal range  Additional findings: New start on day 13 of warfarin       PLAN     Recommended plan for no diet, medication or health factor changes affecting INR     Dosing Instructions: hold dose then decrease your warfarin dose (21.4% change) with next INR in 6-7 days       Summary  As of 5/21/2025      Full warfarin instructions:  5/21: Hold; Otherwise 2.5 mg every Mon, Wed, Fri; 5 mg all other days   Next INR check:  5/28/2025               Telephone call with Abraham who agrees to plan and repeated back plan correctly    Lab visit scheduled    Education provided: Please call back if any changes to your diet, medications or how you've been taking warfarin  Goal range and lab monitoring: goal range and significance of current result and Importance of therapeutic range  Dietary considerations: importance of consistent vitamin K intake  Healthy lifestyle considerations: potential interaction between warfarin and alcohol  Symptom monitoring: monitoring for bleeding signs and symptoms  Contact [phone] with any changes, questions or concerns.     Plan made with [facility] Pharmacist Cristina Duran, RN  5/21/2025  Anticoagulation Clinic  [employer] for routing messages: [facility] HEART [facility] patient phone line:  [phone]        _______________________________________________________________________     Anticoagulation Episode Summary       Current INR goal:  2.0-3.0   TTR:  25.0% (4 d)   Target end date:  Indefinite   Send INR reminders to:  [facility]ART [facility]    Indications    Cardiac pacemaker in situ [Z95.0]  Hypertrophic cardiomyopathy (H) [I42.2]  PAF (paroxysmal atrial fibrillation) (H) [I48.0]  NSVT (nonsustained ventricular tachycardia) (H) [I47.29]             Comments:  --             Anticoagulation Care Providers       Provider Role Specialty Phone number    Leandro Rojas MD Referring Cardiovascular Disease [phone]

## 2025-05-21 NOTE — TELEPHONE ENCOUNTER
Attempted to reach patient to notify that his 7/15 appt with Dr. Otero will need to be rescheduled due to provider canceling clinic.    No answer, LVM.

## 2025-05-28 ENCOUNTER — LAB (OUTPATIENT)
Dept: CARDIOLOGY | Facility: CLINIC | Age: 65
End: 2025-05-28
Payer: COMMERCIAL

## 2025-05-28 ENCOUNTER — ANTICOAGULATION THERAPY VISIT (OUTPATIENT)
Dept: ANTICOAGULATION | Facility: CLINIC | Age: 65
End: 2025-05-28

## 2025-05-28 DIAGNOSIS — I48.0 PAF (PAROXYSMAL ATRIAL FIBRILLATION) (H): ICD-10-CM

## 2025-05-28 DIAGNOSIS — Z95.0 CARDIAC PACEMAKER IN SITU: Primary | ICD-10-CM

## 2025-05-28 DIAGNOSIS — I42.2 HYPERTROPHIC CARDIOMYOPATHY (H): ICD-10-CM

## 2025-05-28 DIAGNOSIS — I47.29 NSVT (NONSUSTAINED VENTRICULAR TACHYCARDIA) (H): ICD-10-CM

## 2025-05-28 LAB — INR POINT OF CARE: 3.5 (ref 0.9–1.1)

## 2025-05-28 PROCEDURE — 36416 COLLJ CAPILLARY BLOOD SPEC: CPT

## 2025-05-28 PROCEDURE — 85610 PROTHROMBIN TIME: CPT

## 2025-05-28 NOTE — PROGRESS NOTES
ANTICOAGULATION MANAGEMENT     Abraham Dong 64 year old male is on warfarin with supratherapeutic INR result. (Goal INR 2.0-3.0)    Recent labs: (last 7 days)     05/28/25  0924   INR 3.5*       ASSESSMENT     Source(s): Chart Review  Previous INR was Supratherapeutic  Medication, diet, health changes since last INR chart reviewed; none identified         PLAN     Recommended plan for no diet, medication or health factor changes affecting INR     Dosing Instructions: hold dose then decrease your warfarin dose (9.1% change) with next INR in 2 weeks       Summary  As of 5/28/2025      Full warfarin instructions:  5/28: Hold; Otherwise 5 mg every Sun, Tue, Thu; 2.5 mg all other days   Next INR check:  6/11/2025               Detailed voice message left for Abraham with dosing instructions and follow up date.     Contact [phone] to schedule and with any changes, questions or concerns.     Education provided: Please call back if any changes to your diet, medications or how you've been taking warfarin    Plan made per [facility] anticoagulation protocol    Vilma Stephen RN  5/28/2025  Anticoagulation Clinic  [employer] [place] for routing messages: p [facility] HEART [facility]  [facility] patient phone line: [phone]        _______________________________________________________________________     Anticoagulation Episode Summary       Current INR goal:  2.0-3.0   TTR:  10.4% (1.6 wk)   Target end date:  Indefinite   Send INR reminders to:  [facility] HEART [facility]    Indications    Cardiac pacemaker in situ [Z95.0]  Hypertrophic cardiomyopathy (H) [I42.2]  PAF (paroxysmal atrial fibrillation) (H) [I48.0]  NSVT (nonsustained ventricular tachycardia) (H) [I47.29]             Comments:  --             Anticoagulation Care Providers       Provider Role Specialty Phone number    Leandro Rojas MD Referring Cardiovascular Disease [phone]

## 2025-06-03 ENCOUNTER — TELEPHONE (OUTPATIENT)
Dept: CARDIOLOGY | Facility: CLINIC | Age: 65
End: 2025-06-03
Payer: COMMERCIAL

## 2025-06-03 DIAGNOSIS — I48.0 PAF (PAROXYSMAL ATRIAL FIBRILLATION) (H): ICD-10-CM

## 2025-06-03 RX ORDER — WARFARIN SODIUM 5 MG/1
2.5-5 TABLET ORAL EVERY EVENING
Qty: 60 TABLET | Refills: 1 | Status: SHIPPED | OUTPATIENT
Start: 2025-06-03

## 2025-06-03 NOTE — TELEPHONE ENCOUNTER
ANTICOAGULATION MANAGEMENT:  Medication Refill    Anticoagulation Summary  As of 5/28/2025      Warfarin maintenance plan:  5 mg (5 mg x 1) every Sun, Tue, Thu; 2.5 mg (5 mg x 0.5) all other days   Next INR check:  6/11/2025   Target end date:  Indefinite    Indications    Cardiac pacemaker in situ [Z95.0]  Hypertrophic cardiomyopathy (H) [I42.2]  PAF (paroxysmal atrial fibrillation) (H) [I48.0]  NSVT (nonsustained ventricular tachycardia) (H) [I47.29]                 Anticoagulation Care Providers       Provider Role Specialty Phone number    Leandro Rojas MD Referring Cardiovascular Disease [phone]            Refill Criteria    Visit with referring provider/group: Meets criteria: visit within referring provider group in the last 15 months on 2/4/25    [facility] referral last signed: 04/08/2025; within last year:  Yes    Lab monitoring is up to date (not exceeding 2 weeks overdue): Yes    Abraham meets all criteria for refill. Rx instructions and quantity supplied updated to match patient's current dosing plan. Warfarin 90 day supply with 1 refill granted per [facility] protocol     Josefina Pearce, RN  Anticoagulation Clinic

## 2025-06-03 NOTE — TELEPHONE ENCOUNTER
Medication Question or Refill    Contacts       Contact Date/Time Type Contact Phone/Fax    06/03/2025 08:45 AM CDT Phone (Incoming) Abraham Dong (Self) [phone] (M)            What medication are you calling about (include dose and sig)?: warfarin ANTICOAGULANT (COUMADIN) 5 MG tablet     Preferred Pharmacy:     [address] [address]  Phone: [phone] Fax: [phone]          Controlled Substance Agreement on file:   CSA -- Patient Level:    CSA: None found at the patient level.       Who prescribed the medication?: Dr. Rojas    Do you need a refill? Yes    When did you use the medication last? Today    Patient offered an appointment? Yes: 06/11/25    Do you have any questions or concerns?  Yes: Need a nurse to call back for dosing questions, never talked to anyone from last INR      Okay to leave a detailed message?: Yes at Home number on file [phone] ([place])

## 2025-06-11 ENCOUNTER — RESULTS FOLLOW-UP (OUTPATIENT)
Dept: ANTICOAGULATION | Facility: CLINIC | Age: 65
End: 2025-06-11

## 2025-06-11 ENCOUNTER — ANTICOAGULATION THERAPY VISIT (OUTPATIENT)
Dept: ANTICOAGULATION | Facility: CLINIC | Age: 65
End: 2025-06-11

## 2025-06-11 ENCOUNTER — LAB (OUTPATIENT)
Dept: CARDIOLOGY | Facility: CLINIC | Age: 65
End: 2025-06-11
Payer: COMMERCIAL

## 2025-06-11 DIAGNOSIS — Z79.01 LONG TERM (CURRENT) USE OF ANTICOAGULANTS: Primary | ICD-10-CM

## 2025-06-11 DIAGNOSIS — I48.0 PAF (PAROXYSMAL ATRIAL FIBRILLATION) (H): ICD-10-CM

## 2025-06-11 DIAGNOSIS — I42.2 HYPERTROPHIC CARDIOMYOPATHY (H): ICD-10-CM

## 2025-06-11 DIAGNOSIS — Z95.0 CARDIAC PACEMAKER IN SITU: Primary | ICD-10-CM

## 2025-06-11 DIAGNOSIS — I47.29 NSVT (NONSUSTAINED VENTRICULAR TACHYCARDIA) (H): ICD-10-CM

## 2025-06-11 LAB — INR POINT OF CARE: 2.8 (ref 0.9–1.1)

## 2025-06-11 PROCEDURE — 85610 PROTHROMBIN TIME: CPT

## 2025-06-11 PROCEDURE — 36416 COLLJ CAPILLARY BLOOD SPEC: CPT

## 2025-06-11 RX ORDER — WARFARIN SODIUM 5 MG/1
2.5-5 TABLET ORAL EVERY EVENING
Qty: 75 TABLET | Refills: 0 | Status: SHIPPED | OUTPATIENT
Start: 2025-06-11

## 2025-06-11 NOTE — PROGRESS NOTES
ANTICOAGULATION MANAGEMENT     Abraham Dong 64 year old male is on warfarin with therapeutic INR result. (Goal INR 2.0-3.0)    Recent labs: (last 7 days)     06/11/25  1035   INR 2.8*       ASSESSMENT     Source(s): Chart Review and Patient/Caregiver Call     Warfarin doses taken: Warfarin taken as instructed  Diet: No new diet changes identified  Medication/supplement changes: None noted  New illness, injury, or hospitalization: No  Signs or symptoms of bleeding or clotting: No  Previous result: Supratherapeutic  Additional findings: Refill needed today. Abraham meets all criteria for refill (current [facility] referral, visit with referring provider/group in last 15 months unless directed to return in 2 years in last referring provider visit note, lab monitoring up to date or not exceeding 2 weeks overdue). Rx instructions and quantity supplied updated to match patient's current dosing plan. 90 day supply with 0 refills granted per [facility] protocol   90 day supply sent to local pharmacy. Will sent refill to mail order with next fill per pt preference.        PLAN     Recommended plan for no diet, medication or health factor changes affecting INR     Dosing Instructions: Continue your current warfarin dose with next INR in 3 weeks       Summary  As of 6/11/2025      Full warfarin instructions:  5 mg every Sun, Tue, Thu; 2.5 mg all other days   Next INR check:  7/2/2025               Telephone call with Abraham who agrees to plan and repeated back plan correctly    Lab visit scheduled    Education provided: Goal range and lab monitoring: goal range and significance of current result    Plan made per [facility] anticoagulation protocol    Pat Garland RN  6/11/2025  Anticoagulation Clinic  [employer] for routing messages: [facility] patient phone line: [phone]        _______________________________________________________________________     Anticoagulation Episode Summary       Current INR goal:   2.0-3.0   TTR:  20.2% (3.7 wk)   Target end date:  Indefinite   Send INR reminders to:  [facility] HEART [facility]    Indications    Cardiac pacemaker in situ [Z95.0]  Hypertrophic cardiomyopathy (H) [I42.2]  PAF (paroxysmal atrial fibrillation) (H) [I48.0]  NSVT (nonsustained ventricular tachycardia) (H) [I47.29]             Comments:  --             Anticoagulation Care Providers       Provider Role Specialty Phone number    Leandro Rojas MD Referring Cardiovascular Disease [phone]

## 2025-07-02 ENCOUNTER — RESULTS FOLLOW-UP (OUTPATIENT)
Dept: ANTICOAGULATION | Facility: CLINIC | Age: 65
End: 2025-07-02

## 2025-07-02 ENCOUNTER — LAB (OUTPATIENT)
Dept: CARDIOLOGY | Facility: CLINIC | Age: 65
End: 2025-07-02
Payer: COMMERCIAL

## 2025-07-02 ENCOUNTER — ANTICOAGULATION THERAPY VISIT (OUTPATIENT)
Dept: ANTICOAGULATION | Facility: CLINIC | Age: 65
End: 2025-07-02

## 2025-07-02 DIAGNOSIS — I47.29 NSVT (NONSUSTAINED VENTRICULAR TACHYCARDIA) (H): ICD-10-CM

## 2025-07-02 DIAGNOSIS — I48.0 PAF (PAROXYSMAL ATRIAL FIBRILLATION) (H): ICD-10-CM

## 2025-07-02 DIAGNOSIS — I42.2 HYPERTROPHIC CARDIOMYOPATHY (H): ICD-10-CM

## 2025-07-02 DIAGNOSIS — Z95.0 CARDIAC PACEMAKER IN SITU: Primary | ICD-10-CM

## 2025-07-02 LAB — INR POINT OF CARE: 3 (ref 0.9–1.1)

## 2025-07-02 PROCEDURE — 85610 PROTHROMBIN TIME: CPT

## 2025-07-02 PROCEDURE — 36416 COLLJ CAPILLARY BLOOD SPEC: CPT

## 2025-07-02 NOTE — PROGRESS NOTES
ANTICOAGULATION MANAGEMENT     Abraham Dong 65 year old male is on warfarin with therapeutic INR result. (Goal INR 2.0-3.0)    Recent labs: (last 7 days)     07/02/25  0857   INR 3.0*       ASSESSMENT     Source(s): Chart Review and Template     Warfarin doses taken: Warfarin taken as instructed  Diet: No new diet changes identified  Medication/supplement changes: None noted  New illness, injury, or hospitalization: No  Signs or symptoms of bleeding or clotting: No  Previous result: Therapeutic last visit; previously outside of goal range  Additional findings: None       PLAN     Recommended plan for no diet, medication or health factor changes affecting INR     Dosing Instructions: Continue your current warfarin dose with next INR in 4 weeks       Summary  As of 7/2/2025      Full warfarin instructions:  5 mg every Sun, Tue, Thu; 2.5 mg all other days   Next INR check:  7/30/2025               Detailed voice message left for Abraham with dosing instructions and follow up date.     Contact [phone] to schedule and with any changes, questions or concerns.     Education provided: Please call back if any changes to your diet, medications or how you've been taking warfarin    Plan made per [facility] anticoagulation protocol    Pat Garland, RN  7/2/2025  Anticoagulation Clinic  [employer] for routing messages: p [facility] HEART [facility] patient phone line: [phone]        _______________________________________________________________________     Anticoagulation Episode Summary       Current INR goal:  2.0-3.0   TTR:  55.8% (1.5 mo)   Target end date:  Indefinite   Send INR reminders to:  [facility] HEART [facility]    Indications    Cardiac pacemaker in situ [Z95.0]  Hypertrophic cardiomyopathy (H) [I42.2]  PAF (paroxysmal atrial fibrillation) (H) [I48.0]  NSVT (nonsustained ventricular tachycardia) (H) [I47.29]             Comments:  --             Anticoagulation Care Providers        Provider Role Specialty Phone number    Leandro Rojas MD Referring Cardiovascular Disease [phone]

## 2025-07-15 ENCOUNTER — PRE VISIT (OUTPATIENT)
Dept: CARDIOLOGY | Facility: CLINIC | Age: 65
End: 2025-07-15

## 2025-08-12 ENCOUNTER — TELEPHONE (OUTPATIENT)
Dept: ANTICOAGULATION | Facility: CLINIC | Age: 65
End: 2025-08-12
Payer: COMMERCIAL

## 2025-08-13 ENCOUNTER — LAB (OUTPATIENT)
Dept: CARDIOLOGY | Facility: CLINIC | Age: 65
End: 2025-08-13
Payer: COMMERCIAL

## 2025-08-13 ENCOUNTER — ANTICOAGULATION THERAPY VISIT (OUTPATIENT)
Dept: ANTICOAGULATION | Facility: CLINIC | Age: 65
End: 2025-08-13

## 2025-08-13 ENCOUNTER — TELEPHONE (OUTPATIENT)
Dept: SCHEDULING | Facility: CLINIC | Age: 65
End: 2025-08-13

## 2025-08-13 DIAGNOSIS — I48.0 PAF (PAROXYSMAL ATRIAL FIBRILLATION) (H): ICD-10-CM

## 2025-08-13 DIAGNOSIS — I42.2 HYPERTROPHIC CARDIOMYOPATHY (H): ICD-10-CM

## 2025-08-13 DIAGNOSIS — Z95.0 CARDIAC PACEMAKER IN SITU: Primary | ICD-10-CM

## 2025-08-13 DIAGNOSIS — I47.29 NSVT (NONSUSTAINED VENTRICULAR TACHYCARDIA) (H): ICD-10-CM

## 2025-08-13 LAB — INR POINT OF CARE: 3.9 (ref 0.9–1.1)

## 2025-08-13 PROCEDURE — 36416 COLLJ CAPILLARY BLOOD SPEC: CPT

## 2025-08-13 PROCEDURE — 85610 PROTHROMBIN TIME: CPT

## 2025-08-20 ENCOUNTER — TELEPHONE (OUTPATIENT)
Dept: CARDIOLOGY | Facility: CLINIC | Age: 65
End: 2025-08-20
Payer: COMMERCIAL

## 2025-08-29 ENCOUNTER — LAB (OUTPATIENT)
Dept: CARDIOLOGY | Facility: CLINIC | Age: 65
End: 2025-08-29
Payer: COMMERCIAL

## 2025-08-29 DIAGNOSIS — I48.0 PAF (PAROXYSMAL ATRIAL FIBRILLATION) (H): ICD-10-CM

## 2025-08-29 LAB — INR POINT OF CARE: 2.2 (ref 0.9–1.1)

## 2025-08-29 PROCEDURE — 36416 COLLJ CAPILLARY BLOOD SPEC: CPT

## 2025-08-29 PROCEDURE — 85610 PROTHROMBIN TIME: CPT

## (undated) DEVICE — KIT HAND CONTROL ACIST 014644 AR-P54

## (undated) DEVICE — SHEATH PRELUDE SNAP 25CM 9FR

## (undated) DEVICE — CATHETER ICE VIEWFLEX XTRA

## (undated) DEVICE — Device

## (undated) DEVICE — GUIDEWIRE FORTE FLOPPY J TOP 34949-05J

## (undated) DEVICE — TRANSDUCER W/MONITORING TRAY 42632-05

## (undated) DEVICE — SHEATH PRELUDE SNAP 13CM 8FR

## (undated) DEVICE — INTRO MICRO MINI STICK 4FR STIFF NITINOL 45-753

## (undated) DEVICE — KIT ROTATION TOOL  6056

## (undated) DEVICE — GUIDEWIRE JTIP 3MM .035 180CM IQ35F180J3

## (undated) DEVICE — CUSTOM PACK LOWER EXTREMITY SOP5BLEHEA

## (undated) DEVICE — INTRO TERUMO 5FRX10CM RSS503

## (undated) DEVICE — EXCHANGE WIRE .035 260 STAR/JFC/035/260/ M001491681

## (undated) DEVICE — CATH LAUNCHER 6FR LA6EBU375

## (undated) DEVICE — CUSTOM PACK CORONARY SAN5BCRHEA

## (undated) DEVICE — KIT STYLET EXT TAPER BALL TIP 58CM 6082-58CM

## (undated) DEVICE — SOL NACL 0.9% IRRIG 1000ML BOTTLE 2F7124

## (undated) DEVICE — CUSTOM PACK EP

## (undated) DEVICE — DEVICE SOFT GRIP HEMOSTAT LR-SGH001

## (undated) DEVICE — DRILL BIT ARTHREX 2.5MM AR-8943-30

## (undated) DEVICE — SUTURE VICRYL+ 2-0 CT-2 27" UND VCP269H

## (undated) DEVICE — CAST PADDING 4" STERILE 9044S

## (undated) DEVICE — DRAPE C-ARMOR 5 SIDED 5523

## (undated) DEVICE — COIL COMPRESSION ONE TIE LR-OTE-N G31929

## (undated) DEVICE — SUTURE MONOCRYL 3-0 18 PS2 UND MCP497G

## (undated) DEVICE — GUIDEWIRE NITINOL .018/175CM N181805

## (undated) DEVICE — CATH BRIDGE OCCLUSION BALLOON 6X80MMX90CM 590-001

## (undated) DEVICE — PAD BACK LARGE P-224-T1

## (undated) DEVICE — INTRO MICRO MINI STICK 5FR STIFF NITINOL

## (undated) DEVICE — GUIDEWIRE TERUMO .035X180 ANG GR3508

## (undated) DEVICE — DRSG ADAPTIC 3X8" 6113

## (undated) DEVICE — CATH ANGIO ANG GLIDE 5FRX65CM CG507

## (undated) DEVICE — GLOVE BIOGEL 6 LATEX

## (undated) DEVICE — GLOVE SURG PI ULTRA TOUCH M SZ 7 LF 42670

## (undated) DEVICE — MANIFOLD KIT ANGIO AUTOMATED 014613

## (undated) DEVICE — SU ETHILON 3-0 PS-1 18" 1663G

## (undated) DEVICE — ELECTRODE DEFIB CADENCE 22550R

## (undated) DEVICE — TUBING SUCTION MEDI-VAC 1/4"X20' N620A

## (undated) DEVICE — DEVICE LLD E LEAD LOCKING STYLET SPECTRANETICS 518-039

## (undated) DEVICE — CATH DIAG 4FR JR 5.0 538423

## (undated) DEVICE — DRAPE STOCKINETTE IMPERVIOUS 12" 1587

## (undated) DEVICE — SHEATH PRELUDE SNAP 25CM 10FR

## (undated) DEVICE — ELECTRODE ADULT PACING MULTI P-211-M1

## (undated) DEVICE — SOL WATER IRRIG 1000ML BOTTLE 2F7114

## (undated) DEVICE — CUSTOM PACK PACER ICD SAN5BPCHEA

## (undated) DEVICE — SHEATH GLIDE RADIAL 4FR 25CM 0.021

## (undated) DEVICE — GUIDEWIRE STARTER .035INX150CM

## (undated) DEVICE — SHEATH PINNACLE 9/25/038 RSS905

## (undated) DEVICE — SHEATH PRELUDE SNAP 7FRX13CM PLS-1007

## (undated) DEVICE — KIT STYLET BALL TIP 6057-52CM

## (undated) DEVICE — SYR ANGIOGRAPHY MULTIUSE KIT ACIST 014612

## (undated) DEVICE — PREP CHLORAPREP 26ML TINTED HI-LITE ORANGE 930815

## (undated) DEVICE — DRAPE C-ARM 60X42" 1013

## (undated) DEVICE — GLOVE BIOGEL PI ULTRATOUCH G SZ 7.5 42175

## (undated) DEVICE — DEVICE INFLATION W/KIT & 6IN TUBING

## (undated) DEVICE — INTRO SHEATH TERUMO 10FRX25CM PINNACLE RSS006

## (undated) DEVICE — FORCEP BIOPSY 2.3MM DISP COATED 000388

## (undated) DEVICE — INTRO SHEATH 4FRX10CM PINNACLE RSS402

## (undated) DEVICE — SU VICRYL 3-0 CT-2 27" UND J232H

## (undated) DEVICE — CATH ANGIO INFINITI PIGTAIL 155 6 SH 6FRX110CM 534654S

## (undated) DEVICE — SLEEVE TR BAND RADIAL COMPRESSION DEVICE 29CM XX-RF06L

## (undated) DEVICE — DRAPE CV INCISE CVARTS 89466

## (undated) DEVICE — GLOVE UNDER INDICATOR PI SZ 7.0 LF 41670

## (undated) DEVICE — SHEATH KIT 14FR LASER SPECTRANETICS 500-302

## (undated) DEVICE — ESU BLADE PEAK PLASMA 3.0S PS210-030S

## (undated) DEVICE — KIT WRENCH 5873W

## (undated) DEVICE — ESU GROUND PAD ADULT REM W/15' CORD E7507DB

## (undated) DEVICE — INTRO TERUMO 7FRX25CM W/MARKER RSB703

## (undated) DEVICE — CATH DIAG 4FR ANG PIG 538453S

## (undated) DEVICE — INTRO MICRO MINI STICK 4FR STD NITINOL

## (undated) DEVICE — SUCTION MANIFOLD NEPTUNE 2 SYS 1 PORT 702-025-000

## (undated) DEVICE — DRSG COTTON ROLL DEROYAL STERILE 9866-01

## (undated) DEVICE — SUCTION CANISTER MEDIVAC LINER 3000ML W/LID 65651-530

## (undated) DEVICE — KIT PREP BRIDGE 591-001

## (undated) DEVICE — DEVICE COIL EXPANDER LR-CEX001

## (undated) DEVICE — ESU PENCIL SMOKE EVAC W/ROCKER SWITCH 0703-047-000

## (undated) DEVICE — GLOVE BIOGEL PI INDICATOR 8.0 LF 41680

## (undated) DEVICE — SHEATH GLIDE RADIAL 6FR 25CM 0.021

## (undated) DEVICE — DRSG GAUZE 4X4" TRAY 6939

## (undated) DEVICE — WIRE GUIDE AMPLATZ SUPER STIFF 0.035"X180CM 46-501

## (undated) RX ORDER — PROPOFOL 10 MG/ML
INJECTION, EMULSION INTRAVENOUS
Status: DISPENSED
Start: 2024-07-23

## (undated) RX ORDER — DIAZEPAM 5 MG
TABLET ORAL
Status: DISPENSED
Start: 2021-12-23

## (undated) RX ORDER — FENTANYL CITRATE 50 UG/ML
INJECTION, SOLUTION INTRAMUSCULAR; INTRAVENOUS
Status: DISPENSED
Start: 2024-04-24

## (undated) RX ORDER — DEXMEDETOMIDINE HYDROCHLORIDE 4 UG/ML
INJECTION, SOLUTION INTRAVENOUS
Status: DISPENSED
Start: 2024-04-24

## (undated) RX ORDER — LIDOCAINE HYDROCHLORIDE 10 MG/ML
INJECTION, SOLUTION EPIDURAL; INFILTRATION; INTRACAUDAL; PERINEURAL
Status: DISPENSED
Start: 2024-04-24

## (undated) RX ORDER — PROPOFOL 10 MG/ML
INJECTION, EMULSION INTRAVENOUS
Status: DISPENSED
Start: 2024-04-24

## (undated) RX ORDER — FENTANYL CITRATE 50 UG/ML
INJECTION, SOLUTION INTRAMUSCULAR; INTRAVENOUS
Status: DISPENSED
Start: 2021-12-23

## (undated) RX ORDER — DEXAMETHASONE SODIUM PHOSPHATE 10 MG/ML
INJECTION, SOLUTION INTRAMUSCULAR; INTRAVENOUS
Status: DISPENSED
Start: 2024-04-24